# Patient Record
Sex: MALE | Race: WHITE | Employment: OTHER | URBAN - METROPOLITAN AREA
[De-identification: names, ages, dates, MRNs, and addresses within clinical notes are randomized per-mention and may not be internally consistent; named-entity substitution may affect disease eponyms.]

---

## 2017-04-01 ENCOUNTER — APPOINTMENT (OUTPATIENT)
Dept: GENERAL RADIOLOGY | Age: 72
DRG: 271 | End: 2017-04-01
Attending: EMERGENCY MEDICINE
Payer: MEDICARE

## 2017-04-01 ENCOUNTER — APPOINTMENT (OUTPATIENT)
Dept: CT IMAGING | Age: 72
DRG: 271 | End: 2017-04-01
Attending: EMERGENCY MEDICINE
Payer: MEDICARE

## 2017-04-01 ENCOUNTER — HOSPITAL ENCOUNTER (INPATIENT)
Age: 72
LOS: 13 days | Discharge: HOME HEALTH | DRG: 271 | End: 2017-04-14
Attending: EMERGENCY MEDICINE | Admitting: SURGERY
Payer: MEDICARE

## 2017-04-01 DIAGNOSIS — R11.2 INTRACTABLE VOMITING WITH NAUSEA, UNSPECIFIED VOMITING TYPE: ICD-10-CM

## 2017-04-01 DIAGNOSIS — N17.9 ACUTE RENAL FAILURE, UNSPECIFIED ACUTE RENAL FAILURE TYPE (HCC): ICD-10-CM

## 2017-04-01 DIAGNOSIS — K43.9 VENTRAL HERNIA WITHOUT OBSTRUCTION OR GANGRENE: ICD-10-CM

## 2017-04-01 DIAGNOSIS — I71.40 ABDOMINAL AORTIC ANEURYSM (AAA) GREATER THAN 5.5 CM IN DIAMETER IN MALE: Primary | ICD-10-CM

## 2017-04-01 LAB
ALBUMIN SERPL BCP-MCNC: 3.4 G/DL (ref 3.5–5)
ALBUMIN/GLOB SERPL: 0.9 {RATIO} (ref 1.1–2.2)
ALP SERPL-CCNC: 92 U/L (ref 45–117)
ALT SERPL-CCNC: 15 U/L (ref 12–78)
ANION GAP BLD CALC-SCNC: 12 MMOL/L (ref 5–15)
AST SERPL W P-5'-P-CCNC: 9 U/L (ref 15–37)
BASOPHILS # BLD AUTO: 0.1 K/UL (ref 0–0.1)
BASOPHILS # BLD: 0 % (ref 0–1)
BILIRUB SERPL-MCNC: 0.6 MG/DL (ref 0.2–1)
BUN SERPL-MCNC: 68 MG/DL (ref 6–20)
BUN/CREAT SERPL: 39 (ref 12–20)
CALCIUM SERPL-MCNC: 9.3 MG/DL (ref 8.5–10.1)
CHLORIDE SERPL-SCNC: 111 MMOL/L (ref 97–108)
CO2 SERPL-SCNC: 22 MMOL/L (ref 21–32)
CREAT SERPL-MCNC: 1.76 MG/DL (ref 0.7–1.3)
EOSINOPHIL # BLD: 0.1 K/UL (ref 0–0.4)
EOSINOPHIL NFR BLD: 1 % (ref 0–7)
ERYTHROCYTE [DISTWIDTH] IN BLOOD BY AUTOMATED COUNT: 14 % (ref 11.5–14.5)
GLOBULIN SER CALC-MCNC: 3.8 G/DL (ref 2–4)
GLUCOSE SERPL-MCNC: 131 MG/DL (ref 65–100)
HCT VFR BLD AUTO: 40.6 % (ref 36.6–50.3)
HGB BLD-MCNC: 13.9 G/DL (ref 12.1–17)
INR PPP: 1.1 (ref 0.9–1.1)
LACTATE SERPL-SCNC: 1.2 MMOL/L (ref 0.4–2)
LIPASE SERPL-CCNC: 146 U/L (ref 73–393)
LYMPHOCYTES # BLD AUTO: 13 % (ref 12–49)
LYMPHOCYTES # BLD: 1.6 K/UL (ref 0.8–3.5)
MCH RBC QN AUTO: 32.3 PG (ref 26–34)
MCHC RBC AUTO-ENTMCNC: 34.2 G/DL (ref 30–36.5)
MCV RBC AUTO: 94.4 FL (ref 80–99)
MONOCYTES # BLD: 1 K/UL (ref 0–1)
MONOCYTES NFR BLD AUTO: 9 % (ref 5–13)
NEUTS SEG # BLD: 9.3 K/UL (ref 1.8–8)
NEUTS SEG NFR BLD AUTO: 77 % (ref 32–75)
PLATELET # BLD AUTO: 191 K/UL (ref 150–400)
POTASSIUM SERPL-SCNC: 4.4 MMOL/L (ref 3.5–5.1)
PROT SERPL-MCNC: 7.2 G/DL (ref 6.4–8.2)
PROTHROMBIN TIME: 11.1 SEC (ref 9–11.1)
RBC # BLD AUTO: 4.3 M/UL (ref 4.1–5.7)
SODIUM SERPL-SCNC: 145 MMOL/L (ref 136–145)
WBC # BLD AUTO: 12.1 K/UL (ref 4.1–11.1)

## 2017-04-01 PROCEDURE — 85025 COMPLETE CBC W/AUTO DIFF WBC: CPT | Performed by: EMERGENCY MEDICINE

## 2017-04-01 PROCEDURE — 83605 ASSAY OF LACTIC ACID: CPT | Performed by: EMERGENCY MEDICINE

## 2017-04-01 PROCEDURE — 80053 COMPREHEN METABOLIC PANEL: CPT | Performed by: EMERGENCY MEDICINE

## 2017-04-01 PROCEDURE — 85610 PROTHROMBIN TIME: CPT | Performed by: EMERGENCY MEDICINE

## 2017-04-01 PROCEDURE — 65610000006 HC RM INTENSIVE CARE

## 2017-04-01 PROCEDURE — 83690 ASSAY OF LIPASE: CPT | Performed by: EMERGENCY MEDICINE

## 2017-04-01 PROCEDURE — 96375 TX/PRO/DX INJ NEW DRUG ADDON: CPT

## 2017-04-01 PROCEDURE — 74011250636 HC RX REV CODE- 250/636: Performed by: EMERGENCY MEDICINE

## 2017-04-01 PROCEDURE — 96376 TX/PRO/DX INJ SAME DRUG ADON: CPT

## 2017-04-01 PROCEDURE — 74011000258 HC RX REV CODE- 258: Performed by: SURGERY

## 2017-04-01 PROCEDURE — 86920 COMPATIBILITY TEST SPIN: CPT | Performed by: EMERGENCY MEDICINE

## 2017-04-01 PROCEDURE — 74176 CT ABD & PELVIS W/O CONTRAST: CPT

## 2017-04-01 PROCEDURE — 74011000250 HC RX REV CODE- 250: Performed by: SURGERY

## 2017-04-01 PROCEDURE — 74022 RADEX COMPL AQT ABD SERIES: CPT

## 2017-04-01 PROCEDURE — 36415 COLL VENOUS BLD VENIPUNCTURE: CPT | Performed by: EMERGENCY MEDICINE

## 2017-04-01 PROCEDURE — 74011250636 HC RX REV CODE- 250/636: Performed by: SURGERY

## 2017-04-01 PROCEDURE — 86900 BLOOD TYPING SEROLOGIC ABO: CPT | Performed by: EMERGENCY MEDICINE

## 2017-04-01 PROCEDURE — 96374 THER/PROPH/DIAG INJ IV PUSH: CPT

## 2017-04-01 PROCEDURE — 93005 ELECTROCARDIOGRAM TRACING: CPT

## 2017-04-01 PROCEDURE — 96361 HYDRATE IV INFUSION ADD-ON: CPT

## 2017-04-01 PROCEDURE — 99284 EMERGENCY DEPT VISIT MOD MDM: CPT

## 2017-04-01 RX ORDER — METOCLOPRAMIDE HYDROCHLORIDE 5 MG/ML
10 INJECTION INTRAMUSCULAR; INTRAVENOUS
Status: COMPLETED | OUTPATIENT
Start: 2017-04-01 | End: 2017-04-01

## 2017-04-01 RX ORDER — ONDANSETRON 2 MG/ML
4 INJECTION INTRAMUSCULAR; INTRAVENOUS
Status: COMPLETED | OUTPATIENT
Start: 2017-04-01 | End: 2017-04-01

## 2017-04-01 RX ORDER — MORPHINE SULFATE 2 MG/ML
6 INJECTION, SOLUTION INTRAMUSCULAR; INTRAVENOUS
Status: COMPLETED | OUTPATIENT
Start: 2017-04-01 | End: 2017-04-01

## 2017-04-01 RX ORDER — ESMOLOL HYDROCHLORIDE 10 MG/ML
50 INJECTION, SOLUTION INTRAVENOUS
Status: DISCONTINUED | OUTPATIENT
Start: 2017-04-01 | End: 2017-04-04

## 2017-04-01 RX ORDER — SODIUM CHLORIDE 9 MG/ML
250 INJECTION, SOLUTION INTRAVENOUS AS NEEDED
Status: DISCONTINUED | OUTPATIENT
Start: 2017-04-01 | End: 2017-04-03 | Stop reason: ALTCHOICE

## 2017-04-01 RX ORDER — ONDANSETRON 2 MG/ML
4 INJECTION INTRAMUSCULAR; INTRAVENOUS
Status: DISCONTINUED | OUTPATIENT
Start: 2017-04-01 | End: 2017-04-02

## 2017-04-01 RX ORDER — FENTANYL CITRATE 50 UG/ML
50 INJECTION, SOLUTION INTRAMUSCULAR; INTRAVENOUS
Status: COMPLETED | OUTPATIENT
Start: 2017-04-01 | End: 2017-04-01

## 2017-04-01 RX ORDER — SODIUM CHLORIDE 450 MG/100ML
100 INJECTION, SOLUTION INTRAVENOUS CONTINUOUS
Status: DISCONTINUED | OUTPATIENT
Start: 2017-04-01 | End: 2017-04-02

## 2017-04-01 RX ORDER — IBUPROFEN 200 MG
1 TABLET ORAL DAILY
Status: DISCONTINUED | OUTPATIENT
Start: 2017-04-02 | End: 2017-04-14 | Stop reason: HOSPADM

## 2017-04-01 RX ADMIN — ONDANSETRON HYDROCHLORIDE 4 MG: 2 INJECTION, SOLUTION INTRAMUSCULAR; INTRAVENOUS at 21:25

## 2017-04-01 RX ADMIN — ONDANSETRON HYDROCHLORIDE 4 MG: 2 INJECTION, SOLUTION INTRAMUSCULAR; INTRAVENOUS at 23:29

## 2017-04-01 RX ADMIN — FENTANYL CITRATE 50 MCG: 50 INJECTION, SOLUTION INTRAMUSCULAR; INTRAVENOUS at 21:25

## 2017-04-01 RX ADMIN — SODIUM CHLORIDE 100 ML/HR: 450 INJECTION, SOLUTION INTRAVENOUS at 22:51

## 2017-04-01 RX ADMIN — ESMOLOL HYDROCHLORIDE 50 MCG/KG/MIN: 10 INJECTION INTRAVENOUS at 23:14

## 2017-04-01 RX ADMIN — Medication 6 MG: at 18:55

## 2017-04-01 RX ADMIN — SODIUM CHLORIDE 1000 ML: 900 INJECTION, SOLUTION INTRAVENOUS at 18:55

## 2017-04-01 RX ADMIN — METOCLOPRAMIDE 10 MG: 5 INJECTION, SOLUTION INTRAMUSCULAR; INTRAVENOUS at 19:38

## 2017-04-01 RX ADMIN — ONDANSETRON HYDROCHLORIDE 4 MG: 2 INJECTION, SOLUTION INTRAMUSCULAR; INTRAVENOUS at 18:54

## 2017-04-01 NOTE — ED PROVIDER NOTES
HPI Comments: Ari Taylor is a 66 y.o. M with PMHx significant for BPH / CAD / HTN who presents in wheelchair to ED [facility] ED c/o diffuse abdominal pain (10/10), nausea and vomiting x this morning. Per family, pt had feculent smelling vomit. Pt has a ventral hernia that he notes has not been worked up. He reports a chronic cough, no change today. Patient reports current 3 ppd tobacco use. He denies any hx of abdominal surgeries. Pt specifically denies any fever, SOB or chest pain. PCP: Stephen Espino NP    There are no other complaints, changes, or physical findings at this time. The history is provided by the patient and a relative. Past Medical History:   Diagnosis Date    BPH (benign prostatic hyperplasia)     CAD (coronary artery disease)     2006 - s/p stent     Essential hypertension 12-12-13    Hematuria     sees urologist    Hyperglycemia        Past Surgical History:   Procedure Laterality Date    CARDIAC SURG PROCEDURE UNLIST      stent 2006         History reviewed. No pertinent family history. Social History     Social History    Marital status:      Spouse name: N/A    Number of children: N/A    Years of education: N/A     Occupational History    Not on file. Social History Main Topics    Smoking status: Current Every Day Smoker     Packs/day: 3.00     Years: 53.00    Smokeless tobacco: Not on file    Alcohol use No    Drug use: Not on file    Sexual activity: Not on file     Other Topics Concern    Not on file     Social History Narrative         ALLERGIES: Lipitor [atorvastatin]    Review of Systems   Constitutional: Negative. Negative for appetite change, chills, fatigue and fever. HENT: Negative. Negative for congestion, rhinorrhea, sinus pressure and sore throat. Eyes: Negative. Respiratory: Negative. Negative for cough, choking, chest tightness, shortness of breath and wheezing. Cardiovascular: Negative.   Negative for chest pain, palpitations and leg swelling. Gastrointestinal: Positive for abdominal pain, nausea and vomiting. Negative for constipation and diarrhea. Endocrine: Negative. Genitourinary: Negative. Negative for difficulty urinating, dysuria, flank pain and urgency. Musculoskeletal:        Positive for ventral hernia   Skin: Negative. Neurological: Negative. Negative for dizziness, speech difficulty, weakness, light-headedness, numbness and headaches. Psychiatric/Behavioral: Negative. All other systems reviewed and are negative. Patient Vitals for the past 12 hrs:   Temp Pulse Resp BP SpO2   04/01/17 2215 - 81 - 126/48 96 %   04/01/17 2125 - 82 20 126/67 94 %   04/01/17 2102 - - - 144/86 95 %   04/01/17 2000 - - - 112/76 96 %   04/01/17 1914 - - - - 98 %   04/01/17 1802 97.4 °F (36.3 °C) 87 18 146/88 98 %            Physical Exam   Constitutional: He is oriented to person, place, and time. He appears well-developed and well-nourished. No distress. HENT:   Head: Normocephalic and atraumatic. Mouth/Throat: Oropharynx is clear and moist. No oropharyngeal exudate. Eyes: Conjunctivae and EOM are normal. Pupils are equal, round, and reactive to light. Neck: Normal range of motion. Neck supple. No JVD present. No tracheal deviation present. Cardiovascular: Normal rate, regular rhythm, normal heart sounds and intact distal pulses. No murmur heard. Pulmonary/Chest: Effort normal and breath sounds normal. No stridor. No respiratory distress. He has no wheezes. He has no rales. He exhibits no tenderness. Abdominal: Soft. He exhibits no distension. There is no tenderness. There is no rebound and no guarding. Obese male, hernia ventral abdomen, mild ttp, no bowel sounds to hernia   Musculoskeletal: Normal range of motion. He exhibits no edema or tenderness. Neurological: He is alert and oriented to person, place, and time. No cranial nerve deficit.    No gross motor or sensory deficits    Skin: Skin is warm and dry. He is not diaphoretic. Psychiatric: He has a normal mood and affect. His behavior is normal.   Nursing note and vitals reviewed. MDM  Number of Diagnoses or Management Options  Abdominal aortic aneurysm (AAA) greater than 5.5 cm in diameter in male [facility]):   Acute renal failure, unspecified acute renal failure type [facility]): Intractable vomiting with nausea, unspecified vomiting type:   Ventral hernia without obstruction or gangrene:   Diagnosis management comments: DDx: Bowel obstruction, Incarcerated hernia, Strangulated hernia, Pancreatitis. Amount and/or Complexity of Data Reviewed  Clinical lab tests: ordered and reviewed  Tests in the radiology section of CPT®: ordered and reviewed  Review and summarize past medical records: yes  Discuss the patient with other providers: yes (Dr. Linus Beavers. Yoselyn Fu)    Patient Progress  Patient progress: stable    ED Course       Procedures     ED EKG interpretation:  Rhythm: normal sinus rhythm; and regular . Rate (approx.): 81; Axis: normal; P wave: normal; QRS interval: normal ; ST/T wave: normal; This EKG was interpreted by Titus Gómez DO,ED Provider. CONSULT NOTE:   9:11 PM  Titus Gómez DO spoke with Dr. Linus Beavers. Yoselyn Fu,   Specialty: Vascular Surgery  Discussed pt's hx, disposition, and available diagnostic and imaging results. Reviewed care plans. Consultant agrees with plans as outlined. Consultant will come and evaluate patient. Written by Naomi Horn. Thalia Foote ED Scribe, as dictated by Titus Gómez DO.    CONSULT NOTE:   10:28 PM  Titus Gmóez DO spoke with Dr. Linus Beavers. Yoselyn Fu,   Specialty: Vascular Surgery  Discussed pt's hx, disposition, and available diagnostic and imaging results. Reviewed care plans. Consultant agrees with plans as outlined. Consultant will admit patient. Written by Naomi Horn.  Thalia Foote ED Scribe, as dictated by Titus Gómez DO.    LABORATORY TESTS:  Recent Results (from the past 12 hour(s))   CBC WITH AUTOMATED DIFF Collection Time: 04/01/17  6:56 PM   Result Value Ref Range    WBC 12.1 (H) 4.1 - 11.1 K/uL    RBC 4.30 4. 10 - 5.70 M/uL    HGB 13.9 12.1 - 17.0 g/dL    HCT 40.6 36.6 - 50.3 %    MCV 94.4 80.0 - 99.0 FL    MCH 32.3 26.0 - 34.0 PG    MCHC 34.2 30.0 - 36.5 g/dL    RDW 14.0 11.5 - 14.5 %    PLATELET 641 728 - 094 K/uL    NEUTROPHILS 77 (H) 32 - 75 %    LYMPHOCYTES 13 12 - 49 %    MONOCYTES 9 5 - 13 %    EOSINOPHILS 1 0 - 7 %    BASOPHILS 0 0 - 1 %    ABS. NEUTROPHILS 9.3 (H) 1.8 - 8.0 K/UL    ABS. LYMPHOCYTES 1.6 0.8 - 3.5 K/UL    ABS. MONOCYTES 1.0 0.0 - 1.0 K/UL    ABS. EOSINOPHILS 0.1 0.0 - 0.4 K/UL    ABS. BASOPHILS 0.1 0.0 - 0.1 K/UL   METABOLIC PANEL, COMPREHENSIVE    Collection Time: 04/01/17  6:56 PM   Result Value Ref Range    Sodium 145 136 - 145 mmol/L    Potassium 4.4 3.5 - 5.1 mmol/L    Chloride 111 (H) 97 - 108 mmol/L    CO2 22 21 - 32 mmol/L    Anion gap 12 5 - 15 mmol/L    Glucose 131 (H) 65 - 100 mg/dL    BUN 68 (H) 6 - 20 MG/DL    Creatinine 1.76 (H) 0.70 - 1.30 MG/DL    BUN/Creatinine ratio 39 (H) 12 - 20      GFR est AA 46 (L) >60 ml/min/1.73m2    GFR est non-AA 38 (L) >60 ml/min/1.73m2    Calcium 9.3 8.5 - 10.1 MG/DL    Bilirubin, total 0.6 0.2 - 1.0 MG/DL    ALT (SGPT) 15 12 - 78 U/L    AST (SGOT) 9 (L) 15 - 37 U/L    Alk.  phosphatase 92 45 - 117 U/L    Protein, total 7.2 6.4 - 8.2 g/dL    Albumin 3.4 (L) 3.5 - 5.0 g/dL    Globulin 3.8 2.0 - 4.0 g/dL    A-G Ratio 0.9 (L) 1.1 - 2.2     LIPASE    Collection Time: 04/01/17  6:56 PM   Result Value Ref Range    Lipase 146 73 - 393 U/L   PROTHROMBIN TIME + INR    Collection Time: 04/01/17  6:56 PM   Result Value Ref Range    INR 1.1 0.9 - 1.1      Prothrombin time 11.1 9.0 - 11.1 sec   LACTIC ACID, PLASMA    Collection Time: 04/01/17  6:56 PM   Result Value Ref Range    Lactic acid 1.2 0.4 - 2.0 MMOL/L       IMAGING RESULTS:  CT ABD PELV WO CONT   Final Result   INDICATION: severe abdominal pain, n/v, ventral hernia     COMPARISON: Earlier radiographs     TECHNIQUE:   Unenhanced axial images were obtained through the abdomen and pelvis. Coronal  and sagittal reconstructions were generated. Oral contrast was not administered. CT dose reduction was achieved through use of a standardized protocol tailored  for this examination and automatic exposure control for dose modulation.      The absence of intravenous and oral contrast material reduces the capacity of CT  to evaluate the solid parenchymal organs of the abdomen and pelvis as well as  the bowel.      FINDINGS:   LUNG BASES: Clear. INCIDENTALLY IMAGED HEART AND MEDIASTINUM: Vascular calcifications at the aortic  root and within the right coronary and left anterior descending coronary  arteries. LIVER: No mass or biliary dilatation. GALLBLADDER: Unremarkable. SPLEEN: No mass. PANCREAS: No mass or ductal dilatation. ADRENALS: Unremarkable. KIDNEYS/URETERS: Several tiny nonobstructing bilateral renal calculi. No renal  pelvocaliectasis. Several bilateral nonspecific lesions of the kidneys measuring  up to 1.9 cm in size, one of which is hyperdense in the posterior right mid pole  measuring 1.1 cm in size. STOMACH: Nondistended. SMALL BOWEL: Nondistended. COLON: Nondistended. APPENDIX: Unremarkable. PERITONEUM: No free peroneal air or fluid. RETROPERITONEUM: Renal and infrarenal fusiform abdominal aortic aneurysm  measuring up to 8.4 cm AP, extending to level of aortic bifurcation. Right  common iliac artery aneurysm measuring 2.9 cm. URINARY BLADDER: No mass or calculus. BONES: Moderate lumbosacral junction, mild lumbar and mild lower thoracic spine  degenerative changes. Mild bilateral hip and SI joint osteoarthrosis. Moderate  degenerative changes at symphysis pubis. ADDITIONAL COMMENTS: Superior periumbilical hernia containing fat, with orifice  measuring 2.2 cm.     IMPRESSION  IMPRESSION:     1. Renal and infrarenal abdominal aortic aneurysm measuring up to 8.4 cm AP.   2. Right common iliac artery aneurysm measuring 2.9 cm.  3. Peripheral vertical hernia containing fat. 4. Subtle nonspecific renal lesions present bilaterally measuring up to 1.9 cm,  one of which is hyperattenuating. XR ABD ACUTE W 1 V CHEST   Final Result   EXAM: XR ABD ACUTE W 1 V CHEST     INDICATION: Abdominal Pain     COMPARISON: None.     FINDINGS: The upright chest radiograph demonstrates clear lungs and normal  cardiac and mediastinal contours. There is no pleural effusion or free air under  the diaphragm.     Supine and upright views of the abdomen demonstrate a nonobstructive bowel gas  pattern. There is no free intraperitoneal air. No soft tissue masses or  pathologic calcifications are identified. Mild lumbar and thoracic spine  degenerative changes are shown.     IMPRESSION  IMPRESSION: No evidence for bowel obstruction or free intraperineal air. MEDICATIONS GIVEN:  Medications   sodium chloride 0.9 % bolus infusion 1,000 mL (0 mL IntraVENous IV Completed 4/1/17 2120)   morphine injection 6 mg (6 mg IntraVENous Given 4/1/17 1855)   ondansetron (ZOFRAN) injection 4 mg (4 mg IntraVENous Given 4/1/17 1854)   metoclopramide HCl (REGLAN) injection 10 mg (10 mg IntraVENous Given 4/1/17 1938)   ondansetron (ZOFRAN) injection 4 mg (4 mg IntraVENous Given 4/1/17 2125)   fentaNYL citrate (PF) injection 50 mcg (50 mcg IntraVENous Given 4/1/17 2125)       IMPRESSION:  1. Abdominal aortic aneurysm (AAA) greater than 5.5 cm in diameter in [facility])    2. Ventral hernia without obstruction or gangrene    3. Intractable vomiting with nausea, unspecified vomiting type    4. Acute renal failure, unspecified acute renal failure type ([address]        PLAN:  1. Admit to hospital.    10:28 PM  Patient is being admitted to the hospital by Dr. Noemi Calles. Matilde Yost. The results of their tests and reasons for their admission have been discussed with them and/or available family.   They convey agreement and understanding for the need to be admitted and for their admission diagnosis. Consultation has been made with the inpatient physician specialist for hospitalization. Written by Rubi Paz. Bill Casper ED Scribe, as dictated by New So DO. Attestations: This note is prepared by Rubi Paz. Lianna, acting as Scribe for New So, [address] [place] [address]: The scribe's documentation has been prepared under my direction and personally reviewed by me in its entirety. I confirm that the note above accurately reflects all work, treatment, procedures, and medical decision making performed by me.

## 2017-04-01 NOTE — IP AVS SNAPSHOT
[address] [facility] [phone] Patient: Claudell Leyland MRN: [id number] KCZ:[id number] You are allergic to the following Allergen Reactions Lipitor (Atorvastatin) Other (comments) \"muscle pain\" Recent Documentation Height Weight BMI Smoking Status 1.753 m 111.7 kg 36.37 kg/m2 Current Every Day Smoker Emergency Contacts Name Discharge Info Relation Home Work Mobile [id number] [facility]  Spouse [3] [phone] Nellie Iniguez  Child [2] [phone] About your hospitalization You were admitted on:  April 1, 2017 You last received care in the:  [facility] 2 GENERAL SURGERY You were discharged on:  April 14, 2017 Unit phone number:  [phone] Why you were hospitalized Your primary diagnosis was:  Not on File Your diagnoses also included:  Aaa (Abdominal Aortic Aneurysm) ([facility]), Tobacco Use Disorder, Dyslipidemia, Cad (Coronary Artery Disease), Pad (Peripheral Artery Disease) ([facility]), Carotid Stenosis, Right, S/P Ptca (Percutaneous Transluminal Coronary Angioplasty) Providers Seen During Your Hospitalizations Provider Role Specialty Primary office phone Kiana Hernandez DO Attending Provider Emergency Medicine [phone] Rosamaria Avina MD Attending Provider General and Vascular Surgery [phone] Your Primary Care Physician (PCP) Primary Care Physician Office Phone Office Fax Tr Holley [phone] Follow-up Information Follow up With Details Comments Contact Info Brody Burgess MD Schedule an appointment as soon as possible for a visit in 3 weeks  [address]. [phone] Nabil Divers, NP   [address] [facility] [address] [place] [address] 
[phone] Current Discharge Medication List  
  
 START taking these medications Dose & Instructions Dispensing Information Comments Morning Noon Evening Bedtime HYDROcodone-acetaminophen 5-325 mg per tablet Commonly known as:  Juanitahonorio Zimmermanle Your last dose was: Your next dose is:    
   
   
 Dose:  1 Tab Take 1 Tab by mouth every four (4) hours as needed for Pain. Max Daily Amount: 6 Tabs. Quantity:  20 Tab Refills:  0  
     
   
   
   
  
 zolpidem 5 mg tablet Commonly known as:  AMBIEN Your last dose was: Your next dose is:    
   
   
 Dose:  5 mg Take 1 Tab by mouth nightly as needed for Sleep. Max Daily Amount: 5 mg. Quantity:  30 Tab Refills:  0 CONTINUE these medications which have NOT CHANGED Dose & Instructions Dispensing Information Comments Morning Noon Evening Bedtime  
 aspirin delayed-release 81 mg tablet Your last dose was: Your next dose is:    
   
   
 Dose:  162 mg Take 2 Tabs by mouth daily. Quantity:  100 Tab Refills:  2  
     
   
   
   
  
 atorvastatin 10 mg tablet Commonly known as:  LIPITOR Your last dose was: Your next dose is: Take  by mouth daily. Refills:  0  
     
   
   
   
  
 hydroCHLOROthiazide 25 mg tablet Commonly known as:  HYDRODIURIL Your last dose was: Your next dose is:    
   
   
 Dose:  25 mg Take 25 mg by mouth daily. Refills:  0 PLAVIX 75 mg Tab Generic drug:  clopidogrel Your last dose was: Your next dose is: Take  by mouth daily. Refills:  0 PROTONIX 40 mg tablet Generic drug:  pantoprazole Your last dose was: Your next dose is:    
   
   
 Dose:  40 mg Take 40 mg by mouth daily. Refills:  0 Where to Get Your Medications Information on where to get these meds will be given to you by the nurse or doctor. ! Ask your nurse or doctor about these medications HYDROcodone-acetaminophen 5-325 mg per tablet  
 zolpidem 5 mg tablet Discharge Instructions Patient Discharge Instructions Kamala Avilez / [id number] : 1945 Admitted 2017 Discharged: 2017 What to do at [facility] May shower No driving, lifting, or straining Need  for PT and nursing visits Information obtained by : 
I understand that if any problems occur once I am at home I am to contact my physician. I understand and acknowledge receipt of the instructions indicated above. R.N.'s Signature                                                                  Date/Time Patient or Representative Signature                                                          Date/Time Alyx Chávez MD 
 
 
Discharge Orders None Introducing [facility]! [facility] introduces [employer] patient portal. Now you can access parts of your medical record, email your doctor's office, and request medication refills online. 1. In your internet browser, go to [email or web address] 2. Click on the First Time User? Click Here link in the Sign In box. You will see the New Member Sign Up page. 3. Enter your [employer] Access Code exactly as it appears below. You will not need to use this code after youve completed the sign-up process. If you do not sign up before the expiration date, you must request a new code. · [employer] Access Code: [email or web address] Expires: 2017  6:26 PM 
 
4.  Enter the last four digits of your Social Security Number (xxxx) and Date of Birth (mm/dd/yyyy) as indicated and click Submit. You will be taken to the next sign-up page. 5. Create a [employer]ck ID. This will be your [employer] login ID and cannot be changed, so think of one that is secure and easy to remember. 6. Create a [employer]ck password. You can change your password at any time. 7. Enter your Password Reset Question and Answer. This can be used at a later time if you forget your password. 8. Enter your e-mail address. You will receive e-mail notification when new information is available in [address] 9. Click Sign Up. You can now view and download portions of your medical record. 10. Click the Download Summary menu link to download a portable copy of your medical information. If you have questions, please visit the Frequently Asked Questions section of the [employer] website. Remember, [employer] is NOT to be used for urgent needs. For medical emergencies, dial 911. Now available from your iPhone and Android! General Information Please provide this summary of care documentation to your next provider. Patient Signature:  ____________________________________________________________ Date:  ____________________________________________________________  
  
Kayleigh Cons Provider Signature:  ____________________________________________________________ Date:  ____________________________________________________________

## 2017-04-01 NOTE — IP AVS SNAPSHOT
Current Discharge Medication List  
  
START taking these medications Dose & Instructions Dispensing Information Comments Morning Noon Evening Bedtime HYDROcodone-acetaminophen 5-325 mg per tablet Commonly known as:  Franck Porter Your last dose was: Your next dose is:    
   
   
 Dose:  1 Tab Take 1 Tab by mouth every four (4) hours as needed for Pain. Max Daily Amount: 6 Tabs. Quantity:  20 Tab Refills:  0  
     
   
   
   
  
 zolpidem 5 mg tablet Commonly known as:  AMBIEN Your last dose was: Your next dose is:    
   
   
 Dose:  5 mg Take 1 Tab by mouth nightly as needed for Sleep. Max Daily Amount: 5 mg. Quantity:  30 Tab Refills:  0 CONTINUE these medications which have NOT CHANGED Dose & Instructions Dispensing Information Comments Morning Noon Evening Bedtime  
 aspirin delayed-release 81 mg tablet Your last dose was: Your next dose is:    
   
   
 Dose:  162 mg Take 2 Tabs by mouth daily. Quantity:  100 Tab Refills:  2  
     
   
   
   
  
 atorvastatin 10 mg tablet Commonly known as:  LIPITOR Your last dose was: Your next dose is: Take  by mouth daily. Refills:  0  
     
   
   
   
  
 hydroCHLOROthiazide 25 mg tablet Commonly known as:  HYDRODIURIL Your last dose was: Your next dose is:    
   
   
 Dose:  25 mg Take 25 mg by mouth daily. Refills:  0 PLAVIX 75 mg Tab Generic drug:  clopidogrel Your last dose was: Your next dose is: Take  by mouth daily. Refills:  0 PROTONIX 40 mg tablet Generic drug:  pantoprazole Your last dose was: Your next dose is:    
   
   
 Dose:  40 mg Take 40 mg by mouth daily. Refills:  0 Where to Get Your Medications Information on where to get these meds will be given to you by the nurse or doctor. ! Ask your nurse or doctor about these medications HYDROcodone-acetaminophen 5-325 mg per tablet  
 zolpidem 5 mg tablet

## 2017-04-02 ENCOUNTER — APPOINTMENT (OUTPATIENT)
Dept: GENERAL RADIOLOGY | Age: 72
DRG: 271 | End: 2017-04-02
Attending: FAMILY MEDICINE
Payer: MEDICARE

## 2017-04-02 ENCOUNTER — APPOINTMENT (OUTPATIENT)
Dept: ULTRASOUND IMAGING | Age: 72
DRG: 271 | End: 2017-04-02
Attending: INTERNAL MEDICINE
Payer: MEDICARE

## 2017-04-02 PROBLEM — I65.21 CAROTID STENOSIS, RIGHT: Status: ACTIVE | Noted: 2017-04-02

## 2017-04-02 PROBLEM — Z98.61 S/P PTCA (PERCUTANEOUS TRANSLUMINAL CORONARY ANGIOPLASTY): Status: ACTIVE | Noted: 2017-04-02

## 2017-04-02 PROBLEM — I73.9 PAD (PERIPHERAL ARTERY DISEASE) (HCC): Status: ACTIVE | Noted: 2017-04-02

## 2017-04-02 LAB
ALBUMIN SERPL BCP-MCNC: 2.9 G/DL (ref 3.5–5)
ALBUMIN/GLOB SERPL: 0.9 {RATIO} (ref 1.1–2.2)
ALP SERPL-CCNC: 77 U/L (ref 45–117)
ALT SERPL-CCNC: 13 U/L (ref 12–78)
ANION GAP BLD CALC-SCNC: 10 MMOL/L (ref 5–15)
ANION GAP BLD CALC-SCNC: 11 MMOL/L (ref 5–15)
APPEARANCE UR: CLEAR
AST SERPL W P-5'-P-CCNC: 13 U/L (ref 15–37)
ATRIAL RATE: 81 BPM
BACTERIA URNS QL MICRO: NEGATIVE /HPF
BASOPHILS # BLD AUTO: 0 K/UL (ref 0–0.1)
BASOPHILS # BLD: 0 % (ref 0–1)
BILIRUB SERPL-MCNC: 0.6 MG/DL (ref 0.2–1)
BILIRUB UR QL: NEGATIVE
BUN SERPL-MCNC: 69 MG/DL (ref 6–20)
BUN SERPL-MCNC: 78 MG/DL (ref 6–20)
BUN/CREAT SERPL: 41 (ref 12–20)
BUN/CREAT SERPL: 45 (ref 12–20)
CALCIUM SERPL-MCNC: 8 MG/DL (ref 8.5–10.1)
CALCIUM SERPL-MCNC: 8.5 MG/DL (ref 8.5–10.1)
CALCULATED P AXIS, ECG09: 58 DEGREES
CALCULATED R AXIS, ECG10: 19 DEGREES
CALCULATED T AXIS, ECG11: 61 DEGREES
CHLORIDE SERPL-SCNC: 116 MMOL/L (ref 97–108)
CHLORIDE SERPL-SCNC: 116 MMOL/L (ref 97–108)
CHLORIDE UR-SCNC: 24 MMOL/L
CK SERPL-CCNC: 49 U/L (ref 39–308)
CO2 SERPL-SCNC: 18 MMOL/L (ref 21–32)
CO2 SERPL-SCNC: 20 MMOL/L (ref 21–32)
COLOR UR: ABNORMAL
CREAT SERPL-MCNC: 1.67 MG/DL (ref 0.7–1.3)
CREAT SERPL-MCNC: 1.75 MG/DL (ref 0.7–1.3)
CREAT UR-MCNC: 129 MG/DL
DIAGNOSIS, 93000: NORMAL
EOSINOPHIL # BLD: 0.1 K/UL (ref 0–0.4)
EOSINOPHIL #/AREA URNS HPF: NEGATIVE /[HPF]
EOSINOPHIL NFR BLD: 1 % (ref 0–7)
EPITH CASTS URNS QL MICRO: ABNORMAL /LPF
ERYTHROCYTE [DISTWIDTH] IN BLOOD BY AUTOMATED COUNT: 14.2 % (ref 11.5–14.5)
GLOBULIN SER CALC-MCNC: 3.4 G/DL (ref 2–4)
GLUCOSE SERPL-MCNC: 111 MG/DL (ref 65–100)
GLUCOSE SERPL-MCNC: 123 MG/DL (ref 65–100)
GLUCOSE UR STRIP.AUTO-MCNC: NEGATIVE MG/DL
HCT VFR BLD AUTO: 30.7 % (ref 36.6–50.3)
HGB BLD-MCNC: 10.4 G/DL (ref 12.1–17)
HGB UR QL STRIP: NEGATIVE
HYALINE CASTS URNS QL MICRO: ABNORMAL /LPF (ref 0–5)
KETONES UR QL STRIP.AUTO: NEGATIVE MG/DL
LEUKOCYTE ESTERASE UR QL STRIP.AUTO: NEGATIVE
LYMPHOCYTES # BLD AUTO: 14 % (ref 12–49)
LYMPHOCYTES # BLD: 1.6 K/UL (ref 0.8–3.5)
MCH RBC QN AUTO: 32 PG (ref 26–34)
MCHC RBC AUTO-ENTMCNC: 33.9 G/DL (ref 30–36.5)
MCV RBC AUTO: 94.5 FL (ref 80–99)
MONOCYTES # BLD: 0.8 K/UL (ref 0–1)
MONOCYTES NFR BLD AUTO: 7 % (ref 5–13)
NEUTS SEG # BLD: 8.4 K/UL (ref 1.8–8)
NEUTS SEG NFR BLD AUTO: 78 % (ref 32–75)
NITRITE UR QL STRIP.AUTO: NEGATIVE
P-R INTERVAL, ECG05: 174 MS
PH UR STRIP: 5.5 [PH] (ref 5–8)
PHOSPHATE SERPL-MCNC: 1.9 MG/DL (ref 2.6–4.7)
PLATELET # BLD AUTO: 153 K/UL (ref 150–400)
POTASSIUM SERPL-SCNC: 3.9 MMOL/L (ref 3.5–5.1)
POTASSIUM SERPL-SCNC: 4.4 MMOL/L (ref 3.5–5.1)
PROT SERPL-MCNC: 6.3 G/DL (ref 6.4–8.2)
PROT UR STRIP-MCNC: 30 MG/DL
PROT UR-MCNC: 40 MG/DL (ref 0–11.9)
PROT/CREAT UR-RTO: 0.3
Q-T INTERVAL, ECG07: 370 MS
QRS DURATION, ECG06: 76 MS
QTC CALCULATION (BEZET), ECG08: 429 MS
RBC # BLD AUTO: 3.25 M/UL (ref 4.1–5.7)
RBC #/AREA URNS HPF: ABNORMAL /HPF (ref 0–5)
SODIUM SERPL-SCNC: 144 MMOL/L (ref 136–145)
SODIUM SERPL-SCNC: 147 MMOL/L (ref 136–145)
SODIUM UR-SCNC: 33 MMOL/L
SP GR UR REFRACTOMETRY: 1.02 (ref 1–1.03)
UA: UC IF INDICATED,UAUC: ABNORMAL
UROBILINOGEN UR QL STRIP.AUTO: 0.2 EU/DL (ref 0.2–1)
VENTRICULAR RATE, ECG03: 81 BPM
WBC # BLD AUTO: 10.8 K/UL (ref 4.1–11.1)
WBC URNS QL MICRO: ABNORMAL /HPF (ref 0–4)

## 2017-04-02 PROCEDURE — 82550 ASSAY OF CK (CPK): CPT | Performed by: INTERNAL MEDICINE

## 2017-04-02 PROCEDURE — 82436 ASSAY OF URINE CHLORIDE: CPT | Performed by: INTERNAL MEDICINE

## 2017-04-02 PROCEDURE — 84100 ASSAY OF PHOSPHORUS: CPT | Performed by: INTERNAL MEDICINE

## 2017-04-02 PROCEDURE — 74011250636 HC RX REV CODE- 250/636: Performed by: INTERNAL MEDICINE

## 2017-04-02 PROCEDURE — 74020 XR ABD (AP AND ERECT OR DECUB): CPT

## 2017-04-02 PROCEDURE — 85025 COMPLETE CBC W/AUTO DIFF WBC: CPT | Performed by: SURGERY

## 2017-04-02 PROCEDURE — 74011250636 HC RX REV CODE- 250/636: Performed by: SURGERY

## 2017-04-02 PROCEDURE — 77030032490 HC SLV COMPR SCD KNE COVD -B

## 2017-04-02 PROCEDURE — 93880 EXTRACRANIAL BILAT STUDY: CPT

## 2017-04-02 PROCEDURE — 80074 ACUTE HEPATITIS PANEL: CPT | Performed by: INTERNAL MEDICINE

## 2017-04-02 PROCEDURE — 80048 BASIC METABOLIC PNL TOTAL CA: CPT | Performed by: INTERNAL MEDICINE

## 2017-04-02 PROCEDURE — 74011250637 HC RX REV CODE- 250/637: Performed by: SURGERY

## 2017-04-02 PROCEDURE — 84156 ASSAY OF PROTEIN URINE: CPT | Performed by: INTERNAL MEDICINE

## 2017-04-02 PROCEDURE — 65610000006 HC RM INTENSIVE CARE

## 2017-04-02 PROCEDURE — 83883 ASSAY NEPHELOMETRY NOT SPEC: CPT | Performed by: INTERNAL MEDICINE

## 2017-04-02 PROCEDURE — 36415 COLL VENOUS BLD VENIPUNCTURE: CPT | Performed by: SURGERY

## 2017-04-02 PROCEDURE — 74011000250 HC RX REV CODE- 250: Performed by: SURGERY

## 2017-04-02 PROCEDURE — 77030034848

## 2017-04-02 PROCEDURE — 82784 ASSAY IGA/IGD/IGG/IGM EACH: CPT | Performed by: INTERNAL MEDICINE

## 2017-04-02 PROCEDURE — 74011250636 HC RX REV CODE- 250/636

## 2017-04-02 PROCEDURE — 76770 US EXAM ABDO BACK WALL COMP: CPT

## 2017-04-02 PROCEDURE — 74011000250 HC RX REV CODE- 250: Performed by: INTERNAL MEDICINE

## 2017-04-02 PROCEDURE — 87205 SMEAR GRAM STAIN: CPT | Performed by: INTERNAL MEDICINE

## 2017-04-02 PROCEDURE — 80053 COMPREHEN METABOLIC PANEL: CPT | Performed by: SURGERY

## 2017-04-02 PROCEDURE — 86225 DNA ANTIBODY NATIVE: CPT | Performed by: INTERNAL MEDICINE

## 2017-04-02 PROCEDURE — 84300 ASSAY OF URINE SODIUM: CPT | Performed by: INTERNAL MEDICINE

## 2017-04-02 PROCEDURE — 86038 ANTINUCLEAR ANTIBODIES: CPT | Performed by: INTERNAL MEDICINE

## 2017-04-02 PROCEDURE — 81001 URINALYSIS AUTO W/SCOPE: CPT | Performed by: SURGERY

## 2017-04-02 RX ORDER — PROCHLORPERAZINE EDISYLATE 5 MG/ML
INJECTION INTRAMUSCULAR; INTRAVENOUS
Status: DISPENSED
Start: 2017-04-02 | End: 2017-04-02

## 2017-04-02 RX ORDER — ONDANSETRON 2 MG/ML
4 INJECTION INTRAMUSCULAR; INTRAVENOUS
Status: DISPENSED | OUTPATIENT
Start: 2017-04-02 | End: 2017-04-07

## 2017-04-02 RX ORDER — MORPHINE SULFATE 2 MG/ML
INJECTION, SOLUTION INTRAMUSCULAR; INTRAVENOUS
Status: DISPENSED
Start: 2017-04-02 | End: 2017-04-02

## 2017-04-02 RX ORDER — MORPHINE SULFATE 2 MG/ML
2 INJECTION, SOLUTION INTRAMUSCULAR; INTRAVENOUS
Status: DISCONTINUED | OUTPATIENT
Start: 2017-04-02 | End: 2017-04-06

## 2017-04-02 RX ADMIN — ESMOLOL HYDROCHLORIDE 50 MCG/KG/MIN: 10 INJECTION INTRAVENOUS at 07:24

## 2017-04-02 RX ADMIN — Medication 2 MG: at 09:49

## 2017-04-02 RX ADMIN — PROCHLORPERAZINE EDISYLATE 5 MG: 5 INJECTION INTRAMUSCULAR; INTRAVENOUS at 01:22

## 2017-04-02 RX ADMIN — SODIUM BICARBONATE: 84 INJECTION, SOLUTION INTRAVENOUS at 09:57

## 2017-04-02 RX ADMIN — Medication 2 MG: at 01:22

## 2017-04-02 RX ADMIN — ONDANSETRON HYDROCHLORIDE 4 MG: 2 INJECTION, SOLUTION INTRAMUSCULAR; INTRAVENOUS at 04:09

## 2017-04-02 NOTE — ED NOTES
Paged Dr. Renetta Thapa regarding orders to titrate esmolol within parameters specified by his orders. Order reads to maintain systolic  or less. Pt is currently 99 systolic. Paged Dr. Renetta Thapa for clarification as to what he would like done if pt's BP reaches less than 90 systolic. Dr. Renetta Thapa requests the medication is titrated down or up to keep the pt btwn  systolic. Will continue to monitor pt.

## 2017-04-02 NOTE — PROGRESS NOTES
VSS overnight  Only complaints are nausea and periumbilical pain  He is very tender over incarcerated umbilical hernia - wonder if there is bowel rather than just fat and/or if fat infarcted?   BONNY occluded-L open  BUN/creat worse    Can't get CTA yet  Nephrology/Cards/Pulm consults  Will ask Gen Surgeon to see as I suspect current SSX from hernia rather than AAA  Her for ARF  Seq compression devices

## 2017-04-02 NOTE — PROGRESS NOTES
Paged Dr. Cade Khan regarding blood tinged urine in finley and bleeding from penis around finley. No new orders at this time, hematuria expected from finley insertion, patient has BPH and history of hematuria.

## 2017-04-02 NOTE — ED NOTES
Pt complaining of abdominal pain 9/10 and n/v.  Dr. Monica Coreas paged. Verbal orders received. Pt wants to have a bowel movement and refuses to use the bed pan. Pt advised that d/t his condition, pt needs to use bedpan. However pt insists on not using bedpan. Pt up to have a bowel movement without success. Pt placed back in bed. Ultrasound tech at bedside to perform doppler. Pharmacy called to verify morphine and send phenergan.   Per pharmacy, order will be changed compazine which is formulary for [facility].

## 2017-04-02 NOTE — CONSULTS
Nephrology Consult Note     Yony Rivera     www. [facility]                    Phone - [phone]   Patient: Alison Barillas  YOB: 1945     Date- 4/2/2017   MRN: [id number]  Ghanshyam AlvaradoANGELICA   Age: 66 y.o. Sex: male      ADMIT DATE:4/1/2017    CONSULTATION DATE:4/2/2017                REASON FOR CONSULTATION: I have been asked to see this patient by  for  Acute renal failure  ASSESSMENT:   Acute renal failure secondary to multiple possibilities. 1.Pre renal factors - vomiting and HCTZ causing dehydration   2. Intra reanl factors - poor renal blood flow due to aneurysm, AIN due to aleve use  3. Post renal factors - urinary retention can't be ruled out  ·   · Abdominal aortic aneurysm  · Metabolic acidosis  · Nausea and vomiting  · Right renal lesion - hyperdense on ct  · nephrolithiasis  · Umbilical hernia  · Active Problems:  ·   CAD (coronary artery disease) ()  ·     Overview: 2006 - s/p stent   ·   ·   Dyslipidemia (1/25/2014)  ·   ·   Tobacco use disorder (1/25/2014)  ·   ·   AAA (abdominal aortic aneurysm) ([address] (4/1/2017)  ·   ·   PAD (peripheral artery disease) ([address] (4/2/2017)  ·   ·   Carotid stenosis, right (4/2/2017)  ·   ·   S/P PTCA (percutaneous transluminal coronary angioplasty) (4/2/2017)  ·     Overview: 2006 stent ? Vessel and type of stent  ·   ·   PLAN:   · Add bicarb to ivf  · No dialysis indicated at present  · Place finley cath  · Check renal usg  · Check urine na, cr  · Check spep, hepatitis panel , birgit  · Check  Urine pr/cr  · Daily labs  · Check ipth to eval for renal stone  · Need for dialysis in setting of worsening cr. D/w pt. And wife. [x] High complexity decision making was performed  [x] Patient is at high-risk of decompensation with multiple organ involvement    Subjective:   HPI: Alison Barillas is a 66 y.o.  male. He presented to er with vomiting. He is found to have arf with cr.  1.76  He has abdominal aortic aneurysm   - CT Showed. -- Renal and infrarenal fusiform abdominal aortic aneurysm  measuring up to 8.4 cm AP, extending to level of aortic bifurcation. Right  common iliac artery aneurysm measuring 2.9 cm  He has been taking aleve every day  He has vomiting and poor po intake for few days  He c/o abdominal pain  He has umbilical hernia. Surgery is consulted for further eval for incarcerated hernia. No urine out put documented   He denies dysuria or hematuria  His urine positive for proteinuria  He has no h/o DM  He denies recent abx use  No documented hypotension    Past Medical Hx:   Past Medical History:   Diagnosis Date    BPH (benign prostatic hyperplasia)     CAD (coronary artery disease)     2006 - s/p stent     Essential hypertension 12-12-13    Hematuria     sees urologist    Hyperglycemia         Past Surgical Hx:     Past Surgical History:   Procedure Laterality Date    CARDIAC SURG PROCEDURE UNLIST      stent 2006       Medications:  Prior to Admission medications    Medication Sig Start Date End Date Taking? Authorizing Provider   aspirin delayed-release 81 mg tablet Take 2 Tabs by mouth daily. 1/30/14   Deepa Epperson MD   pantoprazole (PROTONIX) 40 mg tablet Take 40 mg by mouth daily. Historical Provider   atorvastatin (LIPITOR) 10 mg tablet Take  by mouth daily. Historical Provider   clopidogrel (PLAVIX) 75 mg tablet Take  by mouth daily. Historical Provider   hydrochlorothiazide (HYDRODIURIL) 25 mg tablet Take 25 mg by mouth daily. Historical Provider       Allergies   Allergen Reactions    Lipitor [Atorvastatin] Other (comments)     \"muscle pain\"       Social Hx:  reports that he has been smoking. He has a 159.00 pack-year smoking history. He does not have any smokeless tobacco history on file. He reports that he does not drink alcohol. History reviewed. No pertinent family history. Review of Systems:  A 11 point review of system was performed today. Pertinent positives and negatives are mentioned in the HPI. The reminder of the ROS is negative and noncontributory. Allergies   Allergen Reactions    Lipitor [Atorvastatin] Other (comments)     \"muscle pain\"      Objective:    Vitals:    Vitals:    04/02/17 0600 04/02/17 0700 04/02/17 0712 04/02/17 0813   BP: 109/63 (!) 95/22 92/53 97/46   Pulse: 62 62 63 60   Resp: 17 18 17 17   Temp:   97 °F (36.1 °C)    SpO2: 96% 96% 97% 98%   Weight:       Height:         I&O's:  04/01 0701 - 04/02 0700  In: 809.9 [I.V.:809.9]  Out: 100 [Urine:100]    Physical Exam:  General:Alert, No distress,   Eyes:No scleral icterus, No conjunctival pallor  Neck:Supple,no mass palpable,no thyromegaly  Lungs:Clears to auscultation Bilaterally, normal respiratory effort  CVS:RRR, S1 S2 normal,  No rub,  Abdomen:Soft, Non tender, No hepatosplenomegaly  Extremities: No LE edema  Skin:No rash or lesions, Warm and DRY   Psych: appropriate affect    :  No finley  Musculoskeletal : no redness, no joint tenderness  NEURO: Normal Speech, Non focal    CODE STATUS:    Care Plan discussed with:  Patient and wife and nurse     Chart reviewed. ECG[de-identified] Rev:yes  NSR  No significant ST-T CHANGES  Xray/CT/US/MRI REV:yes  CT ABDO. FINDINGS:   LUNG BASES: Clear. INCIDENTALLY IMAGED HEART AND MEDIASTINUM: Vascular calcifications at the aortic  root and within the right coronary and left anterior descending coronary  arteries. LIVER: No mass or biliary dilatation. GALLBLADDER: Unremarkable. SPLEEN: No mass. PANCREAS: No mass or ductal dilatation. ADRENALS: Unremarkable. KIDNEYS/URETERS: Several tiny nonobstructing bilateral renal calculi. No renal  pelvocaliectasis. Several bilateral nonspecific lesions of the kidneys measuring  up to 1.9 cm in size, one of which is hyperdense in the posterior right mid pole  measuring 1.1 cm in size. STOMACH: Nondistended. SMALL BOWEL: Nondistended. COLON: Nondistended. APPENDIX: Unremarkable.   PERITONEUM: No free peroneal air or fluid. RETROPERITONEUM: Renal and infrarenal fusiform abdominal aortic aneurysm  measuring up to 8.4 cm AP, extending to level of aortic bifurcation. Right  common iliac artery aneurysm measuring 2.9 cm. URINARY BLADDER: No mass or calculus. BONES: Moderate lumbosacral junction, mild lumbar and mild lower thoracic spine  degenerative changes. Mild bilateral hip and SI joint osteoarthrosis. Moderate  degenerative changes at symphysis pubis. ADDITIONAL COMMENTS: Superior periumbilical hernia containing fat, with orifice  measuring 2.2 cm.     IMPRESSION  IMPRESSION:     1. Renal and infrarenal abdominal aortic aneurysm measuring up to 8.4 cm AP. 2. Right common iliac artery aneurysm measuring 2.9 cm.  3. Peripheral vertical hernia containing fat. 4. Subtle nonspecific renal lesions present bilaterally measuring up to 1.9 cm,  one of which is hyperattenuating.   Lab Data Personally Reviewed: (see below)  Recent Labs      04/02/17   0337  04/01/17   1856   NA  144  145   K  4.4  4.4   CL  116*  111*   CO2  18*  22   GLU  123*  131*   BUN  78*  68*   CREA  1.75*  1.76*   CA  8.5  9.3     Recent Labs      04/02/17   0513  04/01/17   1856   WBC  10.8  12.1*   HGB  10.4*  13.9   HCT  30.7*  40.6   PLT  153  191     Recent Labs      04/02/17   0337  04/01/17   1856   NA  144  145   K  4.4  4.4   CL  116*  111*   CO2  18*  22   BUN  78*  68*   CREA  1.75*  1.76*   GLU  123*  131*   CA  8.5  9.3     Lab Results   Component Value Date/Time    Color YELLOW/STRAW 04/02/2017 12:43 AM    Appearance CLEAR 04/02/2017 12:43 AM    Specific gravity 1.024 04/02/2017 12:43 AM    pH (UA) 5.5 04/02/2017 12:43 AM    Protein 30 04/02/2017 12:43 AM    Glucose NEGATIVE  04/02/2017 12:43 AM    Ketone NEGATIVE  04/02/2017 12:43 AM    Bilirubin NEGATIVE  04/02/2017 12:43 AM    Urobilinogen 0.2 04/02/2017 12:43 AM    Nitrites NEGATIVE  04/02/2017 12:43 AM    Leukocyte Esterase NEGATIVE  04/02/2017 12:43 AM    Epithelial cells FEW 04/02/2017 12:43 AM    Bacteria NEGATIVE  04/02/2017 12:43 AM    WBC 0-4 04/02/2017 12:43 AM    RBC 0-5 04/02/2017 12:43 AM     No results found for: CULT  Prior to Admission Medications   Prescriptions Last Dose Informant Patient Reported? Taking?   aspirin delayed-release 81 mg tablet   No No   Sig: Take 2 Tabs by mouth daily. atorvastatin (LIPITOR) 10 mg tablet   Yes No   Sig: Take  by mouth daily. clopidogrel (PLAVIX) 75 mg tablet   Yes No   Sig: Take  by mouth daily. hydrochlorothiazide (HYDRODIURIL) 25 mg tablet   Yes No   Sig: Take 25 mg by mouth daily. pantoprazole (PROTONIX) 40 mg tablet   Yes No   Sig: Take 40 mg by mouth daily. Facility-Administered Medications: None       Medications list Personally Reviewed   [x]      Yes     []               No    Thank you for allowing us to participate in the care this patient. We will follow patient with you. Signed By: Catie Alexis MD  [facility]  [facility], [address], [address]  Phone - [phone]         Fax - [phone] [facility] Office  [address] [place] [address]  Phone - [phone]        Fax - [phone]     www. [facility]

## 2017-04-02 NOTE — ROUTINE PROCESS
TRANSFER - OUT REPORT:    Verbal report given to Ayden Hinojosa RN(name) on [address]  being transferred to CCU(unit) for routine progression of care       Report consisted of patients Situation, Background, Assessment and   Recommendations(SBAR). Information from the following report(s) SBAR, ED Summary, Intake/Output, MAR and Recent Results was reviewed with the receiving nurse. Lines:   Peripheral IV 04/01/17 Right Antecubital (Active)   Site Assessment Clean, dry, & intact 4/1/2017  7:02 PM   Phlebitis Assessment 0 4/1/2017  7:02 PM   Infiltration Assessment 0 4/1/2017  7:02 PM   Dressing Status Clean, dry, & intact 4/1/2017  7:02 PM   Dressing Type Transparent 4/1/2017  7:02 PM   Hub Color/Line Status Pink 4/1/2017  7:02 PM       Peripheral IV 04/01/17 Left Antecubital (Active)   Site Assessment Clean, dry, & intact 4/1/2017 10:40 PM   Phlebitis Assessment 0 4/1/2017 10:40 PM   Infiltration Assessment 0 4/1/2017 10:40 PM   Dressing Status Clean, dry, & intact 4/1/2017 10:40 PM   Dressing Type Transparent 4/1/2017 10:40 PM   Hub Color/Line Status Pink 4/1/2017 10:40 PM        Opportunity for questions and clarification was provided.       Patient transported with:   Monitor  Registered Nurse        ]

## 2017-04-02 NOTE — H&P
Matti Rogers, [address]   HISTORY AND PHYSICAL       Name:  Yessi Ferrell   MR#:  [id number]   :  1945   Account #:  [de-identified]        Date of Adm:  2017       ADMITTING DIAGNOSES    1. Large abdominal aortic aneurysm. 2. Abdominal pain with nausea and vomiting. 3. Acute renal failure. 4. Carotid artery disease. 5. Coronary artery disease. HISTORY OF PRESENT ILLNESS: The patient is a 64-year-old   gentleman with a host of chronic medical problems and tobacco abuse,   who has had little to no medical care or evaluation in the last several   years. He presented to the emergency room today with complaints of   24 hours of intractable nausea and vomiting, which had turned bilious. As part of his evaluation, he had a CT scan without contrast, which   revealed an 8 cm juxtarenal abdominal aortic aneurysm. He had no   prior knowledge of aneurysmal disease. He reports that all of his pain   is in the anterior aspect of his abdomen at the apex of his panniculus   and that he feels better after he vomits. He denies any back pain. His   past medical history is fairly extensive but hard to tease out, as   documentation is modest and the patient and family are poor historians. PAST MEDICAL HISTORY: He does have coronary artery disease. He   reports that he had a stent placed in his heart years ago, but he cannot   remember where or who his cardiologist was and reports he has never   followed up with his cardiologist since stent placement. He has a   history of a stroke in the past and reports that he was told that he had   an occluded right carotid, but again did not follow up with planned CT   angio and carotid duplex. He has had no recent neurologic events. He   has hyperlipidemia, hyperglycemia, severe tobacco abuse, smoking up   to 3 packs a day.  He has a history of claudication, benign prostatic   hypertrophy, hematuria, and hypertension. PAST SURGICAL HISTORY: Only the coronary angioplasty and   stenting, which may have been in 2006. SOCIAL HISTORY: He lives in [place]. He has an over 160-pack-year   history of smoking. Does not use alcohol. Supportive wife and   daughter in attendance. A retired . FAMILY HISTORY: Noncontributory. REVIEW OF SYSTEMS: Negative for weight change, fever, or   headaches. No chest pain, palpitations or shortness of breath. No   cough or wheezing. No neurologic, psychiatric symptoms, or easy   bruising. PHYSICAL EXAMINATION   GENERAL: He is an elderly gentleman who is in no acute distress. HEAD AND NECK: Exam is unremarkable. I do not appreciate carotid   bruits. LUNGS: Clear, without wheezing or rhonchi at this time. HEART: Regular. ABDOMEN: Obese, protuberant. He has a small, chronically   incarcerated ventral hernia, but it is nontender. No groin hernias are   noted. I cannot palpate his aortic pulsation due to his body habitus. His   abdomen is not particularly tender nor was it particularly distended. EXTREMITIES: He has easily palpable femoral and popliteal pulses,   which did not feel enlarged. BUN and creatinine are elevated at 68 and 1.76. White count is 12. Hemoglobin is 13.9. Coagulation profile is normal.      CT scan of the abdomen and pelvis reveals a large juxtarenal   abdominal aortic aneurysm and a small right common iliac artery. The   CT scan was without IV contrast, but it appears as if, given the   angulation and diameter of the neck and the renal involvement, he is   probably not a candidate for aortic stent grafting. IMPRESSION    1. Incidentally discovered large abdominal aortic aneurysm. I get the   impression from the exam and his history that his current abdominal   discomfort is probably not related to the aneurysm and is more related   to his gastrointestinal process. 2. Nausea and vomiting with dehydration and acute renal failure. 3. Coronary artery disease, status unknown. 4. Carotid artery disease, status unknown. 5. Hypertension. 6. Obesity. PLAN: The patient needs to be admitted for gentle hydration, blood   pressure control, and close observation. If he rehydrates to a normal   renal function, we will attempt to get a CT angio to further evaluate his   aneurysm. We will obtain pulmonary and cardiology consultations as   well to try to determine the status of his coronary arteries and carotids   in short order. He will almost certainly need aneurysm repair during   this admission. He and his family understand and agree to the plan as   outlined. A separate conversation was had with his daughter outside the room in   which the gravity of the situation was shared with her.         MD Kimberly Reyes / Pilar Adames   D:  04/01/2017   22:41   T:  04/02/2017   02:10   Job #:  [id number]

## 2017-04-02 NOTE — PROGRESS NOTES
[facility]  Pulmonary, Critical Care, and Sleep Medicine    Name: Dania English MRN: [id number]   : 1945 Hospital: [facility]   Date: 2017        Critical Care Initial Patient Consult    IMPRESSION:   · Presented with abdominal pain, Umbilical Hernia evaluation per Dr. Karlene Case  · Acute renal failure, ongoing evaluation  · Mild Hypotension  · Metabolic acidosis, normal lactate  · AAA 8.4cm followed by Dr. Dee Natarajan. · Anemia hgb of 10.4  · Smoker 2-3 ppd  · Obese  · CAD had prior stent      RECOMMENDATIONS:   · Will need pre op pulmonary evalution when stable. · Ongoing evaluation per Renal   · General surgery following  · Will continue oxygen as needed  · Add nebs  · Monitor BP  · electrolytes     Subjective/History: This patient has been seen and evaluated at the request of Dr. Dee Natarajan for above. Patient is a 66 y.o. male reviewed Chart. Pt not a great historian. Denies any chest pain, back or abdominal pain during my evaluation. NO acute leg pain or swelling. ROS is negative. Past Medical History:   Diagnosis Date    BPH (benign prostatic hyperplasia)     CAD (coronary artery disease)      - s/p stent     Essential hypertension 13    Hematuria     sees urologist    Hyperglycemia       Past Surgical History:   Procedure Laterality Date    CARDIAC SURG PROCEDURE UNLIST      stent       Prior to Admission medications    Medication Sig Start Date End Date Taking? Authorizing Provider   aspirin delayed-release 81 mg tablet Take 2 Tabs by mouth daily. 14   Lorena Beckman MD   pantoprazole (PROTONIX) 40 mg tablet Take 40 mg by mouth daily. Historical Provider   atorvastatin (LIPITOR) 10 mg tablet Take  by mouth daily. Historical Provider   clopidogrel (PLAVIX) 75 mg tablet Take  by mouth daily. Historical Provider   hydrochlorothiazide (HYDRODIURIL) 25 mg tablet Take 25 mg by mouth daily.     Historical Provider     Current Facility-Administered Medications   Medication Dose Route Frequency    morphine 2 mg/mL injection        prochlorperazine (COMPAZINE) 5 mg/mL injection        dextrose 5% 1,000 mL with sodium bicarbonate (8.4%) 100 mEq infusion   IntraVENous CONTINUOUS    nicotine (NICODERM CQ) 21 mg/24 hr patch 1 Patch  1 Patch TransDERmal DAILY    esmolol 10mg/mL (BREVIBLOC) infusion  50 mcg/kg/min IntraVENous TITRATE     Allergies   Allergen Reactions    Lipitor [Atorvastatin] Other (comments)     \"muscle pain\"      Social History   Substance Use Topics    Smoking status: Current Every Day Smoker     Packs/day: 3.00     Years: 53.00    Smokeless tobacco: Not on file    Alcohol use No      History reviewed. No pertinent family history. Review of Systems:  A comprehensive review of systems was negative. Objective:   Vital Signs:    Visit Vitals    BP 97/46    Pulse 60    Temp 97 °F (36.1 °C)    Resp 17    Ht 5' 9\" (1.753 m)    Wt 111.7 kg (246 lb 4.1 oz)    SpO2 98%    BMI 36.37 kg/m2       O2 Device: Room air       Temp (24hrs), Av.8 °F (36.6 °C), Min:97 °F (36.1 °C), Max:98.4 °F (36.9 °C)       Intake/Output:   Last shift:         Last 3 shifts:  1901 -  0700  In: 809.9 [I.V.:809.9]  Out: 100 [Urine:100]    Intake/Output Summary (Last 24 hours) at 17 0906  Last data filed at 17 0500   Gross per 24 hour   Intake           809.91 ml   Output              100 ml   Net           709.91 ml       Physical Exam:    General:  Alert, cooperative, no distress, appears stated age. Head:  Normocephalic, without obvious abnormality, atraumatic. Eyes:  Conjunctivae/corneas clear. PERRL, EOMs intact. Nose: Nares normal. Septum midline. Mucosa normal. No drainage or sinus tenderness. Throat: Lips, mucosa, and tongue normal. Teeth and gums normal.   Neck: Supple, symmetrical, trachea midline, no adenopathy, thyroid: no enlargment/tenderness/nodules, no carotid bruit and no JVD.    Back: Symmetric, no curvature. ROM normal.   Lungs:   Clear to auscultation bilaterally. Has some scattered wheezing. Chest wall:  No tenderness or deformity. Heart:  Regular rate and rhythm, S1, S2 normal, no murmur, click, rub or gallop. Abdomen:   Soft, non-tender. Bowel sounds normal. No masses,  No organomegaly. Extremities: Extremities normal, atraumatic, no cyanosis or edema. Pulses: 2+ and symmetric all extremities. Skin: Skin color, texture, turgor normal. No rashes or lesions   Lymph nodes: Cervical, supraclavicular, and axillary nodes normal.   Neurologic: Grossly nonfocal       Data:     Recent Results (from the past 24 hour(s))   CBC WITH AUTOMATED DIFF    Collection Time: 04/01/17  6:56 PM   Result Value Ref Range    WBC 12.1 (H) 4.1 - 11.1 K/uL    RBC 4.30 4. 10 - 5.70 M/uL    HGB 13.9 12.1 - 17.0 g/dL    HCT 40.6 36.6 - 50.3 %    MCV 94.4 80.0 - 99.0 FL    MCH 32.3 26.0 - 34.0 PG    MCHC 34.2 30.0 - 36.5 g/dL    RDW 14.0 11.5 - 14.5 %    PLATELET 445 491 - 189 K/uL    NEUTROPHILS 77 (H) 32 - 75 %    LYMPHOCYTES 13 12 - 49 %    MONOCYTES 9 5 - 13 %    EOSINOPHILS 1 0 - 7 %    BASOPHILS 0 0 - 1 %    ABS. NEUTROPHILS 9.3 (H) 1.8 - 8.0 K/UL    ABS. LYMPHOCYTES 1.6 0.8 - 3.5 K/UL    ABS. MONOCYTES 1.0 0.0 - 1.0 K/UL    ABS. EOSINOPHILS 0.1 0.0 - 0.4 K/UL    ABS. BASOPHILS 0.1 0.0 - 0.1 K/UL   METABOLIC PANEL, COMPREHENSIVE    Collection Time: 04/01/17  6:56 PM   Result Value Ref Range    Sodium 145 136 - 145 mmol/L    Potassium 4.4 3.5 - 5.1 mmol/L    Chloride 111 (H) 97 - 108 mmol/L    CO2 22 21 - 32 mmol/L    Anion gap 12 5 - 15 mmol/L    Glucose 131 (H) 65 - 100 mg/dL    BUN 68 (H) 6 - 20 MG/DL    Creatinine 1.76 (H) 0.70 - 1.30 MG/DL    BUN/Creatinine ratio 39 (H) 12 - 20      GFR est AA 46 (L) >60 ml/min/1.73m2    GFR est non-AA 38 (L) >60 ml/min/1.73m2    Calcium 9.3 8.5 - 10.1 MG/DL    Bilirubin, total 0.6 0.2 - 1.0 MG/DL    ALT (SGPT) 15 12 - 78 U/L    AST (SGOT) 9 (L) 15 - 37 U/L    Alk. phosphatase 92 45 - 117 U/L    Protein, total 7.2 6.4 - 8.2 g/dL    Albumin 3.4 (L) 3.5 - 5.0 g/dL    Globulin 3.8 2.0 - 4.0 g/dL    A-G Ratio 0.9 (L) 1.1 - 2.2     LIPASE    Collection Time: 04/01/17  6:56 PM   Result Value Ref Range    Lipase 146 73 - 393 U/L   PROTHROMBIN TIME + INR    Collection Time: 04/01/17  6:56 PM   Result Value Ref Range    INR 1.1 0.9 - 1.1      Prothrombin time 11.1 9.0 - 11.1 sec   LACTIC ACID, PLASMA    Collection Time: 04/01/17  6:56 PM   Result Value Ref Range    Lactic acid 1.2 0.4 - 2.0 MMOL/L   TYPE & SCREEN    Collection Time: 04/01/17  9:31 PM   Result Value Ref Range    Crossmatch Expiration 04/04/2017     ABO/Rh(D) O NEGATIVE     Antibody screen NEG     Unit number [id number]     Blood component type RC LR AS1     Unit division 00     Status of unit ALLOCATED     Crossmatch result Compatible     Unit number [id number]     Blood component type RC LR AS3,1     Unit division 00     Status of unit ALLOCATED     Crossmatch result Compatible    EKG, 12 LEAD, INITIAL    Collection Time: 04/01/17 11:09 PM   Result Value Ref Range    Ventricular Rate 81 BPM    Atrial Rate 81 BPM    P-R Interval 174 ms    QRS Duration 76 ms    Q-T Interval 370 ms    QTC Calculation (Bezet) 429 ms    Calculated P Axis 58 degrees    Calculated R Axis 19 degrees    Calculated T Axis 61 degrees    Diagnosis       Normal sinus rhythm  Septal infarct , age undetermined  No previous ECGs available     URINALYSIS W/ REFLEX CULTURE    Collection Time: 04/02/17 12:43 AM   Result Value Ref Range    Color YELLOW/STRAW      Appearance CLEAR CLEAR      Specific gravity 1.024 1.003 - 1.030      pH (UA) 5.5 5.0 - 8.0      Protein 30 (A) NEG mg/dL    Glucose NEGATIVE  NEG mg/dL    Ketone NEGATIVE  NEG mg/dL    Bilirubin NEGATIVE  NEG      Blood NEGATIVE  NEG      Urobilinogen 0.2 0.2 - 1.0 EU/dL    Nitrites NEGATIVE  NEG      Leukocyte Esterase NEGATIVE  NEG      WBC 0-4 0 - 4 /hpf    RBC 0-5 0 - 5 /hpf Epithelial cells FEW FEW /lpf    Bacteria NEGATIVE  NEG /hpf    UA:UC IF INDICATED CULTURE NOT INDICATED BY UA RESULT CNI      Hyaline cast 0-2 0 - 5 /lpf   METABOLIC PANEL, COMPREHENSIVE    Collection Time: 04/02/17  3:37 AM   Result Value Ref Range    Sodium 144 136 - 145 mmol/L    Potassium 4.4 3.5 - 5.1 mmol/L    Chloride 116 (H) 97 - 108 mmol/L    CO2 18 (L) 21 - 32 mmol/L    Anion gap 10 5 - 15 mmol/L    Glucose 123 (H) 65 - 100 mg/dL    BUN 78 (H) 6 - 20 MG/DL    Creatinine 1.75 (H) 0.70 - 1.30 MG/DL    BUN/Creatinine ratio 45 (H) 12 - 20      GFR est AA 47 (L) >60 ml/min/1.73m2    GFR est non-AA 38 (L) >60 ml/min/1.73m2    Calcium 8.5 8.5 - 10.1 MG/DL    Bilirubin, total 0.6 0.2 - 1.0 MG/DL    ALT (SGPT) 13 12 - 78 U/L    AST (SGOT) 13 (L) 15 - 37 U/L    Alk. phosphatase 77 45 - 117 U/L    Protein, total 6.3 (L) 6.4 - 8.2 g/dL    Albumin 2.9 (L) 3.5 - 5.0 g/dL    Globulin 3.4 2.0 - 4.0 g/dL    A-G Ratio 0.9 (L) 1.1 - 2.2     CBC WITH AUTOMATED DIFF    Collection Time: 04/02/17  5:13 AM   Result Value Ref Range    WBC 10.8 4.1 - 11.1 K/uL    RBC 3.25 (L) 4.10 - 5.70 M/uL    HGB 10.4 (L) 12.1 - 17.0 g/dL    HCT 30.7 (L) 36.6 - 50.3 %    MCV 94.5 80.0 - 99.0 FL    MCH 32.0 26.0 - 34.0 PG    MCHC 33.9 30.0 - 36.5 g/dL    RDW 14.2 11.5 - 14.5 %    PLATELET 853 627 - 265 K/uL    NEUTROPHILS 78 (H) 32 - 75 %    LYMPHOCYTES 14 12 - 49 %    MONOCYTES 7 5 - 13 %    EOSINOPHILS 1 0 - 7 %    BASOPHILS 0 0 - 1 %    ABS. NEUTROPHILS 8.4 (H) 1.8 - 8.0 K/UL    ABS. LYMPHOCYTES 1.6 0.8 - 3.5 K/UL    ABS. MONOCYTES 0.8 0.0 - 1.0 K/UL    ABS. EOSINOPHILS 0.1 0.0 - 0.4 K/UL    ABS. BASOPHILS 0.0 0.0 - 0.1 K/UL             Telemetry:normal sinus rhythm    Imaging:  I have personally reviewed the patients radiographs and have reviewed the reports:    4-1-17: IMPRESSION:     1. Renal and infrarenal abdominal aortic aneurysm measuring up to 8.4 cm AP.   2. Right common iliac artery aneurysm measuring 2.9 cm.  3. Peripheral vertical hernia containing fat. 4. Subtle nonspecific renal lesions present bilaterally measuring up to 1.9 cm,  one of which is hyperattenuating. KUB:   4-2-17: COMPARISON: 4/1/2017 plain films and CT which showed a fat-containing umbilical  hernia without alteration in density of the fat and an 8.4 cm AAA and a 2.9 cm  right common iliac artery aneurysm     FINDINGS: Supine and upright views of the abdomen demonstrate a normal gas  pattern. There is no free intraperitoneal air. The previously described 8.4 cm  AAA is not well assessed by this study. The bones and soft tissues are within  normal limits. A rectal tube is in place.     IMPRESSION: Nonspecific gas pattern. No evidence for obstruction.        Onur William MD

## 2017-04-02 NOTE — PROGRESS NOTES
Surgery      Recheck of patient following partial reduction of ventral hernia earlier. .    Abd pain and nausea resolved. Discussed briefly with Dr Hi Vale. Likely open procedure so hernia essentially will be resolved with midline incision.     Dr Rober Zamora to assume surgical evaluation in Marina Meredith MD, [facility]

## 2017-04-02 NOTE — CONSULTS
CARDIOLOGY CONSULT       Date of  Admission: 4/1/2017  6:03 PM     Admission type:Emergency    Consult for: Preoperative evaluation for AAA  Consulted by: Dr. Saloni Gayle     Subjective: Violet Em is a 66 y.o. male admitted for AAA (abdominal aortic aneurysm) ([facility] Patient complains of abdominal discomfort. It is not yet clear whether the pain relates to his umbilical hernia, AAA, or both. He was evaluated in 2014 by Dr. Padmini Marte which included a stress test and echocardiogram.  Stress test showed concerns for inferior ischemia. It was recommended he have catheterization but he chose not to have it done and was lost to follow-up. He currently The patient denies chest pain/ shortness of breath, orthopnea, PND, LE edema, palpitations, syncope, or presyncope. He states he was working up until January of this year and had to move 50 pound hoses at his job with mild shortness of breath.     Patient Active Problem List    Diagnosis Date Noted    PAD (peripheral artery disease) ([facility] 04/02/2017    Carotid stenosis, right 04/02/2017    S/P PTCA (percutaneous transluminal coronary angioplasty) 04/02/2017    AAA (abdominal aortic aneurysm) ([facility] 04/01/2017    Dyslipidemia 01/25/2014    Tobacco use disorder 01/25/2014    Atherosclerosis of native arteries of the extremities with intermittent claudication 01/25/2014    Cerebrovascular disease 01/25/2014    CAD (coronary artery disease)     Hyperglycemia       Robin Arndt NP  Past Medical History:   Diagnosis Date    BPH (benign prostatic hyperplasia)     CAD (coronary artery disease)     2006 - s/p stent     Essential hypertension 12-12-13    Hematuria     sees urologist    Hyperglycemia       Social History     Social History    Marital status:      Spouse name: N/A    Number of children: N/A    Years of education: N/A     Social History Main Topics    Smoking status: Current Every Day Smoker     Packs/day: 3.00     Years: 53.00  Smokeless tobacco: None    Alcohol use No    Drug use: None    Sexual activity: Not Asked     Other Topics Concern    None     Social History Narrative     Allergies   Allergen Reactions    Lipitor [Atorvastatin] Other (comments)     \"muscle pain\"      History reviewed. No pertinent family history. Current Facility-Administered Medications   Medication Dose Route Frequency    morphine injection 2 mg  2 mg IntraVENous Q4H PRN    morphine 2 mg/mL injection        prochlorperazine (COMPAZINE) 5 mg/mL injection        ondansetron (ZOFRAN) injection 4 mg  4 mg IntraVENous Q4H PRN    prochlorperazine (COMPAZINE) with saline injection 10 mg  10 mg IntraVENous Q6H PRN    dextrose 5% 1,000 mL with sodium bicarbonate (8.4%) 100 mEq infusion   IntraVENous CONTINUOUS    0.45% sodium chloride infusion  100 mL/hr IntraVENous CONTINUOUS    nicotine (NICODERM CQ) 21 mg/24 hr patch 1 Patch  1 Patch TransDERmal DAILY    esmolol 10mg/mL (BREVIBLOC) infusion  50 mcg/kg/min IntraVENous TITRATE    0.9% sodium chloride infusion 250 mL  250 mL IntraVENous PRN         Review of Symptoms:  11 systems reviewed, negative other than as stated in HPI     Subjective:      Visit Vitals    BP 97/46    Pulse 60    Temp 97 °F (36.1 °C)    Resp 17    Ht 5' 9\" (1.753 m)    Wt 246 lb 4.1 oz (111.7 kg)    SpO2 98%    BMI 36.37 kg/m2       Physical:  Gen:  NAD  Heart: regular rhythm, no murmur, gallop or rub  Lungs: clear to auscultation bilaterally  Neck: supple, symmetrical, trachea midline, no adenopathy, thyroid: not enlarged, symmetric, no tenderness/mass/nodules, no carotid bruit and no JVD  Abdomen: soft, non-tender.  Bowel sounds normal.  Deep palpation deferred with AAA  Extremities: no cyanosis or edema, positive dp pulses  Neurologic: CN, motor and speech grossly normal    Data Review:   Recent Labs      04/02/17   0513  04/01/17   1856   WBC  10.8  12.1*   HGB  10.4*  13.9   HCT  30.7*  40.6   PLT  153  191 Recent Labs      04/02/17   0337  04/01/17   1856   NA  144  145   K  4.4  4.4   CL  116*  111*   CO2  18*  22   GLU  123*  131*   BUN  78*  68*   CREA  1.75*  1.76*   CA  8.5  9.3   ALB  2.9*  3.4*   TBILI  0.6  0.6   SGOT  13*  9*   ALT  13  15   INR   --   1.1       No results for input(s): TROIQ, CPK, CKMB in the last 72 hours. @BRIEFLAB(CHOL,CHOLPOCT,978951,[id number],[id number],CHOLX,CHOLP,CHLST,CHOLV,302001,HDL,HDLPOCT,HDLPOC,436298,NHDLCT,[id number],HDLC,HDLP,LDL,LDLPOCT,LDLCPOC,999659,NLDLCT,DLDL,LDLC,DLDLP,151982,VLDLC,VLDL,TGL,TGLX,TRIGL,[id number],TRIGP,TGLPOCT,113586,824822,CHHD,CHHDX)@      Intake/Output Summary (Last 24 hours) at 04/02/17 0901  Last data filed at 04/02/17 0500   Gross per 24 hour   Intake           809.91 ml   Output              100 ml   Net           709.91 ml        Cardiographics    Telemetry: normal sinus rhythm    ECG: normal EKG, normal sinus rhythm    Echocardiogram:   SUMMARY:  Left ventricle: Systolic function was normal. Ejection fraction was  estimated in the range of 55 % to 60 %. There was mild concentric  hypertrophy. Doppler parameters were consistent with abnormal left  ventricular relaxation (grade 1 diastolic dysfunction). Right ventricle: Wall thickness was mildly increased. Assessment:         Active Problems:    CAD (coronary artery disease) ()      Overview: 2006 - s/p stent       Dyslipidemia (1/25/2014)      Tobacco use disorder (1/25/2014)      AAA (abdominal aortic aneurysm) ([facility] (4/1/2017)      PAD (peripheral artery disease) ([facility] (4/2/2017)      Carotid stenosis, right (4/2/2017)      S/P PTCA (percutaneous transluminal coronary angioplasty) (4/2/2017)      Overview: 2006 stent ?  Vessel and type of stent         Plan:     Very large AAA, umbilical hernia, pre-op evaluation with h/o CAD and abnormal stress test 2014:  Discussed with Dr. Sivakumar Booth today for 4/2/27  He was advised to have cardiac catheterization in 2014, but was immediately lost to follow-up and never had catheterization  Based on abnormal stress test 2014, and exercise history of complete carotid occlusion, and high risk AAA surgery, revised cardiac risk index is 3 and would predict a 9% risk of cardiovascular events  As surgery for AAA is urgent and cannot wait 1 month, must proceed at increased cardiac risk while continuing beta-blockers in order to save his life from aortic rupture  It is not safe to wait 1 month to consider catheterization and bare-metal stent with 1 month of uninterrupted Plavix per Dr. Rhonda Garcia  I will order preop echo for tomorrow morning  Check lipids and start high intensity statin when taking p.o., continue esmolol (BP and pulse well controlled)    Thanks for the consult. I appreciate the opportunity to participate in this patient's care and will follow with you.

## 2017-04-02 NOTE — PROGRESS NOTES
Dr. Caleb Herrera was called and given update on patient status. New orders received to change frequency of Zofran. Will continue to monitor.

## 2017-04-02 NOTE — CONSULTS
Surgery      Called by Dr Cj Rowland requesting evaluation of patient who has multiple medical and surgical issues including an AAA 8.4 cm in diameter, who presented with abdominal pain and vomiting. No vomiting since patient has been in hospital.  CT scan shows a ventral hernia with no evidence of bowel within the hernia and no comment of compromised contents. Chart reviewed. Pt examined and interviewed. Abd soft, not distended, reports tederness greatest in RLQ and at site of hernia when atempting to reduce. Reports pressure is making him feel like he has to \"pee\", has finley  In place. Midline supraumbilical hernia, tender during initial attempt to reduce. Partially reduced but per paient result is less tenderness at site, and no pain when I am not pushing. WBC and lactic acid were WNL  Afeb    CT reviewed and I saw no bowel in Hernia and no evidence of compromised tissue. Abd films performed    Will check again later to see how patient is doing,      Madelaine Smith.  Homar Moreno MD, [facility]

## 2017-04-02 NOTE — PROGRESS NOTES
Bedside and Verbal shift change report given to Robert Denise RN (oncoming nurse) by Hanna Matthew RN (offgoing nurse).  Report included the following information SBAR, Kardex, Intake/Output, Recent Results and Cardiac Rhythm SR.

## 2017-04-02 NOTE — PROGRESS NOTES
Carotid duplex completed. There is evidence of Right ICA occlusion. Absent color fill with homogenous echoes within the ICA. The right CCA and ECA are patent. The left CCA and ICA are patent with no significant velocity shifts appreciated. Vertebral arteries were not identified. Blood pressures were essentially equal. Subclavian flow antegrade and within normal limits bilaterally. This report was called to Dr. Karma Rivero.   Thank you, Dominique [facility]

## 2017-04-02 NOTE — H&P
[place], [address]   HISTORY AND PHYSICAL       Name:  Sajan Hughes   MR#:  [id number]   :  1945   Account #:  [de-identified]        Date of Adm:  2017       ADMITTING DIAGNOSES     1. Abdominal pain. 2. Incidental aneurysm. HISTORY OF PRESENT ILLNESS: The patient is a 75-year-old   gentleman who presents to the emergency room with a 24-hour history   of nausea, vomiting, and anterior abdominal pain. He is a poor   historian, who has not seen a physician in many years, but clearly has   significant medical comorbidities. His current symptoms started 24-36 hours prior to his presentation   when he noticed some diffuse anterior lower abdominal and   suprapubic pain followed by intractable nausea and vomiting. He had   no prior knowledge of aneurysmal disease. Currently, he denies back   pain and reports that most of his complaints are related to nausea and   discomfort in the area between his umbilicus and pubis. PAST MEDICAL HISTORY: Extensive and significant for the followin. Coronary artery disease. The patient reports that he had a coronary   stent placed years ago. He cannot remember who his cardiologist is,   which facility the stent was placed in, and he reports that he has not   seen his cardiologist since implantation of stent many years ago. He   denies chest pain on a routine basis. 2. Carotid artery disease. He reports has had a stroke in the past and   was told that he had significant carotid disease, but was not a   candidate for intervention. That was years ago and he has had no   followup. 3. Obesity and hyperglycemia. 4. Benign prostatic hypertrophy with a history of hematuria. 5. Long-standing tobacco abuse. The patient has been smoking 3   packs a day for over 50 years for greater than 150-pack-year history. He has never required oxygen at home. 6. Hypertension.     PAST SURGICAL HISTORY: Only the coronary intervention as   mentioned. SOCIAL HISTORY: He lives in [place]. He has a supportive wife and   daughter in attendance, who confirmed that he has been difficult and   noncompliant with medical care in the past. He does not use alcohol. He is retired. He has the tobacco history as mentioned above. FAMILY HISTORY: Noncontributory. REVIEW OF SYSTEMS: Negative for weight change, fever, headache. No chest pain, palpitations, shortness of breath. No cough or   wheezing. He has the GI symptoms as mentioned above, 24 hours of   nausea and vomiting. No neurologic, psychiatric symptoms or easy   bruising. PHYSICAL EXAMINATION   GENERAL: He is an elderly gentleman who is sedentary and obese in   appearance. HEAD AND NECK: Unremarkable. I do not appreciate a carotid bruit   on either side. He has no jugular venous distention, no head and neck   adenopathy. LUNGS: Clear. HEART: Regular. ABDOMEN: Very obese, protuberant and is generally soft and   nondistended. He does have an incarcerated umbilical hernia, which is   not reducible and not particularly tender at the time of my exam. He   has no groin hernias that I can appreciate. He has bounding femoral   and popliteal pulses bilaterally. He has no lower extremity wounds or   edema. His body habitus precludes palpation of his aorta despite the   aneurysm size. DATA: CBC is unremarkable. Electrolytes reveal hypernatremia and   significant elevation in BUN and a modest elevation in creatinine,   probably related to volume contraction from vomiting. A CT scan which   is without oral or IV contrast reveals what appears to be a chronically   incarcerated umbilical hernia with omentum, but no bowel. He has a   previously unknown 8 cm pararenal abdominal aortic aneurysm, which   appears to have no infrarenal neck. He has a small right common iliac   aneurysm as well. IMPRESSION   1.  Incidentally discovered large abdominal aortic aneurysm. I do not   think his abdominal pain at this time is aneurysm related, although this   warrants admission for further evaluation. If his renal function   improves, we will obtain a CT angio to further delineate his aortic   anatomy. 2. Suprapubic abdominal pain, nausea, vomiting. The patient either   has an infectious GI process or possibly an incarcerated umbilical   hernia as the cause of his symptoms. We will admit him, treat him   symptomatically and hydrate him and his umbilical hernia may need   further evaluation. 3. Acute renal failure, presumed to be prerenal and due to volume   contraction. Will hydrate him gently over the next 24 hours. 4. Long-standing tobacco abuse. Will place a Nicoderm patch while he   is here. Ask pulmonologist to see him in anticipation of the need for   possible aneurysm repair. 5. Obesity and hyperglycemia. 6. Hypertension.         MD Chele Vincent / Julio Mina   D:  04/02/2017   08:22   T:  04/02/2017   08:53   Job #:  [id number]

## 2017-04-02 NOTE — PROGRESS NOTES
2nd visit    He feels much better after partial reduction of hernia and finley placement  Nausea and abd pain resolved  Still some incarcerated umbilical contents but not as tender to palpation  Consultants input noted and appreciated    Renal treatment and cardiac eval ongoing  No indication for emergent AAA repair at this time, but will prob need to occur this admission after medical optimization (which is relative at this point)  Another discussion with patient and family about gravity of situation and realistic expectations

## 2017-04-03 ENCOUNTER — APPOINTMENT (OUTPATIENT)
Dept: CT IMAGING | Age: 72
DRG: 271 | End: 2017-04-03
Attending: SURGERY
Payer: MEDICARE

## 2017-04-03 LAB
ALBUMIN SERPL BCP-MCNC: 3 G/DL (ref 3.5–5)
ALBUMIN/GLOB SERPL: 0.9 {RATIO} (ref 1.1–2.2)
ALP SERPL-CCNC: 77 U/L (ref 45–117)
ALT SERPL-CCNC: 12 U/L (ref 12–78)
ANION GAP BLD CALC-SCNC: 10 MMOL/L (ref 5–15)
AST SERPL W P-5'-P-CCNC: 9 U/L (ref 15–37)
BILIRUB SERPL-MCNC: 0.9 MG/DL (ref 0.2–1)
BUN SERPL-MCNC: 60 MG/DL (ref 6–20)
BUN/CREAT SERPL: 36 (ref 12–20)
CALCIUM SERPL-MCNC: 8.3 MG/DL (ref 8.5–10.1)
CALCIUM SERPL-MCNC: 8.5 MG/DL (ref 8.5–10.1)
CHLORIDE SERPL-SCNC: 109 MMOL/L (ref 97–108)
CHOLEST SERPL-MCNC: 135 MG/DL
CO2 SERPL-SCNC: 23 MMOL/L (ref 21–32)
CREAT SERPL-MCNC: 1.66 MG/DL (ref 0.7–1.3)
ERYTHROCYTE [DISTWIDTH] IN BLOOD BY AUTOMATED COUNT: 14.2 % (ref 11.5–14.5)
GLOBULIN SER CALC-MCNC: 3.4 G/DL (ref 2–4)
GLUCOSE SERPL-MCNC: 136 MG/DL (ref 65–100)
HAV IGM SERPL QL IA: NONREACTIVE
HBV CORE IGM SER QL: NONREACTIVE
HBV SURFACE AG SER QL: <0.1 INDEX
HBV SURFACE AG SER QL: NEGATIVE
HCT VFR BLD AUTO: 31.2 % (ref 36.6–50.3)
HCV AB SERPL QL IA: NONREACTIVE
HCV COMMENT,HCGAC: NORMAL
HDLC SERPL-MCNC: 27 MG/DL
HDLC SERPL: 5 {RATIO} (ref 0–5)
HEMOCCULT STL QL: POSITIVE
HGB BLD-MCNC: 10.8 G/DL (ref 12.1–17)
LDLC SERPL CALC-MCNC: 58 MG/DL (ref 0–100)
LIPID PROFILE,FLP: ABNORMAL
MCH RBC QN AUTO: 32.9 PG (ref 26–34)
MCHC RBC AUTO-ENTMCNC: 34.6 G/DL (ref 30–36.5)
MCV RBC AUTO: 95.1 FL (ref 80–99)
PHOSPHATE SERPL-MCNC: 1.5 MG/DL (ref 2.6–4.7)
PLATELET # BLD AUTO: 145 K/UL (ref 150–400)
POTASSIUM SERPL-SCNC: 3.6 MMOL/L (ref 3.5–5.1)
PROT SERPL-MCNC: 6.4 G/DL (ref 6.4–8.2)
PTH-INTACT SERPL-MCNC: 147.1 PG/ML (ref 14–72)
RBC # BLD AUTO: 3.28 M/UL (ref 4.1–5.7)
SODIUM SERPL-SCNC: 142 MMOL/L (ref 136–145)
SP1: NORMAL
SP2: NORMAL
SP3: NORMAL
TRIGL SERPL-MCNC: 250 MG/DL (ref ?–150)
VLDLC SERPL CALC-MCNC: 50 MG/DL
WBC # BLD AUTO: 9.7 K/UL (ref 4.1–11.1)

## 2017-04-03 PROCEDURE — 77030029684 HC NEB SM VOL KT MONA -A

## 2017-04-03 PROCEDURE — 74011000250 HC RX REV CODE- 250: Performed by: INTERNAL MEDICINE

## 2017-04-03 PROCEDURE — 93306 TTE W/DOPPLER COMPLETE: CPT

## 2017-04-03 PROCEDURE — 83970 ASSAY OF PARATHORMONE: CPT | Performed by: INTERNAL MEDICINE

## 2017-04-03 PROCEDURE — 80053 COMPREHEN METABOLIC PANEL: CPT | Performed by: SURGERY

## 2017-04-03 PROCEDURE — 74011250637 HC RX REV CODE- 250/637: Performed by: SURGERY

## 2017-04-03 PROCEDURE — 94640 AIRWAY INHALATION TREATMENT: CPT

## 2017-04-03 PROCEDURE — 74011000258 HC RX REV CODE- 258: Performed by: INTERNAL MEDICINE

## 2017-04-03 PROCEDURE — 82272 OCCULT BLD FECES 1-3 TESTS: CPT | Performed by: SURGERY

## 2017-04-03 PROCEDURE — 85027 COMPLETE CBC AUTOMATED: CPT | Performed by: SURGERY

## 2017-04-03 PROCEDURE — 74174 CTA ABD&PLVS W/CONTRAST: CPT

## 2017-04-03 PROCEDURE — 84100 ASSAY OF PHOSPHORUS: CPT | Performed by: SURGERY

## 2017-04-03 PROCEDURE — 65610000006 HC RM INTENSIVE CARE

## 2017-04-03 PROCEDURE — 74011636320 HC RX REV CODE- 636/320: Performed by: SURGERY

## 2017-04-03 PROCEDURE — 80061 LIPID PANEL: CPT | Performed by: INTERNAL MEDICINE

## 2017-04-03 PROCEDURE — 74011250636 HC RX REV CODE- 250/636: Performed by: INTERNAL MEDICINE

## 2017-04-03 PROCEDURE — 36415 COLL VENOUS BLD VENIPUNCTURE: CPT | Performed by: INTERNAL MEDICINE

## 2017-04-03 PROCEDURE — 94664 DEMO&/EVAL PT USE INHALER: CPT

## 2017-04-03 PROCEDURE — 74011000250 HC RX REV CODE- 250: Performed by: SURGERY

## 2017-04-03 PROCEDURE — 74011250636 HC RX REV CODE- 250/636: Performed by: SURGERY

## 2017-04-03 PROCEDURE — 74011250637 HC RX REV CODE- 250/637: Performed by: INTERNAL MEDICINE

## 2017-04-03 RX ORDER — SODIUM CHLORIDE 9 MG/ML
50 INJECTION, SOLUTION INTRAVENOUS
Status: COMPLETED | OUTPATIENT
Start: 2017-04-03 | End: 2017-04-03

## 2017-04-03 RX ORDER — SODIUM CHLORIDE 0.9 % (FLUSH) 0.9 %
10 SYRINGE (ML) INJECTION
Status: COMPLETED | OUTPATIENT
Start: 2017-04-03 | End: 2017-04-03

## 2017-04-03 RX ORDER — DEXTROSE MONOHYDRATE AND SODIUM CHLORIDE 5; .45 G/100ML; G/100ML
50 INJECTION, SOLUTION INTRAVENOUS CONTINUOUS
Status: DISCONTINUED | OUTPATIENT
Start: 2017-04-03 | End: 2017-04-06

## 2017-04-03 RX ORDER — METOPROLOL TARTRATE 5 MG/5ML
5 INJECTION INTRAVENOUS
Status: DISCONTINUED | OUTPATIENT
Start: 2017-04-03 | End: 2017-04-09

## 2017-04-03 RX ORDER — ACETAMINOPHEN 325 MG/1
650 TABLET ORAL
Status: DISCONTINUED | OUTPATIENT
Start: 2017-04-03 | End: 2017-04-07

## 2017-04-03 RX ORDER — IPRATROPIUM BROMIDE AND ALBUTEROL SULFATE 2.5; .5 MG/3ML; MG/3ML
3 SOLUTION RESPIRATORY (INHALATION)
Status: DISCONTINUED | OUTPATIENT
Start: 2017-04-03 | End: 2017-04-06

## 2017-04-03 RX ORDER — MUPIROCIN 20 MG/G
OINTMENT TOPICAL EVERY 12 HOURS
Status: DISPENSED | OUTPATIENT
Start: 2017-04-03 | End: 2017-04-08

## 2017-04-03 RX ADMIN — ACETAMINOPHEN 650 MG: 325 TABLET, FILM COATED ORAL at 14:41

## 2017-04-03 RX ADMIN — DEXTROSE MONOHYDRATE AND SODIUM CHLORIDE 100 ML/HR: 5; .45 INJECTION, SOLUTION INTRAVENOUS at 09:26

## 2017-04-03 RX ADMIN — DEXTROSE MONOHYDRATE AND SODIUM CHLORIDE 100 ML/HR: 5; .45 INJECTION, SOLUTION INTRAVENOUS at 19:45

## 2017-04-03 RX ADMIN — POTASSIUM PHOSPHATE, MONOBASIC AND POTASSIUM PHOSPHATE, DIBASIC: 224; 236 INJECTION, SOLUTION INTRAVENOUS at 09:59

## 2017-04-03 RX ADMIN — Medication 10 ML: at 14:42

## 2017-04-03 RX ADMIN — METOPROLOL TARTRATE 5 MG: 5 INJECTION INTRAVENOUS at 20:21

## 2017-04-03 RX ADMIN — SODIUM BICARBONATE: 84 INJECTION, SOLUTION INTRAVENOUS at 00:36

## 2017-04-03 RX ADMIN — IPRATROPIUM BROMIDE AND ALBUTEROL SULFATE 3 ML: .5; 3 SOLUTION RESPIRATORY (INHALATION) at 12:03

## 2017-04-03 RX ADMIN — SODIUM CHLORIDE 50 ML/HR: 900 INJECTION, SOLUTION INTRAVENOUS at 15:13

## 2017-04-03 RX ADMIN — IPRATROPIUM BROMIDE AND ALBUTEROL SULFATE 3 ML: .5; 3 SOLUTION RESPIRATORY (INHALATION) at 15:20

## 2017-04-03 RX ADMIN — MUPIROCIN: 20 OINTMENT TOPICAL at 09:57

## 2017-04-03 RX ADMIN — Medication 2 MG: at 23:45

## 2017-04-03 RX ADMIN — IOPAMIDOL 100 ML: 755 INJECTION, SOLUTION INTRAVENOUS at 15:13

## 2017-04-03 RX ADMIN — MUPIROCIN: 20 OINTMENT TOPICAL at 19:46

## 2017-04-03 NOTE — PROGRESS NOTES
Pressure Ulcer Prevention In basket Alert Received for Aj < 14 (moderate risk).      Suggested Care Plan/Interventions for Nursing  1. Complete Aj Pressure Ulcer Risk Scale and use sub scores to identify appropriate interventions. 2. Perform Assessment: skin, changes in LOC, visual cues for pain, monitor skin under medical devices  3. Respond to Reduced Sensory Perception: changes in LOC, check visual cues for pain, float heels, suspension boots, pressure redistribution bed/mattress/chair cushion, turning and reposition approximately every 2 hours (pillows & wedges), pad between skin to skin, turn & reposition  4. Manage Moisture: absorbent under pads, internal / external urinary device, internal /  external fecal device, minimize layers, contain wound drainage, access need for specialty bed, limit adult briefs, maintain skin hydration (lotion/cream), moisture barrier, offer toileting every hour  5. Promote Activity: increase time out of bed, chair cushion, PT/OT evaluation, trapeze to reposition, pressure redistribution bed/mattress/chair  6. Address Reduced Mobility: float heels / suspension boot, HOB 30 degrees or less, pressure redistribution bed/mattress/cushion, PT / OT evaluation, turn and reposition approximately every 2 hours (pillows & wedges)  7. Promote Nutrition: document food / fluid / supplement intake, encourage/assist with meals as needed  8. Reduce Friction and Shear: transferring/repositioning devices (lift/draw sheet), lift team/ patient mobility team, feet elevated on foot rest, minimize layers, foam dressing / transparent film / skin sealants, protective barrier creams and emollients, transfer aides (board, Sherrill lift, ceiling lift, stand assist), HOB 30 degrees or less, trapeze to reposition.   Wound Care Team

## 2017-04-03 NOTE — PROGRESS NOTES
0715: Bedside and verbal report received from night shift nurse. 0890: Assessment completed. Patient alert and following commands. No complaints of pain. Her draining blood tinged urine. 1020: Patient to toilet. Minimum assistance needed. 1200: Reassessment completed. 1530: Patient back from [facility] 18: Patient sitting in chair with family at bedside. 1600: Reassessment completed. 1917: Bedside and verbal report given to Shawn Mclean RN.

## 2017-04-03 NOTE — PROGRESS NOTES
[facility]  Pulmonary, Critical Care, and Sleep Medicine    Name: Kenneth Gutierrezedgardoignacia  MRN: [id number]   : 1945 Hospital: [facility]   Date: 4/3/2017        Critical Care    IMPRESSION:   · Presented with abdominal pain, Umbilical Hernia evaluation per Dr. Karlene Case  · Acute renal failure   · Mild Hypotension  · Metabolic acidosis, normal lactate  · AAA 8.4cm followed by Dr. Dee Natarajan. · Anemia hgb of 10.4  · Smoker 2-3 ppd  · Obese  · CAD had prior stent      RECOMMENDATIONS:   · Can check PFTs as part of preop risk-stratification, though there will be few modifiable risk factors from a pulmonary standpoint (especially for an urgent/necessary surgery)  · Ongoing evaluation per renal  · General surgery following  · Vascular surgery following  · Will continue oxygen as needed  · Bronchodilators   · Monitor BP     Subjective/History:     4-3-17: no overnight events    This patient has been seen and evaluated at the request of Dr. Dee Natarajan for above. Patient is a 66 y.o. male reviewed Chart. Pt not a great historian. Denies any chest pain, back or abdominal pain during my evaluation. NO acute leg pain or swelling. ROS is negative. Past Medical History:   Diagnosis Date    BPH (benign prostatic hyperplasia)     CAD (coronary artery disease)      - s/p stent     Essential hypertension 13    Hematuria     sees urologist    Hyperglycemia       Past Surgical History:   Procedure Laterality Date    CARDIAC SURG PROCEDURE UNLIST      stent       Prior to Admission medications    Medication Sig Start Date End Date Taking? Authorizing Provider   aspirin delayed-release 81 mg tablet Take 2 Tabs by mouth daily. 14   Lorena Beckman MD   pantoprazole (PROTONIX) 40 mg tablet Take 40 mg by mouth daily. Historical Provider   atorvastatin (LIPITOR) 10 mg tablet Take  by mouth daily. Historical Provider   clopidogrel (PLAVIX) 75 mg tablet Take  by mouth daily. Historical Provider   hydrochlorothiazide (HYDRODIURIL) 25 mg tablet Take 25 mg by mouth daily. Historical Provider     Current Facility-Administered Medications   Medication Dose Route Frequency    nicotine (NICODERM CQ) 21 mg/24 hr patch 1 Patch  1 Patch TransDERmal DAILY    esmolol 10mg/mL (BREVIBLOC) infusion  50 mcg/kg/min IntraVENous TITRATE     Allergies   Allergen Reactions    Lipitor [Atorvastatin] Other (comments)     \"muscle pain\"      Social History   Substance Use Topics    Smoking status: Current Every Day Smoker     Packs/day: 3.00     Years: 53.00    Smokeless tobacco: Not on file    Alcohol use No      History reviewed. No pertinent family history. Review of Systems:  A comprehensive review of systems was negative. Objective:   Vital Signs:    Visit Vitals    /59 (BP 1 Location: Left arm, BP Patient Position: At rest)    Pulse 71    Temp 99.2 °F (37.3 °C)    Resp 20    Ht 5' 9\" (1.753 m)    Wt 111.7 kg (246 lb 4.1 oz)    SpO2 97%    BMI 36.37 kg/m2       O2 Device: Room air       Temp (24hrs), Av.3 °F (36.8 °C), Min:97.2 °F (36.2 °C), Max:99.2 °F (37.3 °C)       Intake/Output:   Last shift:      701 - 1900  In: -   Out: 325 [Urine:325]  Last 3 shifts: 1901 -  0700  In: 1483.6 [I.V.:1483.6]  Out: 1500 [Urine:1500]    Intake/Output Summary (Last 24 hours) at 17 0841  Last data filed at 17 0725   Gross per 24 hour   Intake           673.72 ml   Output             1725 ml   Net         -1051.28 ml       Physical Exam:    General:  Alert, cooperative, no distress, appears stated age. Head:  Normocephalic, without obvious abnormality, atraumatic. Eyes:  Conjunctivae/corneas clear. PERRL, EOMs intact. Nose: Nares normal. Septum midline. Mucosa normal.     Throat: Lips, mucosa, and tongue normal.     Neck: Supple, symmetrical, trachea midline    Back:   Symmetric, no curvature.  ROM normal.   Lungs:   Clear to auscultation bilaterally. Has some scattered wheezing. Chest wall:  No tenderness or deformity. Heart:  Regular rate and rhythm    Abdomen:   Soft, mild tenderness   Extremities: Extremities normal, atraumatic, no cyanosis or edema. Skin: Skin color, texture, turgor normal. No rashes or lesions   Neurologic: Grossly nonfocal     Data:   Labs:  Recent Labs      04/03/17   0357  04/02/17   0513  04/01/17   1856   WBC  9.7  10.8  12.1*   HGB  10.8*  10.4*  13.9   HCT  31.2*  30.7*  40.6   PLT  145*  153  191     Recent Labs      04/03/17   0357  04/03/17   0356  04/02/17   1348  04/02/17   0337  04/01/17   1856   NA  142   --   147*  144  145   K  3.6   --   3.9  4.4  4.4   CL  109*   --   116*  116*  111*   CO2  23   --   20*  18*  22   GLU  136*   --   111*  123*  131*   BUN  60*   --   69*  78*  68*   CREA  1.66*   --   1.67*  1.75*  1.76*   CA  8.5  8.3*  8.0*  8.5  9.3   PHOS  1.5*   --    --   1.9*   --    ALB  3.0*   --    --   2.9*  3.4*   TBILI  0.9   --    --   0.6  0.6   SGOT  9*   --    --   13*  9*   ALT  12   --    --   13  15   INR   --    --    --    --   1.1     No results for input(s): PH, PCO2, PO2, HCO3, FIO2 in the last 72 hours.     Imaging:  I have personally reviewed the patients radiographs:  None today        Lane Ziegler MD

## 2017-04-03 NOTE — PROGRESS NOTES
4/3/2017 3:17 PM    Admit Date: 4/1/2017    Admit Diagnosis:   AAA (abdominal aortic aneurysm) ([facility])    Subjective: [address] Bonds denies chest pain or shortness of breath. Current Facility-Administered Medications   Medication Dose Route Frequency    mupirocin (BACTROBAN) 2 % ointment   Topical Q12H    albuterol-ipratropium (DUO-NEB) 2.5 MG-0.5 MG/3 ML  3 mL Nebulization QID RT    dextrose 5 % - 0.45% NaCl infusion  100 mL/hr IntraVENous CONTINUOUS    acetaminophen (TYLENOL) tablet 650 mg  650 mg Oral Q4H PRN    morphine injection 2 mg  2 mg IntraVENous Q4H PRN    ondansetron (ZOFRAN) injection 4 mg  4 mg IntraVENous Q4H PRN    prochlorperazine (COMPAZINE) with saline injection 10 mg  10 mg IntraVENous Q6H PRN    nicotine (NICODERM CQ) 21 mg/24 hr patch 1 Patch  1 Patch TransDERmal DAILY    esmolol 10mg/mL (BREVIBLOC) infusion  50 mcg/kg/min IntraVENous TITRATE         Objective:      Physical Exam:    Visit Vitals    /65    Pulse 78    Temp 97.9 °F (36.6 °C)    Resp 13    Ht 5' 9\" (1.753 m)    Wt 246 lb 4.1 oz (111.7 kg)    SpO2 98%    BMI 36.37 kg/m2     Gen:  NAD  Mental Status - Alert. General Appearance - Not in acute distress. Chest and Lung Exam   Inspection: Accessory muscles - No use of accessory muscles in breathing. Auscultation:   Breath sounds: - Normal.   Cardiovascular   Inspection: Jugular vein - Bilateral - Inspection Normal.   Palpation/Percussion:   Apical Impulse: - Normal.   Auscultation: Rhythm - Regular. Heart Sounds - S1 WNL and S2 WNL. No S3 or S4. Murmurs & Other Heart Sounds: Auscultation of the heart reveals - No Murmurs. Peripheral Vascular   Upper Extremity: Inspection - Bilateral - No Cyanotic nailbeds or Digital clubbing. Lower Extremity:   Palpation: Edema - Bilateral - No edema. Abdomen:   Soft, non-tender, bowel sounds are active.   Neuro: A&O times 3, CN and motor grossly WNL    Data Review:   Recent Labs      04/03/17   0354 04/02/17   0513  04/01/17   1856   WBC  9.7  10.8  12.1*   HGB  10.8*  10.4*  13.9   HCT  31.2*  30.7*  40.6   PLT  145*  153  191     Recent Labs      04/03/17   0357  04/03/17   0356  04/02/17   1348  04/02/17   0337  04/01/17   1856   NA  142   --   147*  144  145   K  3.6   --   3.9  4.4  4.4   CL  109*   --   116*  116*  111*   CO2  23   --   20*  18*  22   GLU  136*   --   111*  123*  131*   BUN  60*   --   69*  78*  68*   CREA  1.66*   --   1.67*  1.75*  1.76*   CA  8.5  8.3*  8.0*  8.5  9.3   PHOS  1.5*   --    --   1.9*   --    ALB  3.0*   --    --   2.9*  3.4*   TBILI  0.9   --    --   0.6  0.6   SGOT  9*   --    --   13*  9*   ALT  12   --    --   13  15   INR   --    --    --    --   1.1       Recent Labs      04/02/17   1348   CPK  49         Intake/Output Summary (Last 24 hours) at 04/03/17 1517  Last data filed at 04/03/17 1403   Gross per 24 hour   Intake           461.67 ml   Output             1765 ml   Net         -1303.33 ml        Telemetry: normal sinus rhythm   Echo 4/3/17:  SUMMARY:  Left ventricle: Systolic function was normal. Ejection fraction was  estimated in the range of 55 % to 60 %. No obvious wall motion  abnormalities identified in the views obtained. Assessment:     Active Problems:    CAD (coronary artery disease) ()      Overview: 2006 - s/p stent       Dyslipidemia (1/25/2014)      Tobacco use disorder (1/25/2014)      AAA (abdominal aortic aneurysm) ([facility] (4/1/2017)      PAD (peripheral artery disease) ([facility] (4/2/2017)      Carotid stenosis, right (4/2/2017)      S/P PTCA (percutaneous transluminal coronary angioplasty) (4/2/2017)      Overview: 2006 stent ?  Vessel and type of stent        Plan:     Very large AAA, umbilical hernia, pre-op evaluation with h/o CAD and abnormal stress test 2014:  Discussed with Dr. Amy Castro   He was advised to have cardiac catheterization in 2014, but was immediately lost to follow-up and never had catheterization  Based on abnormal stress test 2014, and exercise history of complete carotid occlusion, and high risk AAA surgery, revised cardiac risk index is 3 and would predict a 9% risk of cardiovascular events  As surgery for AAA is urgent and cannot wait 1 month, must proceed at increased cardiac risk while continuing beta-blockers in order to save his life from aortic rupture  It is not safe to wait 1 month to consider catheterization and bare-metal stent with 1 month of uninterrupted Plavix per Dr. Tsering Winston  Fortunately, preoperative echocardiogram is normal  Check lipids and start high intensity statin when taking p.o., continue esmolol (BP and pulse well controlled)

## 2017-04-03 NOTE — PROGRESS NOTES
Nephrology Progress Note     Yony Rivera       [email or web address]. [email or web address]                   Phone - [phone]   Patient: Janice Platt  YOB: 1945     Date- 4/3/2017     CC: Follow up for acute renal failure          Subjective: Interval History:   -   Cr. Stable  Bun improved  Acidosis better  bp stable  Abdominal pain improved  No sob  C/o discomfort with finley cath     Assessment:   ·  Acute renal failure secondary to multiple possibilities. · 1. Pre renal factors - vomiting and HCTZ causing dehydration   · 2. Intra reanl factors - poor renal blood flow due to aneurysm, AIN due to aleve use  · 3. Post renal factors - no hydronephrosis - 250 ml urine out put after finley placement    · hypophosphatemia  · Abdominal aortic aneurysm - 8 cm  · Metabolic acidosis  · Nausea and vomiting  · Right renal lesion - hyperdense on ct  · Nephrolithiasis  · Umbilical hernia     Plan:   Change ivf to d5 . 45 nacl infusion  Keep finley in place  Iv kphos  Follow spep  Follow ipth  Daily labs  He will need further eval for renal lesion as out pt. Care Plan discussed with: pt ,nurse and family  [] High complexity decision making was performed  [] Patient is at high-risk of decompensation with multiple organ involvement  Review of Systems: Pertinent items are noted in HPI.   Objective:   Vitals:    04/03/17 0500 04/03/17 0600 04/03/17 0800 04/03/17 0900   BP: 103/72 118/59 108/61 115/64   Pulse: 69 71 67 74   Resp: 19 20 17 17   Temp:   97.9 °F (36.6 °C)    SpO2: 98% 97% 94% 96%   Weight:       Height:           Intake/Output Summary (Last 24 hours) at 04/03/17 0919  Last data filed at 04/03/17 0900   Gross per 24 hour   Intake           673.72 ml   Output             2025 ml   Net         -1351.28 ml     Physical Exam:   GEN: NAD  NECK- Supple, no thyromegaly  RESP: Clear b/l, no wheezing  CVS: RRR,S1,S2   NEURO: non focal, normal speech  SKIN: No Rash  ABDO: soft , non tender, No hepatosplenomegaly  EXT: No Edema   - finley +     Chart reviewed. Pertinent Notes reviewed. Medications list  reviewed     Current Facility-Administered Medications   Medication    mupirocin (BACTROBAN) 2 % ointment    albuterol-ipratropium (DUO-NEB) 2.5 MG-0.5 MG/3 ML    potassium phosphate 20 mmol in 0.9% sodium chloride 250 mL infusion    dextrose 5 % - 0.45% NaCl infusion    morphine injection 2 mg    ondansetron (ZOFRAN) injection 4 mg    prochlorperazine (COMPAZINE) with saline injection 10 mg    nicotine (NICODERM CQ) 21 mg/24 hr patch 1 Patch    esmolol 10mg/mL (BREVIBLOC) infusion    0.9% sodium chloride infusion 250 mL              Data Review :  Recent Labs      04/03/17 0357 04/03/17   0356  04/02/17   1348  04/02/17   0337  04/01/17   1856   NA  142   --   147*  144  145   K  3.6   --   3.9  4.4  4.4   CL  109*   --   116*  116*  111*   CO2  23   --   20*  18*  22   GLU  136*   --   111*  123*  131*   BUN  60*   --   69*  78*  68*   CREA  1.66*   --   1.67*  1.75*  1.76*   CA  8.5  8.3*  8.0*  8.5  9.3   PHOS  1.5*   --    --   1.9*   --    ALB  3.0*   --    --   2.9*  3.4*   SGOT  9*   --    --   13*  9*   ALT  12   --    --   13  15   INR   --    --    --    --   1.1     Recent Labs      04/03/17   0357  04/02/17   0513  04/01/17   1856   WBC  9.7  10.8  12.1*   HGB  10.8*  10.4*  13.9   HCT  31.2*  30.7*  40.6   PLT  145*  153  191     No results found for: CULT  No results found for: MD [facility]  [facility] [address], [place], [address]  Phone - [phone]         Fax - [phone]  [facility] Office  [address] [facility] [address]  Phone - [phone]        Fax - [phone]    [email or web address]

## 2017-04-03 NOTE — PROGRESS NOTES
Critical care interdisciplinary rounds held on 04/03/17. Following members present, Pharmacy, Diabetes Treatment, Case Management, Respiratory Therapy, Physical Therapy, Pastoral Care and Nutrition. Led by SHIRA Barkley and Lam Ewing RN. Dr. Donna Hayes. Plan of care discussed. See clinical pathway for plan of care and interventions and desired outcomes.

## 2017-04-03 NOTE — PROGRESS NOTES
Spoke with Dr. Jackie Jarvis regarding pt /45. Pt has been off esmolol all day. Order received for metoprolol PRN for SBP > 130. Not needed at this time.   Recycle /36

## 2017-04-03 NOTE — PROGRESS NOTES
Received patient at the change of shift per report. Assessment completed and documented. During my shift the patient has been up 3 times to the BR for BMs. The first two BMs were formed but the last one was soft, and unformed. The last MB which was loose was + for ocult blood. Physician was paged and spoke with Dr. Francesca Wylie he quested if a.m. Lab with [facility] had been ordered; it had so he said:  \"I'll let Dr. Cj Rowland know. \"

## 2017-04-03 NOTE — PROGRESS NOTES
Admit Date: 2017    POD * No surgery found *    Procedure:  * No surgery found *    Subjective:     Patient has no new complaints. Feels much better today following hernia reduction yesterday. No N/V. Objective:     Blood pressure 115/64, pulse 74, temperature 97.9 °F (36.6 °C), resp. rate 17, height 5' 9\" (1.753 m), weight 246 lb 4.1 oz (111.7 kg), SpO2 96 %. Temp (24hrs), Av.2 °F (36.8 °C), Min:97.2 °F (36.2 °C), Max:99.2 °F (37.3 °C)      Physical Exam:  GENERAL: alert, cooperative, no distress, appears stated age, LUNG: clear to auscultation bilaterally, HEART: regular rate and rhythm, S1, S2 normal, no murmur, click, rub or gallop, ABDOMEN: soft, non-tender. Bowel sounds normal. No masses,  no organomegaly, palpable supraumbilical midline hernia sac, soft and less tender, EXTREMITIES:  extremities normal, atraumatic, no cyanosis or edema    Labs:   Recent Results (from the past 24 hour(s))   CHLORIDE, UR, RANDOM    Collection Time: 17  9:38 AM   Result Value Ref Range    Chloride,urine random 24 MMOL/L   SODIUM, UR, RANDOM    Collection Time: 17  9:38 AM   Result Value Ref Range    Sodium urine, random 33 MMOL/L   EOSINOPHILS, URINE    Collection Time: 17  9:38 AM   Result Value Ref Range    Eosinophils,urine NEGATIVE      PROTEIN/CREATININE RATIO, URINE    Collection Time: 17  9:38 AM   Result Value Ref Range    Protein, urine random 40 (H) 0.0 - 11.9 mg/dL    Creatinine, urine 129.00 mg/dL    Protein/Creat.  urine Ratio 0.3     METABOLIC PANEL, BASIC    Collection Time: 17  1:48 PM   Result Value Ref Range    Sodium 147 (H) 136 - 145 mmol/L    Potassium 3.9 3.5 - 5.1 mmol/L    Chloride 116 (H) 97 - 108 mmol/L    CO2 20 (L) 21 - 32 mmol/L    Anion gap 11 5 - 15 mmol/L    Glucose 111 (H) 65 - 100 mg/dL    BUN 69 (H) 6 - 20 MG/DL    Creatinine 1.67 (H) 0.70 - 1.30 MG/DL    BUN/Creatinine ratio 41 (H) 12 - 20      GFR est AA 49 (L) >60 ml/min/1.73m2    GFR est non-AA 41 (L) >60 ml/min/1.73m2    Calcium 8.0 (L) 8.5 - 10.1 MG/DL   CK    Collection Time: 04/02/17  1:48 PM   Result Value Ref Range    CK 49 39 - 308 U/L   OCCULT BLOOD, STOOL    Collection Time: 04/03/17  3:26 AM   Result Value Ref Range    Occult blood, stool POSITIVE (A) NEG     PTH INTACT    Collection Time: 04/03/17  3:56 AM   Result Value Ref Range    Calcium 8.3 (L) 8.5 - 10.1 MG/DL    PTH, Intact 147.1 (H) 14.0 - 72.0 pg/mL   CBC W/O DIFF    Collection Time: 04/03/17  3:57 AM   Result Value Ref Range    WBC 9.7 4.1 - 11.1 K/uL    RBC 3.28 (L) 4.10 - 5.70 M/uL    HGB 10.8 (L) 12.1 - 17.0 g/dL    HCT 31.2 (L) 36.6 - 50.3 %    MCV 95.1 80.0 - 99.0 FL    MCH 32.9 26.0 - 34.0 PG    MCHC 34.6 30.0 - 36.5 g/dL    RDW 14.2 11.5 - 14.5 %    PLATELET 541 (L) 647 - 841 K/uL   METABOLIC PANEL, COMPREHENSIVE    Collection Time: 04/03/17  3:57 AM   Result Value Ref Range    Sodium 142 136 - 145 mmol/L    Potassium 3.6 3.5 - 5.1 mmol/L    Chloride 109 (H) 97 - 108 mmol/L    CO2 23 21 - 32 mmol/L    Anion gap 10 5 - 15 mmol/L    Glucose 136 (H) 65 - 100 mg/dL    BUN 60 (H) 6 - 20 MG/DL    Creatinine 1.66 (H) 0.70 - 1.30 MG/DL    BUN/Creatinine ratio 36 (H) 12 - 20      GFR est AA 49 (L) >60 ml/min/1.73m2    GFR est non-AA 41 (L) >60 ml/min/1.73m2    Calcium 8.5 8.5 - 10.1 MG/DL    Bilirubin, total 0.9 0.2 - 1.0 MG/DL    ALT (SGPT) 12 12 - 78 U/L    AST (SGOT) 9 (L) 15 - 37 U/L    Alk.  phosphatase 77 45 - 117 U/L    Protein, total 6.4 6.4 - 8.2 g/dL    Albumin 3.0 (L) 3.5 - 5.0 g/dL    Globulin 3.4 2.0 - 4.0 g/dL    A-G Ratio 0.9 (L) 1.1 - 2.2     LIPID PANEL    Collection Time: 04/03/17  3:57 AM   Result Value Ref Range    LIPID PROFILE          Cholesterol, total 135 <200 MG/DL    Triglyceride 250 (H) <150 MG/DL    HDL Cholesterol 27 MG/DL    LDL, calculated 58 0 - 100 MG/DL    VLDL, calculated 50 MG/DL    CHOL/HDL Ratio 5.0 0 - 5.0     PHOSPHORUS    Collection Time: 04/03/17  3:57 AM   Result Value Ref Range    Phosphorus 1.5 (L) 2.6 - 4.7 MG/DL       Data Review images and reports reviewed    Assessment:     Active Problems:    CAD (coronary artery disease) ()      Overview: 2006 - s/p stent       Dyslipidemia (1/25/2014)      Tobacco use disorder (1/25/2014)      AAA (abdominal aortic aneurysm) ([facility] (4/1/2017)      PAD (peripheral artery disease) ([facility] (4/2/2017)      Carotid stenosis, right (4/2/2017)      S/P PTCA (percutaneous transluminal coronary angioplasty) (4/2/2017)      Overview: 2006 stent ? Vessel and type of stent        Plan/Recommendations/Medical Decision Making:     Continue present treatment   Understand pt will undergo open AAA repair. I can be available to assist with management of hernia at time of that operation if needed, suspect will be a simple matter of opening the midline and closing primarily at the end of the AAA repair. No pressing urgency for surgery from hernia standpoint presently. Zackery Caban.  Flora Navarro MD, [facility]

## 2017-04-03 NOTE — PROCEDURES
[facility]  *** FINAL REPORT ***    Name: Augusto Nino  MRN: [id number]    Inpatient  : 13 Aug 1945  HIS Order #: [id number]  [address] [facility] Visit #: [id number]  Date: 2017    TYPE OF TEST: Cerebrovascular Duplex    REASON FOR TEST  AAA    Right Carotid:-             Proximal               Mid                 Distal  cm/s  Systolic  Diastolic  Systolic  Diastolic  Systolic  Diastolic  CCA:     83.0       8.0                            25.0       0.0  Bulb:  ECA:    324.0  ICA:      0.0                  0.0                  0.0  ICA/CCA:  0.0    ICA Stenosis: Occluded    Right Vertebral:-  Finding: Not visualized  Sys:  [place]:    Right Subclavian: Normal    Left Carotid:-            Proximal                Mid                 Distal  cm/s  Systolic  Diastolic  Systolic  Diastolic  Systolic  Diastolic  CCA:     56.7                                      85.0  Bulb:  ECA:    128.0  ICA:     58.0                 54.0                 82.0  ICA/CCA:  0.7    ICA Stenosis: <50%    Left Vertebral:-  Finding: Not visualized  Sys:  [place]:    Left Subclavian: Normal    INTERPRETATION/FINDINGS  PROCEDURE:  Carotid Duplex Examination using B-mode, color and  spectral Doppler of the extracranial cerebrovascular arteries. 1. Probable occlusion of the right internal carotid artery, cannot  exclude extremely low flow. 2. <50% stenosis in the left internal carotid artery. 3. Probable hemodynamically significant stenosis in the right external   carotid artery. 4. No significant stenosis in the left external carotid artery. 5. Unable to visualize either vertebral artery. 6. Normal flow in both subclavian arteries. Plaque Morphology:  1. Homogeneous plaque in the bulb and left ICA. ADDITIONAL COMMENTS    I have personally reviewed the data relevant to the interpretation of  this  study.     TECHNOLOGIST: Dayan Schwarz RVT  Signed: 2017 09:54 AM    PHYSICIAN: Nestor Monroe MD  Signed: 2017 12:50 PM

## 2017-04-03 NOTE — PROGRESS NOTES
The patient complained about not having \"good vision\" at this time; stating that something was wrong. Assessment of his eyes completed with eyes being equal, brisk and reactive. By the time I got in to see the patient he was no longer complaining of having visual difficulties.

## 2017-04-04 ENCOUNTER — ANESTHESIA (OUTPATIENT)
Dept: ENDOSCOPY | Age: 72
DRG: 271 | End: 2017-04-04
Payer: MEDICARE

## 2017-04-04 ENCOUNTER — ANESTHESIA EVENT (OUTPATIENT)
Dept: ENDOSCOPY | Age: 72
DRG: 271 | End: 2017-04-04
Payer: MEDICARE

## 2017-04-04 LAB
ALBUMIN SERPL BCP-MCNC: 2.7 G/DL (ref 3.5–5)
ANA SER QL: POSITIVE
ANION GAP BLD CALC-SCNC: 6 MMOL/L (ref 5–15)
BUN SERPL-MCNC: 33 MG/DL (ref 6–20)
BUN/CREAT SERPL: 24 (ref 12–20)
CALCIUM SERPL-MCNC: 8 MG/DL (ref 8.5–10.1)
CHLORIDE SERPL-SCNC: 111 MMOL/L (ref 97–108)
CO2 SERPL-SCNC: 27 MMOL/L (ref 21–32)
CREAT SERPL-MCNC: 1.39 MG/DL (ref 0.7–1.3)
DSDNA AB SER-ACNC: 14 IU/ML (ref 0–9)
GLUCOSE SERPL-MCNC: 128 MG/DL (ref 65–100)
IGA SERPL-MCNC: 144 MG/DL (ref 61–437)
IGG SERPL-MCNC: 826 MG/DL (ref 700–1600)
IGM SERPL-MCNC: 34 MG/DL (ref 15–143)
KAPPA LC FREE SER-MCNC: 51.78 MG/L (ref 3.3–19.4)
KAPPA LC FREE/LAMBDA FREE SER: 1.93 {RATIO} (ref 0.26–1.65)
LAMBDA LC FREE SERPL-MCNC: 26.82 MG/L (ref 5.71–26.3)
PHOSPHATE SERPL-MCNC: 2.3 MG/DL (ref 2.6–4.7)
POTASSIUM SERPL-SCNC: 3.6 MMOL/L (ref 3.5–5.1)
PROT PATTERN SERPL IFE-IMP: NORMAL
SEE BELOW:, 164879: ABNORMAL
SODIUM SERPL-SCNC: 144 MMOL/L (ref 136–145)

## 2017-04-04 PROCEDURE — 94060 EVALUATION OF WHEEZING: CPT

## 2017-04-04 PROCEDURE — 80069 RENAL FUNCTION PANEL: CPT | Performed by: INTERNAL MEDICINE

## 2017-04-04 PROCEDURE — 36415 COLL VENOUS BLD VENIPUNCTURE: CPT | Performed by: INTERNAL MEDICINE

## 2017-04-04 PROCEDURE — 77030033269 HC SLV COMPR SCD KNE2 CARD -B

## 2017-04-04 PROCEDURE — 65610000006 HC RM INTENSIVE CARE

## 2017-04-04 PROCEDURE — 94640 AIRWAY INHALATION TREATMENT: CPT

## 2017-04-04 PROCEDURE — 77030027138 HC INCENT SPIROMETER -A

## 2017-04-04 PROCEDURE — 74011250637 HC RX REV CODE- 250/637: Performed by: INTERNAL MEDICINE

## 2017-04-04 PROCEDURE — 74011250637 HC RX REV CODE- 250/637: Performed by: SURGERY

## 2017-04-04 PROCEDURE — 77030034848

## 2017-04-04 PROCEDURE — 74011000250 HC RX REV CODE- 250: Performed by: INTERNAL MEDICINE

## 2017-04-04 PROCEDURE — 51798 US URINE CAPACITY MEASURE: CPT

## 2017-04-04 PROCEDURE — 74011000250 HC RX REV CODE- 250: Performed by: SURGERY

## 2017-04-04 PROCEDURE — 94726 PLETHYSMOGRAPHY LUNG VOLUMES: CPT

## 2017-04-04 PROCEDURE — 74011000258 HC RX REV CODE- 258: Performed by: INTERNAL MEDICINE

## 2017-04-04 PROCEDURE — 77030032490 HC SLV COMPR SCD KNE COVD -B

## 2017-04-04 PROCEDURE — 94729 DIFFUSING CAPACITY: CPT

## 2017-04-04 RX ORDER — SODIUM,POTASSIUM PHOSPHATES 280-250MG
2 POWDER IN PACKET (EA) ORAL 4 TIMES DAILY
Status: DISPENSED | OUTPATIENT
Start: 2017-04-04 | End: 2017-04-05

## 2017-04-04 RX ADMIN — IPRATROPIUM BROMIDE AND ALBUTEROL SULFATE 3 ML: .5; 3 SOLUTION RESPIRATORY (INHALATION) at 21:05

## 2017-04-04 RX ADMIN — POTASSIUM & SODIUM PHOSPHATES POWDER PACK 280-160-250 MG 2 PACKET: 280-160-250 PACK at 18:03

## 2017-04-04 RX ADMIN — POTASSIUM & SODIUM PHOSPHATES POWDER PACK 280-160-250 MG 2 PACKET: 280-160-250 PACK at 21:19

## 2017-04-04 RX ADMIN — ACETAMINOPHEN 650 MG: 325 TABLET, FILM COATED ORAL at 13:45

## 2017-04-04 RX ADMIN — MUPIROCIN: 20 OINTMENT TOPICAL at 21:20

## 2017-04-04 RX ADMIN — METOPROLOL TARTRATE 5 MG: 5 INJECTION INTRAVENOUS at 03:21

## 2017-04-04 RX ADMIN — IPRATROPIUM BROMIDE AND ALBUTEROL SULFATE 3 ML: .5; 3 SOLUTION RESPIRATORY (INHALATION) at 08:00

## 2017-04-04 RX ADMIN — IPRATROPIUM BROMIDE AND ALBUTEROL SULFATE 3 ML: .5; 3 SOLUTION RESPIRATORY (INHALATION) at 11:25

## 2017-04-04 RX ADMIN — IPRATROPIUM BROMIDE AND ALBUTEROL SULFATE 3 ML: .5; 3 SOLUTION RESPIRATORY (INHALATION) at 16:00

## 2017-04-04 RX ADMIN — DEXTROSE MONOHYDRATE AND SODIUM CHLORIDE 100 ML/HR: 5; .45 INJECTION, SOLUTION INTRAVENOUS at 03:32

## 2017-04-04 RX ADMIN — MUPIROCIN: 20 OINTMENT TOPICAL at 08:40

## 2017-04-04 RX ADMIN — DEXTROSE MONOHYDRATE AND SODIUM CHLORIDE 75 ML/HR: 5; .45 INJECTION, SOLUTION INTRAVENOUS at 18:46

## 2017-04-04 RX ADMIN — METOPROLOL TARTRATE 5 MG: 5 INJECTION INTRAVENOUS at 10:33

## 2017-04-04 NOTE — PROGRESS NOTES
1900: Received report from Sangeeta GUERRERO. 2000: Assessment complete. Patient is alert and oriented. Complaining of some pain when he urinates, however a finley is in place. Received adequate urine output, tugged on catheter, placement secure. Patient states, \"I have a large prostate, its always been like this, but is still hurts every once in a while. \" Will continue to monitor. 2020: PRN metoprolol given. / HR  78  0000: Reassessment complete. Patient had an incontinent episode on the way to the toilet of black watery stool. Still complaining of urethra pain. PRN morphine given. 0200: Patient complaining of pain around prostate region. States \"I'm sorry but I cant stand it anymore this is the worst pain. \" Deflated finley ballon, 10ml returned, immediate blood oozing from urethra, advanced finley to bifurcation and inflated. Patient felt immediate relief, stated \"that feels so much better\"   0330: PRN metoprolol given. 0400: Reassessment complete. No changes.

## 2017-04-04 NOTE — CARDIO/PULMONARY
C/P rehab note- chart reviewed for in basket referral.    Adm with AAA 8.4 cm    HX includes BPH,CAD,HTN,Hematuria,Hyperglycemia. PAD. Current everyday smoker. LVEF 55-60%. Possible surgery this Friday per notes.     No teaching from Cardiac Rehab  Indicated at this time

## 2017-04-04 NOTE — PROGRESS NOTES
1915: Received report from [employer]. 2000: Assessment complete. Patient alert and oriented. Denies pain. Bowel sounds active. 2045: Patient called out complaining of pain around prostate area. Deflated finley balloon, advanced catheter and re inflated. Instant relief of patients pain. Will continue to monitor. 2300: Patient restless, up to chair for comfort. 2330: Reassessment complete. Back to bed.   0430: Reassessment complete. No changes. Labs drawn and sent. 0720: Bedside and Verbal shift change report given to Alisha Parrish (oncoming nurse) by [address] (offgoing nurse). Report included the following information SBAR, Kardex, ED Summary and Recent Results.

## 2017-04-04 NOTE — PROGRESS NOTES
Interdisciplinary team rounds were held  4/4/2017  with the following team members:Care Management, Diabetes Treatment Specialist, Nursing, Nutrition, Pastoral Care, Pharmacy, Physical Therapy, Physician and Respiratory Therapy. Plan of care discussed. Goal: See MD orders and progress notes for further  interventions and desired outcomes.

## 2017-04-04 NOTE — CONSULTS
Gastroenterology Consult    Patient Name: Thea Gonzalez    : 1945    MRN: [id number]  [facility] Date: 2017  Consult Date: 2017     Attending Physician: Dr. Cher Mccoy    Subjective:     Chief Complaint: GI Bleed    History of Present Illness: Thea Gonzalez is a 66 y.o. male who is seen in consultation at the request of Dr. Keira Sen for the above complaint. Patient was admitted on 17 with c/o abdominal pain, nausea and vomiting. He had a CT scan in the ER that revealed a fat-containing periumbilical hernia and a 8.4 cm AAA, which was thought to be an incidental but urgent finding. The periumbilical hernia was partially reduced on 17 by Dr. Kamran Arceo. Patient reports continued pain in the periumbilical region, though improved since admission. He reports that at the onset of the pain, he had nausea and vomiting, with the vomitus described as \"black as tar. \"  He has not had any recent vomiting. He noticed yesterday that he had black stool, which was tested and found to be heme-positive. Patient denies any h/o previous GI Bleed. He was taking ASA and Plavix at home, but they have been stopped. Past Medical History:   Diagnosis Date    BPH (benign prostatic hyperplasia)     CAD (coronary artery disease)      - s/p stent     Essential hypertension 13    Hematuria     sees urologist    Hyperglycemia      Past Surgical History:   Procedure Laterality Date    CARDIAC SURG PROCEDURE UNLIST      stent       History reviewed. No pertinent family history. Social History   Substance Use Topics    Smoking status: Current Every Day Smoker     Packs/day: 3.00     Years: 53.00    Smokeless tobacco: Not on file    Alcohol use No        Allergies   Allergen Reactions    Lipitor [Atorvastatin] Other (comments)     \"muscle pain\"     Prior to Admission medications    Medication Sig Start Date End Date Taking?  Authorizing Provider   aspirin delayed-release 81 mg tablet Take 2 Tabs by mouth daily. 1/30/14   Mitul Rogers MD   pantoprazole (PROTONIX) 40 mg tablet Take 40 mg by mouth daily. Historical Provider   atorvastatin (LIPITOR) 10 mg tablet Take  by mouth daily. Historical Provider   clopidogrel (PLAVIX) 75 mg tablet Take  by mouth daily. Historical Provider   hydrochlorothiazide (HYDRODIURIL) 25 mg tablet Take 25 mg by mouth daily. Historical Provider     Current Facility-Administered Medications   Medication Dose Route Frequency    mupirocin (BACTROBAN) 2 % ointment   Topical Q12H    albuterol-ipratropium (DUO-NEB) 2.5 MG-0.5 MG/3 ML  3 mL Nebulization QID RT    dextrose 5 % - 0.45% NaCl infusion  100 mL/hr IntraVENous CONTINUOUS    acetaminophen (TYLENOL) tablet 650 mg  650 mg Oral Q4H PRN    metoprolol (LOPRESSOR) injection 5 mg  5 mg IntraVENous Q6H PRN    morphine injection 2 mg  2 mg IntraVENous Q4H PRN    ondansetron (ZOFRAN) injection 4 mg  4 mg IntraVENous Q4H PRN    prochlorperazine (COMPAZINE) with saline injection 10 mg  10 mg IntraVENous Q6H PRN    nicotine (NICODERM CQ) 21 mg/24 hr patch 1 Patch  1 Patch TransDERmal DAILY        Review of Systems:    Constitutional:  No fever, chills, night sweats, weight loss, increased fatigue or weakness  Respiratory: No coughing, wheezing or sob  Cardiac:  No chest pain, palpitations  Gastrointestinal:  See history of the present illness  Musculoskeletal:  No arthritis. Integumentary:  No skin rash or jaundice  Neurologic:  No confusion; no numbness or tingling of the extremities. Ext: No pain or edema    Objective:     Visit Vitals    /56    Pulse (!) 58    Temp 98.4 °F (36.9 °C)    Resp 14    Ht 5' 9\" (1.753 m)    Wt 111.7 kg (246 lb 4.1 oz)    SpO2 94%    BMI 36.37 kg/m2        Physical Exam:  General appearance: cooperative, no distress, appears stated age  Skin: Extremities and face reveal no rashes, jaundice or pallor  HEENT: Sclerae anicteric. Extra-occular muscles are intact.  No abnormal pigmentation of the lips. Cardiovascular: Regular rate and rhythm. No murmurs  Respiratory: Clear breath sounds with no wheezes, rales, or rhonchi. GI: Abdomen obese, nondistended, soft. TTP periumbilical at site of hernia, otherwise nontender. Normal active bowel sounds. No enlargement of the liver or spleen. No masses palpable. Rectal: Deferred   Musculoskeletal: No edema of the lower legs. Neurological: Gross memory appears intact. Patient is alert and oriented. Psychiatric: Mood appears appropriate with good judgement. No anxiety or agitation. Laboratory:    Recent Results (from the past 24 hour(s))   RENAL FUNCTION PANEL    Collection Time: 04/04/17  3:29 AM   Result Value Ref Range    Sodium 144 136 - 145 mmol/L    Potassium 3.6 3.5 - 5.1 mmol/L    Chloride 111 (H) 97 - 108 mmol/L    CO2 27 21 - 32 mmol/L    Anion gap 6 5 - 15 mmol/L    Glucose 128 (H) 65 - 100 mg/dL    BUN 33 (H) 6 - 20 MG/DL    Creatinine 1.39 (H) 0.70 - 1.30 MG/DL    BUN/Creatinine ratio 24 (H) 12 - 20      GFR est AA >60 >60 ml/min/1.73m2    GFR est non-AA 50 (L) >60 ml/min/1.73m2    Calcium 8.0 (L) 8.5 - 10.1 MG/DL    Phosphorus 2.3 (L) 2.6 - 4.7 MG/DL    Albumin 2.7 (L) 3.5 - 5.0 g/dL       Imaging Review:    Results    CT ABD PELV WO CONT (Accession [id number]) (Order [id number])         Allergies        Unspecified: Lipitor [Atorvastatin]       Result Information      Status Provider Status        Final result (Exam End: 4/1/2017  8:19 PM) Open        Study Result      INDICATION: severe abdominal pain, n/v, ventral hernia     COMPARISON: Earlier radiographs     TECHNIQUE:   Unenhanced axial images were obtained through the abdomen and pelvis. Coronal  and sagittal reconstructions were generated. Oral contrast was not administered.   CT dose reduction was achieved through use of a standardized protocol tailored  for this examination and automatic exposure control for dose modulation.      The absence of intravenous and oral contrast material reduces the capacity of CT  to evaluate the solid parenchymal organs of the abdomen and pelvis as well as  the bowel.      FINDINGS:   LUNG BASES: Clear. INCIDENTALLY IMAGED HEART AND MEDIASTINUM: Vascular calcifications at the aortic  root and within the right coronary and left anterior descending coronary  arteries. LIVER: No mass or biliary dilatation. GALLBLADDER: Unremarkable. SPLEEN: No mass. PANCREAS: No mass or ductal dilatation. ADRENALS: Unremarkable. KIDNEYS/URETERS: Several tiny nonobstructing bilateral renal calculi. No renal  pelvocaliectasis. Several bilateral nonspecific lesions of the kidneys measuring  up to 1.9 cm in size, one of which is hyperdense in the posterior right mid pole  measuring 1.1 cm in size. STOMACH: Nondistended. SMALL BOWEL: Nondistended. COLON: Nondistended. APPENDIX: Unremarkable. PERITONEUM: No free peroneal air or fluid. RETROPERITONEUM: Renal and infrarenal fusiform abdominal aortic aneurysm  measuring up to 8.4 cm AP, extending to level of aortic bifurcation. Right  common iliac artery aneurysm measuring 2.9 cm. URINARY BLADDER: No mass or calculus. BONES: Moderate lumbosacral junction, mild lumbar and mild lower thoracic spine  degenerative changes. Mild bilateral hip and SI joint osteoarthrosis. Moderate  degenerative changes at symphysis pubis. ADDITIONAL COMMENTS: Superior periumbilical hernia containing fat, with orifice  measuring 2.2 cm.     IMPRESSION:    1. Renal and infrarenal abdominal aortic aneurysm measuring up to 8.4 cm AP. 2. Right common iliac artery aneurysm measuring 2.9 cm.  3. Peripheral vertical hernia containing fat. 4. Subtle nonspecific renal lesions present bilaterally measuring up to 1.9 cm,  one of which is hyperattenuating.            Assessment / Plan:     Anemia with heme-positive stool and report of black vomitus several days ago   - Hgb 13.9 on admission 4/1, down to 10.8 today.   Looking at history, appears his normal range is around 17   - Plan for EGD today at 1430 with Dr. Karo Shay. - Last had sips of clears around 0900 this morning. GI Attending---Pt seen and examined. Needs EGD but was given clear liquids this am. So scheduled for tomorrow. Patient Active Hospital Problem List:   Active Problems:    CAD (coronary artery disease) ()      Overview: 2006 - s/p stent       Dyslipidemia (1/25/2014)      Tobacco use disorder (1/25/2014)      AAA (abdominal aortic aneurysm) ([facility] (4/1/2017)      PAD (peripheral artery disease) ([facility] (4/2/2017)      Carotid stenosis, right (4/2/2017)      S/P PTCA (percutaneous transluminal coronary angioplasty) (4/2/2017)      Overview: 2006 stent ? Vessel and type of stent        The above was discussed with Dr. Karo Shay. Please await further input from him. Thank you for allowing us to participate in the care and management of this patient.      Cortes Cee PA-C

## 2017-04-04 NOTE — PROGRESS NOTES
[facility]  Pulmonary, Critical Care, and Sleep Medicine    Name: Janice Platt MRN: [id number]   : 1945 Hospital: [facility]   Date: 2017        Critical Care    IMPRESSION:   · Presented with abdominal pain, Umbilical Hernia evaluation per Dr. Gm Lopez  · Acute renal failure   · Mild Hypotension  · Metabolic acidosis, normal lactate  · AAA 8.4cm followed by Dr. Isabella Soliman. · Anemia hgb of 10.4  · Smoker 2-3 ppd  · Obese  · CAD had prior stent      RECOMMENDATIONS:   · Will check PFTs as part of preop risk-stratification, though there will be few modifiable risk factors from a pulmonary standpoint (especially for an urgent/necessary surgery)  · Bronchodilators   · Will start preop incentive spirometry for training to maximize postop compliance  · Ongoing evaluation per renal  · General surgery following  · Vascular surgery following  · Will continue oxygen as needed  · Monitor BP     Subjective/History:     17:  Didn't notice much difference with nebulizers. Having some melena. This patient has been seen and evaluated at the request of Dr. Isabella Soliman for above. Patient is a 66 y.o. male reviewed Chart. Pt not a great historian. Denies any chest pain, back or abdominal pain during my evaluation. NO acute leg pain or swelling. ROS is negative. Past Medical History:   Diagnosis Date    BPH (benign prostatic hyperplasia)     CAD (coronary artery disease)      - s/p stent     Essential hypertension 13    Hematuria     sees urologist    Hyperglycemia       Past Surgical History:   Procedure Laterality Date    CARDIAC SURG PROCEDURE UNLIST      stent       Prior to Admission medications    Medication Sig Start Date End Date Taking? Authorizing Provider   aspirin delayed-release 81 mg tablet Take 2 Tabs by mouth daily. 14   Candace Colón MD   pantoprazole (PROTONIX) 40 mg tablet Take 40 mg by mouth daily.     Historical Provider atorvastatin (LIPITOR) 10 mg tablet Take  by mouth daily. Historical Provider   clopidogrel (PLAVIX) 75 mg tablet Take  by mouth daily. Historical Provider   hydrochlorothiazide (HYDRODIURIL) 25 mg tablet Take 25 mg by mouth daily. Historical Provider     Current Facility-Administered Medications   Medication Dose Route Frequency    mupirocin (BACTROBAN) 2 % ointment   Topical Q12H    albuterol-ipratropium (DUO-NEB) 2.5 MG-0.5 MG/3 ML  3 mL Nebulization QID RT    dextrose 5 % - 0.45% NaCl infusion  100 mL/hr IntraVENous CONTINUOUS    nicotine (NICODERM CQ) 21 mg/24 hr patch 1 Patch  1 Patch TransDERmal DAILY     Allergies   Allergen Reactions    Lipitor [Atorvastatin] Other (comments)     \"muscle pain\"      Social History   Substance Use Topics    Smoking status: Current Every Day Smoker     Packs/day: 3.00     Years: 53.00    Smokeless tobacco: Not on file    Alcohol use No      History reviewed. No pertinent family history. Review of Systems:  A comprehensive review of systems was negative. Objective:   Vital Signs:    Visit Vitals    /67    Pulse 66    Temp 98.6 °F (37 °C)    Resp 17    Ht 5' 9\" (1.753 m)    Wt 111.7 kg (246 lb 4.1 oz)    SpO2 97%    BMI 36.37 kg/m2       O2 Device: Room air       Temp (24hrs), Av.4 °F (36.9 °C), Min:97.9 °F (36.6 °C), Max:98.7 °F (37.1 °C)       Intake/Output:   Last shift:         Last 3 shifts:  1901 -  0700  In: 2056.7 [I.V.:2056.7]  Out: 9038 [Urine:3165]    Intake/Output Summary (Last 24 hours) at 17 0842  Last data filed at 17 0600   Gross per 24 hour   Intake          2056.67 ml   Output             2265 ml   Net          -208.33 ml       Physical Exam:    General:  Alert, cooperative, no distress, appears stated age. Head:  Normocephalic, without obvious abnormality, atraumatic. Eyes:  Conjunctivae/corneas clear. PERRL, EOMs intact. Nose: Nares normal. Septum midline.  Mucosa normal.     Throat: Lips, mucosa, and tongue normal.     Neck: Supple, symmetrical, trachea midline    Back:   Symmetric, no curvature. ROM normal.   Lungs:   Bilateral scattered wheeze   Chest wall:  No tenderness or deformity. Heart:  Regular rate and rhythm    Abdomen:   Soft, mild tenderness   Extremities: Extremities normal, atraumatic, no cyanosis or edema. Skin: Skin color, texture, turgor normal. No rashes or lesions   Neurologic: Grossly nonfocal     Data:   Labs:  Recent Labs      04/03/17   0357  04/02/17   0513  04/01/17   1856   WBC  9.7  10.8  12.1*   HGB  10.8*  10.4*  13.9   HCT  31.2*  30.7*  40.6   PLT  145*  153  191     Recent Labs      04/04/17   0329  04/03/17   0357  04/03/17   0356  04/02/17   1348  04/02/17   0337  04/01/17   1856   NA  144  142   --   147*  144  145   K  3.6  3.6   --   3.9  4.4  4.4   CL  111*  109*   --   116*  116*  111*   CO2  27  23   --   20*  18*  22   GLU  128*  136*   --   111*  123*  131*   BUN  33*  60*   --   69*  78*  68*   CREA  1.39*  1.66*   --   1.67*  1.75*  1.76*   CA  8.0*  8.5  8.3*  8.0*  8.5  9.3   PHOS  2.3*  1.5*   --    --   1.9*   --    ALB  2.7*  3.0*   --    --   2.9*  3.4*   TBILI   --   0.9   --    --   0.6  0.6   SGOT   --   9*   --    --   13*  9*   ALT   --   12   --    --   13  15   INR   --    --    --    --    --   1.1     No results for input(s): PH, PCO2, PO2, HCO3, FIO2 in the last 72 hours.     Imaging:  I have personally reviewed the patients radiographs:  None today        Violetta Elmore MD

## 2017-04-04 NOTE — ANESTHESIA PREPROCEDURE EVALUATION
Anesthetic History   No history of anesthetic complications            Review of Systems / Medical History  Patient summary reviewed, nursing notes reviewed and pertinent labs reviewed    Pulmonary          Smoker         Neuro/Psych   Within defined limits           Cardiovascular    Hypertension          CAD, PAD, cardiac stents (2006) and hyperlipidemia    Exercise tolerance: >4 METS  Comments: Ejection fraction was estimated in the range of 55 % to 60 %.      R carotisd stenosis   GI/Hepatic/Renal  Within defined limits              Endo/Other        Anemia     Other Findings   Comments: 8.4cm AAA           Physical Exam    Airway  Mallampati: II  TM Distance: 4 - 6 cm  Neck ROM: normal range of motion   Mouth opening: Normal     Cardiovascular  Regular rate and rhythm,  S1 and S2 normal,  no murmur, click, rub, or gallop             Dental  No notable dental hx       Pulmonary  Breath sounds clear to auscultation               Abdominal  GI exam deferred       Other Findings            Anesthetic Plan    ASA: 3  Anesthesia type: total IV anesthesia and MAC          Induction: Intravenous  Anesthetic plan and risks discussed with: Patient

## 2017-04-04 NOTE — PROGRESS NOTES
Nephrology Progress Note     Yoyn Rivera       [email or web address]                   Phone - [phone]   Patient: Brenda Lopes  YOB: 1945     Date- 4/4/2017     CC: Follow up for acute renal failure          Subjective: Interval History:   -   Cr. improving  Phos low  bp stable  Finley removed this am      C/o vomiting  Black stool    No abdo. pain  No sob  No fever     Assessment:   ·  Acute renal failure secondary to multiple possibilities. · 1. Pre renal factors - vomiting and HCTZ causing dehydration   · 2. Intra reanl factors - poor renal blood flow due to aneurysm, AIN due to aleve use  · 3. Post renal factors - no hydronephrosis - 250 ml urine out put after finley placement    · hypophosphatemia  · Abdominal aortic aneurysm - 8 cm  · Metabolic acidosis  · Nausea and vomiting  · Right renal lesion - hyperdense on ct  · Nephrolithiasis  · Umbilical hernia  · GI bleed     Plan:   Continue ivf to d5 . 45 nacl infusion  Keep finley in place  Po neutraphos  Follow spep  Follow ipth  Daily labs  He will need further eval for renal lesion as out pt. D/w  that he will need iv contrast for eval for aaa. Care Plan discussed with: pt ,nurse and family  [] High complexity decision making was performed  [] Patient is at high-risk of decompensation with multiple organ involvement  Review of Systems: Pertinent items are noted in HPI.   Objective:   Vitals:    04/04/17 1100 04/04/17 1125 04/04/17 1200 04/04/17 1300   BP: 119/56  130/79 122/65   Pulse: (!) 58  67 68   Resp: 14  18 13   Temp:   98.4 °F (36.9 °C)    SpO2: 91% 94% 96% 95%   Weight:       Height:           Intake/Output Summary (Last 24 hours) at 04/04/17 1400  Last data filed at 04/04/17 1332   Gross per 24 hour   Intake             1700 ml   Output             1700 ml   Net                0 ml     Physical Exam:   GEN: NAD  NECK- Supple, no thyromegaly  RESP: Clear b/l, no wheezing  CVS: RRR,S1,S2   NEURO: non focal, normal speech  SKIN: No Rash  ABDO: soft , non tender, No hepatosplenomegaly  EXT: No Edema      Chart reviewed. Pertinent Notes reviewed. Medications list  reviewed     Current Facility-Administered Medications   Medication    mupirocin (BACTROBAN) 2 % ointment    albuterol-ipratropium (DUO-NEB) 2.5 MG-0.5 MG/3 ML    dextrose 5 % - 0.45% NaCl infusion    acetaminophen (TYLENOL) tablet 650 mg    metoprolol (LOPRESSOR) injection 5 mg    morphine injection 2 mg    ondansetron (ZOFRAN) injection 4 mg    prochlorperazine (COMPAZINE) with saline injection 10 mg    nicotine (NICODERM CQ) 21 mg/24 hr patch 1 Patch              Data Review :  Recent Labs      04/04/17   0329  04/03/17   0357  04/03/17   0356  04/02/17   1348  04/02/17   0337  04/01/17   1856   NA  144  142   --   147*  144  145   K  3.6  3.6   --   3.9  4.4  4.4   CL  111*  109*   --   116*  116*  111*   CO2  27  23   --   20*  18*  22   GLU  128*  136*   --   111*  123*  131*   BUN  33*  60*   --   69*  78*  68*   CREA  1.39*  1.66*   --   1.67*  1.75*  1.76*   CA  8.0*  8.5  8.3*  8.0*  8.5  9.3   PHOS  2.3*  1.5*   --    --   1.9*   --    ALB  2.7*  3.0*   --    --   2.9*  3.4*   SGOT   --   9*   --    --   13*  9*   ALT   --   12   --    --   13  15   INR   --    --    --    --    --   1.1     Recent Labs      04/03/17   0357  04/02/17   0513  04/01/17   1856   WBC  9.7  10.8  12.1*   HGB  10.8*  10.4*  13.9   HCT  31.2*  30.7*  40.6   PLT  145*  153  191     No results found for: CULT  No results found for: MD [facility]  [facility] [address], [address], [address]  Phone - [phone]         Fax - [phone]  [facility] Office  [address] [place] [address]  Phone - [phone]        Fax - [phone]    [email or web address]

## 2017-04-04 NOTE — PROGRESS NOTES
Attended Critical Care Unit Interdisciplinary Rounds where patient care was discussed.   MICHAELLE Montoya, [facility], [address] [place] [address] [address]     Paging Service  [phone]

## 2017-04-04 NOTE — PROGRESS NOTES
Pt. Robert Carls to [id number]. \"I'm glad to be back\". Family present. Skin and dry to touch. Denies pain \"except when I have to go to the bathroom\". Reports difficulty and painful urination. Note pt. Was straight cath prior to transfer. Scope SR. B/p as recorded. Gait steady, dry non-productive hacking type cough. Requesting dinner tray. Back to bed gait steady. 1800 Voiding scanty urine cherise with slight blood-colored tinged on commode. Some slight dyspnea with exertion. Accepted clear liquid tray without difficulty. 1820 Requesting re-insertion of finley, \"I just can't go\".

## 2017-04-04 NOTE — PROGRESS NOTES
4/4/2017 4:49 PM    Admit Date: 4/1/2017    Admit Diagnosis:   AAA (abdominal aortic aneurysm) (HCC);AAA;GI bleed    Subjective: [address] denies chest pain or shortness of breath. Current Facility-Administered Medications   Medication Dose Route Frequency    potassium, sodium phosphates (NEUTRA-PHOS) packet 2 Packet  2 Packet Oral QID    mupirocin (BACTROBAN) 2 % ointment   Topical Q12H    albuterol-ipratropium (DUO-NEB) 2.5 MG-0.5 MG/3 ML  3 mL Nebulization QID RT    dextrose 5 % - 0.45% NaCl infusion  75 mL/hr IntraVENous CONTINUOUS    acetaminophen (TYLENOL) tablet 650 mg  650 mg Oral Q4H PRN    metoprolol (LOPRESSOR) injection 5 mg  5 mg IntraVENous Q6H PRN    morphine injection 2 mg  2 mg IntraVENous Q4H PRN    ondansetron (ZOFRAN) injection 4 mg  4 mg IntraVENous Q4H PRN    prochlorperazine (COMPAZINE) with saline injection 10 mg  10 mg IntraVENous Q6H PRN    nicotine (NICODERM CQ) 21 mg/24 hr patch 1 Patch  1 Patch TransDERmal DAILY         Objective:      Physical Exam:    Visit Vitals    /79    Pulse 68    Temp 98.7 °F (37.1 °C)    Resp 16    Ht 5' 9\" (1.753 m)    Wt 246 lb 4.1 oz (111.7 kg)    SpO2 95%    BMI 36.37 kg/m2     Gen:  NAD  Mental Status - Alert. General Appearance - Not in acute distress. Chest and Lung Exam   Inspection: Accessory muscles - No use of accessory muscles in breathing. Auscultation:   Breath sounds: - Normal.   Cardiovascular   Inspection: Jugular vein - Bilateral - Inspection Normal.   Palpation/Percussion:   Apical Impulse: - Normal.   Auscultation: Rhythm - Regular. Heart Sounds - S1 WNL and S2 WNL. No S3 or S4. Murmurs & Other Heart Sounds: Auscultation of the heart reveals - No Murmurs. Peripheral Vascular   Upper Extremity: Inspection - Bilateral - No Cyanotic nailbeds or Digital clubbing. Lower Extremity:   Palpation: Edema - Bilateral - No edema. Abdomen:   Soft, non-tender, bowel sounds are active.   Neuro: A&O times 3, CN and motor grossly WNL    Data Review:   Recent Labs      04/03/17   0357  04/02/17   0513  04/01/17   1856   WBC  9.7  10.8  12.1*   HGB  10.8*  10.4*  13.9   HCT  31.2*  30.7*  40.6   PLT  145*  153  191     Recent Labs      04/04/17   0329  04/03/17   0357  04/03/17   0356  04/02/17   1348  04/02/17   0337  04/01/17   1856   NA  144  142   --   147*  144  145   K  3.6  3.6   --   3.9  4.4  4.4   CL  111*  109*   --   116*  116*  111*   CO2  27  23   --   20*  18*  22   GLU  128*  136*   --   111*  123*  131*   BUN  33*  60*   --   69*  78*  68*   CREA  1.39*  1.66*   --   1.67*  1.75*  1.76*   CA  8.0*  8.5  8.3*  8.0*  8.5  9.3   PHOS  2.3*  1.5*   --    --   1.9*   --    ALB  2.7*  3.0*   --    --   2.9*  3.4*   TBILI   --   0.9   --    --   0.6  0.6   SGOT   --   9*   --    --   13*  9*   ALT   --   12   --    --   13  15   INR   --    --    --    --    --   1.1       Recent Labs      04/02/17   1348   CPK  49         Intake/Output Summary (Last 24 hours) at 04/04/17 1649  Last data filed at 04/04/17 1500   Gross per 24 hour   Intake             1400 ml   Output             2125 ml   Net             -725 ml        Telemetry: normal sinus rhythm    Assessment:     Active Problems:    CAD (coronary artery disease) ()      Overview: 2006 - s/p stent       Dyslipidemia (1/25/2014)      Tobacco use disorder (1/25/2014)      AAA (abdominal aortic aneurysm) ([facility]) (4/1/2017)      PAD (peripheral artery disease) ([facility]) (4/2/2017)      Carotid stenosis, right (4/2/2017)      S/P PTCA (percutaneous transluminal coronary angioplasty) (4/2/2017)      Overview: 2006 stent ?  Vessel and type of stent        Plan:     Very large AAA, umbilical hernia, pre-op evaluation with h/o CAD and abnormal stress test 2014:  Discussed with Dr. Keira Sen   He was advised to have cardiac catheterization in 2014, but was immediately lost to follow-up and never had catheterization  Based on abnormal stress test 2014, and exercise history of complete carotid occlusion, and high risk AAA surgery, revised cardiac risk index is 3 and would predict a 9% risk of cardiovascular events  As surgery for AAA is urgent and cannot wait 1 month, must proceed at increased cardiac risk while continuing beta-blockers in order to save his life from aortic rupture  It is not safe to wait 1 month to consider catheterization and bare-metal stent with 1 month of uninterrupted Plavix per Dr. Alaina Crouch  Fortunately, preoperative echocardiogram is normal  Check lipids and start high intensity statin when taking p.o., continue IV BB (BP and pulse well controlled)

## 2017-04-04 NOTE — PROGRESS NOTES
Bedside and Verbal shift change report given to Ema Evans RN by [facility]. Report included the following information SBAR, Kardex, Intake/Output, MAR, Recent Results and Cardiac Rhythm Non telemetry. 1500: Patient transferred from CCU. Arrived to floor. Vitals stable and no complaints of pain   1600: Patient left floor, being transferred back to CCU due to miscommunication in bed placement/ transfer.

## 2017-04-04 NOTE — PROGRESS NOTES
0808:initial assessment complete, Dr. Cristopher Burroughs at bedside; Pt. A/ox4, VSS, no complaints of pain; PIV, finley intact; will continue to monitor. 1000: Finley removed     9146: PRN metotoprolol given for SBP>130. RECHECK 119/56     1200: re-assessment complete, no changes noted; VSS, no complaints of pain; Lines intact; will continue to monitor. 1345: tylenol given for 6/10 pain when urinating. Harles Old Pt. States his prostate hurts. 1420: pt. C/o he has to urinate, but cannot seem to; bladder scanned for >400.    1500: Straight Cath patient for 450 ml, passed on to RN may have to get finley replaced. 1630: re-assessment complete, no changes noted; VSS, no complaints of pain; lines in tact; will continue to monitor. 1830: urinary catheter placed due to retention. 1900: report given to Jenna Mccabe RN.

## 2017-04-04 NOTE — PROGRESS NOTES
Painfree and comfortable  Loose melenotic stools with history of HH +/- PUD  Renal function better  abd benign    30min talk with pt and daughter  He would like to proceed with open repair of juxtarenal AAA - plan Friday    Need GI eval in interim  To floor  D/C finley for now  Clear liquids  PFTs

## 2017-04-05 ENCOUNTER — ANESTHESIA (OUTPATIENT)
Dept: ENDOSCOPY | Age: 72
DRG: 271 | End: 2017-04-05
Payer: MEDICARE

## 2017-04-05 ENCOUNTER — ANESTHESIA EVENT (OUTPATIENT)
Dept: ENDOSCOPY | Age: 72
DRG: 271 | End: 2017-04-05
Payer: MEDICARE

## 2017-04-05 LAB
ABO + RH BLD: NORMAL
ALBUMIN SERPL BCP-MCNC: 2.7 G/DL (ref 3.5–5)
ANION GAP BLD CALC-SCNC: 8 MMOL/L (ref 5–15)
BASOPHILS # BLD AUTO: 0 K/UL (ref 0–0.1)
BASOPHILS # BLD: 0 % (ref 0–1)
BLD PROD TYP BPU: NORMAL
BLD PROD TYP BPU: NORMAL
BLOOD GROUP ANTIBODIES SERPL: NORMAL
BPU ID: NORMAL
BPU ID: NORMAL
BUN SERPL-MCNC: 19 MG/DL (ref 6–20)
BUN/CREAT SERPL: 14 (ref 12–20)
CALCIUM SERPL-MCNC: 8.4 MG/DL (ref 8.5–10.1)
CHLORIDE SERPL-SCNC: 111 MMOL/L (ref 97–108)
CO2 SERPL-SCNC: 24 MMOL/L (ref 21–32)
CREAT SERPL-MCNC: 1.32 MG/DL (ref 0.7–1.3)
CROSSMATCH RESULT,%XM: NORMAL
CROSSMATCH RESULT,%XM: NORMAL
EOSINOPHIL # BLD: 0.5 K/UL (ref 0–0.4)
EOSINOPHIL NFR BLD: 8 % (ref 0–7)
ERYTHROCYTE [DISTWIDTH] IN BLOOD BY AUTOMATED COUNT: 14.1 % (ref 11.5–14.5)
GLUCOSE SERPL-MCNC: 109 MG/DL (ref 65–100)
H PYLORI FROM TISSUE: NEGATIVE
HCT VFR BLD AUTO: 27.2 % (ref 36.6–50.3)
HGB BLD-MCNC: 9.1 G/DL (ref 12.1–17)
KIT LOT NO., HCLOLOT: NORMAL
LYMPHOCYTES # BLD AUTO: 23 % (ref 12–49)
LYMPHOCYTES # BLD: 1.3 K/UL (ref 0.8–3.5)
MCH RBC QN AUTO: 31.6 PG (ref 26–34)
MCHC RBC AUTO-ENTMCNC: 33.5 G/DL (ref 30–36.5)
MCV RBC AUTO: 94.4 FL (ref 80–99)
MONOCYTES # BLD: 0.5 K/UL (ref 0–1)
MONOCYTES NFR BLD AUTO: 9 % (ref 5–13)
NEGATIVE CONTROL: NEGATIVE
NEUTS SEG # BLD: 3.4 K/UL (ref 1.8–8)
NEUTS SEG NFR BLD AUTO: 60 % (ref 32–75)
PHOSPHATE SERPL-MCNC: 2.8 MG/DL (ref 2.6–4.7)
PLATELET # BLD AUTO: 148 K/UL (ref 150–400)
POSITIVE CONTROL: POSITIVE
POTASSIUM SERPL-SCNC: 3.7 MMOL/L (ref 3.5–5.1)
RBC # BLD AUTO: 2.88 M/UL (ref 4.1–5.7)
SODIUM SERPL-SCNC: 143 MMOL/L (ref 136–145)
SPECIMEN EXP DATE BLD: NORMAL
STATUS OF UNIT,%ST: NORMAL
STATUS OF UNIT,%ST: NORMAL
UNIT DIVISION, %UDIV: 0
UNIT DIVISION, %UDIV: 0
WBC # BLD AUTO: 5.7 K/UL (ref 4.1–11.1)

## 2017-04-05 PROCEDURE — 0DB58ZX EXCISION OF ESOPHAGUS, VIA NATURAL OR ARTIFICIAL OPENING ENDOSCOPIC, DIAGNOSTIC: ICD-10-PCS | Performed by: INTERNAL MEDICINE

## 2017-04-05 PROCEDURE — 77030029684 HC NEB SM VOL KT MONA -A

## 2017-04-05 PROCEDURE — 85025 COMPLETE CBC W/AUTO DIFF WBC: CPT | Performed by: INTERNAL MEDICINE

## 2017-04-05 PROCEDURE — 76040000019: Performed by: INTERNAL MEDICINE

## 2017-04-05 PROCEDURE — 74011250636 HC RX REV CODE- 250/636: Performed by: SURGERY

## 2017-04-05 PROCEDURE — 74011000250 HC RX REV CODE- 250

## 2017-04-05 PROCEDURE — 74011250637 HC RX REV CODE- 250/637: Performed by: SURGERY

## 2017-04-05 PROCEDURE — 0DB68ZX EXCISION OF STOMACH, VIA NATURAL OR ARTIFICIAL OPENING ENDOSCOPIC, DIAGNOSTIC: ICD-10-PCS | Performed by: INTERNAL MEDICINE

## 2017-04-05 PROCEDURE — 88305 TISSUE EXAM BY PATHOLOGIST: CPT | Performed by: INTERNAL MEDICINE

## 2017-04-05 PROCEDURE — 74011250636 HC RX REV CODE- 250/636: Performed by: INTERNAL MEDICINE

## 2017-04-05 PROCEDURE — 36415 COLL VENOUS BLD VENIPUNCTURE: CPT | Performed by: INTERNAL MEDICINE

## 2017-04-05 PROCEDURE — 80069 RENAL FUNCTION PANEL: CPT | Performed by: INTERNAL MEDICINE

## 2017-04-05 PROCEDURE — 65270000029 HC RM PRIVATE

## 2017-04-05 PROCEDURE — 74011000250 HC RX REV CODE- 250: Performed by: INTERNAL MEDICINE

## 2017-04-05 PROCEDURE — 74011000258 HC RX REV CODE- 258: Performed by: INTERNAL MEDICINE

## 2017-04-05 PROCEDURE — 77030019988 HC FCPS ENDOSC DISP BSC -B: Performed by: INTERNAL MEDICINE

## 2017-04-05 PROCEDURE — 87077 CULTURE AEROBIC IDENTIFY: CPT | Performed by: INTERNAL MEDICINE

## 2017-04-05 PROCEDURE — 94640 AIRWAY INHALATION TREATMENT: CPT

## 2017-04-05 PROCEDURE — 76060000031 HC ANESTHESIA FIRST 0.5 HR: Performed by: INTERNAL MEDICINE

## 2017-04-05 PROCEDURE — 74011250637 HC RX REV CODE- 250/637: Performed by: INTERNAL MEDICINE

## 2017-04-05 RX ORDER — FLUMAZENIL 0.1 MG/ML
0.2 INJECTION INTRAVENOUS
Status: DISCONTINUED | OUTPATIENT
Start: 2017-04-05 | End: 2017-04-05 | Stop reason: HOSPADM

## 2017-04-05 RX ORDER — SODIUM CHLORIDE 0.9 % (FLUSH) 0.9 %
5-10 SYRINGE (ML) INJECTION AS NEEDED
Status: ACTIVE | OUTPATIENT
Start: 2017-04-05 | End: 2017-04-05

## 2017-04-05 RX ORDER — DEXTROMETHORPHAN/PSEUDOEPHED 2.5-7.5/.8
1.2 DROPS ORAL
Status: DISCONTINUED | OUTPATIENT
Start: 2017-04-05 | End: 2017-04-05 | Stop reason: HOSPADM

## 2017-04-05 RX ORDER — ATROPINE SULFATE 0.1 MG/ML
0.5 INJECTION INTRAVENOUS
Status: DISCONTINUED | OUTPATIENT
Start: 2017-04-05 | End: 2017-04-05 | Stop reason: HOSPADM

## 2017-04-05 RX ORDER — SODIUM CHLORIDE 0.9 % (FLUSH) 0.9 %
5-10 SYRINGE (ML) INJECTION EVERY 8 HOURS
Status: COMPLETED | OUTPATIENT
Start: 2017-04-05 | End: 2017-04-05

## 2017-04-05 RX ORDER — METOPROLOL TARTRATE 25 MG/1
25 TABLET, FILM COATED ORAL 2 TIMES DAILY
Status: DISCONTINUED | OUTPATIENT
Start: 2017-04-06 | End: 2017-04-07

## 2017-04-05 RX ORDER — SODIUM CHLORIDE 0.9 % (FLUSH) 0.9 %
5-10 SYRINGE (ML) INJECTION EVERY 8 HOURS
Status: DISPENSED | OUTPATIENT
Start: 2017-04-05 | End: 2017-04-05

## 2017-04-05 RX ORDER — LIDOCAINE HYDROCHLORIDE 20 MG/ML
INJECTION, SOLUTION EPIDURAL; INFILTRATION; INTRACAUDAL; PERINEURAL AS NEEDED
Status: DISCONTINUED | OUTPATIENT
Start: 2017-04-05 | End: 2017-04-05 | Stop reason: HOSPADM

## 2017-04-05 RX ORDER — SODIUM CHLORIDE 9 MG/ML
75 INJECTION, SOLUTION INTRAVENOUS CONTINUOUS
Status: DISPENSED | OUTPATIENT
Start: 2017-04-05 | End: 2017-04-05

## 2017-04-05 RX ORDER — EPINEPHRINE 0.1 MG/ML
1 INJECTION INTRACARDIAC; INTRAVENOUS
Status: DISCONTINUED | OUTPATIENT
Start: 2017-04-05 | End: 2017-04-05 | Stop reason: HOSPADM

## 2017-04-05 RX ORDER — NALOXONE HYDROCHLORIDE 0.4 MG/ML
0.4 INJECTION, SOLUTION INTRAMUSCULAR; INTRAVENOUS; SUBCUTANEOUS
Status: DISCONTINUED | OUTPATIENT
Start: 2017-04-05 | End: 2017-04-05 | Stop reason: HOSPADM

## 2017-04-05 RX ORDER — GLYCOPYRROLATE 0.2 MG/ML
INJECTION INTRAMUSCULAR; INTRAVENOUS AS NEEDED
Status: DISCONTINUED | OUTPATIENT
Start: 2017-04-05 | End: 2017-04-05 | Stop reason: HOSPADM

## 2017-04-05 RX ORDER — MIDAZOLAM HYDROCHLORIDE 1 MG/ML
.25-5 INJECTION, SOLUTION INTRAMUSCULAR; INTRAVENOUS
Status: DISCONTINUED | OUTPATIENT
Start: 2017-04-05 | End: 2017-04-05 | Stop reason: HOSPADM

## 2017-04-05 RX ORDER — FENTANYL CITRATE 50 UG/ML
25 INJECTION, SOLUTION INTRAMUSCULAR; INTRAVENOUS
Status: DISCONTINUED | OUTPATIENT
Start: 2017-04-05 | End: 2017-04-05 | Stop reason: HOSPADM

## 2017-04-05 RX ORDER — METOPROLOL TARTRATE 25 MG/1
12.5 TABLET, FILM COATED ORAL 2 TIMES DAILY
Status: DISCONTINUED | OUTPATIENT
Start: 2017-04-05 | End: 2017-04-05

## 2017-04-05 RX ORDER — PROPOFOL 10 MG/ML
.5-2 INJECTION, EMULSION INTRAVENOUS ONCE
Status: DISCONTINUED | OUTPATIENT
Start: 2017-04-05 | End: 2017-04-05 | Stop reason: HOSPADM

## 2017-04-05 RX ADMIN — MUPIROCIN: 20 OINTMENT TOPICAL at 08:56

## 2017-04-05 RX ADMIN — IPRATROPIUM BROMIDE AND ALBUTEROL SULFATE 3 ML: .5; 3 SOLUTION RESPIRATORY (INHALATION) at 08:12

## 2017-04-05 RX ADMIN — METOPROLOL TARTRATE 12.5 MG: 25 TABLET ORAL at 17:14

## 2017-04-05 RX ADMIN — Medication 10 ML: at 15:20

## 2017-04-05 RX ADMIN — SODIUM CHLORIDE 75 ML/HR: 900 INJECTION, SOLUTION INTRAVENOUS at 13:52

## 2017-04-05 RX ADMIN — ACETAMINOPHEN 650 MG: 325 TABLET, FILM COATED ORAL at 21:37

## 2017-04-05 RX ADMIN — IPRATROPIUM BROMIDE AND ALBUTEROL SULFATE 3 ML: .5; 3 SOLUTION RESPIRATORY (INHALATION) at 11:43

## 2017-04-05 RX ADMIN — LIDOCAINE HYDROCHLORIDE 100 MG: 20 INJECTION, SOLUTION EPIDURAL; INFILTRATION; INTRACAUDAL; PERINEURAL at 14:11

## 2017-04-05 RX ADMIN — IPRATROPIUM BROMIDE AND ALBUTEROL SULFATE 3 ML: .5; 3 SOLUTION RESPIRATORY (INHALATION) at 20:18

## 2017-04-05 RX ADMIN — DEXTROSE MONOHYDRATE AND SODIUM CHLORIDE 50 ML/HR: 5; .45 INJECTION, SOLUTION INTRAVENOUS at 15:20

## 2017-04-05 RX ADMIN — IPRATROPIUM BROMIDE AND ALBUTEROL SULFATE 3 ML: .5; 3 SOLUTION RESPIRATORY (INHALATION) at 16:00

## 2017-04-05 RX ADMIN — Medication 2 MG: at 09:18

## 2017-04-05 RX ADMIN — DEXTROSE MONOHYDRATE AND SODIUM CHLORIDE 75 ML/HR: 5; .45 INJECTION, SOLUTION INTRAVENOUS at 08:00

## 2017-04-05 RX ADMIN — GLYCOPYRROLATE 0.2 MG: 0.2 INJECTION INTRAMUSCULAR; INTRAVENOUS at 14:11

## 2017-04-05 NOTE — ANESTHESIA POSTPROCEDURE EVALUATION
Post-Anesthesia Evaluation and Assessment    Patient: Violet Em MRN: [id number]  SSN: [id number]    YOB: 1945  Age: 66 y.o. Sex: male       Cardiovascular Function/Vital Signs  Visit Vitals    BP (!) 118/91    Pulse 83    Temp 36.7 °C (98.1 °F)    Resp 23    Ht 5' 9\" (1.753 m)    Wt 111.7 kg (246 lb 4.1 oz)    SpO2 94%    BMI 36.37 kg/m2       Patient is status post total IV anesthesia anesthesia for Procedure(s):  ESOPHAGOGASTRODUODENOSCOPY (EGD)  ESOPHAGOGASTRODUODENAL (EGD) BIOPSY. Nausea/Vomiting: None    Postoperative hydration reviewed and adequate. Pain:  Pain Scale 1: Numeric (0 - 10) (04/05/17 1456)  Pain Intensity 1: 0 (04/05/17 1456)   Managed    Neurological Status: At baseline    Mental Status and Level of Consciousness: Arousable    Pulmonary Status:   O2 Device: Room air (04/05/17 1500)   Adequate oxygenation and airway patent    Complications related to anesthesia: None    Post-anesthesia assessment completed.  No concerns    Signed By: Neri Briseno DO     April 5, 2017

## 2017-04-05 NOTE — PROGRESS NOTES
Painfree  Diarrhea resolved  Her reinserted for retention  EGD today  AAA repair Friday  Have started po beta blocker

## 2017-04-05 NOTE — H&P
See prior Consultation Note. The patient was reexamined. No interval change in the last 30 days. Proceed with procedure(s) with deep sedation or conscious sedation as clinically indicated.

## 2017-04-05 NOTE — PERIOP NOTES
TRANSFER - IN REPORT:    Verbal report received from April(name) on Marlon Vasquezer  being received from 2135(unit) for ordered procedure      Report consisted of patients Situation, Background, Assessment and   Recommendations(SBAR). Information from the following report(s) SBAR was reviewed with the receiving nurse. Opportunity for questions and clarification was provided. Assessment completed upon patients arrival to unit and care assumed.

## 2017-04-05 NOTE — PROGRESS NOTES
4/5/2017 5:22 PM    Admit Date: 4/1/2017    Admit Diagnosis:   AAA (abdominal aortic aneurysm) (HCC);AAA;GI bleed    Subjective: [address] denies chest pain or shortness of breath. Current Facility-Administered Medications   Medication Dose Route Frequency    0.9% sodium chloride infusion  75 mL/hr IntraVENous CONTINUOUS    sodium chloride (NS) flush 5-10 mL  5-10 mL IntraVENous Q8H    sodium chloride (NS) flush 5-10 mL  5-10 mL IntraVENous PRN    sodium chloride (NS) flush 5-10 mL  5-10 mL IntraVENous PRN    metoprolol tartrate (LOPRESSOR) tablet 12.5 mg  12.5 mg Oral BID    mupirocin (BACTROBAN) 2 % ointment   Topical Q12H    albuterol-ipratropium (DUO-NEB) 2.5 MG-0.5 MG/3 ML  3 mL Nebulization QID RT    dextrose 5 % - 0.45% NaCl infusion  50 mL/hr IntraVENous CONTINUOUS    acetaminophen (TYLENOL) tablet 650 mg  650 mg Oral Q4H PRN    metoprolol (LOPRESSOR) injection 5 mg  5 mg IntraVENous Q6H PRN    morphine injection 2 mg  2 mg IntraVENous Q4H PRN    ondansetron (ZOFRAN) injection 4 mg  4 mg IntraVENous Q4H PRN    prochlorperazine (COMPAZINE) with saline injection 10 mg  10 mg IntraVENous Q6H PRN    nicotine (NICODERM CQ) 21 mg/24 hr patch 1 Patch  1 Patch TransDERmal DAILY         Objective:      Physical Exam:    Visit Vitals    /75    Pulse 73    Temp 98.5 °F (36.9 °C)    Resp 22    Ht 5' 9\" (1.753 m)    Wt 246 lb 4.1 oz (111.7 kg)    SpO2 97%    BMI 36.37 kg/m2     Gen:  NAD  Mental Status - Alert. General Appearance - Not in acute distress. Chest and Lung Exam   Inspection: Accessory muscles - No use of accessory muscles in breathing. Auscultation:   Breath sounds: - Normal.   Cardiovascular   Inspection: Jugular vein - Bilateral - Inspection Normal.   Palpation/Percussion:   Apical Impulse: - Normal.   Auscultation: Rhythm - Regular. Heart Sounds - S1 WNL and S2 WNL. No S3 or S4. Murmurs & Other Heart Sounds:  Auscultation of the heart reveals - No Murmurs. Peripheral Vascular   Upper Extremity: Inspection - Bilateral - No Cyanotic nailbeds or Digital clubbing. Lower Extremity:   Palpation: Edema - Bilateral - No edema. Abdomen:   Soft, non-tender, bowel sounds are active. Neuro: A&O times 3, CN and motor grossly WNL    Data Review:   Recent Labs      04/05/17 0433 04/03/17   0357   WBC  5.7  9.7   HGB  9.1*  10.8*   HCT  27.2*  31.2*   PLT  148*  145*     Recent Labs      04/05/17 0433 04/04/17   0329  04/03/17   0357   NA  143  144  142   K  3.7  3.6  3.6   CL  111*  111*  109*   CO2  24  27  23   GLU  109*  128*  136*   BUN  19  33*  60*   CREA  1.32*  1.39*  1.66*   CA  8.4*  8.0*  8.5   PHOS  2.8  2.3*  1.5*   ALB  2.7*  2.7*  3.0*   TBILI   --    --   0.9   SGOT   --    --   9*   ALT   --    --   12       No results for input(s): TROIQ, CPK, CKMB in the last 72 hours. Intake/Output Summary (Last 24 hours) at 04/05/17 1722  Last data filed at 04/05/17 1425   Gross per 24 hour   Intake          3142.09 ml   Output             2605 ml   Net           537.09 ml        Telemetry: normal sinus rhythm    Assessment:     Active Problems:    CAD (coronary artery disease) ()      Overview: 2006 - s/p stent       Dyslipidemia (1/25/2014)      Tobacco use disorder (1/25/2014)      AAA (abdominal aortic aneurysm) ([facility] (4/1/2017)      PAD (peripheral artery disease) ([facility] (4/2/2017)      Carotid stenosis, right (4/2/2017)      S/P PTCA (percutaneous transluminal coronary angioplasty) (4/2/2017)      Overview: 2006 stent ?  Vessel and type of stent        Plan:     Very large AAA, umbilical hernia, pre-op evaluation with h/o CAD and abnormal stress test 2014:  Discussed with Dr. Dee Natarajan   He was advised to have cardiac catheterization in 2014, but was immediately lost to follow-up and never had catheterization  Based on abnormal stress test 2014, and exercise history of complete carotid occlusion, and high risk AAA surgery, revised cardiac risk index is 3 and would predict a 9% risk of cardiovascular events  As surgery for AAA is urgent and cannot wait 1 month, must proceed at increased cardiac risk while continuing beta-blockers in order to save his life from aortic rupture  Converted by Dr. Monica Coreas to p.o. metoprolol. I will increase to 25 twice daily. LDL is low, but he will benefit from lifelong statin. Start Lipitor 20 mg daily.   It is not safe to wait 1 month to consider catheterization and bare-metal stent with 1 month of uninterrupted Plavix per Dr. Monica Coreas  Fortunately, preoperative echocardiogram is normal

## 2017-04-05 NOTE — PROGRESS NOTES
0700 - Verbal and bedside shift report received from Susan Bajwa RN. Care of patient assumed. 0800 - Patient assessment completed as documented. The patient is alert, oriented times four, and follows commands. Lungs are diminished bilaterally with scattered wheezes, bowel sounds are active, and pulses are palpable in all four extremities. The patient denies any pain or shortness of breath. Vital signs are stable. Patient remains NPO for EGD scheduled for this afternoon. 6114 - Administered Morphine 2 mg IV for headache pain 6/10.    1239 - Endoscopy notified of patient being transferred to room 2135, transport will be on unit to get patient around 1.     1241 - TRANSFER - OUT REPORT:    Verbal report given to Mary Jane Harmon RN on [facility]  being transferred to General Surgery (unit) for routine progression of care       Report consisted of patients Situation, Background, Assessment and   Recommendations(SBAR). Information from the following report(s) SBAR, Kardex, Intake/Output, MAR, Recent Results and Cardiac Rhythm normal sinus was reviewed with the receiving nurse. Lines:   Peripheral IV 04/04/17 Right;Upper Arm (Active)   Site Assessment Clean, dry, & intact 4/5/2017 12:00 PM   Phlebitis Assessment 0 4/5/2017 12:00 PM   Infiltration Assessment 0 4/5/2017 12:00 PM   Dressing Status Clean, dry, & intact; Occlusive 4/5/2017 12:00 PM   Dressing Type Tape;Transparent 4/5/2017 12:00 PM   Hub Color/Line Status Pink; Infusing 4/5/2017 12:00 PM        Opportunity for questions and clarification was provided.       Patient transported with:   Patient-specific medications from Pharmacy

## 2017-04-05 NOTE — ANESTHESIA PREPROCEDURE EVALUATION
Anesthetic History   No history of anesthetic complications            Review of Systems / Medical History  Patient summary reviewed, nursing notes reviewed and pertinent labs reviewed    Pulmonary        Sleep apnea: No treatment  Smoker         Neuro/Psych       CVA       Cardiovascular    Hypertension: well controlled        Dysrhythmias : SVT  CAD, PAD, cardiac stents and hyperlipidemia    Exercise tolerance: <4 METS     GI/Hepatic/Renal  Within defined limits              Endo/Other    Diabetes: well controlled, type 2    Obesity and anemia     Other Findings            Physical Exam    Airway  Mallampati: II  TM Distance: > 6 cm  Neck ROM: normal range of motion   Mouth opening: Normal     Cardiovascular    Rhythm: regular  Rate: normal         Dental    Dentition: Edentulous     Pulmonary      Decreased breath sounds: bibasilar           Abdominal  GI exam deferred       Other Findings            Anesthetic Plan    ASA: 4  Anesthesia type: total IV anesthesia          Induction: Intravenous  Anesthetic plan and risks discussed with: Patient

## 2017-04-05 NOTE — PROGRESS NOTES
Anesthesia reports 200 mg Propofol, 100 mg Lidocaine and 250 mL NS given during procedure. Received report from anesthesia staff on vital signs and status of patient.

## 2017-04-05 NOTE — PROGRESS NOTES
TRANSFER - IN REPORT:    Verbal report received from Cinda Eid RN(name) on [facility]   being received from Carlos (unit) for progression of care     Report consisted of patients Situation, Background, Assessment and   Recommendations(SBAR). Information from the following report(s) Procedure Summary, Intake/Output and MAR was reviewed with the receiving nurse. Opportunity for questions and clarification was provided. Assessment completed upon patients arrival to unit and care assumed.

## 2017-04-05 NOTE — PROGRESS NOTES
[facility]  Pulmonary, Critical Care, and Sleep Medicine    Name: Ora Alvarez MRN: [id number]   : 1945 Hospital: [facility]   Date: 2017        Critical Care    IMPRESSION:   · Presented with abdominal pain, Umbilical Hernia evaluation per Dr. Menendez Factor  · Acute renal failure   · COPD  · Metabolic acidosis, normal lactate  · AAA 8.4cm followed by Dr. Jalen Gaffney. · Anemia hgb of 10.4  · Smoker 2-3 ppd  · Obese  · CAD had prior stent      RECOMMENDATIONS:   · On room air  · Bronchodilators   · Preop incentive spirometry for training to maximize postop compliance  · Ongoing evaluation per renal  · General surgery following  · Vascular surgery following  · Will continue oxygen as needed  · Monitor BP     Subjective/History:     17:  Didn't notice much difference with nebulizers. Having some melena. This patient has been seen and evaluated at the request of Dr. Jalen Gaffney for above. Patient is a 66 y.o. male reviewed Chart. Pt not a great historian. Denies any chest pain, back or abdominal pain during my evaluation. NO acute leg pain or swelling. ROS is negative. Past Medical History:   Diagnosis Date    BPH (benign prostatic hyperplasia)     CAD (coronary artery disease)      - s/p stent     Essential hypertension 13    Hematuria     sees urologist    Hyperglycemia       Past Surgical History:   Procedure Laterality Date    CARDIAC SURG PROCEDURE UNLIST      stent       Prior to Admission medications    Medication Sig Start Date End Date Taking? Authorizing Provider   aspirin delayed-release 81 mg tablet Take 2 Tabs by mouth daily. 14   Rere Chery MD   pantoprazole (PROTONIX) 40 mg tablet Take 40 mg by mouth daily. Historical Provider   atorvastatin (LIPITOR) 10 mg tablet Take  by mouth daily. Historical Provider   clopidogrel (PLAVIX) 75 mg tablet Take  by mouth daily.     Historical Provider   hydrochlorothiazide (HYDRODIURIL) 25 mg tablet Take 25 mg by mouth daily. Historical Provider     Current Facility-Administered Medications   Medication Dose Route Frequency    potassium, sodium phosphates (NEUTRA-PHOS) packet 2 Packet  2 Packet Oral QID    mupirocin (BACTROBAN) 2 % ointment   Topical Q12H    albuterol-ipratropium (DUO-NEB) 2.5 MG-0.5 MG/3 ML  3 mL Nebulization QID RT    dextrose 5 % - 0.45% NaCl infusion  75 mL/hr IntraVENous CONTINUOUS    nicotine (NICODERM CQ) 21 mg/24 hr patch 1 Patch  1 Patch TransDERmal DAILY     Allergies   Allergen Reactions    Lipitor [Atorvastatin] Other (comments)     \"muscle pain\"      Social History   Substance Use Topics    Smoking status: Current Every Day Smoker     Packs/day: 3.00     Years: 53.00    Smokeless tobacco: Not on file    Alcohol use No      History reviewed. No pertinent family history. Review of Systems:  A comprehensive review of systems was negative. Objective:   Vital Signs:    Visit Vitals    /78 (BP 1 Location: Left arm, BP Patient Position: At rest)    Pulse 68    Temp 98.3 °F (36.8 °C)    Resp 22    Ht 5' 9\" (1.753 m)    Wt 111.7 kg (246 lb 4.1 oz)    SpO2 96%    BMI 36.37 kg/m2       O2 Device: Room air       Temp (24hrs), Av.5 °F (36.9 °C), Min:98.2 °F (36.8 °C), Max:99 °F (37.2 °C)       Intake/Output:   Last shift:         Last 3 shifts:  1901 -  0700  In: 3889.2 [P.O.:370; I.V.:3519.2]  Out: 3805 [Urine:3805]    Intake/Output Summary (Last 24 hours) at 17 0748  Last data filed at 17 0600   Gross per 24 hour   Intake          2489.17 ml   Output             2605 ml   Net          -115.83 ml       Physical Exam:    General:  Alert, cooperative, no distress, appears stated age. Head:  Normocephalic, without obvious abnormality, atraumatic. Eyes:  Conjunctivae/corneas clear. PERRL, EOMs intact. Nose: Nares normal. Septum midline.  Mucosa normal.     Throat: Lips, mucosa, and tongue normal.     Neck: Supple, symmetrical, trachea midline    Back:   Symmetric, no curvature. ROM normal.   Lungs: No wheeze today   Chest wall:  No tenderness or deformity. Heart:  Regular rate and rhythm    Abdomen:   Soft, mild tenderness   Extremities: Extremities normal, atraumatic, no cyanosis or edema. Skin: Skin color, texture, turgor normal. No rashes or lesions   Neurologic: Grossly nonfocal     Data:   Labs:  Recent Labs      04/05/17 0433 04/03/17   0357   WBC  5.7  9.7   HGB  9.1*  10.8*   HCT  27.2*  31.2*   PLT  148*  145*     Recent Labs      04/05/17 0433 04/04/17   0329  04/03/17   0357   NA  143  144  142   K  3.7  3.6  3.6   CL  111*  111*  109*   CO2  24  27  23   GLU  109*  128*  136*   BUN  19  33*  60*   CREA  1.32*  1.39*  1.66*   CA  8.4*  8.0*  8.5   PHOS  2.8  2.3*  1.5*   ALB  2.7*  2.7*  3.0*   TBILI   --    --   0.9   SGOT   --    --   9*   ALT   --    --   12     No results for input(s): PH, PCO2, PO2, HCO3, FIO2 in the last 72 hours.     Imaging:  I have personally reviewed the patients radiographs:  None today        Ann Marie Pool MD

## 2017-04-05 NOTE — PROGRESS NOTES
TRANSFER - OUT REPORT:    Verbal report given to Manuela Cross (name) on Ora Alvarez  being transferred to General Surgery room 2135 (unit) for routine progression of care       Report consisted of patients Situation, Background, Assessment and   Recommendations(SBAR). Information from the following report(s) SBAR, Procedure Summary, Intake/Output, MAR and Recent Results was reviewed with the receiving nurse. Lines:   Peripheral IV 04/04/17 Right;Upper Arm (Active)   Site Assessment Clean, dry, & intact 4/5/2017  2:39 PM   Phlebitis Assessment 0 4/5/2017  2:39 PM   Infiltration Assessment 0 4/5/2017  2:39 PM   Dressing Status Clean, dry, & intact 4/5/2017  2:39 PM   Dressing Type Transparent 4/5/2017  2:39 PM   Hub Color/Line Status Infusing 4/5/2017  2:39 PM        Opportunity for questions and clarification was provided.

## 2017-04-05 NOTE — PROGRESS NOTES
End of Shift Nursing Note    Bedside shift change report given to YOLANDA Mackey (oncoming nurse) by Homer Bland RN (offgoing nurse). Report included the following information SBAR, Kardex, Intake/Output and MAR. Zone Phone:   [phone]    Significant changes during shift:    Patient had a EGD done today   Non-emergent issues for physician to address:   none     Number times ambulated in hallway past shift: 0      Number of times OOB to chair past shift: 0    POD #: admitted 4-1-17     Vital Signs:    Temp: 98.5 °F (36.9 °C)     Pulse (Heart Rate): 73     BP: 136/75     Resp Rate: 22     O2 Sat (%): 97 %    Lines & Drains:     Urinary Catheter? Yes   Placement Date: 4-4-17   Medical Necessity: retention/obstruction      Skin Integrity:      Wounds: no   Dressings Present: no    Wound Concerns: no      GI:    Current diet:  DIET CLEAR LIQUID    Nausea: NO  Vomiting: NO  Bowel Sounds: YES  Flatus: YES  Last Bowel Movement: several days ago   Respiratory:  Supplemental O2: No     Incentive Spirometer: YES  Volume: 1000  Coughing and Deep Breathing: YES  Oral Care: YES  Understanding (patient/family education): YES   Getting out of bed: YES  Head of bed elevation: YES    Patient Safety:    Falls Score: 2  Mobility Score: 1  Bed Alarm On? No  Sitter? NO      Opportunity for questions and clarification was given to oncoming nurse. Patient bed is in supine position, side rails are up x 2,  call bell within reach and patient not in distress.     Diana Garrison

## 2017-04-05 NOTE — PROCEDURES
NAME:  Dick Carvalho   :   1945   MRN:   [id number]     Date/Time:  2017 3:38 PM    Esophagogastroduodenoscopy (EGD) Procedure Note    Procedure: Esophagogastroduodenoscopy with biopsy, ERNA test    Indication:  Melena/hematochezia  Pre-operative Diagnosis: see indication above  Post-operative Diagnosis: see findings below    Primary GI:  Tari Myrick MD  :  Merrill Woodson MD  Referring Provider:   -Jermain Romero MD; -Melissa Pimentel NP    Exam:  Airway: clear, no airway problems anticipated  Heart: RRR, without gallops or rubs  Lungs: clear bilaterally without wheezes, crackles, or rhonchi  Abdomen: soft, nontender, nondistended, bowel sounds present  Mental Status: awake, alert and oriented to person, place and time     Anethesia/Sedation:  MAC anesthesia Propofol 200mg IV  Procedure Details   After informed consent was obtained for the procedure, with all risks and benefits of procedure explained the patient was taken to the endoscopy suite and placed in the left lateral decubitus position. Following sequential administration of sedation as per above, the [id number] gastroscope was inserted into the mouth and advanced under direct vision to second portion of the duodenum. A careful inspection was made as the gastroscope was withdrawn, including a retroflexed view of the proximal stomach; findings and interventions are described below. Findings:    -Minimal distal esophagitis  -Normal gastric mucosa; ERNA test obtained  -Small duodenal diverticulum immediate past D1-D2 junction  -Large 7cm lateral duodenal diverticulum in second portion duodenum, immediately distal to and opposite ampulla  -No active bleeding or hematin noted    Therapies:  biopsy of esophagus; ERNA test  Specimens: ERNA test; #1 g-e jxn  EBL:  None. Complications:   None; patient tolerated the procedure well.            Impression:    -Minimal distal esophagitis  -Normal gastric mucosa; ERNA test obtained and noted to be negative  -Small duodenal diverticulum immediate past D1-D2 junction  -Large 7cm lateral duodenal diverticulum in second portion duodenum, immediately distal to and opposite ampulla  -No active bleeding or hematin noted    Recommendations:  -Check pending esophageal biopsy  -Consider for bleeding from more distal distal  -Resume diet  -Note large duodenal diverticulum seen endoscopically is also seen on all prior CT scans, but not commented on    Discharge disposition:  To room following recovery in Endoscopy  Leon Goodson MD

## 2017-04-05 NOTE — PROGRESS NOTES
Nephrology Progress Note     Yony Rivera       [email or web address]                   Phone - [phone]   Patient: Kaylan Melena  YOB: 1945     Date- 4/5/2017     CC: Follow up for acute renal failure          Subjective: Interval History:   -   Cr. improving  PHOS IMPROVED  bp stable  CT ABDO results noted  Finley was reinserted due to urinary retention. Positive birgit  Normal spep  No abdo. pain  No sob  No fever     Assessment:   ·  Acute renal failure secondary to multiple possibilities. · 1. Pre renal factors - vomiting and HCTZ causing dehydration   · 2. Intra reanl factors - poor renal blood flow due to aneurysm, AIN due to aleve use  · 3. Post renal factors - no hydronephrosis - 250 ml urine out put after finley placement    · hypophosphatemia  · Abdominal aortic aneurysm -s/p CT ANGIO 4-3-17  · Left renal artery stenosis on ct angio  · Metabolic acidosis  · Nausea and vomiting  · Right renal lesion - hyperdense on ct  · Nephrolithiasis  · Umbilical hernia  · GI bleed     Plan:   Continue ivf to d5 . 45 nacl infusion  Keep finley in place  Check c3,c 4 - he has positive birgit, dsdna, i doubt he has lupus affecting his kidneys. He will need urology eval if urinary retention issue persist  Plan for surgery noted. Care Plan discussed with: pt ,nurse and family  [] High complexity decision making was performed  [] Patient is at high-risk of decompensation with multiple organ involvement  Review of Systems: Pertinent items are noted in HPI.   Objective:   Vitals:    04/04/17 2139 04/04/17 2330 04/05/17 0430 04/05/17 0800   BP:  123/66 124/78 134/69   Pulse:  70 68 67   Resp:  14 22 21   Temp:  98.4 °F (36.9 °C) 98.3 °F (36.8 °C) 97.7 °F (36.5 °C)   SpO2: 95% 98% 96% 96%   Weight:       Height:           Intake/Output Summary (Last 24 hours) at 04/05/17 1004  Last data filed at 04/05/17 0900   Gross per 24 hour   Intake          2639.17 ml   Output             2905 ml   Net          -265.83 ml     Physical Exam:   GEN: NAD  NECK- Supple, no thyromegaly  RESP: Clear b/l, no wheezing  CVS: RRR,S1,S2   NEURO: non focal, normal speech  SKIN: No Rash  ABDO: soft , non tender, No hepatosplenomegaly  EXT: No Edema   - finley +      Chart reviewed. Pertinent Notes reviewed. Medications list  reviewed     Current Facility-Administered Medications   Medication    mupirocin (BACTROBAN) 2 % ointment    albuterol-ipratropium (DUO-NEB) 2.5 MG-0.5 MG/3 ML    dextrose 5 % - 0.45% NaCl infusion    acetaminophen (TYLENOL) tablet 650 mg    metoprolol (LOPRESSOR) injection 5 mg    morphine injection 2 mg    ondansetron (ZOFRAN) injection 4 mg    prochlorperazine (COMPAZINE) with saline injection 10 mg    nicotine (NICODERM CQ) 21 mg/24 hr patch 1 Patch              Data Review [facility].) [[id number]] Collected: 04/02/17 1348      Order Status: Completed Specimen: Serum from Serum Updated: 04/04/17 1537      Immunofixation, serum Comment          (NOTE)   An apparent normal immunofixation pattern. Performed At: [address] [id number]   Annette Vora MD [id number]            Immunoglobulin G, Qt. 826 mg/dL       Immunoglobulin A, Qt. 144 mg/dL       Immunoglobulin M, Qt.  34 mg/dL        (NOTE)   Performed At: [address] [id number]   Annette Vora MD [id number]           DSDNA AB REFLEX [[id number]] (Abnormal) Collected: 04/02/17 1348     Order Status: Completed Specimen: Serum Updated: 04/04/17 1439      dsDNA Ab, Qt. 14 (H) IU/mL        (NOTE)                                     Negative      <5                                     Equivocal  5 - 9                                     Positive      >9   Performed At: [address] [id number]   Annette Vora MD [id number]           NEETU [place]ca CASCADE [[id number]] (Abnormal) Collected: 04/02/17 1348     Order Status: Completed Specimen: Serum from Serum Updated: 04/04/17 1439      NEETU Direct Positive (A)          (NOTE)   Performed At: [address] [id number]   Amy Gutierrez MD [id number]            See below Comment          (NOTE)             Recent Labs      04/05/17   0433  04/04/17   0329  04/03/17   0357  04/03/17   0356  04/02/17   1348   NA  143  144  142   --   147*   K  3.7  3.6  3.6   --   3.9   CL  111*  111*  109*   --   116*   CO2  24  27  23   --   20*   GLU  109*  128*  136*   --   111*   BUN  19  33*  60*   --   69*   CREA  1.32*  1.39*  1.66*   --   1.67*   CA  8.4*  8.0*  8.5  8.3*  8.0*   PHOS  2.8  2.3*  1.5*   --    --    ALB  2.7*  2.7*  3.0*   --    --    SGOT   --    --   9*   --    --    ALT   --    --   12   --    --      Recent Labs      04/05/17 0433 04/03/17   0357   WBC  5.7  9.7   HGB  9.1*  10.8*   HCT  27.2*  31.2*   PLT  148*  145*     CT ABDO  There is a large juxtarenal fusiform abdominal aortic aneurysm. The aneurysm  begins immediately inferior to both renal arteries without a discernible neck. The aneurysm extends to the aortic bifurcation but does not involve the iliac  arteries. Maximal aneurysm diameter measures 7.8 cm.     The celiac artery is widely patent. The superior mesenteric artery is calcified  at its origin with possible moderate stenosis.     The extra iliac arteries are calcified but no definite focal stenosis. Right  external iliac is ectatic but not grossly aneurysmal. There is a focal aneurysm  at the right iliac bifurcation measuring 2.6 cm. The external iliac arteries are  calcified but otherwise patent.     There is probable focal stenosis of the origin of the left renal artery. The  right renal artery is calcified but grossly patent.     Lung bases are grossly clear with minimal atelectasis.  The liver pancreas spleen  and kidneys are unremarkable given limitations of arterial phase contrast. There  are small bilateral renal cysts. The adrenals are unremarkable.     No pelvic mass or adenopathy. No free fluid or focal fluid collection.     IMPRESSION  IMPRESSION: Juxtarenal fusiform abdominal aortic aneurysm extending to the  aortic bifurcation as detailed above. No results found for: CULT  No results found for: [facility], [address] [place] [address] [facility]  [facility] SYSTM FRANCISPris[facility] HospitalVERONA [address] dwight Balderasu, [address] [place]  Phone - [phone]         Fax - [phone]  [facility]  [address] [place] [address]  Phone - [phone]        Fax - [phone]    www. [email or web address]

## 2017-04-05 NOTE — ROUTINE PROCESS
[facility]  1945  [id number]    Situation:  Verbal report received from: Candido Litten RN  Procedure: Procedure(s):  ESOPHAGOGASTRODUODENOSCOPY (EGD)  ESOPHAGOGASTRODUODENAL (EGD) BIOPSY    Background:    Preoperative diagnosis: GI bleed  Postoperative diagnosis: esophagitis    :  Dr. Anton Price  Assistant(s): Endoscopy RN-1: Jared Parisi RN  Endoscopy RN-2: Remi Sanon RN    Specimens:   ID Type Source Tests Collected by Time Destination   1 : Distal Esophagus bx Preservative Esophagus, Distal  Teddy Segundo MD 4/5/2017 1421 Pathology     H. Pylori  yes    Assessment:  Intra-procedure medications       Anesthesia gave intra-procedure sedation and medications, see anesthesia flow sheet yes    Intravenous fluids: NS@ KVO     Vital signs stable       Abdominal assessment: round and soft       Recommendation:  Discharge patient per MD order  . Return to floor yes  Family or Friend   Wife and two daughters are here, probably upstairs in pt room, per pt.   Permission to share finding with family or friend yes

## 2017-04-06 ENCOUNTER — ANESTHESIA EVENT (OUTPATIENT)
Dept: SURGERY | Age: 72
DRG: 271 | End: 2017-04-06
Payer: MEDICARE

## 2017-04-06 LAB
ALBUMIN SERPL BCP-MCNC: 2.8 G/DL (ref 3.5–5)
ANION GAP BLD CALC-SCNC: 12 MMOL/L (ref 5–15)
BUN SERPL-MCNC: 14 MG/DL (ref 6–20)
BUN/CREAT SERPL: 10 (ref 12–20)
CALCIUM SERPL-MCNC: 8.7 MG/DL (ref 8.5–10.1)
CHLORIDE SERPL-SCNC: 110 MMOL/L (ref 97–108)
CO2 SERPL-SCNC: 21 MMOL/L (ref 21–32)
CREAT SERPL-MCNC: 1.46 MG/DL (ref 0.7–1.3)
GLUCOSE SERPL-MCNC: 111 MG/DL (ref 65–100)
PHOSPHATE SERPL-MCNC: 2.4 MG/DL (ref 2.6–4.7)
POTASSIUM SERPL-SCNC: 3.6 MMOL/L (ref 3.5–5.1)
SODIUM SERPL-SCNC: 143 MMOL/L (ref 136–145)

## 2017-04-06 PROCEDURE — 74011000250 HC RX REV CODE- 250: Performed by: INTERNAL MEDICINE

## 2017-04-06 PROCEDURE — 86235 NUCLEAR ANTIGEN ANTIBODY: CPT | Performed by: INTERNAL MEDICINE

## 2017-04-06 PROCEDURE — 94640 AIRWAY INHALATION TREATMENT: CPT

## 2017-04-06 PROCEDURE — 86160 COMPLEMENT ANTIGEN: CPT | Performed by: INTERNAL MEDICINE

## 2017-04-06 PROCEDURE — 74011250637 HC RX REV CODE- 250/637: Performed by: INTERNAL MEDICINE

## 2017-04-06 PROCEDURE — 86920 COMPATIBILITY TEST SPIN: CPT | Performed by: SURGERY

## 2017-04-06 PROCEDURE — 36415 COLL VENOUS BLD VENIPUNCTURE: CPT | Performed by: INTERNAL MEDICINE

## 2017-04-06 PROCEDURE — 74011250637 HC RX REV CODE- 250/637: Performed by: SURGERY

## 2017-04-06 PROCEDURE — 65270000029 HC RM PRIVATE

## 2017-04-06 PROCEDURE — 86900 BLOOD TYPING SEROLOGIC ABO: CPT | Performed by: SURGERY

## 2017-04-06 PROCEDURE — P9016 RBC LEUKOCYTES REDUCED: HCPCS | Performed by: SURGERY

## 2017-04-06 PROCEDURE — 80069 RENAL FUNCTION PANEL: CPT | Performed by: INTERNAL MEDICINE

## 2017-04-06 PROCEDURE — 36430 TRANSFUSION BLD/BLD COMPNT: CPT

## 2017-04-06 RX ORDER — ZOLPIDEM TARTRATE 5 MG/1
5 TABLET ORAL
Status: DISCONTINUED | OUTPATIENT
Start: 2017-04-06 | End: 2017-04-07

## 2017-04-06 RX ORDER — CEFAZOLIN SODIUM IN 0.9 % NACL 2 G/100 ML
2 PLASTIC BAG, INJECTION (ML) INTRAVENOUS
Status: COMPLETED | OUTPATIENT
Start: 2017-04-06 | End: 2017-04-07

## 2017-04-06 RX ORDER — SODIUM CHLORIDE 9 MG/ML
250 INJECTION, SOLUTION INTRAVENOUS AS NEEDED
Status: DISCONTINUED | OUTPATIENT
Start: 2017-04-06 | End: 2017-04-07

## 2017-04-06 RX ORDER — IPRATROPIUM BROMIDE AND ALBUTEROL SULFATE 2.5; .5 MG/3ML; MG/3ML
3 SOLUTION RESPIRATORY (INHALATION)
Status: DISCONTINUED | OUTPATIENT
Start: 2017-04-06 | End: 2017-04-13

## 2017-04-06 RX ORDER — PANTOPRAZOLE SODIUM 40 MG/1
40 TABLET, DELAYED RELEASE ORAL DAILY
Status: DISCONTINUED | OUTPATIENT
Start: 2017-04-06 | End: 2017-04-07

## 2017-04-06 RX ADMIN — IPRATROPIUM BROMIDE AND ALBUTEROL SULFATE 3 ML: .5; 3 SOLUTION RESPIRATORY (INHALATION) at 14:54

## 2017-04-06 RX ADMIN — IPRATROPIUM BROMIDE AND ALBUTEROL SULFATE 3 ML: .5; 3 SOLUTION RESPIRATORY (INHALATION) at 08:23

## 2017-04-06 RX ADMIN — IPRATROPIUM BROMIDE AND ALBUTEROL SULFATE 3 ML: .5; 3 SOLUTION RESPIRATORY (INHALATION) at 20:50

## 2017-04-06 RX ADMIN — MUPIROCIN: 20 OINTMENT TOPICAL at 22:05

## 2017-04-06 RX ADMIN — METOPROLOL TARTRATE 25 MG: 25 TABLET ORAL at 17:40

## 2017-04-06 RX ADMIN — ZOLPIDEM TARTRATE 5 MG: 5 TABLET ORAL at 21:29

## 2017-04-06 RX ADMIN — METOPROLOL TARTRATE 25 MG: 25 TABLET ORAL at 09:01

## 2017-04-06 RX ADMIN — PANTOPRAZOLE SODIUM 40 MG: 40 TABLET, DELAYED RELEASE ORAL at 09:01

## 2017-04-06 NOTE — PROGRESS NOTES
Nutrition Services      Nutrition Screen:  Wt Readings from Last 10 Encounters:   04/02/17 111.7 kg (246 lb 4.1 oz)   01/30/14 108.4 kg (239 lb)   01/24/14 108.1 kg (238 lb 6.4 oz)   12/26/13 107.5 kg (237 lb)   12/09/10 112.5 kg (248 lb)   11/18/10 112.5 kg (248 lb)     Body mass index is 36.37 kg/(m^2). Cardiac diet ed done w/ pt per pt request; pt asked lots of questions & expressed understanding. Provided written & contact info. \" I know I want to do better. \"    Supplements:                        _____ ordered ______  declined. __ __  Pt is nutritionally stable at this time, will rescreen in 7 days. _x __    Pt is at nutritional risk and will be rescreened in 3-5 days. __ __  Pt is at moderate or high nutritional risk, will refer to RD for assessment.        Elaine Bran  Dietetic Technician, Registered

## 2017-04-06 NOTE — PROGRESS NOTES
Nephrology Progress Note     Yony Rivera       [email or web address]. [email or web address]                   Phone - [phone]   Patient: Jody Blanco  YOB: 1945     Date- 4/6/2017     CC: Follow up for acute renal failure          Subjective: Interval History:   -   Cr.slightly increased  Good urine out put  No abdo. pain  No sob  No fever     Assessment:   ·  Acute renal failure -vomiting and HCTZ ,aleve use, urinary retention  · hypophosphatemia  · Abdominal aortic aneurysm -s/p CT ANGIO 4-3-17  · Left renal artery stenosis on ct angio  · Metabolic acidosis  · Nausea and vomiting  · Right renal lesion - hyperdense on ct  · Nephrolithiasis  · Umbilical hernia  · GI bleed     Plan:   Stop ivf  Check iron panel  prbc tx ordered by DR. Tracy finley in place  Follow c3,c 4 - he has positive birgit, dsdna, i doubt he has lupus affecting his kidneys. Plan for surgery noted. Care Plan discussed with: pt  [] High complexity decision making was performed  [] Patient is at high-risk of decompensation with multiple organ involvement  Review of Systems: Pertinent items are noted in HPI. Objective:   Vitals:    04/06/17 0300 04/06/17 0806 04/06/17 0823 04/06/17 1142   BP: 127/66 127/70  120/63   Pulse: 70 69  82   Resp: 20 19  19   Temp: 98.2 °F (36.8 °C) 98.3 °F (36.8 °C)  98.7 °F (37.1 °C)   SpO2: 96% 96% 98% 94%   Weight:       Height:           Intake/Output Summary (Last 24 hours) at 04/06/17 1151  Last data filed at 04/06/17 1118   Gross per 24 hour   Intake          2227.93 ml   Output             2475 ml   Net          -247.07 ml     Physical Exam:   GEN: NAD  NECK- Supple, no thyromegaly  RESP: Clear b/l, no wheezing  CVS: RRR,S1,S2   NEURO: non focal, normal speech  SKIN: No Rash  ABDO: soft , non tender,  EXT: No Edema   - finley +      Chart reviewed. Pertinent Notes reviewed.    Medications list  reviewed     Current Facility-Administered Medications   Medication    pantoprazole (PROTONIX) tablet 40 mg    ceFAZolin (ANCEF) 2g in 100 ml 0.9% NS IVPB    0.9% sodium chloride infusion 250 mL    albuterol-ipratropium (DUO-NEB) 2.5 MG-0.5 MG/3 ML    zolpidem (AMBIEN) tablet 5 mg    metoprolol tartrate (LOPRESSOR) tablet 25 mg    mupirocin (BACTROBAN) 2 % ointment    acetaminophen (TYLENOL) tablet 650 mg    metoprolol (LOPRESSOR) injection 5 mg    ondansetron (ZOFRAN) injection 4 mg    prochlorperazine (COMPAZINE) with saline injection 10 mg    nicotine (NICODERM CQ) 21 mg/24 hr patch 1 Patch              Data Review [facility] [[id number]] Collected: 04/02/17 1348      Order Status: Completed Specimen: Serum from Serum Updated: 04/04/17 1537      Immunofixation, serum Comment          (NOTE)   An apparent normal immunofixation pattern. Performed At: [address] [id number]   Tate Vazquez MD [id number]            Immunoglobulin G, Qt. 826 mg/dL       Immunoglobulin A, Qt. 144 mg/dL       Immunoglobulin M, Qt.  34 mg/dL        (NOTE)   Performed At: [address] [id number]   Tate Vazquez MD QS:[id number]           DSDNA AB REFLEX [[id number]] (Abnormal) Collected: 04/02/17 1348     Order Status: Completed Specimen: Serum Updated: 04/04/17 1439      dsDNA Ab, Qt. 14 (H) IU/mL        (NOTE)                                     Negative      <5                                     Equivocal  5 - 9                                     Positive      >9   Performed At: [address] [id number]   Tate Vazquez MD [id number]         KAITLYNN Maldonado/REFLEX CASCADE [[id number]] (Abnormal) Collected: 04/02/17 1348     Order Status: Completed Specimen: Serum from Serum Updated: 04/04/17 1439      NEETU Direct Positive (A)          (NOTE)   Performed At: [place] [address] [place], [place] [id number]   Annette Vora MD [id number]            See below Comment          (NOTE)             Recent Labs      04/06/17   0427  04/05/17   0433  04/04/17   0329   NA  143  143  144   K  3.6  3.7  3.6   CL  110*  111*  111*   CO2  21  24  27   GLU  111*  109*  128*   BUN  14  19  33*   CREA  1.46*  1.32*  1.39*   CA  8.7  8.4*  8.0*   PHOS  2.4*  2.8  2.3*   ALB  2.8*  2.7*  2.7*     Recent Labs      04/05/17   0433   WBC  5.7   HGB  9.1*   HCT  27.2*   PLT  148*     CT ABDO  There is a large juxtarenal fusiform abdominal aortic aneurysm. The aneurysm  begins immediately inferior to both renal arteries without a discernible neck. The aneurysm extends to the aortic bifurcation but does not involve the iliac  arteries. Maximal aneurysm diameter measures 7.8 cm.     The celiac artery is widely patent. The superior mesenteric artery is calcified  at its origin with possible moderate stenosis.     The extra iliac arteries are calcified but no definite focal stenosis. Right  external iliac is ectatic but not grossly aneurysmal. There is a focal aneurysm  at the right iliac bifurcation measuring 2.6 cm. The external iliac arteries are  calcified but otherwise patent.     There is probable focal stenosis of the origin of the left renal artery. The  right renal artery is calcified but grossly patent.     Lung bases are grossly clear with minimal atelectasis. The liver pancreas spleen  and kidneys are unremarkable given limitations of arterial phase contrast. There  are small bilateral renal cysts. The adrenals are unremarkable.     No pelvic mass or adenopathy. No free fluid or focal fluid collection.     IMPRESSION  IMPRESSION: Juxtarenal fusiform abdominal aortic aneurysm extending to the  aortic bifurcation as detailed above.   No results found for: CULT  No results found for: [place], MD  [facility]  [address]  [address] [facility]  Phone - [phone]         Fax - [phone]  [facility] Office  [address] [address]  Phone - [phone]        Fax - [phone]    [email or web address]

## 2017-04-06 NOTE — PROGRESS NOTES
Gastrointestinal Progress Note    Patient Name: Olga Mcconnell      : 1945      MRN: [id number]  Admit Date: 2017  Today's Date: 2017    Subjective:     Patient without GI complaints today. He had a small BM yesterday and another small BM this morning. Both were dark in color, but described as lighter than the black he had been seeing. He denies nausea and vomiting. He is scheduled for AAA repair tomorrow and will be getting blood transfusion today. Objective:     Blood pressure 120/63, pulse 82, temperature 98.7 °F (37.1 °C), resp. rate 19, height 5' 9\" (1.753 m), weight 111.7 kg (246 lb 4.1 oz), SpO2 94 %. Physical Exam:  General appearance: cooperative, no distress  Skin: Extremities and face reveal no rashes, pallor or jaundice  HEENT: Sclerae anicteric. Extra-occular muscles are intact. No abnormal pigmentation of the lips. Cardiovascular: Regular rate and rhythm. Respiratory: Clear breath sounds  GI: Abdomen nondistended, soft, and nontender. Normal active bowel sounds. Musculoskeletal: No edema of the lower legs. Neurological: Gross memory appears intact. Patient is alert and oriented. Psychiatric: Mood appears appropriate with good judgement. No anxiety or agitation.     Data Review:    Recent Results (from the past 24 hour(s))   POC H. PYLORI, TISSUE    Collection Time: 17  2:16 PM   Result Value Ref Range    H. pylori from tissue Negative Negative    Positive control positive     Negative control negative     Lot no. [id number]    RENAL FUNCTION PANEL    Collection Time: 17  4:27 AM   Result Value Ref Range    Sodium 143 136 - 145 mmol/L    Potassium 3.6 3.5 - 5.1 mmol/L    Chloride 110 (H) 97 - 108 mmol/L    CO2 21 21 - 32 mmol/L    Anion gap 12 5 - 15 mmol/L    Glucose 111 (H) 65 - 100 mg/dL    BUN 14 6 - 20 MG/DL    Creatinine 1.46 (H) 0.70 - 1.30 MG/DL    BUN/Creatinine ratio 10 (L) 12 - 20      GFR est AA 57 (L) >60 ml/min/1.73m2    GFR est non-AA 47 (L) >60 ml/min/1.73m2    Calcium 8.7 8.5 - 10.1 MG/DL    Phosphorus 2.4 (L) 2.6 - 4.7 MG/DL    Albumin 2.8 (L) 3.5 - 5.0 g/dL   TYPE & CROSSMATCH    Collection Time: 04/06/17  9:42 AM   Result Value Ref Range    Crossmatch Expiration 04/09/2017     ABO/Rh(D) O NEGATIVE     Antibody screen NEG     Unit number [id number]     Blood component type RC LR AS3,1     Unit division 00     Status of unit ALLOCATED     Crossmatch result Compatible     Unit number [id number]     Blood component type RC LR AS3,1     Unit division 00     Status of unit ALLOCATED     Crossmatch result Compatible        Assessment / Plan :        Anemia with heme-positive stool   - Hgb 13.9 on admission 4/1/17, down to 9.1 yesterday, but no evidence of bleeding on EGD yesterday    - To be transfused with 2 units PRBCs today, in preparation for AAA repair tomorrow    - He will need colonoscopy and possibly M2A at some point, but the AAA is the most pressing concern right now. - We will continue to follow. Patient Active Hospital Problem List:   Active Problems:    CAD (coronary artery disease) ()      Overview: 2006 - s/p stent       Dyslipidemia (1/25/2014)      Tobacco use disorder (1/25/2014)      AAA (abdominal aortic aneurysm) ([facility] (4/1/2017)      PAD (peripheral artery disease) ([facility] (4/2/2017)      Carotid stenosis, right (4/2/2017)      S/P PTCA (percutaneous transluminal coronary angioplasty) (4/2/2017)      Overview: 2006 stent ? Vessel and type of stent        The above was discussed with Dr. Bina Agrawal. Please await further input from him.

## 2017-04-06 NOTE — PROGRESS NOTES
Feels 'real good'  Wants solid food today  Plan AAA repair in am  preop transfusion today as he is <10 and expect significant blood loss    Have discussed procedure at length with pt and his daughter. Given his co-morbidities and challenging anatomy, they understand that this is a high risk repair. I have quoted a mortality of 12-15% and a morbidity of 25-40%. They understand the likelihood of dialysis either temp or permanent in addition to periop cardiopulm risks.

## 2017-04-06 NOTE — PROGRESS NOTES
End of Shift Nursing Note    Bedside shift change report given to Isadora Gentile (oncoming nurse) by Joesph Sanches (offgoing nurse). Report included the following information SBAR, Kardex, Intake/Output and MAR. Zone Phone:   [phone]    Significant changes during shift:    none   Non-emergent issues for physician to address:   none     Number times ambulated in hallway past shift: 1      Number of times OOB to chair past shift: 1    POD #:      Vital Signs:    Temp: 98.2 °F (36.8 °C)     Pulse (Heart Rate): 70     BP: 127/66     Resp Rate: 20     O2 Sat (%): 96 %    Lines & Drains:     Urinary Catheter? Yes     NG tube [] in [] removed [] not applicable   Drains [] in [] removed [] not applicable     Skin Integrity:      Wounds: no   Dressings Present: no    Wound Concerns: no      GI:    Current diet:  DIET CLEAR LIQUID    Nausea: NO  Vomiting: NO  Bowel Sounds: YES  Flatus: YES  Last Bowel Movement: today   Appearance:     Respiratory:  Supplemental O2: No      Device:    via  Liters/min     Incentive Spirometer: YES  Volume: 1200  Coughing and Deep Breathing: YES  Oral Care: YES  Understanding (patient/family education): YES   Getting out of bed: YES  Head of bed elevation: YES    Patient Safety:    Falls Score: 2  Mobility Score: 1  Bed Alarm On? No  Sitter? No      Opportunity for questions and clarification was given to oncoming nurse. Patient bed is in low position, side rails are up x 2, door & observation blinds open as needed, call bell within reach and patient not in distress.     Leander Triplett RN

## 2017-04-06 NOTE — PROGRESS NOTES
The patient is scheduled for AAA surgery tomorrow. He lives with his wife and his daughter Cathleen Guerrero has been assisting him with medical appointments in the past.  Care Management Interventions  PCP Verified by CM: Yes (1 month ago)  Transition of Care Consult (CM Consult): Discharge Planning (scheduled for surgery 4/7/17)  Discharge Durable Medical Equipment: No (straight cane at home)  Physical Therapy Consult: No  Occupational Therapy Consult: No  Speech Therapy Consult: No  Current Support Network: Lives with Spouse, Own Home, Family Lives Nearby (he was independent and drivng PTA, just started using cane  for pain before admission)   The patient uses [facility] and has been able to pay for his prescriptions. He is challenged with paying his co pays for doctor appointments. Per his daughter he has high co pays with his insurance. CM explained and gave the daughter a financial application for a Care Card. Care management will follow for discharge planning.  Patrice Castro RN #[id number]

## 2017-04-06 NOTE — PROGRESS NOTES
4/6/2017 3:18 PM    Admit Date: 4/1/2017    Admit Diagnosis:   AAA (abdominal aortic aneurysm) (HCC);AAA;GI bleed    Subjective: [address] denies chest pain or shortness of breath. Current Facility-Administered Medications   Medication Dose Route Frequency    pantoprazole (PROTONIX) tablet 40 mg  40 mg Oral DAILY    ceFAZolin (ANCEF) 2g in 100 ml 0.9% NS IVPB  2 g IntraVENous ON CALL    0.9% sodium chloride infusion 250 mL  250 mL IntraVENous PRN    albuterol-ipratropium (DUO-NEB) 2.5 MG-0.5 MG/3 ML  3 mL Nebulization TID RT    zolpidem (AMBIEN) tablet 5 mg  5 mg Oral QHS PRN    metoprolol tartrate (LOPRESSOR) tablet 25 mg  25 mg Oral BID    mupirocin (BACTROBAN) 2 % ointment   Topical Q12H    acetaminophen (TYLENOL) tablet 650 mg  650 mg Oral Q4H PRN    metoprolol (LOPRESSOR) injection 5 mg  5 mg IntraVENous Q6H PRN    ondansetron (ZOFRAN) injection 4 mg  4 mg IntraVENous Q4H PRN    prochlorperazine (COMPAZINE) with saline injection 10 mg  10 mg IntraVENous Q6H PRN    nicotine (NICODERM CQ) 21 mg/24 hr patch 1 Patch  1 Patch TransDERmal DAILY         Objective:      Physical Exam:    Visit Vitals    /65 (BP 1 Location: Left arm, BP Patient Position: At rest)    Pulse 83    Temp 96.9 °F (36.1 °C)    Resp 18    Ht 5' 9\" (1.753 m)    Wt 246 lb 4.1 oz (111.7 kg)    SpO2 96%    BMI 36.37 kg/m2     Gen:  NAD  Mental Status - Alert. General Appearance - Not in acute distress. Chest and Lung Exam   Inspection: Accessory muscles - No use of accessory muscles in breathing. Auscultation:   Breath sounds: - Normal.   Cardiovascular   Inspection: Jugular vein - Bilateral - Inspection Normal.   Palpation/Percussion:   Apical Impulse: - Normal.   Auscultation: Rhythm - Regular. Heart Sounds - S1 WNL and S2 WNL. No S3 or S4. Murmurs & Other Heart Sounds: Auscultation of the heart reveals - No Murmurs.    Peripheral Vascular   Upper Extremity: Inspection - Bilateral - No Cyanotic nailbeds or Digital clubbing. Lower Extremity:   Palpation: Edema - Bilateral - No edema. Abdomen:   Soft, non-tender, bowel sounds are active. Neuro: A&O times 3, CN and motor grossly WNL    Data Review:   Recent Labs      04/05/17   0433   WBC  5.7   HGB  9.1*   HCT  27.2*   PLT  148*     Recent Labs      04/06/17   0427  04/05/17   0433  04/04/17   0329   NA  143  143  144   K  3.6  3.7  3.6   CL  110*  111*  111*   CO2  21  24  27   GLU  111*  109*  128*   BUN  14  19  33*   CREA  1.46*  1.32*  1.39*   CA  8.7  8.4*  8.0*   PHOS  2.4*  2.8  2.3*   ALB  2.8*  2.7*  2.7*       No results for input(s): TROIQ, CPK, CKMB in the last 72 hours. Intake/Output Summary (Last 24 hours) at 04/06/17 1518  Last data filed at 04/06/17 1427   Gross per 24 hour   Intake          2085.01 ml   Output             2500 ml   Net          -414.99 ml        Telemetry: normal sinus rhythm    Assessment:     Active Problems:    CAD (coronary artery disease) ()      Overview: 2006 - s/p stent       Dyslipidemia (1/25/2014)      Tobacco use disorder (1/25/2014)      AAA (abdominal aortic aneurysm) ([facility] (4/1/2017)      [facility] (4/2/2017)      Carotid stenosis, right (4/2/2017)      S/P PTCA (percutaneous transluminal coronary angioplasty) (4/2/2017)      Overview: 2006 stent ?  Vessel and type of stent        Plan:     Very large AAA, umbilical hernia, pre-op evaluation with h/o CAD and abnormal stress test 2014:  Discussed with Dr. Hi Vale   He was advised to have cardiac catheterization in 2014, but was immediately lost to follow-up and never had catheterization  Based on abnormal stress test 2014, and exercise history of complete carotid occlusion, and high risk AAA surgery, revised cardiac risk index is 3 and would predict a 9% risk of cardiovascular events  As surgery for AAA is urgent and cannot wait 1 month, must proceed at increased cardiac risk while continuing beta-blockers in order to save his life from aortic rupture  Blood pressure controlled on metoprolol 25 twice daily. LDL is low, but he will benefit from lifelong statin. Start Lipitor 20 mg daily.   It is not safe to wait 1 month to consider catheterization and bare-metal stent with 1 month of uninterrupted Plavix per Dr. Guy Loyola  Fortunately, preoperative echocardiogram is normal

## 2017-04-06 NOTE — PROGRESS NOTES
Pt's PIV is leaking and pt's c/o pain at the site. Pt's IV taken out. Catheter tip intact, no redness or swelling noted at insertion site. Band-aid applied. Attempted to obtain line, unsuccessful. PICC team called to see if pt would qualify for a midline catheter. PICC team to come evaluate pt.

## 2017-04-06 NOTE — PROGRESS NOTES
Bedside and Verbal shift change report given to Jossie Lr RN (oncoming nurse) by Niecy Juarez RN (offgoing nurse). Report included the following information SBAR, Kardex, OR Summary, Intake/Output and MAR.

## 2017-04-06 NOTE — PROGRESS NOTES
Spiritual Care Assessment/Progress Notes    Yobany Dickerson [id number]  [id number]    1945  66 y.o.  male    Patient Telephone Number: [phone] (home)   Jainism Affiliation: Kevin Collado   Language: English   Extended Emergency Contact Information  Primary Emergency Contact: Rita Leonardo  Address: [address] [place] [address],[address], [facility] [address] Phone: [phone]  Relation: Spouse  Secondary Emergency Contact: Millicent Phone: [phone]  Relation: Child   Patient Active Problem List    Diagnosis Date Noted    [facility] 04/02/2017    Carotid stenosis, right 04/02/2017    S/P PTCA (percutaneous transluminal coronary angioplasty) 04/02/2017    AAA (ab[facility]) ([facility] 04/01/2017    Dyslipidemia 01/25/2014    Tobacco use disorder 01/25/2014    Atherosclerosis of native arteries of the extremities with intermittent claudication 01/25/2014    Cerebrovascular disease 01/25/2014    CAD (coronary artery disease)     Hyperglycemia         Date: 4/6/2017       Level of Jainism/Spiritual Activity:  []         Involved in uzma tradition/spiritual practice    []         Not involved in uzma tradition/spiritual practice  [x]         Spiritually oriented    []         Claims no spiritual orientation    []         seeking spiritual identity  []         Feels alienated from Yarsani practice/tradition  []         Feels angry about Yarsani practice/tradition  [x]         Spirituality/Yarsani tradition is a resource for coping at this time.   []         Not able to assess due to medical condition    Services Provided Today:  []         crisis intervention    []         reading Scriptures  [x]         spiritual assessment    [x]         prayer  [x]         empathic listening/emotional support  []         rites and rituals (cite in comments)  []         life review     []         Yarsani support  []         theological development    []         advocacy  []         ethical dialog     []         blessing  []         bereavement support    []         support to family  []         anticipatory grief support   []         help with AMD  []         spiritual guidance    []         meditation      Spiritual Care Needs  []         Emotional Support  []         Spiritual/Lutheran Care  []         Loss/Adjustment  []         Advocacy/Referral                /Ethics  [x]         No needs expressed at               this time  []         Other: (note in               comments)  [address]  []         Follow up visits with               pt/family  []         Provide materials  []         Schedule sacraments  []         Contact Community               Clergy  [x]         Follow up as needed  []         Other: (note in               comments)     Comments:  Brief supportive visit on Gen Surg to assess spiritual needs. Patient's daughter was present. Provided listening presence and affirmation of experience as patient shared he came to the hospital for one issue but an underlying issue was discovered that needed attention. He is expecting his wife to arrive shortly. Patient's nurse came and patient wanted to speak with her, so we ended our visit. He was receptive to assurance of prayer and he was advised of  availability.   MICHAELLE Dias, [facility], [address]     Paging Service  [phone])

## 2017-04-06 NOTE — PROGRESS NOTES
End of Shift Nursing Note    Bedside shift change report given to Karrie Rodas RN (oncoming nurse) by Marvin Bourgeois RN (offgoing nurse). Report included the following information SBAR, Kardex, Intake/Output, MAR and Recent Results. Zone Phone:   [phone]    Significant changes during shift:    Patient received 2 units of blood and tolerated well   Non-emergent issues for physician to address:   Consent for surgery not yet signed, patient is agreeable and aware of surgery but needs more information about surgery, please talk with patient and family regarding risks of surgery vs benefits     Number times ambulated in hallway past shift: 0      Number of times OOB to chair past shift: 0 (patient sitting on edge of bed for dinner, did ambulate to restroom once)     Vital Signs:    Temp: 98.3 °F (36.8 °C)     Pulse (Heart Rate): 80     BP: 98/60     Resp Rate: 16     O2 Sat (%): 95 %    Lines & Drains:     Urinary Catheter? Yes   Placement Date: 4/4/2017   Medical Necessity: retention  PIV R arm    NG tube [] in [] removed [x] not applicable   Drains [] in [] removed [x] not applicable     Skin Integrity:      Wounds: no   Dressings Present: no    Wound Concerns: no      GI:    Current diet:  DIET NPO  DIET CARDIAC Regular    Nausea: NO  Vomiting: NO  Bowel Sounds: YES  Flatus: YES  Last Bowel Movement: today   Appearance: dark brown per patient    Respiratory:       Coughing and Deep Breathing: YES  Oral Care: YES  Understanding (patient/family education): YES   Getting out of bed: YES  Head of bed elevation: YES    Patient Safety:    Falls Score: 2  Mobility Score: 1  Bed Alarm On? No  Sitter? No      Opportunity for questions and clarification was given to oncoming nurse. Patient bed is in locked position, side rails are up x 2, door & observation blinds open as needed, call bell within reach and patient not in distress.     Lyn Richard RN

## 2017-04-07 ENCOUNTER — ANESTHESIA (OUTPATIENT)
Dept: SURGERY | Age: 72
DRG: 271 | End: 2017-04-07
Payer: MEDICARE

## 2017-04-07 ENCOUNTER — APPOINTMENT (OUTPATIENT)
Dept: GENERAL RADIOLOGY | Age: 72
DRG: 271 | End: 2017-04-07
Attending: SURGERY
Payer: MEDICARE

## 2017-04-07 LAB
ALBUMIN SERPL BCP-MCNC: 2.6 G/DL (ref 3.5–5)
ANION GAP BLD CALC-SCNC: 10 MMOL/L (ref 5–15)
ANION GAP BLD CALC-SCNC: 12 MMOL/L (ref 5–15)
ANION GAP BLD CALC-SCNC: 18 MMOL/L (ref 5–15)
APTT PPP: 24.5 SEC (ref 22.1–32.5)
ARTERIAL PATENCY WRIST A: ABNORMAL
BASE DEFICIT BLDA-SCNC: 5.7 MMOL/L
BASOPHILS # BLD AUTO: 0 K/UL (ref 0–0.1)
BASOPHILS # BLD: 0 % (ref 0–1)
BDY SITE: ABNORMAL
BREATHS.SPONTANEOUS ON VENT: 15
BUN BLD-MCNC: 14 MG/DL (ref 9–20)
BUN SERPL-MCNC: 14 MG/DL (ref 6–20)
BUN SERPL-MCNC: 14 MG/DL (ref 6–20)
BUN/CREAT SERPL: 9 (ref 12–20)
BUN/CREAT SERPL: 9 (ref 12–20)
C3 SERPL-MCNC: 141 MG/DL (ref 82–167)
C4 SERPL-MCNC: 31 MG/DL (ref 14–44)
CA-I BLD-MCNC: 1.25 MMOL/L (ref 1.12–1.32)
CALCIUM SERPL-MCNC: 7.8 MG/DL (ref 8.5–10.1)
CALCIUM SERPL-MCNC: 8.3 MG/DL (ref 8.5–10.1)
CHLORIDE BLD-SCNC: 108 MMOL/L (ref 98–107)
CHLORIDE SERPL-SCNC: 112 MMOL/L (ref 97–108)
CHLORIDE SERPL-SCNC: 112 MMOL/L (ref 97–108)
CO2 BLD-SCNC: 20 MMOL/L (ref 21–32)
CO2 SERPL-SCNC: 20 MMOL/L (ref 21–32)
CO2 SERPL-SCNC: 22 MMOL/L (ref 21–32)
CREAT BLD-MCNC: 1.4 MG/DL (ref 0.6–1.3)
CREAT SERPL-MCNC: 1.49 MG/DL (ref 0.7–1.3)
CREAT SERPL-MCNC: 1.59 MG/DL (ref 0.7–1.3)
DIFFERENTIAL METHOD BLD: ABNORMAL
EOSINOPHIL # BLD: 0.1 K/UL (ref 0–0.4)
EOSINOPHIL NFR BLD: 1 % (ref 0–7)
ERYTHROCYTE [DISTWIDTH] IN BLOOD BY AUTOMATED COUNT: 14.8 % (ref 11.5–14.5)
FIO2 ON VENT: 100 %
GAS FLOW.O2 O2 DELIVERY SYS: 4 L/MIN
GLUCOSE BLD STRIP.AUTO-MCNC: 182 MG/DL (ref 65–100)
GLUCOSE BLD-MCNC: 129 MG/DL (ref 65–100)
GLUCOSE SERPL-MCNC: 111 MG/DL (ref 65–100)
GLUCOSE SERPL-MCNC: 162 MG/DL (ref 65–100)
HCO3 BLDA-SCNC: 21 MMOL/L (ref 22–26)
HCT VFR BLD AUTO: 30.3 % (ref 36.6–50.3)
HCT VFR BLD CALC: 31 % (ref 36.6–50.3)
HGB BLD-MCNC: 10.2 G/DL (ref 12.1–17)
HGB BLD-MCNC: 10.5 GM/DL (ref 12.1–17)
INR PPP: 1.1 (ref 0.9–1.1)
LYMPHOCYTES # BLD AUTO: 6 % (ref 12–49)
LYMPHOCYTES # BLD: 0.6 K/UL (ref 0.8–3.5)
MCH RBC QN AUTO: 31.8 PG (ref 26–34)
MCHC RBC AUTO-ENTMCNC: 33.7 G/DL (ref 30–36.5)
MCV RBC AUTO: 94.4 FL (ref 80–99)
MONOCYTES # BLD: 0.8 K/UL (ref 0–1)
MONOCYTES NFR BLD AUTO: 7 % (ref 5–13)
NEUTS SEG # BLD: 9.3 K/UL (ref 1.8–8)
NEUTS SEG NFR BLD AUTO: 86 % (ref 32–75)
PCO2 BLDA: 45 MMHG (ref 35–45)
PH BLDA: 7.29 [PH] (ref 7.35–7.45)
PHOSPHATE SERPL-MCNC: 3.2 MG/DL (ref 2.6–4.7)
PLATELET # BLD AUTO: 166 K/UL (ref 150–400)
PO2 BLDA: 76 MMHG (ref 80–100)
POTASSIUM BLD-SCNC: 3.9 MMOL/L (ref 3.5–5.1)
POTASSIUM SERPL-SCNC: 3.4 MMOL/L (ref 3.5–5.1)
POTASSIUM SERPL-SCNC: 4.2 MMOL/L (ref 3.5–5.1)
PROTHROMBIN TIME: 11.6 SEC (ref 9–11.1)
RBC # BLD AUTO: 3.21 M/UL (ref 4.1–5.7)
RBC MORPH BLD: ABNORMAL
SAO2 % BLD: 94 % (ref 92–97)
SAO2% DEVICE SAO2% SENSOR NAME: ABNORMAL
SERVICE CMNT-IMP: ABNORMAL
SERVICE CMNT-IMP: ABNORMAL
SODIUM BLD-SCNC: 142 MMOL/L (ref 136–145)
SODIUM SERPL-SCNC: 144 MMOL/L (ref 136–145)
SODIUM SERPL-SCNC: 144 MMOL/L (ref 136–145)
SPECIMEN SITE: ABNORMAL
THERAPEUTIC RANGE,PTTT: NORMAL SECS (ref 58–77)
WBC # BLD AUTO: 10.8 K/UL (ref 4.1–11.1)

## 2017-04-07 PROCEDURE — 74011250636 HC RX REV CODE- 250/636

## 2017-04-07 PROCEDURE — 30233N1 TRANSFUSION OF NONAUTOLOGOUS RED BLOOD CELLS INTO PERIPHERAL VEIN, PERCUTANEOUS APPROACH: ICD-10-PCS | Performed by: SURGERY

## 2017-04-07 PROCEDURE — 74011250636 HC RX REV CODE- 250/636: Performed by: ANESTHESIOLOGY

## 2017-04-07 PROCEDURE — 03HB33Z INSERTION OF INFUSION DEVICE INTO RIGHT RADIAL ARTERY, PERCUTANEOUS APPROACH: ICD-10-PCS | Performed by: ANESTHESIOLOGY

## 2017-04-07 PROCEDURE — 77030002986 HC SUT PROL J&J -A: Performed by: SURGERY

## 2017-04-07 PROCEDURE — 80069 RENAL FUNCTION PANEL: CPT | Performed by: INTERNAL MEDICINE

## 2017-04-07 PROCEDURE — 85730 THROMBOPLASTIN TIME PARTIAL: CPT | Performed by: SURGERY

## 2017-04-07 PROCEDURE — 82962 GLUCOSE BLOOD TEST: CPT

## 2017-04-07 PROCEDURE — 77030008771 HC TU NG SALEM SUMP -A: Performed by: SURGERY

## 2017-04-07 PROCEDURE — 77030008684 HC TU ET CUF COVD -B: Performed by: ANESTHESIOLOGY

## 2017-04-07 PROCEDURE — 82803 BLOOD GASES ANY COMBINATION: CPT | Performed by: SURGERY

## 2017-04-07 PROCEDURE — 74011000250 HC RX REV CODE- 250: Performed by: SURGERY

## 2017-04-07 PROCEDURE — 77030021668 HC NEB PREFIL KT VYRM -A

## 2017-04-07 PROCEDURE — 74011000272 HC RX REV CODE- 272: Performed by: SURGERY

## 2017-04-07 PROCEDURE — 65620000000 HC RM CCU GENERAL

## 2017-04-07 PROCEDURE — 77030020788: Performed by: SURGERY

## 2017-04-07 PROCEDURE — C1768 GRAFT, VASCULAR: HCPCS | Performed by: SURGERY

## 2017-04-07 PROCEDURE — 77030013797 HC KT TRNSDUC PRSSR EDWD -A: Performed by: ANESTHESIOLOGY

## 2017-04-07 PROCEDURE — 77030011640 HC PAD GRND REM COVD -A: Performed by: SURGERY

## 2017-04-07 PROCEDURE — 77030005401 HC CATH RAD ARRO -A: Performed by: ANESTHESIOLOGY

## 2017-04-07 PROCEDURE — 77030032490 HC SLV COMPR SCD KNE COVD -B

## 2017-04-07 PROCEDURE — 76210000017 HC OR PH I REC 1.5 TO 2 HR: Performed by: SURGERY

## 2017-04-07 PROCEDURE — 77030010516 HC APPL HEMA CLP TELE -B: Performed by: SURGERY

## 2017-04-07 PROCEDURE — 74011000250 HC RX REV CODE- 250: Performed by: ANESTHESIOLOGY

## 2017-04-07 PROCEDURE — 77030020153 HC PRB DOPLR DISP MIZU -C: Performed by: SURGERY

## 2017-04-07 PROCEDURE — 94640 AIRWAY INHALATION TREATMENT: CPT

## 2017-04-07 PROCEDURE — 77030018846 HC SOL IRR STRL H20 ICUM -A: Performed by: SURGERY

## 2017-04-07 PROCEDURE — 77030020256 HC SOL INJ NACL 0.9%  500ML: Performed by: SURGERY

## 2017-04-07 PROCEDURE — 85610 PROTHROMBIN TIME: CPT | Performed by: SURGERY

## 2017-04-07 PROCEDURE — 02HV33Z INSERTION OF INFUSION DEVICE INTO SUPERIOR VENA CAVA, PERCUTANEOUS APPROACH: ICD-10-PCS | Performed by: ANESTHESIOLOGY

## 2017-04-07 PROCEDURE — 77030034848: Performed by: SURGERY

## 2017-04-07 PROCEDURE — 04V00DZ RESTRICTION OF ABDOMINAL AORTA WITH INTRALUMINAL DEVICE, OPEN APPROACH: ICD-10-PCS | Performed by: SURGERY

## 2017-04-07 PROCEDURE — 77030008771 HC TU NG SALEM SUMP -A: Performed by: ANESTHESIOLOGY

## 2017-04-07 PROCEDURE — 76060000039 HC ANESTHESIA 4 TO 4.5 HR: Performed by: SURGERY

## 2017-04-07 PROCEDURE — 85025 COMPLETE CBC W/AUTO DIFF WBC: CPT | Performed by: SURGERY

## 2017-04-07 PROCEDURE — 77030031139 HC SUT VCRL2 J&J -A: Performed by: SURGERY

## 2017-04-07 PROCEDURE — 65660000000 HC RM CCU STEPDOWN

## 2017-04-07 PROCEDURE — 77030026438 HC STYL ET INTUB CARD -A: Performed by: ANESTHESIOLOGY

## 2017-04-07 PROCEDURE — 77030002996 HC SUT SLK J&J -A: Performed by: SURGERY

## 2017-04-07 PROCEDURE — C1751 CATH, INF, PER/CENT/MIDLINE: HCPCS | Performed by: ANESTHESIOLOGY

## 2017-04-07 PROCEDURE — 77030014125 HC TY EPDRL BBMI -B: Performed by: ANESTHESIOLOGY

## 2017-04-07 PROCEDURE — 77030011264 HC ELECTRD BLD EXT COVD -A: Performed by: SURGERY

## 2017-04-07 PROCEDURE — 77030018836 HC SOL IRR NACL ICUM -A: Performed by: SURGERY

## 2017-04-07 PROCEDURE — 74011000250 HC RX REV CODE- 250: Performed by: INTERNAL MEDICINE

## 2017-04-07 PROCEDURE — 74011250636 HC RX REV CODE- 250/636: Performed by: SURGERY

## 2017-04-07 PROCEDURE — 77030020061 HC IV BLD WRMR ADMIN SET 3M -B: Performed by: ANESTHESIOLOGY

## 2017-04-07 PROCEDURE — 77030010938 HC CLP LIG TELE -A: Performed by: SURGERY

## 2017-04-07 PROCEDURE — 71010 XR CHEST PORT: CPT

## 2017-04-07 PROCEDURE — 77030008463 HC STPLR SKN PROX J&J -B: Performed by: SURGERY

## 2017-04-07 PROCEDURE — 80048 BASIC METABOLIC PNL TOTAL CA: CPT | Performed by: SURGERY

## 2017-04-07 PROCEDURE — 77030020782 HC GWN BAIR PAWS FLX 3M -B

## 2017-04-07 PROCEDURE — 80047 BASIC METABLC PNL IONIZED CA: CPT

## 2017-04-07 PROCEDURE — 74011250637 HC RX REV CODE- 250/637: Performed by: INTERNAL MEDICINE

## 2017-04-07 PROCEDURE — 0WQF0ZZ REPAIR ABDOMINAL WALL, OPEN APPROACH: ICD-10-PCS | Performed by: SURGERY

## 2017-04-07 PROCEDURE — 77030033263 HC DRSG MEPILEX 16-48IN BORD MOLN -B

## 2017-04-07 PROCEDURE — P9016 RBC LEUKOCYTES REDUCED: HCPCS | Performed by: SURGERY

## 2017-04-07 PROCEDURE — 77030010939 HC CLP LIG TELE -B: Performed by: SURGERY

## 2017-04-07 PROCEDURE — 74011000250 HC RX REV CODE- 250

## 2017-04-07 PROCEDURE — 76010000175 HC OR TIME 4 TO 4.5 HR INTENSV-TIER 1: Performed by: SURGERY

## 2017-04-07 PROCEDURE — 36415 COLL VENOUS BLD VENIPUNCTURE: CPT | Performed by: INTERNAL MEDICINE

## 2017-04-07 PROCEDURE — P9045 ALBUMIN (HUMAN), 5%, 250 ML: HCPCS

## 2017-04-07 PROCEDURE — 88304 TISSUE EXAM BY PATHOLOGIST: CPT | Performed by: SURGERY

## 2017-04-07 PROCEDURE — 88302 TISSUE EXAM BY PATHOLOGIST: CPT | Performed by: SURGERY

## 2017-04-07 DEVICE — IMPLANTABLE DEVICE: Type: IMPLANTABLE DEVICE | Site: ABDOMEN | Status: FUNCTIONAL

## 2017-04-07 RX ORDER — SODIUM BICARBONATE 1 MEQ/ML
SYRINGE (ML) INTRAVENOUS AS NEEDED
Status: DISCONTINUED | OUTPATIENT
Start: 2017-04-07 | End: 2017-04-07 | Stop reason: HOSPADM

## 2017-04-07 RX ORDER — SODIUM CHLORIDE 0.9 % (FLUSH) 0.9 %
5-10 SYRINGE (ML) INJECTION AS NEEDED
Status: DISCONTINUED | OUTPATIENT
Start: 2017-04-07 | End: 2017-04-14 | Stop reason: HOSPADM

## 2017-04-07 RX ORDER — POTASSIUM CHLORIDE AND SODIUM CHLORIDE 450; 150 MG/100ML; MG/100ML
INJECTION, SOLUTION INTRAVENOUS CONTINUOUS
Status: DISCONTINUED | OUTPATIENT
Start: 2017-04-07 | End: 2017-04-12

## 2017-04-07 RX ORDER — ACETAMINOPHEN 500 MG
1000 TABLET ORAL EVERY 6 HOURS
Status: DISCONTINUED | OUTPATIENT
Start: 2017-04-07 | End: 2017-04-07

## 2017-04-07 RX ORDER — ACETAMINOPHEN 10 MG/ML
1000 INJECTION, SOLUTION INTRAVENOUS EVERY 6 HOURS
Status: DISCONTINUED | OUTPATIENT
Start: 2017-04-07 | End: 2017-04-07

## 2017-04-07 RX ORDER — MIDAZOLAM HYDROCHLORIDE 1 MG/ML
INJECTION, SOLUTION INTRAMUSCULAR; INTRAVENOUS AS NEEDED
Status: DISCONTINUED | OUTPATIENT
Start: 2017-04-07 | End: 2017-04-07 | Stop reason: HOSPADM

## 2017-04-07 RX ORDER — SODIUM CHLORIDE 9 MG/ML
250 INJECTION, SOLUTION INTRAVENOUS AS NEEDED
Status: DISCONTINUED | OUTPATIENT
Start: 2017-04-07 | End: 2017-04-07

## 2017-04-07 RX ORDER — ROCURONIUM BROMIDE 10 MG/ML
INJECTION, SOLUTION INTRAVENOUS AS NEEDED
Status: DISCONTINUED | OUTPATIENT
Start: 2017-04-07 | End: 2017-04-07 | Stop reason: HOSPADM

## 2017-04-07 RX ORDER — ACETAMINOPHEN 10 MG/ML
INJECTION, SOLUTION INTRAVENOUS AS NEEDED
Status: DISCONTINUED | OUTPATIENT
Start: 2017-04-07 | End: 2017-04-07 | Stop reason: HOSPADM

## 2017-04-07 RX ORDER — LIDOCAINE HYDROCHLORIDE AND EPINEPHRINE 15; 5 MG/ML; UG/ML
INJECTION, SOLUTION EPIDURAL AS NEEDED
Status: DISCONTINUED | OUTPATIENT
Start: 2017-04-07 | End: 2017-04-07 | Stop reason: HOSPADM

## 2017-04-07 RX ORDER — MANNITOL 250 MG/ML
INJECTION, SOLUTION INTRAVENOUS AS NEEDED
Status: DISCONTINUED | OUTPATIENT
Start: 2017-04-07 | End: 2017-04-07 | Stop reason: HOSPADM

## 2017-04-07 RX ORDER — ONDANSETRON 2 MG/ML
4 INJECTION INTRAMUSCULAR; INTRAVENOUS
Status: DISCONTINUED | OUTPATIENT
Start: 2017-04-07 | End: 2017-04-14 | Stop reason: HOSPADM

## 2017-04-07 RX ORDER — HYDROMORPHONE HCL IN 0.9% NACL 15 MG/30ML
PATIENT CONTROLLED ANALGESIA VIAL INTRAVENOUS CONTINUOUS
Status: DISCONTINUED | OUTPATIENT
Start: 2017-04-07 | End: 2017-04-07 | Stop reason: SINTOL

## 2017-04-07 RX ORDER — ACETAMINOPHEN 10 MG/ML
1000 INJECTION, SOLUTION INTRAVENOUS EVERY 6 HOURS
Status: COMPLETED | OUTPATIENT
Start: 2017-04-07 | End: 2017-04-08

## 2017-04-07 RX ORDER — FENTANYL CITRATE 50 UG/ML
25 INJECTION, SOLUTION INTRAMUSCULAR; INTRAVENOUS
Status: DISCONTINUED | OUTPATIENT
Start: 2017-04-07 | End: 2017-04-07 | Stop reason: HOSPADM

## 2017-04-07 RX ORDER — FENTANYL CITRATE 50 UG/ML
INJECTION, SOLUTION INTRAMUSCULAR; INTRAVENOUS
Status: COMPLETED
Start: 2017-04-07 | End: 2017-04-07

## 2017-04-07 RX ORDER — MIDAZOLAM HYDROCHLORIDE 1 MG/ML
0.5 INJECTION, SOLUTION INTRAMUSCULAR; INTRAVENOUS
Status: DISCONTINUED | OUTPATIENT
Start: 2017-04-07 | End: 2017-04-07 | Stop reason: HOSPADM

## 2017-04-07 RX ORDER — BUPIVACAINE HYDROCHLORIDE AND EPINEPHRINE 2.5; 5 MG/ML; UG/ML
INJECTION, SOLUTION EPIDURAL; INFILTRATION; INTRACAUDAL; PERINEURAL AS NEEDED
Status: DISCONTINUED | OUTPATIENT
Start: 2017-04-07 | End: 2017-04-07 | Stop reason: HOSPADM

## 2017-04-07 RX ORDER — HEPARIN SODIUM 1000 [USP'U]/ML
INJECTION, SOLUTION INTRAVENOUS; SUBCUTANEOUS AS NEEDED
Status: DISCONTINUED | OUTPATIENT
Start: 2017-04-07 | End: 2017-04-07 | Stop reason: HOSPADM

## 2017-04-07 RX ORDER — POTASSIUM CHLORIDE AND SODIUM CHLORIDE 450; 150 MG/100ML; MG/100ML
INJECTION, SOLUTION INTRAVENOUS
Status: COMPLETED
Start: 2017-04-07 | End: 2017-04-07

## 2017-04-07 RX ORDER — KETOROLAC TROMETHAMINE 30 MG/ML
30 INJECTION, SOLUTION INTRAMUSCULAR; INTRAVENOUS
Status: COMPLETED | OUTPATIENT
Start: 2017-04-08 | End: 2017-04-08

## 2017-04-07 RX ORDER — NEOSTIGMINE METHYLSULFATE 1 MG/ML
INJECTION INTRAVENOUS AS NEEDED
Status: DISCONTINUED | OUTPATIENT
Start: 2017-04-07 | End: 2017-04-07 | Stop reason: HOSPADM

## 2017-04-07 RX ORDER — MORPHINE SULFATE 10 MG/ML
2 INJECTION, SOLUTION INTRAMUSCULAR; INTRAVENOUS
Status: DISCONTINUED | OUTPATIENT
Start: 2017-04-07 | End: 2017-04-07 | Stop reason: HOSPADM

## 2017-04-07 RX ORDER — HYDROMORPHONE HYDROCHLORIDE 1 MG/ML
.2-.5 INJECTION, SOLUTION INTRAMUSCULAR; INTRAVENOUS; SUBCUTANEOUS
Status: DISCONTINUED | OUTPATIENT
Start: 2017-04-07 | End: 2017-04-07 | Stop reason: HOSPADM

## 2017-04-07 RX ORDER — GLYCOPYRROLATE 0.2 MG/ML
INJECTION INTRAMUSCULAR; INTRAVENOUS AS NEEDED
Status: DISCONTINUED | OUTPATIENT
Start: 2017-04-07 | End: 2017-04-07 | Stop reason: HOSPADM

## 2017-04-07 RX ORDER — SODIUM CHLORIDE, SODIUM LACTATE, POTASSIUM CHLORIDE, CALCIUM CHLORIDE 600; 310; 30; 20 MG/100ML; MG/100ML; MG/100ML; MG/100ML
25 INJECTION, SOLUTION INTRAVENOUS CONTINUOUS
Status: DISCONTINUED | OUTPATIENT
Start: 2017-04-07 | End: 2017-04-07 | Stop reason: HOSPADM

## 2017-04-07 RX ORDER — HYDROMORPHONE HYDROCHLORIDE 2 MG/ML
INJECTION, SOLUTION INTRAMUSCULAR; INTRAVENOUS; SUBCUTANEOUS AS NEEDED
Status: DISCONTINUED | OUTPATIENT
Start: 2017-04-07 | End: 2017-04-07 | Stop reason: HOSPADM

## 2017-04-07 RX ORDER — MORPHINE SULFATE 2 MG/ML
2 INJECTION, SOLUTION INTRAMUSCULAR; INTRAVENOUS
Status: DISCONTINUED | OUTPATIENT
Start: 2017-04-07 | End: 2017-04-08

## 2017-04-07 RX ORDER — LIDOCAINE HYDROCHLORIDE 20 MG/ML
INJECTION, SOLUTION EPIDURAL; INFILTRATION; INTRACAUDAL; PERINEURAL AS NEEDED
Status: DISCONTINUED | OUTPATIENT
Start: 2017-04-07 | End: 2017-04-07 | Stop reason: HOSPADM

## 2017-04-07 RX ORDER — BUPIVACAINE HCL/0.9 % NACL/PF 0.125 %
1-15 PREFILLED PUMP RESERVOIR EPIDURAL
Status: DISCONTINUED | OUTPATIENT
Start: 2017-04-07 | End: 2017-04-09

## 2017-04-07 RX ORDER — ONDANSETRON 2 MG/ML
INJECTION INTRAMUSCULAR; INTRAVENOUS AS NEEDED
Status: DISCONTINUED | OUTPATIENT
Start: 2017-04-07 | End: 2017-04-07 | Stop reason: HOSPADM

## 2017-04-07 RX ORDER — LIDOCAINE HYDROCHLORIDE 10 MG/ML
0.1 INJECTION, SOLUTION EPIDURAL; INFILTRATION; INTRACAUDAL; PERINEURAL AS NEEDED
Status: DISCONTINUED | OUTPATIENT
Start: 2017-04-07 | End: 2017-04-07 | Stop reason: HOSPADM

## 2017-04-07 RX ORDER — ONDANSETRON 2 MG/ML
4 INJECTION INTRAMUSCULAR; INTRAVENOUS AS NEEDED
Status: DISCONTINUED | OUTPATIENT
Start: 2017-04-07 | End: 2017-04-07 | Stop reason: HOSPADM

## 2017-04-07 RX ORDER — SODIUM CHLORIDE 0.9 % (FLUSH) 0.9 %
5-10 SYRINGE (ML) INJECTION EVERY 8 HOURS
Status: DISCONTINUED | OUTPATIENT
Start: 2017-04-07 | End: 2017-04-14 | Stop reason: HOSPADM

## 2017-04-07 RX ORDER — FENTANYL CITRATE 50 UG/ML
INJECTION, SOLUTION INTRAMUSCULAR; INTRAVENOUS AS NEEDED
Status: DISCONTINUED | OUTPATIENT
Start: 2017-04-07 | End: 2017-04-07 | Stop reason: HOSPADM

## 2017-04-07 RX ORDER — SODIUM CHLORIDE 9 MG/ML
INJECTION, SOLUTION INTRAVENOUS
Status: DISCONTINUED | OUTPATIENT
Start: 2017-04-07 | End: 2017-04-07 | Stop reason: HOSPADM

## 2017-04-07 RX ORDER — PROPOFOL 10 MG/ML
INJECTION, EMULSION INTRAVENOUS
Status: DISCONTINUED
Start: 2017-04-07 | End: 2017-04-07

## 2017-04-07 RX ORDER — ALBUMIN HUMAN 50 G/1000ML
SOLUTION INTRAVENOUS AS NEEDED
Status: DISCONTINUED | OUTPATIENT
Start: 2017-04-07 | End: 2017-04-07 | Stop reason: HOSPADM

## 2017-04-07 RX ORDER — SODIUM CHLORIDE 9 MG/ML
100 INJECTION, SOLUTION INTRAVENOUS CONTINUOUS
Status: DISCONTINUED | OUTPATIENT
Start: 2017-04-07 | End: 2017-04-07

## 2017-04-07 RX ORDER — ACETAMINOPHEN 500 MG
1000 TABLET ORAL EVERY 6 HOURS
Status: COMPLETED | OUTPATIENT
Start: 2017-04-07 | End: 2017-04-08

## 2017-04-07 RX ORDER — DIPHENHYDRAMINE HYDROCHLORIDE 50 MG/ML
12.5 INJECTION, SOLUTION INTRAMUSCULAR; INTRAVENOUS AS NEEDED
Status: DISCONTINUED | OUTPATIENT
Start: 2017-04-07 | End: 2017-04-07 | Stop reason: HOSPADM

## 2017-04-07 RX ORDER — SODIUM CHLORIDE, SODIUM LACTATE, POTASSIUM CHLORIDE, CALCIUM CHLORIDE 600; 310; 30; 20 MG/100ML; MG/100ML; MG/100ML; MG/100ML
INJECTION, SOLUTION INTRAVENOUS
Status: DISCONTINUED | OUTPATIENT
Start: 2017-04-07 | End: 2017-04-07

## 2017-04-07 RX ORDER — PROPOFOL 10 MG/ML
INJECTION, EMULSION INTRAVENOUS AS NEEDED
Status: DISCONTINUED | OUTPATIENT
Start: 2017-04-07 | End: 2017-04-07 | Stop reason: HOSPADM

## 2017-04-07 RX ORDER — SODIUM CHLORIDE, SODIUM LACTATE, POTASSIUM CHLORIDE, CALCIUM CHLORIDE 600; 310; 30; 20 MG/100ML; MG/100ML; MG/100ML; MG/100ML
INJECTION, SOLUTION INTRAVENOUS
Status: DISCONTINUED | OUTPATIENT
Start: 2017-04-07 | End: 2017-04-07 | Stop reason: HOSPADM

## 2017-04-07 RX ORDER — LORAZEPAM 2 MG/ML
1 INJECTION INTRAMUSCULAR
Status: DISCONTINUED | OUTPATIENT
Start: 2017-04-07 | End: 2017-04-14 | Stop reason: HOSPADM

## 2017-04-07 RX ORDER — CEFAZOLIN SODIUM IN 0.9 % NACL 2 G/100 ML
2 PLASTIC BAG, INJECTION (ML) INTRAVENOUS EVERY 8 HOURS
Status: COMPLETED | OUTPATIENT
Start: 2017-04-07 | End: 2017-04-08

## 2017-04-07 RX ORDER — PROTAMINE SULFATE 10 MG/ML
INJECTION, SOLUTION INTRAVENOUS AS NEEDED
Status: DISCONTINUED | OUTPATIENT
Start: 2017-04-07 | End: 2017-04-07 | Stop reason: HOSPADM

## 2017-04-07 RX ORDER — SODIUM CHLORIDE 0.9 % (FLUSH) 0.9 %
5-10 SYRINGE (ML) INJECTION AS NEEDED
Status: DISCONTINUED | OUTPATIENT
Start: 2017-04-07 | End: 2017-04-07

## 2017-04-07 RX ORDER — HYDROMORPHONE HYDROCHLORIDE 1 MG/ML
1-2 INJECTION, SOLUTION INTRAMUSCULAR; INTRAVENOUS; SUBCUTANEOUS
Status: DISCONTINUED | OUTPATIENT
Start: 2017-04-07 | End: 2017-04-08

## 2017-04-07 RX ORDER — FENTANYL CITRATE 50 UG/ML
50 INJECTION, SOLUTION INTRAMUSCULAR; INTRAVENOUS AS NEEDED
Status: DISCONTINUED | OUTPATIENT
Start: 2017-04-07 | End: 2017-04-07 | Stop reason: HOSPADM

## 2017-04-07 RX ORDER — SUCCINYLCHOLINE CHLORIDE 20 MG/ML
INJECTION INTRAMUSCULAR; INTRAVENOUS AS NEEDED
Status: DISCONTINUED | OUTPATIENT
Start: 2017-04-07 | End: 2017-04-07 | Stop reason: HOSPADM

## 2017-04-07 RX ADMIN — BUPIVACAINE HYDROCHLORIDE AND EPINEPHRINE 4 ML: 2.5; 5 INJECTION, SOLUTION EPIDURAL; INFILTRATION; INTRACAUDAL; PERINEURAL at 13:15

## 2017-04-07 RX ADMIN — HYDROMORPHONE HYDROCHLORIDE 1 MG: 2 INJECTION, SOLUTION INTRAMUSCULAR; INTRAVENOUS; SUBCUTANEOUS at 12:50

## 2017-04-07 RX ADMIN — ALBUMIN HUMAN 250 ML: 50 SOLUTION INTRAVENOUS at 11:24

## 2017-04-07 RX ADMIN — PROTAMINE SULFATE 25 MG: 10 INJECTION, SOLUTION INTRAVENOUS at 11:26

## 2017-04-07 RX ADMIN — ACETAMINOPHEN 1000 MG: 10 INJECTION, SOLUTION INTRAVENOUS at 13:40

## 2017-04-07 RX ADMIN — FENTANYL CITRATE 50 MCG: 50 INJECTION, SOLUTION INTRAMUSCULAR; INTRAVENOUS at 09:49

## 2017-04-07 RX ADMIN — FENTANYL CITRATE 25 MCG: 50 INJECTION, SOLUTION INTRAMUSCULAR; INTRAVENOUS at 13:49

## 2017-04-07 RX ADMIN — IPRATROPIUM BROMIDE AND ALBUTEROL SULFATE 3 ML: .5; 3 SOLUTION RESPIRATORY (INHALATION) at 19:20

## 2017-04-07 RX ADMIN — FENTANYL CITRATE 50 MCG: 50 INJECTION, SOLUTION INTRAMUSCULAR; INTRAVENOUS at 09:53

## 2017-04-07 RX ADMIN — HEPARIN SODIUM 5000 UNITS: 1000 INJECTION, SOLUTION INTRAVENOUS; SUBCUTANEOUS at 10:19

## 2017-04-07 RX ADMIN — NEOSTIGMINE METHYLSULFATE 5 MG: 1 INJECTION INTRAVENOUS at 12:08

## 2017-04-07 RX ADMIN — FENTANYL CITRATE 50 MCG: 50 INJECTION, SOLUTION INTRAMUSCULAR; INTRAVENOUS at 12:58

## 2017-04-07 RX ADMIN — FENTANYL CITRATE 100 MCG: 50 INJECTION, SOLUTION INTRAMUSCULAR; INTRAVENOUS at 09:18

## 2017-04-07 RX ADMIN — LORAZEPAM 1 MG: 2 INJECTION INTRAMUSCULAR at 14:29

## 2017-04-07 RX ADMIN — LIDOCAINE HYDROCHLORIDE 80 MG: 20 INJECTION, SOLUTION EPIDURAL; INFILTRATION; INTRACAUDAL; PERINEURAL at 09:18

## 2017-04-07 RX ADMIN — ONDANSETRON HYDROCHLORIDE 4 MG: 2 INJECTION, SOLUTION INTRAMUSCULAR; INTRAVENOUS at 21:22

## 2017-04-07 RX ADMIN — CEFAZOLIN 2 G: 10 INJECTION, POWDER, FOR SOLUTION INTRAVENOUS; PARENTERAL at 09:20

## 2017-04-07 RX ADMIN — MIDAZOLAM HYDROCHLORIDE 2 MG: 1 INJECTION, SOLUTION INTRAMUSCULAR; INTRAVENOUS at 08:15

## 2017-04-07 RX ADMIN — FENTANYL CITRATE 50 MCG: 50 INJECTION, SOLUTION INTRAMUSCULAR; INTRAVENOUS at 09:58

## 2017-04-07 RX ADMIN — SODIUM CHLORIDE, SODIUM LACTATE, POTASSIUM CHLORIDE, AND CALCIUM CHLORIDE: 600; 310; 30; 20 INJECTION, SOLUTION INTRAVENOUS at 10:38

## 2017-04-07 RX ADMIN — ROCURONIUM BROMIDE 30 MG: 10 INJECTION, SOLUTION INTRAVENOUS at 09:30

## 2017-04-07 RX ADMIN — Medication: at 18:35

## 2017-04-07 RX ADMIN — SODIUM CHLORIDE 100 ML/HR: 900 INJECTION, SOLUTION INTRAVENOUS at 13:15

## 2017-04-07 RX ADMIN — ACETAMINOPHEN 1000 MG: 10 INJECTION, SOLUTION INTRAVENOUS at 11:45

## 2017-04-07 RX ADMIN — Medication 10 ML/HR: at 20:55

## 2017-04-07 RX ADMIN — ALBUMIN HUMAN 250 ML: 50 SOLUTION INTRAVENOUS at 10:56

## 2017-04-07 RX ADMIN — LIDOCAINE HYDROCHLORIDE AND EPINEPHRINE 3 ML: 15; 5 INJECTION, SOLUTION EPIDURAL at 11:30

## 2017-04-07 RX ADMIN — FENTANYL CITRATE 50 MCG: 50 INJECTION, SOLUTION INTRAMUSCULAR; INTRAVENOUS at 08:15

## 2017-04-07 RX ADMIN — FENTANYL CITRATE 50 MCG: 50 INJECTION, SOLUTION INTRAMUSCULAR; INTRAVENOUS at 12:54

## 2017-04-07 RX ADMIN — Medication 10 ML: at 22:04

## 2017-04-07 RX ADMIN — FENTANYL CITRATE 50 MCG: 50 INJECTION, SOLUTION INTRAMUSCULAR; INTRAVENOUS at 09:35

## 2017-04-07 RX ADMIN — MANNITOL 12.5 G: 250 INJECTION, SOLUTION INTRAVENOUS at 09:42

## 2017-04-07 RX ADMIN — Medication 25 MEQ: at 11:57

## 2017-04-07 RX ADMIN — FENTANYL CITRATE 100 MCG: 50 INJECTION, SOLUTION INTRAMUSCULAR; INTRAVENOUS at 13:15

## 2017-04-07 RX ADMIN — FENTANYL CITRATE 50 MCG: 50 INJECTION, SOLUTION INTRAMUSCULAR; INTRAVENOUS at 13:40

## 2017-04-07 RX ADMIN — SUCCINYLCHOLINE CHLORIDE 140 MG: 20 INJECTION INTRAMUSCULAR; INTRAVENOUS at 09:18

## 2017-04-07 RX ADMIN — POTASSIUM CHLORIDE AND SODIUM CHLORIDE 1000 ML: 450; 150 INJECTION, SOLUTION INTRAVENOUS at 15:08

## 2017-04-07 RX ADMIN — SODIUM CHLORIDE, SODIUM LACTATE, POTASSIUM CHLORIDE, AND CALCIUM CHLORIDE 25 ML/HR: 600; 310; 30; 20 INJECTION, SOLUTION INTRAVENOUS at 08:03

## 2017-04-07 RX ADMIN — Medication 2 MG: at 15:28

## 2017-04-07 RX ADMIN — MIDAZOLAM HYDROCHLORIDE 1 MG: 1 INJECTION, SOLUTION INTRAMUSCULAR; INTRAVENOUS at 08:19

## 2017-04-07 RX ADMIN — SODIUM CHLORIDE, SODIUM LACTATE, POTASSIUM CHLORIDE, CALCIUM CHLORIDE: 600; 310; 30; 20 INJECTION, SOLUTION INTRAVENOUS at 09:39

## 2017-04-07 RX ADMIN — ONDANSETRON 4 MG: 2 INJECTION INTRAMUSCULAR; INTRAVENOUS at 11:43

## 2017-04-07 RX ADMIN — MANNITOL 12.5 G: 250 INJECTION, SOLUTION INTRAVENOUS at 10:21

## 2017-04-07 RX ADMIN — PROPOFOL 150 MG: 10 INJECTION, EMULSION INTRAVENOUS at 09:18

## 2017-04-07 RX ADMIN — GLYCOPYRROLATE 0.6 MG: 0.2 INJECTION INTRAMUSCULAR; INTRAVENOUS at 12:08

## 2017-04-07 RX ADMIN — ROCURONIUM BROMIDE 40 MG: 10 INJECTION, SOLUTION INTRAVENOUS at 10:58

## 2017-04-07 RX ADMIN — ACETAMINOPHEN 1000 MG: 10 INJECTION, SOLUTION INTRAVENOUS at 19:56

## 2017-04-07 RX ADMIN — ROCURONIUM BROMIDE 5 MG: 10 INJECTION, SOLUTION INTRAVENOUS at 09:18

## 2017-04-07 RX ADMIN — SODIUM CHLORIDE AND POTASSIUM CHLORIDE 1000 ML: 4.5; 1.49 INJECTION, SOLUTION INTRAVENOUS at 15:08

## 2017-04-07 RX ADMIN — HYDROMORPHONE HYDROCHLORIDE 1 MG: 2 INJECTION, SOLUTION INTRAMUSCULAR; INTRAVENOUS; SUBCUTANEOUS at 10:50

## 2017-04-07 RX ADMIN — Medication 50 MEQ: at 11:25

## 2017-04-07 RX ADMIN — MUPIROCIN: 20 OINTMENT TOPICAL at 20:56

## 2017-04-07 RX ADMIN — SODIUM CHLORIDE: 9 INJECTION, SOLUTION INTRAVENOUS at 09:08

## 2017-04-07 RX ADMIN — Medication 8 ML/HR: at 13:15

## 2017-04-07 RX ADMIN — Medication 5 ML/HR: at 13:30

## 2017-04-07 RX ADMIN — METOPROLOL TARTRATE 5 MG: 5 INJECTION INTRAVENOUS at 03:53

## 2017-04-07 RX ADMIN — Medication 10 ML/HR: at 13:16

## 2017-04-07 RX ADMIN — CEFAZOLIN 2 G: 10 INJECTION, POWDER, FOR SOLUTION INTRAVENOUS; PARENTERAL at 16:53

## 2017-04-07 RX ADMIN — BUPIVACAINE HYDROCHLORIDE AND EPINEPHRINE 4 ML: 2.5; 5 INJECTION, SOLUTION EPIDURAL; INFILTRATION; INTRACAUDAL; PERINEURAL at 13:12

## 2017-04-07 RX ADMIN — Medication 10 ML: at 16:53

## 2017-04-07 NOTE — PERIOP NOTES
Family into visit. [address] in from [address], given mepilex sacral pad, she states will place on pt.

## 2017-04-07 NOTE — PERIOP NOTES
Handoff Report from Operating Room to PACU    Report received from ALEXIA HERCULES CRNA and Karime Crocker RN regarding Claudell Leyland. Surgeon(s):  Rosamaria Avina MD  And Procedure(s) (LRB):  OPEN ABDOMINAL AORTIC  ANEURYSM REPAIR (GEN W/ EPIDURAL) (N/A)  confirmed   with allergies, drains and dressings discussed. Anesthesia type, drugs, patient history, complications, estimated blood loss, vital signs, intake and output, and last pain medication, lines, reversal medications and temperature were reviewed.

## 2017-04-07 NOTE — PROGRESS NOTES
Patient transported to OR via bed with transport tech; family escorted to [facility] [address] waiting room    Arvind Light RN  7:33 AM  04/07/17

## 2017-04-07 NOTE — ANESTHESIA POSTPROCEDURE EVALUATION
Post-Anesthesia Evaluation and Assessment    Patient: Taiwo Jack MRN: [id number]  SSN: [id number]    YOB: 1945  Age: 66 y.o. Sex: male       Cardiovascular Function/Vital Signs  Visit Vitals    /54    Pulse 63    Temp 36.4 °C (97.6 °F)    Resp 17    Ht 5' 9\" (1.753 m)    Wt 111.7 kg (246 lb 4.1 oz)    SpO2 92%    BMI 36.37 kg/m2       Patient is status post general anesthesia for Procedure(s):  OPEN ABDOMINAL AORTIC  ANEURYSM REPAIR (GEN W/ EPIDURAL). Nausea/Vomiting: None    Postoperative hydration reviewed and adequate. Pain:  Pain Scale 1: Numeric (0 - 10) (04/07/17 0745)  Pain Intensity 1: 0 (04/07/17 0745)   Managed    Neurological Status:   Neuro (WDL): Within Defined Limits (04/07/17 0755)   At baseline    Mental Status and Level of Consciousness: Arousable    Pulmonary Status:   O2 Device: Nasal cannula;02 face tent (04/07/17 1415)   Adequate oxygenation and airway patent    Complications related to anesthesia: None    Post-anesthesia assessment completed.  No concerns    Signed By: Jovani Villeda MD     April 7, 2017

## 2017-04-07 NOTE — ROUTINE PROCESS
15:50 PM  Pt arrived to 2548, A&O x 4, 10/10 pain, abdominal dressing clean/dry/ intact, pulses are palpable, Pt follows commands, states he has all feeling in his feet/toes. Pt bathed, skin looks good. NG to suction. 4:00 PM  Pt still complains of 10/10. Epidural titrated to 12 mg/hr. Pt still has all feeling in toes/feet. VSS. Surgical site clean/dry/intact. 4:17 PM  Pt moaning, still complains of pain. Epidural titrated 15 mg/hr. VSS. Pt still has all feeling in toes/feet. 5:33 PM  Paged Dr. Kena Anderson, on call for Dr. Nazanin Calderón, regarding pain control for pt.     5:48 PM  Orders received from Dr. Kena Anderson: Dilaudid PCA  0.3 mg bolus, 6 min lockout, no basal rate, 4 mg per 4 hour max, 2 mg loading dose. 6:04 PM  Waiting for PCA pump to be delivered. Pt still complains of 10/10 pain, VSS, Feeling still intact in feet/legs. Pulses still palpable, will continue to monitor. 6:42 PM  Dilauded PCA started with Willis Davidson RN. Pt stated his pain is already easing up. VSS, Pt still has feeling in his toes/legs. Surgical site clean/dry/intact, pulses are palpable. Will continue to monitor.

## 2017-04-07 NOTE — PROGRESS NOTES
[facility]  Pulmonary, Critical Care, and Sleep Medicine    Name: Milan Kendrick MRN: [id number]   : 1945 Hospital: [facility]   Date: 2017        Critical Care    IMPRESSION:   · S/P open AAA repair  · Acute renal failure (preop)  · COPD  · Anemia hgb of 10.4  · Smoker 2-3 ppd  · Obese  · CAD had prior stent      RECOMMENDATIONS:   · O2 - wean  · Bronchodilators   · Pulmonary toilet  · Ongoing evaluation per renal  · Postop care  · Pain control  · BP control - currently on nicardipine drip  · DVT prophylaxis     Subjective/History:     17:  Seen in PACU post open AAA repair. Having some abdominal pain but no dyspnea. 17:  Didn't notice much difference with nebulizers. Having some melena. This patient has been seen and evaluated at the request of Dr. Desean Pierce for above. Patient is a 66 y.o. male reviewed Chart. Pt not a great historian. Denies any chest pain, back or abdominal pain during my evaluation. NO acute leg pain or swelling. ROS is negative. Past Medical History:   Diagnosis Date    BPH (benign prostatic hyperplasia)     CAD (coronary artery disease)      - s/p stent     Essential hypertension 13    Hematuria     sees urologist    Hyperglycemia       Past Surgical History:   Procedure Laterality Date    CARDIAC SURG PROCEDURE UNLIST      stent       Prior to Admission medications    Medication Sig Start Date End Date Taking? Authorizing Provider   aspirin delayed-release 81 mg tablet Take 2 Tabs by mouth daily. 14  Yes Mckenna Burks MD   atorvastatin (LIPITOR) 10 mg tablet Take  by mouth daily. Yes Historical Provider   clopidogrel (PLAVIX) 75 mg tablet Take  by mouth daily. Yes Historical Provider   hydrochlorothiazide (HYDRODIURIL) 25 mg tablet Take 25 mg by mouth daily. Yes Historical Provider   pantoprazole (PROTONIX) 40 mg tablet Take 40 mg by mouth daily.     Historical Provider     Current Facility-Administered Medications   Medication Dose Route Frequency    bupivacaine(PF) in NS (MARCAINE) 0.125 % epidural infusion  1-15 mL/hr Epidural TITRATE    0.9% sodium chloride infusion  100 mL/hr IntraVENous CONTINUOUS    [START ON 2017] pantoprazole (PROTONIX) 40 mg in sodium chloride 0.9 % 10 mL injection  40 mg IntraVENous DAILY    acetaminophen (TYLENOL) tablet 1,000 mg  1,000 mg Oral Q6H    Or    acetaminophen (OFIRMEV) infusion 1,000 mg  1,000 mg IntraVENous Q6H    niCARdipine (CARDENE) 25 mg in 0.9% sodium chloride 250 mL infusion  0-15 mg/hr IntraVENous TITRATE    albuterol-ipratropium (DUO-NEB) 2.5 MG-0.5 MG/3 ML  3 mL Nebulization TID RT    mupirocin (BACTROBAN) 2 % ointment   Topical Q12H    nicotine (NICODERM CQ) 21 mg/24 hr patch 1 Patch  1 Patch TransDERmal DAILY     Allergies   Allergen Reactions    Lipitor [Atorvastatin] Other (comments)     \"muscle pain\"      Social History   Substance Use Topics    Smoking status: Current Every Day Smoker     Packs/day: 3.00     Years: 53.00    Smokeless tobacco: Not on file    Alcohol use No      History reviewed. No pertinent family history. Review of Systems:  A comprehensive review of systems was negative. Objective:   Vital Signs:    Visit Vitals    /54    Pulse 64    Temp 97.6 °F (36.4 °C)    Resp 13    Ht 5' 9\" (1.753 m)    Wt 111.7 kg (246 lb 4.1 oz)    SpO2 98%    BMI 36.37 kg/m2       O2 Device: Nasal cannula   O2 Flow Rate (L/min): 2 l/min   Temp (24hrs), Av.7 °F (36.5 °C), Min:96.4 °F (35.8 °C), Max:98.6 °F (37 °C)       Intake/Output:   Last shift:       07 -  1900  In: 6624 [I.V.:2400]  Out: 1120 [Urine:720]  Last 3 shifts: 1901 -  0700  In: 2635 [P.O.:1200;  I.V.:825]  Out: 3550 [Urine:3550]    Intake/Output Summary (Last 24 hours) at 17 1357  Last data filed at 17 1333   Gross per 24 hour   Intake             3800 ml   Output             2170 ml   Net             1630 ml       Physical Exam:    General:  Alert, cooperative, no distress   Head:  Normocephalic, without obvious abnormality, atraumatic. Eyes:  Conjunctivae/corneas clear. Nose: Nares normal. Septum midline. Mucosa normal.     Throat: Lips, mucosa, and tongue normal.     Neck: Supple, symmetrical, trachea midline    Back:   Symmetric, no curvature. ROM normal.   Lungs: No wheeze today   Chest wall:  No tenderness or deformity. Heart:  Regular rate and rhythm    Abdomen:   Hypoactive bowel sounds immediately postop   Extremities: Extremities normal, atraumatic, no cyanosis or edema.    Skin: Skin color, texture, turgor normal. No rashes or lesions   Neurologic: Grossly nonfocal     Data:   Labs:  Recent Labs      04/05/17   0433   WBC  5.7   HGB  9.1*   HCT  27.2*   PLT  148*     Recent Labs      04/07/17   0400  04/06/17   0427  04/05/17   0433   NA  144  143  143   K  3.4*  3.6  3.7   CL  112*  110*  111*   CO2  20*  21  24   GLU  111*  111*  109*   BUN  14  14  19   CREA  1.49*  1.46*  1.32*   CA  8.3*  8.7  8.4*   PHOS  3.2  2.4*  2.8   ALB  2.6*  2.8*  2.7*     Recent Labs      04/07/17   1344   PH  7.29*   PCO2  45   PO2  76*   HCO3  21*   FIO2  100       Imaging:  I have personally reviewed the patients radiographs:  None today        Andrew Robison MD

## 2017-04-07 NOTE — PROCEDURES
Epidural not working properly. Epidural removed and replaced @ T8 - T9. Patient tolerated procedure well.

## 2017-04-07 NOTE — ANESTHESIA PREPROCEDURE EVALUATION
Anesthetic History   No history of anesthetic complications            Review of Systems / Medical History  Patient summary reviewed, nursing notes reviewed and pertinent labs reviewed    Pulmonary          Smoker      Comments: Current Every Day Smoker - 159 pack years   Neuro/Psych   Within defined limits           Cardiovascular    Hypertension          CAD, PAD and hyperlipidemia    Exercise tolerance: >4 METS  Comments: S/P PTCA  AAA  Carotid stenosis, right     GI/Hepatic/Renal         Renal disease: CRI       Endo/Other        Obesity    Comments: Hx Hyperglycemia    Other Findings   Comments: BPH   Hx Hematuria   Cerebrovascular disease                 Physical Exam    Airway  Mallampati: II    Neck ROM: normal range of motion   Mouth opening: Normal     Cardiovascular  Regular rate and rhythm,  S1 and S2 normal,  no murmur, click, rub, or gallop             Dental    Dentition: Edentulous     Pulmonary  Breath sounds clear to auscultation               Abdominal  GI exam deferred       Other Findings            Anesthetic Plan    ASA: 3  Anesthesia type: general    Monitoring Plan: Arterial line and CVP  Post-op pain plan if not by surgeon: indwelling epidural catheter    Induction: Intravenous  Anesthetic plan and risks discussed with: Patient

## 2017-04-07 NOTE — PERIOP NOTES
TRANSFER - IN REPORT:    Verbal report received from Lupe Martinez RN (name) on [facility] .  being received from General Surgery (unit) for ordered procedure      Report consisted of patients Situation, Background, Assessment and   Recommendations(SBAR). Information from the following report(s) SBAR, Kardex, [facility] and Recent Results was reviewed with the receiving nurse. Opportunity for questions and clarification was provided. Assessment completed upon patients arrival to unit and care assumed.

## 2017-04-07 NOTE — PROCEDURES
[address], [address]   PULMONARY FUNCTION       Name:  Harshil Kim   MR#:  [id number]   :  1945   Account #:  [de-identified]    Date of Procedure:  2017   Date of Adm:  2017        CLINICAL DIAGNOSIS/INDICATIONS:  Dyspnea. INTERPRETATION:  Spirometry and lung volumes were performed and   they reveal    1. No airflow obstruction. 2. No restrictive lung disease. 3. Moderate reduction in DLCO. 4. Significant improvement on FEV1 after bronchodilators. 5. Flow volume loop is within normal limits.          MD MAKENNA Taylor / Guy Magallanes   D:  2017   10:58   T:  2017   14:09   Job #:  [id number]

## 2017-04-07 NOTE — PERIOP NOTES
1237: Patient arrived to pacu restless, thrashing around bed, complaining of pain in his belly. Anesthesia remained at bedside and dosed with pain medication per their documentation. Dr Calvin Leigh at bedside as well attempting to calm patient. Cardene infusing at 15mg/hour which was decreased by anesthesia to 10mg/hour. 1300: placed humidified face tent on patient to assist with oxygenation; patient is mouth breather. 1305: Chest xray obtained as ordered. Dr Terrence García (renal) at bedside to see patient. 1315: Patient continues to complain of pain 10/10, thrashing about in bed. Pharmacy has been notified of need for epidural infusion as ordered. In interim, Dr Pearl Padron to bedside to dose epidural. Patient also received IV pain medication as documented. 1330: Patient continues to rate pain 10/10. bolused 5ml per epidural order instructions. Dr Pearl Padron at bedside. Made decision to change out epidural catheter as he thinks it is not working properly. 1340: Patient medicated with IV pain medication as documented  1345: Dr Pearl Padrno at bedside again with epidural supplies. Current epidural catheter removed with no problems, tip intact. New eipidural placed at bedside. Labs and ABG also obtained at this time. Epidural infusion restarted at completion of epidural insertion at 10ml/hour. Dr Manisha Shah at bedside to see patient. 1349: Patient received another dose of IV pain medication. 1355: Cardene infusion stopped. 80: Spoke with patient's daughter who was understanding of elapsed time since previous update. Awaiting bed assignment and transfer to CCU. Will update with room number at time of transfer. 1415: Per Dr Calvin Leigh, hold 2nd unit of PRBC as HGB 10.2 on recent labs. Unit of blood returned to blood bank in cooler that it was provided in from OR.   1429: patient continues to be anxious, complaining of pain when awake. Patient will drift off to sleep and appears comfortable.  As soon as he awakens, begins thrashing about in bed, moaning, etc. Medicated with 1mg ativan PRN as ordered. [phone]: attempt to call report for room 2548, nurse not available at this time. She will return call when available. Patient relaxed, no signs of distress, vital signs as documented. 1445: Dr Caleb Herrera has been in and out of PACU seeing patient throughout his entire stay. Reports that he his happy with patient's apparent status at this time. He reports that he will be placing order for different IV fluids. Will be on look out for new order. 1530: TRANSFER - OUT REPORT:    Verbal report given to Inga Maloney RN (name) on [address] [facility] [address]  being transferred to CCU (unit) for routine post - op       Report consisted of patients Situation, Background, Assessment and   Recommendations(SBAR). Information from the following report(s) SBAR, Kardex, OR Summary, Procedure Summary, Intake/Output, MAR and Alarm Parameters  was reviewed with the receiving nurse. Lines:   Triple Lumen 04/07/17 Right Internal jugular (Active)   Central Line Being Utilized Yes 4/7/2017  3:13 PM   Criteria for Appropriate Use Hemodynamically unstable, requiring monitoring lines, vasopressors, or volume resuscitation 4/7/2017  3:13 PM   Site Assessment Clean, dry, & intact 4/7/2017  3:13 PM   Infiltration Assessment 0 4/7/2017  3:13 PM   Affected Extremity/Extremities Color distal to insertion site pink (or appropriate for race); Pulses palpable 4/7/2017  3:13 PM   Date of Last Dressing Change 04/07/17 4/7/2017  3:13 PM   Dressing Status Clean, dry, & intact 4/7/2017  3:13 PM   Dressing Type Disk with Chlorhexadine gluconate (CHG); Transparent 4/7/2017  3:13 PM   Proximal Hub Color/Line Status White;Capped 4/7/2017  3:13 PM   Positive Blood Return (Medial Site) Yes 4/7/2017 12:40 PM   Medial Hub Color/Line Status Blue; Infusing 4/7/2017  3:13 PM   Positive Blood Return (Lateral Site) Yes 4/7/2017 12:40 PM   Distal Hub Color/Line Status Brown;Capped 4/7/2017  3:13 PM   Alcohol Cap Used Yes 4/7/2017 12:40 PM       Peripheral IV 04/06/17 Right Forearm (Active)   Site Assessment Clean, dry, & intact 4/7/2017  3:13 PM   Phlebitis Assessment 0 4/7/2017  3:13 PM   Infiltration Assessment 0 4/7/2017  3:13 PM   Dressing Status Clean, dry, & intact 4/7/2017  3:13 PM   Dressing Type Tape;Transparent 4/7/2017  3:13 PM   Hub Color/Line Status Pink;Capped 4/7/2017  3:13 PM   Action Taken Other (comment) 4/6/2017  1:24 PM   Alcohol Cap Used No 4/6/2017  1:24 PM       Arterial Line 04/07/17 Radial artery (Active)   Site Assessment Clean, dry, & intact 4/7/2017  3:13 PM   Dressing Status Clean, dry, & intact 4/7/2017  3:13 PM   Dressing Type Tape;Transparent 4/7/2017  3:13 PM   Line Status Intact and in place 4/7/2017  3:13 PM   Treatment Arm board on 4/7/2017  3:13 PM   Affected Extremity/Extremities Pulses palpable;Color distal to insertion site pink (or appropriate for race) 4/7/2017 12:40 PM        Opportunity for questions and clarification was provided. Patient transported with:   Monitor  O2 @ 5 liters  Registered Nurse    [phone]: Family updated with room number and directed to CCU waiting room.

## 2017-04-07 NOTE — ANESTHESIA PROCEDURE NOTES
Arterial Line Placement    Performed by: Hallie Vargas  Authorized by: Hallie Vargas     Pre-Procedure  Indications:  Arterial pressure monitoring  Preanesthetic Checklist: timeout performed      Procedure:   Location:  Radial artery  Number of attempts:  1    Assessment:   Post-procedure:  Line secured and sterile dressing applied  Patient Tolerance:  Patient tolerated the procedure well with no immediate complications  Comment:   Risks, benefits, alternatives explained and patient agrees to proceed. Sterile prep with Chlorhexidine. 0.5 mL 1% Lidocaine placed at insertion site. Right radial a-line placed.

## 2017-04-07 NOTE — PROGRESS NOTES
Order received to obtain consent for AAA repair. Nurse asked pt if he understands the risks of the surgery. Pt states, \"I guess so. I don't really care right now, I just want some real food. \" Consent not obtained.

## 2017-04-07 NOTE — BRIEF OP NOTE
BRIEF OPERATIVE NOTE    Date of Procedure: 4/7/2017   Preoperative Diagnosis: large juxtarenal AAA  Postoperative Diagnosis: same    Procedure(s): Open AAA repair w/24mm Dacron tube graft  Surgeon: Dayday Loya MD   Anesthesia: General   Estimated Blood Loss: 400cc  Specimens:   ID Type Source Tests Collected by Time Destination   1 : Hernia Sac and Content Preservative Hernia Sac  Juan Pan MD 4/7/2017 9012 Pathology   2 : neru thrombosis Preservative Abdomen  Juan Pan MD 4/7/2017 1202 Pathology      Findings: 2+ DP pulses; good UO  Complications: none  Implants:   Implant Name Type Inv.  Item Serial No.  Lot No. LRB No. Used Action   GRAFT KNT HEMSHLD GL [id number] --  - [id number]   GRAFT KNT HEMSHLD GL [id number] --  [id number] Liane STOVALL MADARVIN CARDIOVASCLR [id number] N/A 1 Implanted

## 2017-04-07 NOTE — PROGRESS NOTES
End of Shift Nursing Note    Bedside shift change report given to YOLANDA Redding (oncoming nurse) by Florencio Sommer RN (offgoing nurse). Report included the following information SBAR, Kardex, Intake/Output, MAR and Recent Results. Zone Phone:   [phone]    Significant changes during shift:   CHG Wipe bath done at 0440, new gown and linen at this time as well; extra wipes in room as patient gets sweaty and may need an additional wipe down; nicotine patch not removed at this time     at 0342, given 5mg IV Metoprolol per order, SBP down to 112    Patient to leave unit at 0730 for surgery scheduled at 0900     Non-emergent issues for physician to address:   Consent for surgery not yet signed, patient is agreeable and aware of surgery but needs more information about surgery, please talk with patient and family regarding risks of surgery vs benefits     Number times ambulated in hallway past shift: 0      Number of times OOB to chair past shift: 0 (patient sitting on edge of bed for dinner, did ambulate to restroom once)     Vital Signs:    Temp: 98.2 °F (36.8 °C)     Pulse (Heart Rate): (!) 57     BP: 112/60     Resp Rate: 18     O2 Sat (%): 93 %    Lines & Drains:     Urinary Catheter? Yes   Placement Date: 4/4/2017   Medical Necessity: retention  PIV R arm    NG tube [] in [] removed [x] not applicable   Drains [] in [] removed [x] not applicable     Skin Integrity:      Wounds: no   Dressings Present: no    Wound Concerns: no      GI:    Current diet:  DIET NPO    Nausea: NO  Vomiting: NO  Bowel Sounds: YES  Flatus: YES  Last Bowel Movement: 4/6   Appearance: dark brown per patient    Respiratory:       Coughing and Deep Breathing: YES  Oral Care: YES  Understanding (patient/family education): YES   Getting out of bed: YES  Head of bed elevation: YES    Patient Safety:    Falls Score: 2  Mobility Score: 1  Bed Alarm On? No  Sitter?  No      Opportunity for questions and clarification was given to juventino nurse. Patient bed is in locked position, side rails are up x 2, door & observation blinds open as needed, call bell within reach and patient not in distress.     Carolina Reardon RN

## 2017-04-07 NOTE — PROGRESS NOTES
Nephrology Progress Note     Yony Rivera       [email or web address]                   Phone - [phone]   Patient: Jose Alejandro Cummings  YOB: 1945     Date- 4/7/2017     CC: Follow up for acute renal failure          Subjective: Interval History:   -   Seen in PACU  S/p AAA repair   Getting prbc tx  Ros  Can't access due to patient's current condition       Assessment:   ·  Acute renal failure -vomiting and HCTZ ,aleve use, urinary retention  · hypophosphatemia  · Abdominal aortic aneurysm -s/p CT ANGIO 4-3-17  · Left renal artery stenosis on ct angio  · Metabolic acidosis  · Nausea and vomiting  · Right renal lesion - hyperdense on ct  · Nephrolithiasis  · Umbilical hernia  · GI bleed  · +ve NEETU, normal complements level     Plan:   Follow post op labs  Continue nacl infusion  follow iron panel  prbc tx ordered by DR. Alina finley in [facility] discussed with:nurse  [] High complexity decision making was performed  [] Patient is at high-risk of decompensation with multiple organ involvement  Review of Systems: Pertinent items are noted in HPI. Objective:   Vitals:    04/07/17 0832 04/07/17 0837 04/07/17 0850 04/07/17 1254   BP: 122/53 119/53 127/59 127/54   Pulse: 65 66 67 64   Resp: 16 18 16 13   Temp:    97.6 °F (36.4 °C)   SpO2: 99% 93% 94% 98%   Weight:       Height:           Intake/Output Summary (Last 24 hours) at 04/07/17 1307  Last data filed at 04/07/17 1255   Gross per 24 hour   Intake             3920 ml   Output             2070 ml   Net             1850 ml     Physical Exam:   GEN: confused  NECK- Supple,   RESP: Clear b/l, no wheezing  CVS: RRR,S1,S2   NEURO: Can't access due to patient's current condition    ABDO: soft , dressing +  EXT: No Edema   - finley +      Chart reviewed. Pertinent Notes reviewed.    Medications list  reviewed     Current Facility-Administered Medications   Medication    sodium chloride (NS) flush 5-10 mL    fentaNYL citrate (PF) injection 25 mcg    morphine injection 2 mg    HYDROmorphone (PF) (DILAUDID) injection 0.2-0.5 mg    ondansetron (ZOFRAN) injection 4 mg    midazolam (VERSED) injection 0.5 mg    diphenhydrAMINE (BENADRYL) injection 12.5 mg    meperidine (DEMEROL) injection 12.5 mg    ePHEDrine (MISTOLE) 50 mg/mL injection 5 mg    0.9% sodium chloride infusion 250 mL    bupivacaine(PF) in NS (MARCAINE) 0.125 % epidural infusion    propofol (DIPRIVAN) 10 mg/mL injection    0.9% sodium chloride infusion    LORazepam (ATIVAN) injection 1 mg    [START ON 4/8/2017] pantoprazole (PROTONIX) 40 mg in sodium chloride 0.9 % 10 mL injection    ondansetron (ZOFRAN) injection 4 mg    morphine injection 2 mg    acetaminophen (TYLENOL) tablet 1,000 mg    Or    acetaminophen (OFIRMEV) infusion 1,000 mg    pantoprazole (PROTONIX) tablet 40 mg    0.9% sodium chloride infusion 250 mL    albuterol-ipratropium (DUO-NEB) 2.5 MG-0.5 MG/3 ML    zolpidem (AMBIEN) tablet 5 mg    metoprolol tartrate (LOPRESSOR) tablet 25 mg    mupirocin (BACTROBAN) 2 % ointment    acetaminophen (TYLENOL) tablet 650 mg    metoprolol (LOPRESSOR) injection 5 mg    prochlorperazine (COMPAZINE) with saline injection 10 mg    nicotine (NICODERM CQ) 21 mg/24 hr patch 1 Patch              Data Review [facility].) [[id number]] Collected: 04/02/17 1348      Order Status: Completed Specimen: Serum from Serum Updated: 04/04/17 4547      Immunofixation, serum Comment          (NOTE)   An apparent normal immunofixation pattern. Performed At: [address] [id number]   Brian Gerard MD [id number]            Immunoglobulin G, Qt. 826 mg/dL       Immunoglobulin A, Qt. 144 mg/dL       Immunoglobulin M, Qt.  34 mg/dL        (NOTE)   Performed At: [facility]   [employer] [address] [id number]   Brian Gerard MD [id number]:[id number]   DSDNA AB REFLEX [[id number]] (Abnormal) Collected: 04/02/17 1348     Order Status: Completed Specimen: Serum Updated: 04/04/17 1439      dsDNA Ab, Qt. 14 (H) IU/mL        (NOTE)                                     Negative      <5                                     Equivocal  5 - 9                                     Positive      >9   Performed At: [address] [id number]   Amy Gutierrez MD IO:[id number]         Dinorah Chapa, W/REFLEX CASCADE [[id number]] (Abnormal) Collected: 04/02/17 1348     Order Status: Completed Specimen: Serum from Serum Updated: 04/04/17 1439      NEETU Direct Positive (A)          (NOTE)   Performed At: [address] [id number]   Amy Gutierrez MD GA:[id number]            See below Comment          (NOTE)             Recent Labs      04/07/17   0400  04/06/17   0427  04/05/17   0433   NA  144  143  143   K  3.4*  3.6  3.7   CL  112*  110*  111*   CO2  20*  21  24   GLU  111*  111*  109*   BUN  14  14  19   CREA  1.49*  1.46*  1.32*   CA  8.3*  8.7  8.4*   PHOS  3.2  2.4*  2.8   ALB  2.6*  2.8*  2.7*     Recent Labs      04/05/17   0433   WBC  5.7   HGB  9.1*   HCT  27.2*   PLT  148*     CT ABDO  There is a large juxtarenal fusiform abdominal aortic aneurysm. The aneurysm  begins immediately inferior to both renal arteries without a discernible neck. The aneurysm extends to the aortic bifurcation but does not involve the iliac  arteries. Maximal aneurysm diameter measures 7.8 cm.     The celiac artery is widely patent. The superior mesenteric artery is calcified  at its origin with possible moderate stenosis.     The extra iliac arteries are calcified but no definite focal stenosis. Right  external iliac is ectatic but not grossly aneurysmal. There is a focal aneurysm  at the right iliac bifurcation measuring 2.6 cm.  The external iliac arteries are  calcified but otherwise patent.     There is probable focal stenosis of the origin of the left renal artery. The  right renal artery is calcified but grossly patent.     Lung bases are grossly clear with minimal atelectasis. The liver pancreas spleen  and kidneys are unremarkable given limitations of arterial phase contrast. There  are small bilateral renal cysts. The adrenals are unremarkable.     No pelvic mass or adenopathy. No free fluid or focal fluid collection.     IMPRESSION  IMPRESSION: Juxtarenal fusiform abdominal aortic aneurysm extending to the  aortic bifurcation as detailed above. No results found for: CULT  No results found for: [address] [facility]  [facility] SYST FRANCISFormerly [facility] DillonVERONA [address], Katheryne [facility], [address] [facility]  Phone - [phone]         Fax - [phone]  [facility] Office  [address] [place] [address]  Phone - [phone]        Fax - [phone]    [email or web address]

## 2017-04-07 NOTE — OP NOTES
[address], [address]   OP NOTE       Name:  Sajan Hughes   MR#:  [id number]   :  1945   Account #:  [de-identified]    Surgery Date:  2017   Date of Adm:  2017       PREOPERATIVE DIAGNOSES:   1. Large juxtarenal abdominal aortic aneurysm. 2. Incarcerated umbilical hernia. POSTOPERATIVE DIAGNOSES:   1. Large juxtarenal abdominal aortic aneurysm. 2. Incarcerated umbilical hernia. PROCEDURES PERFORMED:   1. Repair with 24 mm Dacron tube graft. 2. Reduce and repair umbilical hernia. SURGEON: Sage Vale MD    ANESTHESIA: General and epidural.    SPECIMENS REMOVED: Mural thrombus. ESTIMATED BLOOD LOSS: 400 mL. INDICATIONS: The patient is a 33-year-old gentleman admitted almost   a week ago with a symptomatic incarcerated umbilical hernia, nausea,   vomiting, renal failure, and low-grade GI bleeding. Over the ensuing week,   these issues have been addressed and corrected. He has was   incidentally identified as having an 8.5 cm previously unknown   abdominal aortic aneurysm, which extended to the renal arteries,   with no options for traditional endografting. He is brought to the   operating room at this time for open repair. I have had an extensive   discussion with the patient and his wife and daughter regarding risks   and benefits. They understand the risks, including myocardial   infarction, ventilator dependence, pulmonary complications, renal   insufficiency, including the possible need for temporary or permanent   dialysis, bleeding and infection. DESCRIPTION OF PROCEDURE: After obtaining informed consent,   the patient was placed supine on the operating table. After adequate   induction of general and epidural anesthesia, patient and site   confirmation and administration of prophylactic antibiotics, the patient   was prepped from nipples to knees and table to table.      A long midline incision was made from xiphoid to pubis, omentum,   which was chronically incarcerated, and umbilical hernia was identified,   reduced and resected. No bowel was noted within the umbilical hernia. General exploration of abdominal cavity was unremarkable. The   omentum was eviscerated superiorly, the small bowel was eviscerated   laterally to the patient's right and both were protected with warm, moist   laparotomy pads. Attention was turned to the retroperitoneum where   the large aneurysm was identified. There was a more inflammatory   component to the aneurysm than is normally seen. The duodenum and   small bowel required a somewhat tedious dissection off the aneurysm. The gonadal vein required ligation and division. Dissection was   continued cephalad until the left renal vein was identified. This was   then mobilized fully and allowed identification of both right and left   renal artery orifices. The patient's costal margin was flared enough and the left   renal vein was mobile enough that the left renal vein did not require ligation. Attention was turned to the pelvis where dissection was continued until   the aortic bifurcation and the proximal common iliac arteries were   dissected free and controlled. Care was taken to identify and avoid   injuring any of the ureters. The circumferential control was obtained at   the level of the renal arteries with the help of a digital dissection. A large Satinsky clamp was placed across the aorta just at the renal   artery orifices and applied. This allowed access to a centimeter or 2 of   aneurysmal aorta just below the renal arteries. The patient was   systemically heparinized. The proximal and distal clips were applied. The aorta was opened and a large amount of mural thrombus was   evacuated. There were no backbleeding lumbars noted and the DUKE   did not backbleed.  The proximal neck was fashioned such that a   sewing ring was achieved, a centimeter or 2 below the clamp in the   renal arteries. This was clearly not normal healthy aorta by any means,   but was deemed suitable for a pledgeted suture line in this particular   instance. A 24 mm Dacron Hemashield tube graft was chosen. The   proximal anastomosis was constructed in an end-to-end fashion using   2 continuous Prolene sutures. The posterior wall of the suture line was   better quality aorta and also allowed for taking some tissue deep to the   aorta in bites. The anterior suture line was constructed between the   Dacron prosthesis and a doubled thickness layer of the aorta   reinforced with interrupted pledgets. At the completion of the proximal   suture line, the graft was clamped. The Satinsky clamp was   slowly released and the proximal suture was found to be hemostatic. Attention was turned to the distal aorta where a sewing ring around the   aortic orifices was identified. The graft was taken to length, transected   and a distal anastomosis fashioned in a similar end-to-end fashion   using a single continuous Prolene suture in a parachute technique. At   the completion of the distal anastomosis, suture line was found to be   hemostatic. Flow was returned to both legs at the same time with   the transient decrease in blood pressure, but otherwise tolerated   without incident. At this point, both suture lines were deemed   hemostatic, as was the retroperitoneum. Operating surgeon removed   gown and gloves and checked the feet where there were palpable   dorsal pedal pulses bilaterally. The patient was given 25 mg of   protamine for heparin reversal.      The retroperitoneum was irrigated with antibiotic solution, evacuated   and deemed hemostatic. The generous aneurysm sac was closed   securely over the Dacron prosthesis and the intraabdominal contents   returned to their appropriate position.  After ensuring a correct sponge   and needle count x2, the midline fascia was closed with 2 interrupted   heavy gauge running Prolene sutures which included repair of the   umbilical hernia. Skin was closed with standard skin staples. The   patient tolerated the procedure well with no complications. Blood loss   was 400 mL, was not replaced as there was inadequate volume from   the Cell Saver to return any of the shed blood.         MD ARELI Andres / RAJI   D:  04/07/2017   12:36   T:  04/07/2017   13:56   Job #:  [id number]

## 2017-04-07 NOTE — PROGRESS NOTES
Patient is off the floor  He is scheduled for AAA surgery today  Labs reviewed  Cr. Stable  Patient not seen today. We will follow.     Ruperto Peña MD

## 2017-04-07 NOTE — ANESTHESIA PROCEDURE NOTES
Central Line Placement    Performed by: Adelina Mcdonald  Authorized by: Adelina Mcdonald     Indications: central pressure monitoring and vascular access    Pre-procedure: All elements of maximal sterile barrier technique followed? Yes    Maximal barrier precautions followed, 2% Chlorhexidine for cutaneous antisepsis and Hand hygiene performed prior to catheter insertion            Assessment:           Central Line Procedure Note    Indication: Inadequate venous access    Risks, benefits, alternatives explained and patient agrees to proceed. Patient positioned in Trendelenburg. 7-Step Sterility Protocol followed. (cap, mask sterile gown, sterile gloves, large sterile sheet, hand hygiene, 2% chlorhexidine for cutaneous antisepsis)  5 mL 1% Lidocaine placed at insertion site. Right internal jugular cannulated x 1 attempt(s) utilizing the Seldinger technique. Catheter secured & Biopatch applied. Sterile Tegaderm placed. CXR pending.     Care turned over to covering Attending MD.

## 2017-04-07 NOTE — ANESTHESIA PROCEDURE NOTES
Epidural Block    Performed by: Vanita Burdick  Authorized by: Vanita Burdick     Pre-Procedure  Indication: labor epidural    Preanesthetic Checklist: risks and benefits discussed, site marked and timeout performed      Epidural:   Patient position:  Seated  Prep region:  Thoracic  Prep: Chlorhexidine    Location:  T9-10    Needle and Epidural Catheter:   Needle Type:  Tuohy  Needle Gauge:  17 G  Injection Technique:  Loss of resistance using air  Attempts:  1  Catheter Size:  19 G  Events: no blood with aspiration, no cerebrospinal fluid with aspiration, no paresthesia and negative aspiration test      Assessment:   Catheter Secured:  Tegaderm and tape  Insertion:  Uncomplicated  Patient tolerance:  Patient tolerated the procedure well with no immediate complications

## 2017-04-07 NOTE — PROGRESS NOTES
This RN spoke with Olamide Nation RN on ambulatory surgery unit regarding patient's upcoming surgery; reviewed recent lab results, recent medications, past medical history, SBAR, Kardex, vital signs; opportunity for questions and clarification was provided; patient will be transported for surgery around 0730; patient and family updated; continuing to monitor and intervene as needed.     Teddy Wesley RN  6:30 AM  04/07/17

## 2017-04-08 LAB
ANION GAP BLD CALC-SCNC: 10 MMOL/L (ref 5–15)
APTT PPP: 27.9 SEC (ref 22.1–32.5)
BASOPHILS # BLD AUTO: 0 K/UL (ref 0–0.1)
BASOPHILS # BLD: 0 % (ref 0–1)
BUN SERPL-MCNC: 15 MG/DL (ref 6–20)
BUN/CREAT SERPL: 10 (ref 12–20)
CALCIUM SERPL-MCNC: 7.6 MG/DL (ref 8.5–10.1)
CHLORIDE SERPL-SCNC: 112 MMOL/L (ref 97–108)
CO2 SERPL-SCNC: 22 MMOL/L (ref 21–32)
CREAT SERPL-MCNC: 1.57 MG/DL (ref 0.7–1.3)
ENA RNP AB SER-ACNC: <0.2 AI (ref 0–0.9)
EOSINOPHIL # BLD: 0.1 K/UL (ref 0–0.4)
EOSINOPHIL NFR BLD: 2 % (ref 0–7)
ERYTHROCYTE [DISTWIDTH] IN BLOOD BY AUTOMATED COUNT: 15 % (ref 11.5–14.5)
GLUCOSE SERPL-MCNC: 100 MG/DL (ref 65–100)
HCT VFR BLD AUTO: 29.6 % (ref 36.6–50.3)
HGB BLD-MCNC: 10 G/DL (ref 12.1–17)
INR PPP: 1.2 (ref 0.9–1.1)
LYMPHOCYTES # BLD AUTO: 10 % (ref 12–49)
LYMPHOCYTES # BLD: 0.6 K/UL (ref 0.8–3.5)
MCH RBC QN AUTO: 32.6 PG (ref 26–34)
MCHC RBC AUTO-ENTMCNC: 33.8 G/DL (ref 30–36.5)
MCV RBC AUTO: 96.4 FL (ref 80–99)
MONOCYTES # BLD: 0.5 K/UL (ref 0–1)
MONOCYTES NFR BLD AUTO: 9 % (ref 5–13)
NEUTS SEG # BLD: 4.7 K/UL (ref 1.8–8)
NEUTS SEG NFR BLD AUTO: 79 % (ref 32–75)
PLATELET # BLD AUTO: 156 K/UL (ref 150–400)
POTASSIUM SERPL-SCNC: 4.7 MMOL/L (ref 3.5–5.1)
PROTHROMBIN TIME: 12 SEC (ref 9–11.1)
RBC # BLD AUTO: 3.07 M/UL (ref 4.1–5.7)
SODIUM SERPL-SCNC: 144 MMOL/L (ref 136–145)
THERAPEUTIC RANGE,PTTT: NORMAL SECS (ref 58–77)
WBC # BLD AUTO: 5.9 K/UL (ref 4.1–11.1)

## 2017-04-08 PROCEDURE — 74011250636 HC RX REV CODE- 250/636: Performed by: INTERNAL MEDICINE

## 2017-04-08 PROCEDURE — 74011000250 HC RX REV CODE- 250: Performed by: INTERNAL MEDICINE

## 2017-04-08 PROCEDURE — 74011250636 HC RX REV CODE- 250/636: Performed by: SURGERY

## 2017-04-08 PROCEDURE — 80048 BASIC METABOLIC PNL TOTAL CA: CPT | Performed by: SURGERY

## 2017-04-08 PROCEDURE — 36415 COLL VENOUS BLD VENIPUNCTURE: CPT | Performed by: SURGERY

## 2017-04-08 PROCEDURE — 85025 COMPLETE CBC W/AUTO DIFF WBC: CPT | Performed by: SURGERY

## 2017-04-08 PROCEDURE — 65660000000 HC RM CCU STEPDOWN

## 2017-04-08 PROCEDURE — 74011250637 HC RX REV CODE- 250/637: Performed by: SURGERY

## 2017-04-08 PROCEDURE — 74011000250 HC RX REV CODE- 250: Performed by: ANESTHESIOLOGY

## 2017-04-08 PROCEDURE — 85730 THROMBOPLASTIN TIME PARTIAL: CPT | Performed by: SURGERY

## 2017-04-08 PROCEDURE — 94640 AIRWAY INHALATION TREATMENT: CPT

## 2017-04-08 PROCEDURE — 85610 PROTHROMBIN TIME: CPT | Performed by: SURGERY

## 2017-04-08 PROCEDURE — C9113 INJ PANTOPRAZOLE SODIUM, VIA: HCPCS | Performed by: SURGERY

## 2017-04-08 PROCEDURE — 74011000250 HC RX REV CODE- 250: Performed by: SURGERY

## 2017-04-08 PROCEDURE — 77030029684 HC NEB SM VOL KT MONA -A

## 2017-04-08 PROCEDURE — 65620000000 HC RM CCU GENERAL

## 2017-04-08 PROCEDURE — 74011000258 HC RX REV CODE- 258: Performed by: INTERNAL MEDICINE

## 2017-04-08 RX ORDER — HYDROCORTISONE SODIUM SUCCINATE 100 MG/2ML
100 INJECTION, POWDER, FOR SOLUTION INTRAMUSCULAR; INTRAVENOUS ONCE
Status: COMPLETED | OUTPATIENT
Start: 2017-04-08 | End: 2017-04-08

## 2017-04-08 RX ORDER — FENTANYL CITRATE 50 UG/ML
25-50 INJECTION, SOLUTION INTRAMUSCULAR; INTRAVENOUS
Status: DISCONTINUED | OUTPATIENT
Start: 2017-04-08 | End: 2017-04-14 | Stop reason: HOSPADM

## 2017-04-08 RX ADMIN — FENTANYL CITRATE 50 MCG: 50 INJECTION, SOLUTION INTRAMUSCULAR; INTRAVENOUS at 08:39

## 2017-04-08 RX ADMIN — ACETAMINOPHEN 1000 MG: 10 INJECTION, SOLUTION INTRAVENOUS at 02:03

## 2017-04-08 RX ADMIN — SODIUM CHLORIDE AND POTASSIUM CHLORIDE: 4.5; 1.49 INJECTION, SOLUTION INTRAVENOUS at 10:52

## 2017-04-08 RX ADMIN — SODIUM CHLORIDE 0.7 MCG/KG/HR: 900 INJECTION, SOLUTION INTRAVENOUS at 20:30

## 2017-04-08 RX ADMIN — FENTANYL CITRATE 50 MCG: 50 INJECTION, SOLUTION INTRAMUSCULAR; INTRAVENOUS at 16:50

## 2017-04-08 RX ADMIN — Medication 10 ML: at 22:00

## 2017-04-08 RX ADMIN — Medication 10 ML: at 05:41

## 2017-04-08 RX ADMIN — FENTANYL CITRATE 50 MCG: 50 INJECTION, SOLUTION INTRAMUSCULAR; INTRAVENOUS at 20:30

## 2017-04-08 RX ADMIN — SODIUM CHLORIDE 0.7 MCG/KG/HR: 900 INJECTION, SOLUTION INTRAVENOUS at 10:43

## 2017-04-08 RX ADMIN — SODIUM CHLORIDE 0.7 MCG/KG/HR: 900 INJECTION, SOLUTION INTRAVENOUS at 14:57

## 2017-04-08 RX ADMIN — IPRATROPIUM BROMIDE AND ALBUTEROL SULFATE 3 ML: .5; 3 SOLUTION RESPIRATORY (INHALATION) at 19:31

## 2017-04-08 RX ADMIN — FENTANYL CITRATE 50 MCG: 50 INJECTION, SOLUTION INTRAMUSCULAR; INTRAVENOUS at 10:54

## 2017-04-08 RX ADMIN — SODIUM CHLORIDE AND POTASSIUM CHLORIDE: 4.5; 1.49 INJECTION, SOLUTION INTRAVENOUS at 01:00

## 2017-04-08 RX ADMIN — SODIUM CHLORIDE 40 MG: 9 INJECTION INTRAMUSCULAR; INTRAVENOUS; SUBCUTANEOUS at 08:39

## 2017-04-08 RX ADMIN — HYDROCORTISONE SODIUM SUCCINATE 100 MG: 100 INJECTION, POWDER, FOR SOLUTION INTRAMUSCULAR; INTRAVENOUS at 10:30

## 2017-04-08 RX ADMIN — LORAZEPAM 1 MG: 2 INJECTION INTRAMUSCULAR at 08:40

## 2017-04-08 RX ADMIN — CEFAZOLIN 2 G: 10 INJECTION, POWDER, FOR SOLUTION INTRAVENOUS; PARENTERAL at 00:27

## 2017-04-08 RX ADMIN — HYDROMORPHONE HYDROCHLORIDE 2 MG: 1 INJECTION, SOLUTION INTRAMUSCULAR; INTRAVENOUS; SUBCUTANEOUS at 05:37

## 2017-04-08 RX ADMIN — FENTANYL CITRATE 50 MCG: 50 INJECTION, SOLUTION INTRAMUSCULAR; INTRAVENOUS at 14:12

## 2017-04-08 RX ADMIN — KETOROLAC TROMETHAMINE 30 MG: 30 INJECTION, SOLUTION INTRAMUSCULAR at 00:26

## 2017-04-08 RX ADMIN — Medication 10 ML: at 14:12

## 2017-04-08 RX ADMIN — Medication 10 ML/HR: at 09:14

## 2017-04-08 NOTE — PROGRESS NOTES
0700  Bedside and verbal report from [facility], RN.  0800  Assessment completed; patient calling out; complains of pain. Screaming at times. Dr. Shabana Jacobo here to see patient. Dr. Odell Eddy on phone for update. Will give ativan and narcotics per prn orders. Patient is oriented to name, place, and situation; not oriented to time. Order received to have anesthesia remove the epidural catheter. Dr. Ash Calloway notified. 0840  Fentanyl 50 mcg IV and ativan 1 mg IV given per prn orders. Nicotine patch applied. 0900  Patient resting for brief moments (1-2 minutes), then calling out, moaning, trying to swing legs over edge of bed. Patient reoriented to situation. 1000  Remains restless; calling out. Rolling side-to-side in the bed. 1030  Arterial line removed by patient due to his agitation. Dr. La Nena Dunbar at bedside; will start precedex and give solu-cortef for control of pain and anxiety. 1043  Precedex drip started  @ 0.7 mcg/kg/hr. 1054  Medicated with fentanyl 50 mcg IV for pain. 1100  Epidural catheter removed by Dr. Johanna Alamo. Patient now resting quietly with eyes closed. Vitals stable. 1200  Napping. Vitals stable. Wife and daughter here to see patient. Status update given to family members. 1300  Napping; vitals stable. 1412  Awake, moaning, but does say that his pain is \"better. \"  Medicated with fentanyl 50 mcg IV for pain per prn order. 1500  Now resting quietly with eyes closed; vitals stable. 1600  Reassessment completed; bath completed; bed linens and gown changed. Patient cooperative, complains of \"soreness\" but is able to participate in care. 1650  Medicated with fentanyl 50 mcg IV for pain. 1800  Resting quietly with eyes closed; responds to name called and follows commands, then returns to sleep. Urine output adequate. 1900  Napping at intervals. Bedside and Verbal shift change report given to [facility] RN (oncoming nurse) by Gisela Kendrick RN (offgoing nurse).  Report included the following information SBAR, Kardex, Procedure Summary, Intake/Output, MAR, Accordion, Recent Results, Med Rec Status, Cardiac Rhythm NSR and Alarm Parameters .

## 2017-04-08 NOTE — PROGRESS NOTES
[facility]  Pulmonary, Critical Care, and Sleep Medicine    Name: Ping De Paz MRN: [id number]   : 1945 Hospital: [place]    Date: 2017        Critical Care    IMPRESSION:   · S/P open AAA repair  · Acute renal failure (preop)  · COPD  · Anemia hgb of 10.4  · Smoker 2-3 ppd  · Obese  · CAD had prior stent      RECOMMENDATIONS:   · O2 - wean  · Try fentanyl  · Try IV ativan  · mouthcare  · Nicotine patch  · Bronchodilators   · Pulmonary toilet  · Ongoing evaluation per renal  · Postop care  · Pain control  · BP control - currently off nicardipine drip  · DVT prophylaxis     Subjective/History:     17: restless, less confused but now c/o dry mouth, thirst and lower abdominal pain. Overnight became veri confused from IV dilaudid. On dose IV toradol helped but renal function precludes additional use  17:  Seen in PACU post open AAA repair. Having some abdominal pain but no dyspnea. 17:  Didn't notice much difference with nebulizers. Having some melena. This patient has been seen and evaluated at the request of Dr. Alaina Crouch for above. Patient is a 66 y.o. male reviewed Chart. Pt not a great historian. Denies any chest pain, back or abdominal pain during my evaluation. NO acute leg pain or swelling. ROS is negative. Past Medical History:   Diagnosis Date    BPH (benign prostatic hyperplasia)     CAD (coronary artery disease)      - s/p stent     Essential hypertension 13    Hematuria     sees urologist    Hyperglycemia       Past Surgical History:   Procedure Laterality Date    CARDIAC SURG PROCEDURE UNLIST      stent       Prior to Admission medications    Medication Sig Start Date End Date Taking? Authorizing Provider   aspirin delayed-release 81 mg tablet Take 2 Tabs by mouth daily. 14  Yes Yary Yu MD   atorvastatin (LIPITOR) 10 mg tablet Take  by mouth daily.    Yes Historical Provider   clopidogrel (PLAVIX) 75 mg tablet Take  by mouth daily. Yes Historical Provider   hydrochlorothiazide (HYDRODIURIL) 25 mg tablet Take 25 mg by mouth daily. Yes Historical Provider   pantoprazole (PROTONIX) 40 mg tablet Take 40 mg by mouth daily. Historical Provider     Current Facility-Administered Medications   Medication Dose Route Frequency    bupivacaine(PF) in NS (MARCAINE) 0.125 % epidural infusion  1-15 mL/hr Epidural TITRATE    sodium chloride (NS) flush 5-10 mL  5-10 mL IntraVENous Q8H    pantoprazole (PROTONIX) 40 mg in sodium chloride 0.9 % 10 mL injection  40 mg IntraVENous DAILY    0.45% sodium chloride with KCl 20 mEq/L infusion   IntraVENous CONTINUOUS    albuterol-ipratropium (DUO-NEB) 2.5 MG-0.5 MG/3 ML  3 mL Nebulization TID RT    mupirocin (BACTROBAN) 2 % ointment   Topical Q12H    nicotine (NICODERM CQ) 21 mg/24 hr patch 1 Patch  1 Patch TransDERmal DAILY     Allergies   Allergen Reactions    Lipitor [Atorvastatin] Other (comments)     \"muscle pain\"      Social History   Substance Use Topics    Smoking status: Current Every Day Smoker     Packs/day: 3.00     Years: 53.00    Smokeless tobacco: Not on file    Alcohol use No      History reviewed. No pertinent family history. Review of Systems:  A comprehensive review of systems was negative. Objective:   Vital Signs:    Visit Vitals    /58    Pulse 77    Temp 98.6 °F (37 °C)    Resp 20    Ht 5' 9\" (1.753 m)    Wt 111.7 kg (246 lb 4.1 oz)    SpO2 95%    BMI 36.37 kg/m2       O2 Device: Nasal cannula   O2 Flow Rate (L/min): 2 l/min   Temp (24hrs), Av °F (36.7 °C), Min:97.6 °F (36.4 °C), Max:98.6 °F (37 °C)       Intake/Output:   Last shift:       07 - 1900  In: -   Out: 100 [Urine:100]  Last 3 shifts:  190 -  0700  In: 5136.7 [P.O.:240;  I.V.:4286.7]  Out: 2970 [Urine:2520]    Intake/Output Summary (Last 24 hours) at 17 0830  Last data filed at 17 0800   Gross per 24 hour   Intake 4596.66 ml   Output             2370 ml   Net          2226.66 ml       Physical Exam:    General:  Alert, restless WM   Head:  Normocephalic, without obvious abnormality, atraumatic. Eyes:  Conjunctivae/corneas clear. Nose: Nares normal. Septum midline. Mucosa normal.     Throat: Lips, mucosa, and tongue normal.     Neck: Supple, symmetrical, trachea midline    Back:   Symmetric, no curvature. ROM normal.   Lungs: No wheeze today   Chest wall:  No tenderness or deformity. Heart:  Regular rate and rhythm    Abdomen:   Hypoactive bowel sounds, guarding lower abdomen   Extremities: Extremities normal, atraumatic, no cyanosis or edema. SCDS   Skin: Skin color, texture, turgor normal. No rashes or lesions   Neurologic: Grossly nonfocal     Data:   Labs:  Recent Labs      04/08/17   0333  04/07/17   1345   WBC  5.9  10.8   HGB  10.0*  10.2*   HCT  29.6*  30.3*   PLT  156  166     Recent Labs      04/08/17   0333  04/07/17   1345  04/07/17   0400  04/06/17   0427   NA  144  144  144  143   K  4.7  4.2  3.4*  3.6   CL  112*  112*  112*  110*   CO2  22  22  20*  21   GLU  100  162*  111*  111*   BUN  15  14  14  14   CREA  1.57*  1.59*  1.49*  1.46*   CA  7.6*  7.8*  8.3*  8.7   PHOS   --    --   3.2  2.4*   ALB   --    --   2.6*  2.8*   INR  1.2*  1.1   --    --      Recent Labs      04/07/17   1344   PH  7.29*   PCO2  45   PO2  76*   HCO3  21*   FIO2  100       Imaging:  I have personally reviewed the patients radiographs:  None today        Sara Garcia MD

## 2017-04-08 NOTE — PROGRESS NOTES
1900- bedside report received from [place], [address] [place] [address]- increase in confusion, pt does not believe he had surgery today,   2250- pt states he doesn't know his name, pt c/o of confusion, removed PCA button from pt.  2256- Dr. Walden  on call for Dr. Desean garza, awaiting call back

## 2017-04-08 NOTE — PROGRESS NOTES
Nephrology Progress Note     Yony Rivera       [email or web address]                   Phone - [phone]   Patient: Pascale Colin  YOB: 1945     Date- 4/8/2017     CC: Follow up for acute renal failure          Subjective: Interval History:   Seen and examined. Resting now. Agitated most of the night and AM.  Cr stable, stable UOP. CXR from yesterday noted. O2 sats stable    Ros  Can't assess due to patient's current condition       Assessment:   ·  Acute renal failure -vomiting and HCTZ ,aleve use, urinary retention  · hypophosphatemia  · Abdominal aortic aneurysm -s/p CT ANGIO 4-3-17  · Left renal artery stenosis on ct angio  · Metabolic acidosis  · Nausea and vomiting  · Right renal lesion - hyperdense on ct  · Nephrolithiasis  · Umbilical hernia  · GI bleed  · +ve NEETU, normal complements level     Plan:   Reduce IVF rate  Cbc stable after PRBCs  Serial labs    Care Plan discussed with:nurse  [] High complexity decision making was performed  [] Patient is at high-risk of decompensation with multiple organ involvement  Review of Systems: Pertinent items are noted in HPI. Objective:   Vitals:    04/08/17 1100 04/08/17 1157 04/08/17 1200 04/08/17 1300   BP: 112/55  105/48 111/52   Pulse: 78  63 61   Resp: 16  21 19   Temp:  99.3 °F (37.4 °C)     SpO2: 98%  97% 97%   Weight:       Height:           Intake/Output Summary (Last 24 hours) at 04/08/17 1322  Last data filed at 04/08/17 1300   Gross per 24 hour   Intake          2692.18 ml   Output             1775 ml   Net           917.18 ml     Physical Exam:   GEN: confused  NECK- Supple,   RESP: Clear b/l, no wheezing  CVS: RRR,S1,S2   NEURO: Can't access due to patient's current condition    ABDO: soft , dressing +  EXT: No Edema   - finley +      Chart reviewed. Pertinent Notes reviewed.    Medications list  reviewed     Current Facility-Administered Medications   Medication    fentaNYL citrate (PF) injection 25-50 mcg    artificial saliva (MOUTH KOTE) 1 Spray    dexmedetomidine (PRECEDEX) 400 mcg in 0.9% sodium chloride 100 mL infusion    bupivacaine(PF) in NS (MARCAINE) 0.125 % epidural infusion    sodium chloride (NS) flush 5-10 mL    sodium chloride (NS) flush 5-10 mL    LORazepam (ATIVAN) injection 1 mg    pantoprazole (PROTONIX) 40 mg in sodium chloride 0.9 % 10 mL injection    ondansetron (ZOFRAN) injection 4 mg    0.45% sodium chloride with KCl 20 mEq/L infusion    sodium chloride 0.9 % bolus infusion 1,000 mL    albuterol-ipratropium (DUO-NEB) 2.5 MG-0.5 MG/3 ML    metoprolol (LOPRESSOR) injection 5 mg    nicotine (NICODERM CQ) 21 mg/24 hr patch 1 Patch              Data Review [facility].) [[id number]] Collected: 04/02/17 1348      Order Status: Completed Specimen: Serum from Serum Updated: 04/04/17 1537      Immunofixation, serum Comment          (NOTE)   An apparent normal immunofixation pattern. Performed At: [address] [id number]   Colten Horn MD KW:[id number]            Immunoglobulin G, Qt. 826 mg/dL       Immunoglobulin A, Qt. 144 mg/dL       Immunoglobulin M, Qt.  34 mg/dL        (NOTE)   Performed At: [address] [id number]   Colten Horn MD XD:[id number]           DSDNA AB REFLEX [[id number]] (Abnormal) Collected: 04/02/17 1348     Order Status: Completed Specimen: Serum Updated: 04/04/17 1439      dsDNA Ab, Qt. 14 (H) IU/mL        (NOTE)                                     Negative      <5                                     Equivocal  5 - 9                                     Positive      >9   Performed At: [facility]   [address] [id number]   Colten Horn MD DP:[id number]         Shandra Sloan CASCADE [[id number]] (Abnormal) Collected: 04/02/17 1348     Order Status: Completed Specimen: Serum from Serum Updated: 04/04/17 1439      NEETU Direct Positive (A)          (NOTE)   Performed At: [address] [id number]   Moises Saavedra MD FO:[id number]            See below Comment          (NOTE)             Recent Labs      04/08/17   0333  04/07/17   1345  04/07/17   0400  04/06/17   0427   NA  144  144  144  143   K  4.7  4.2  3.4*  3.6   CL  112*  112*  112*  110*   CO2  22  22  20*  21   GLU  100  162*  111*  111*   BUN  15  14  14  14   CREA  1.57*  1.59*  1.49*  1.46*   CA  7.6*  7.8*  8.3*  8.7   PHOS   --    --   3.2  2.4*   ALB   --    --   2.6*  2.8*   INR  1.2*  1.1   --    --      Recent Labs      04/08/17   0333  04/07/17   1345   WBC  5.9  10.8   HGB  10.0*  10.2*   HCT  29.6*  30.3*   PLT  156  166     CT ABDO  There is a large juxtarenal fusiform abdominal aortic aneurysm. The aneurysm  begins immediately inferior to both renal arteries without a discernible neck. The aneurysm extends to the aortic bifurcation but does not involve the iliac  arteries. Maximal aneurysm diameter measures 7.8 cm.     The celiac artery is widely patent. The superior mesenteric artery is calcified  at its origin with possible moderate stenosis.     The extra iliac arteries are calcified but no definite focal stenosis. Right  external iliac is ectatic but not grossly aneurysmal. There is a focal aneurysm  at the right iliac bifurcation measuring 2.6 cm. The external iliac arteries are  calcified but otherwise patent.     There is probable focal stenosis of the origin of the left renal artery. The  right renal artery is calcified but grossly patent.     Lung bases are grossly clear with minimal atelectasis. The liver pancreas spleen  and kidneys are unremarkable given limitations of arterial phase contrast. There  are small bilateral renal cysts. The adrenals are unremarkable.     No pelvic mass or adenopathy.  No free fluid or focal fluid collection.     IMPRESSION  IMPRESSION: Juxtarenal fusiform abdominal aortic aneurysm extending to the  aortic bifurcation as detailed above. No results found for: CULT  No results found for: [place], MD  [facility]  [facility] SYSTM FRANCISColumb[facility] System[facility] CHRISTINA [address], [address] Lie  [place], [address] [facility]  Phone - [phone]         Fax - [phone]  [facility]  [address] [place] [address]  Phone - [phone]        Fax - [phone]    www. [email or web address] [email or web address]

## 2017-04-08 NOTE — PROGRESS NOTES
Epidural Follow-up Note    1 Day Post-Op sp Procedure(s):  OPEN ABDOMINAL AORTIC  ANEURYSM REPAIR (GEN W/ EPIDURAL). Visit Vitals    BP 90/68 (BP 1 Location: Left arm, BP Patient Position: At rest)    Pulse 83    Temp 36.9 °C (98.5 °F)    Resp 22    Ht 5' 9\" (1.753 m)    Wt 111.7 kg (246 lb 4.1 oz)    SpO2 94%    BMI 36.37 kg/m2   . Pain is poorly controlled with epidural infusion. Epidural site is clean, dry and intact. Epidural catheter removed with blue-tip intact per surgeon's request, (No relief after 2 boluses and rate increase to 15). Now on PCA Dilaudid.

## 2017-04-08 NOTE — PROGRESS NOTES
Cardiology Progress Note            [address]  [phone]    4/8/2017 11:25 AM    Admit Date: 4/1/2017    Admit Diagnosis: AAA (abdominal aortic aneurysm) (HCC);AAA;GI bleed    Subjective: [address] lora chest pain.     Visit Vitals    BP (P) 112/55 (BP 1 Location: Left arm, BP Patient Position: At rest)    Pulse 78    Temp 98.5 °F (36.9 °C)    Resp 16    Ht 5' 9\" (1.753 m)    Wt 246 lb 4.1 oz (111.7 kg)    SpO2 98%    BMI 36.37 kg/m2     Current Facility-Administered Medications   Medication Dose Route Frequency    fentaNYL citrate (PF) injection 25-50 mcg  25-50 mcg IntraVENous Q2.5H PRN    artificial saliva (MOUTH KOTE) 1 Spray  1 Spray Oral PRN    dexmedetomidine (PRECEDEX) 400 mcg in 0.9% sodium chloride 100 mL infusion  0.2-0.7 mcg/kg/hr IntraVENous TITRATE    bupivacaine(PF) in NS (MARCAINE) 0.125 % epidural infusion  1-15 mL/hr Epidural TITRATE    sodium chloride (NS) flush 5-10 mL  5-10 mL IntraVENous Q8H    sodium chloride (NS) flush 5-10 mL  5-10 mL IntraVENous PRN    LORazepam (ATIVAN) injection 1 mg  1 mg IntraVENous Q6H PRN    pantoprazole (PROTONIX) 40 mg in sodium chloride 0.9 % 10 mL injection  40 mg IntraVENous DAILY    ondansetron (ZOFRAN) injection 4 mg  4 mg IntraVENous Q6H PRN    0.45% sodium chloride with KCl 20 mEq/L infusion   IntraVENous CONTINUOUS    sodium chloride 0.9 % bolus infusion 1,000 mL  1,000 mL IntraVENous ONCE PRN    albuterol-ipratropium (DUO-NEB) 2.5 MG-0.5 MG/3 ML  3 mL Nebulization TID RT    metoprolol (LOPRESSOR) injection 5 mg  5 mg IntraVENous Q6H PRN    nicotine (NICODERM CQ) 21 mg/24 hr patch 1 Patch  1 Patch TransDERmal DAILY         Objective:      Visit Vitals    BP (P) 112/55 (BP 1 Location: Left arm, BP Patient Position: At rest)    Pulse 78    Temp 98.5 °F (36.9 °C)    Resp 16    Ht 5' 9\" (1.753 m)    Wt 246 lb 4.1 oz (111.7 kg)    SpO2 98%    BMI 36.37 kg/m2       Physical Exam:  Abdomen: soft, non-tender  Extremities: extremities normal  Heart: regular rate and rhythm  Lungs: clear to auscultation bilaterally  Pulses: 2+ and symmetric    Data Review:   Labs:    Recent Labs      04/08/17   0333  04/07/17   1345   WBC  5.9  10.8   HGB  10.0*  10.2*   HCT  29.6*  30.3*   PLT  156  166     Recent Labs      04/08/17   0333  04/07/17   1345  04/07/17   0400  04/06/17   0427   NA  144  144  144  143   K  4.7  4.2  3.4*  3.6   CL  112*  112*  112*  110*   CO2  22  22  20*  21   GLU  100  162*  111*  111*   BUN  15  14  14  14   CREA  1.57*  1.59*  1.49*  1.46*   CA  7.6*  7.8*  8.3*  8.7   PHOS   --    --   3.2  2.4*   ALB   --    --   2.6*  2.8*   INR  1.2*  1.1   --    --        No results for input(s): TROIQ, CPK, CKMB in the last 72 hours. Intake/Output Summary (Last 24 hours) at 04/08/17 1125  Last data filed at 04/08/17 1000   Gross per 24 hour   Intake          3696.66 ml   Output             1790 ml   Net          1906.66 ml        Telemetry: nsr    Assessment:     Active Problems:    CAD (coronary artery disease) ()      Overview: 2006 - s/p stent       Dyslipidemia (1/25/2014)      Tobacco use disorder (1/25/2014)      AAA (abdominal aortic aneurysm) ([facility] [address] (4/1/2017)      PAD (peripheral artery disease) ([facility] [address] [facility] (4/2/2017)      Carotid stenosis, right (4/2/2017)      S/P PTCA (percutaneous transluminal coronary angioplasty) (4/2/2017)      Overview: 2006 stent ? Vessel and type of stent        Plan:     Alison Barillas is a doing well post op. He is normotensive.  Would start low dose bb once taking po    Santosh Russell MD, [facility]    4/8/2017

## 2017-04-08 NOTE — PROGRESS NOTES
Vascular    Difficult pain control  Epidural not working  Confused & combative w/ narcs  Pulled A-line  Needs narcs to prevent splinting & re-intubation  VSS  Good UOP  Labs stable  2+ pedal pulses  Abd distended (obese), soft  Will try Precedex, steroids x 1 dose, narcs  D/C epidural  High risk for reintubation

## 2017-04-09 PROCEDURE — 94640 AIRWAY INHALATION TREATMENT: CPT

## 2017-04-09 PROCEDURE — 74011000250 HC RX REV CODE- 250: Performed by: SURGERY

## 2017-04-09 PROCEDURE — C9113 INJ PANTOPRAZOLE SODIUM, VIA: HCPCS | Performed by: SURGERY

## 2017-04-09 PROCEDURE — 74011000258 HC RX REV CODE- 258: Performed by: INTERNAL MEDICINE

## 2017-04-09 PROCEDURE — 65620000000 HC RM CCU GENERAL

## 2017-04-09 PROCEDURE — 74011250637 HC RX REV CODE- 250/637: Performed by: INTERNAL MEDICINE

## 2017-04-09 PROCEDURE — 74011250636 HC RX REV CODE- 250/636: Performed by: INTERNAL MEDICINE

## 2017-04-09 PROCEDURE — 74011250636 HC RX REV CODE- 250/636: Performed by: SURGERY

## 2017-04-09 PROCEDURE — 65660000000 HC RM CCU STEPDOWN

## 2017-04-09 PROCEDURE — 74011000250 HC RX REV CODE- 250: Performed by: INTERNAL MEDICINE

## 2017-04-09 PROCEDURE — 74011250637 HC RX REV CODE- 250/637: Performed by: SURGERY

## 2017-04-09 PROCEDURE — 77010033678 HC OXYGEN DAILY

## 2017-04-09 PROCEDURE — 77030027138 HC INCENT SPIROMETER -A

## 2017-04-09 RX ORDER — ACETAMINOPHEN 325 MG/1
650 TABLET ORAL
Status: DISCONTINUED | OUTPATIENT
Start: 2017-04-09 | End: 2017-04-14 | Stop reason: HOSPADM

## 2017-04-09 RX ORDER — METOPROLOL TARTRATE 25 MG/1
12.5 TABLET, FILM COATED ORAL EVERY 12 HOURS
Status: DISCONTINUED | OUTPATIENT
Start: 2017-04-09 | End: 2017-04-10

## 2017-04-09 RX ADMIN — FENTANYL CITRATE 50 MCG: 50 INJECTION, SOLUTION INTRAMUSCULAR; INTRAVENOUS at 20:00

## 2017-04-09 RX ADMIN — IPRATROPIUM BROMIDE AND ALBUTEROL SULFATE 3 ML: .5; 3 SOLUTION RESPIRATORY (INHALATION) at 14:53

## 2017-04-09 RX ADMIN — SODIUM CHLORIDE 0.7 MCG/KG/HR: 900 INJECTION, SOLUTION INTRAVENOUS at 01:14

## 2017-04-09 RX ADMIN — FENTANYL CITRATE 50 MCG: 50 INJECTION, SOLUTION INTRAMUSCULAR; INTRAVENOUS at 12:04

## 2017-04-09 RX ADMIN — METOPROLOL TARTRATE 12.5 MG: 25 TABLET ORAL at 14:29

## 2017-04-09 RX ADMIN — ACETAMINOPHEN 650 MG: 325 TABLET, FILM COATED ORAL at 18:44

## 2017-04-09 RX ADMIN — IPRATROPIUM BROMIDE AND ALBUTEROL SULFATE 3 ML: .5; 3 SOLUTION RESPIRATORY (INHALATION) at 08:55

## 2017-04-09 RX ADMIN — Medication 10 ML: at 14:31

## 2017-04-09 RX ADMIN — Medication 10 ML: at 05:02

## 2017-04-09 RX ADMIN — FENTANYL CITRATE 50 MCG: 50 INJECTION, SOLUTION INTRAMUSCULAR; INTRAVENOUS at 07:57

## 2017-04-09 RX ADMIN — METOPROLOL TARTRATE 12.5 MG: 25 TABLET ORAL at 20:05

## 2017-04-09 RX ADMIN — SODIUM CHLORIDE 0.4 MCG/KG/HR: 900 INJECTION, SOLUTION INTRAVENOUS at 08:00

## 2017-04-09 RX ADMIN — SODIUM CHLORIDE AND POTASSIUM CHLORIDE: 4.5; 1.49 INJECTION, SOLUTION INTRAVENOUS at 05:01

## 2017-04-09 RX ADMIN — FENTANYL CITRATE 50 MCG: 50 INJECTION, SOLUTION INTRAMUSCULAR; INTRAVENOUS at 15:13

## 2017-04-09 RX ADMIN — SODIUM CHLORIDE 0.4 MCG/KG/HR: 900 INJECTION, SOLUTION INTRAVENOUS at 20:00

## 2017-04-09 RX ADMIN — Medication 10 ML: at 22:00

## 2017-04-09 RX ADMIN — SODIUM CHLORIDE 40 MG: 9 INJECTION INTRAMUSCULAR; INTRAVENOUS; SUBCUTANEOUS at 07:50

## 2017-04-09 NOTE — PROGRESS NOTES
Cardiology Progress Note            [address] [facility], [place] [address] [address] [facility]  [phone]    4/9/2017 11:31 AM    Admit Date: 4/1/2017    Admit Diagnosis: AAA (abdominal aortic aneurysm) (HCC);AAA;GI bleed    Subjective: [address] lora chest pain.     Visit Vitals    /53    Pulse 65    Temp 97.7 °F (36.5 °C)    Resp 13    Ht 5' 9\" (1.753 m)    Wt 246 lb 4.1 oz (111.7 kg)    SpO2 91%    BMI 36.37 kg/m2     Current Facility-Administered Medications   Medication Dose Route Frequency    fentaNYL citrate (PF) injection 25-50 mcg  25-50 mcg IntraVENous Q2.5H PRN    artificial saliva (MOUTH KOTE) 1 Spray  1 Spray Oral PRN    dexmedetomidine (PRECEDEX) 400 mcg in 0.9% sodium chloride 100 mL infusion  0.2-0.7 mcg/kg/hr IntraVENous TITRATE    sodium chloride (NS) flush 5-10 mL  5-10 mL IntraVENous Q8H    sodium chloride (NS) flush 5-10 mL  5-10 mL IntraVENous PRN    LORazepam (ATIVAN) injection 1 mg  1 mg IntraVENous Q6H PRN    pantoprazole (PROTONIX) 40 mg in sodium chloride 0.9 % 10 mL injection  40 mg IntraVENous DAILY    ondansetron (ZOFRAN) injection 4 mg  4 mg IntraVENous Q6H PRN    0.45% sodium chloride with KCl 20 mEq/L infusion   IntraVENous CONTINUOUS    sodium chloride 0.9 % bolus infusion 1,000 mL  1,000 mL IntraVENous ONCE PRN    albuterol-ipratropium (DUO-NEB) 2.5 MG-0.5 MG/3 ML  3 mL Nebulization TID RT    metoprolol (LOPRESSOR) injection 5 mg  5 mg IntraVENous Q6H PRN    nicotine (NICODERM CQ) 21 mg/24 hr patch 1 Patch  1 Patch TransDERmal DAILY         Objective:      Visit Vitals    /53    Pulse 65    Temp 97.7 °F (36.5 °C)    Resp 13    Ht 5' 9\" (1.753 m)    Wt 246 lb 4.1 oz (111.7 kg)    SpO2 91%    BMI 36.37 kg/m2       Physical Exam:  Abdomen: soft, non-tender  Extremities: extremities normal  Heart: regular rate and rhythm  Lungs: clear to auscultation bilaterally  Pulses: 2+ and symmetric    Data Review:   Labs:    Recent Labs 04/08/17   0333  04/07/17   1345   WBC  5.9  10.8   HGB  10.0*  10.2*   HCT  29.6*  30.3*   PLT  156  166     Recent Labs      04/08/17   0333  04/07/17   1345  04/07/17   0400   NA  144  144  144   K  4.7  4.2  3.4*   CL  112*  112*  112*   CO2  22  22  20*   GLU  100  162*  111*   BUN  15  14  14   CREA  1.57*  1.59*  1.49*   CA  7.6*  7.8*  8.3*   PHOS   --    --   3.2   ALB   --    --   2.6*   INR  1.2*  1.1   --        No results for input(s): TROIQ, CPK, CKMB in the last 72 hours. Intake/Output Summary (Last 24 hours) at 04/09/17 1131  Last data filed at 04/09/17 5180   Gross per 24 hour   Intake          1661.05 ml   Output              965 ml   Net           696.05 ml        Telemetry: nsr    Assessment:     Active Problems:    CAD (coronary artery disease) ()      Overview: 2006 - s/p stent       Dyslipidemia (1/25/2014)      Tobacco use disorder (1/25/2014)      AAA (abdominal aortic aneurysm) ([facility] [address] (4/1/2017)      PAD (peripheral artery disease) ([facility] [address] [facility] (4/2/2017)      Carotid stenosis, right (4/2/2017)      S/P PTCA (percutaneous transluminal coronary angioplasty) (4/2/2017)      Overview: 2006 stent ? Vessel and type of stent        Plan:     Claudell Leyland is doing well - he is normotensive. Would resume low dose bb lopressor 12.5 mg bid.  Hold for hr<60, sbp<100    Mary Jane Soto MD, [facility]    4/9/2017

## 2017-04-09 NOTE — PROGRESS NOTES
Labs stable  BP stable  Continue with current care  Will see again in AM  Please call with any questions.

## 2017-04-09 NOTE — PROGRESS NOTES
Her catheter found in patients bed with balloon deflated. Cathter to be removed tomorrow. Pt given urinal, RN will monitor closely. 1600 Bladder scanned, <20 ml urine. 1650  Pt able to use urinal, 50 ML cherise urine.

## 2017-04-09 NOTE — PROGRESS NOTES
Vascular    Pain control better on Precedex  Off Cardene  VSS  Looks good  Abd soft  2+ pedal pulses  Cr stable at 1.5  Overall improving  Will D/C NG  Keep finley in due to ARF/CKD and Precedex  Can probably D/C finley tomorrow  PT/OT tomorrow

## 2017-04-09 NOTE — PROGRESS NOTES
[facility]  Pulmonary, Critical Care, and Sleep Medicine    Name: Dania English MRN: [id number]   : 1945 Hospital: [facility]    Date: 2017        Critical Care    IMPRESSION:   · S/P open AAA repair  · Acute renal failure (preop)  · COPD  · Anemia hgb of 10.4  · Smoker 2-3 ppd  · Obese  · CAD had prior stent      RECOMMENDATIONS:   · O2 - wean  · IV precedex  · Reorient  · PT, OT in AM if Ok with   · mouthcare  · Nicotine patch  · Bronchodilators   · Pulmonary toilet  · Ongoing evaluation per renal  · Postop care  · Pain control  · BP control - currently off nicardipine drip  · DVT prophylaxis     Subjective/History:     17: Precedex and one dose IV solucortef helped. Some rest overnight. Cooperative now. Sluggish. Less pain  17: restless, less confused but now c/o dry mouth, thirst and lower abdominal pain. Overnight became veri confused from IV dilaudid. On dose IV toradol helped but renal function precludes additional use. Difficult pain control. Epidural not working and removed. Confused & combative w/ narcs. Pulled out his A-line. VSS. Good UOP  17:  Seen in PACU post open AAA repair. Having some abdominal pain but no dyspnea. 17:  Didn't notice much difference with nebulizers. Having some melena. This patient has been seen and evaluated at the request of Dr. Dee Natarajan for above. Patient is a 66 y.o. male reviewed Chart. Pt not a great historian. Denies any chest pain, back or abdominal pain during my evaluation. NO acute leg pain or swelling. ROS is negative.      Past Medical History:   Diagnosis Date    BPH (benign prostatic hyperplasia)     CAD (coronary artery disease)      - s/p stent     Essential hypertension 13    Hematuria     sees urologist    Hyperglycemia       Past Surgical History:   Procedure Laterality Date    CARDIAC SURG PROCEDURE UNLIST      stent       Prior to Admission medications Medication Sig Start Date End Date Taking? Authorizing Provider   aspirin delayed-release 81 mg tablet Take 2 Tabs by mouth daily. 14  Yes April Hinojosa MD   atorvastatin (LIPITOR) 10 mg tablet Take  by mouth daily. Yes Historical Provider   clopidogrel (PLAVIX) 75 mg tablet Take  by mouth daily. Yes Historical Provider   hydrochlorothiazide (HYDRODIURIL) 25 mg tablet Take 25 mg by mouth daily. Yes Historical Provider   pantoprazole (PROTONIX) 40 mg tablet Take 40 mg by mouth daily. Historical Provider     Current Facility-Administered Medications   Medication Dose Route Frequency    dexmedetomidine (PRECEDEX) 400 mcg in 0.9% sodium chloride 100 mL infusion  0.2-0.7 mcg/kg/hr IntraVENous TITRATE    bupivacaine(PF) in NS (MARCAINE) 0.125 % epidural infusion  1-15 mL/hr Epidural TITRATE    sodium chloride (NS) flush 5-10 mL  5-10 mL IntraVENous Q8H    pantoprazole (PROTONIX) 40 mg in sodium chloride 0.9 % 10 mL injection  40 mg IntraVENous DAILY    0.45% sodium chloride with KCl 20 mEq/L infusion   IntraVENous CONTINUOUS    albuterol-ipratropium (DUO-NEB) 2.5 MG-0.5 MG/3 ML  3 mL Nebulization TID RT    nicotine (NICODERM CQ) 21 mg/24 hr patch 1 Patch  1 Patch TransDERmal DAILY     Allergies   Allergen Reactions    Lipitor [Atorvastatin] Other (comments)     \"muscle pain\"      Social History   Substance Use Topics    Smoking status: Current Every Day Smoker     Packs/day: 3.00     Years: 53.00    Smokeless tobacco: Not on file    Alcohol use No      History reviewed. No pertinent family history. Review of Systems:  A comprehensive review of systems was negative.     Objective:   Vital Signs:    Visit Vitals    /49    Pulse 63    Temp 97.7 °F (36.5 °C)    Resp 26    Ht 5' 9\" (1.753 m)    Wt 111.7 kg (246 lb 4.1 oz)    SpO2 100%    BMI 36.37 kg/m2       O2 Device: Nasal cannula   O2 Flow Rate (L/min): 2 l/min   Temp (24hrs), Av.7 °F (37.1 °C), Min:97.7 °F (36.5 °C), Max:99.3 °F (37.4 °C)       Intake/Output:   Last shift:      04/09 0701 - 04/09 1900  In: -   Out: 40 [Urine:40]  Last 3 shifts: 04/07 1901 - 04/09 0700  In: 3377.4 [I.V.:3377.4]  Out: 5413 [Urine:1565]    Intake/Output Summary (Last 24 hours) at 04/09/17 0832  Last data filed at 04/09/17 0804   Gross per 24 hour   Intake          1777.37 ml   Output             1005 ml   Net           772.37 ml       Physical Exam:    General:  Alert, restless WM   Head:  Normocephalic, without obvious abnormality, atraumatic. Eyes:  Conjunctivae/corneas clear. Nose: Nares normal. Septum midline. Mucosa normal.     Throat: Lips, mucosa, and tongue normal.     Neck: Supple, symmetrical, trachea midline    Back:   Symmetric, no curvature. ROM normal.   Lungs: No wheeze today   Chest wall:  No tenderness or deformity. Heart:  Regular rate and rhythm    Abdomen:   Hypoactive bowel sounds, guarding lower abdomen   Extremities: Extremities normal, atraumatic, no cyanosis or edema. SCDS   Skin: Skin color, texture, turgor normal. No rashes or lesions   Neurologic: Grossly nonfocal     Data:   Labs:  Recent Labs      04/08/17   0333  04/07/17   1345   WBC  5.9  10.8   HGB  10.0*  10.2*   HCT  29.6*  30.3*   PLT  156  166     Recent Labs      04/08/17   0333  04/07/17   1345  04/07/17   0400   NA  144  144  144   K  4.7  4.2  3.4*   CL  112*  112*  112*   CO2  22  22  20*   GLU  100  162*  111*   BUN  15  14  14   CREA  1.57*  1.59*  1.49*   CA  7.6*  7.8*  8.3*   PHOS   --    --   3.2   ALB   --    --   2.6*   INR  1.2*  1.1   --      Recent Labs      04/07/17   1344   PH  7.29*   PCO2  45   PO2  76*   HCO3  21*   FIO2  100       Imaging:  I have personally reviewed the patients radiographs:  None today        Tiny Craven MD

## 2017-04-10 LAB
ABO + RH BLD: NORMAL
ANION GAP BLD CALC-SCNC: 12 MMOL/L (ref 5–15)
BASOPHILS # BLD AUTO: 0 K/UL (ref 0–0.1)
BASOPHILS # BLD: 0 % (ref 0–1)
BLD PROD TYP BPU: NORMAL
BLOOD GROUP ANTIBODIES SERPL: NORMAL
BPU ID: NORMAL
BUN SERPL-MCNC: 23 MG/DL (ref 6–20)
BUN/CREAT SERPL: 16 (ref 12–20)
CALCIUM SERPL-MCNC: 8.6 MG/DL (ref 8.5–10.1)
CHLORIDE SERPL-SCNC: 110 MMOL/L (ref 97–108)
CO2 SERPL-SCNC: 19 MMOL/L (ref 21–32)
CREAT SERPL-MCNC: 1.41 MG/DL (ref 0.7–1.3)
CROSSMATCH RESULT,%XM: NORMAL
EOSINOPHIL # BLD: 0.2 K/UL (ref 0–0.4)
EOSINOPHIL NFR BLD: 3 % (ref 0–7)
ERYTHROCYTE [DISTWIDTH] IN BLOOD BY AUTOMATED COUNT: 14.1 % (ref 11.5–14.5)
GLUCOSE SERPL-MCNC: 84 MG/DL (ref 65–100)
HCT VFR BLD AUTO: 27.4 % (ref 36.6–50.3)
HGB BLD-MCNC: 9.1 G/DL (ref 12.1–17)
LYMPHOCYTES # BLD AUTO: 10 % (ref 12–49)
LYMPHOCYTES # BLD: 0.7 K/UL (ref 0.8–3.5)
MCH RBC QN AUTO: 31.6 PG (ref 26–34)
MCHC RBC AUTO-ENTMCNC: 33.2 G/DL (ref 30–36.5)
MCV RBC AUTO: 95.1 FL (ref 80–99)
MONOCYTES # BLD: 0.7 K/UL (ref 0–1)
MONOCYTES NFR BLD AUTO: 9 % (ref 5–13)
NEUTS SEG # BLD: 5.4 K/UL (ref 1.8–8)
NEUTS SEG NFR BLD AUTO: 78 % (ref 32–75)
PLATELET # BLD AUTO: 194 K/UL (ref 150–400)
POTASSIUM SERPL-SCNC: 4.2 MMOL/L (ref 3.5–5.1)
RBC # BLD AUTO: 2.88 M/UL (ref 4.1–5.7)
SODIUM SERPL-SCNC: 141 MMOL/L (ref 136–145)
SPECIMEN EXP DATE BLD: NORMAL
STATUS OF UNIT,%ST: NORMAL
UNIT DIVISION, %UDIV: 0
WBC # BLD AUTO: 7 K/UL (ref 4.1–11.1)

## 2017-04-10 PROCEDURE — 74011000250 HC RX REV CODE- 250: Performed by: SURGERY

## 2017-04-10 PROCEDURE — 77010033678 HC OXYGEN DAILY

## 2017-04-10 PROCEDURE — 65620000000 HC RM CCU GENERAL

## 2017-04-10 PROCEDURE — 94640 AIRWAY INHALATION TREATMENT: CPT

## 2017-04-10 PROCEDURE — 36415 COLL VENOUS BLD VENIPUNCTURE: CPT | Performed by: INTERNAL MEDICINE

## 2017-04-10 PROCEDURE — 80048 BASIC METABOLIC PNL TOTAL CA: CPT | Performed by: INTERNAL MEDICINE

## 2017-04-10 PROCEDURE — G8978 MOBILITY CURRENT STATUS: HCPCS

## 2017-04-10 PROCEDURE — 97116 GAIT TRAINING THERAPY: CPT

## 2017-04-10 PROCEDURE — 74011250637 HC RX REV CODE- 250/637: Performed by: INTERNAL MEDICINE

## 2017-04-10 PROCEDURE — 65660000000 HC RM CCU STEPDOWN

## 2017-04-10 PROCEDURE — 74011000250 HC RX REV CODE- 250: Performed by: INTERNAL MEDICINE

## 2017-04-10 PROCEDURE — 74011250636 HC RX REV CODE- 250/636: Performed by: SURGERY

## 2017-04-10 PROCEDURE — 74011250636 HC RX REV CODE- 250/636: Performed by: INTERNAL MEDICINE

## 2017-04-10 PROCEDURE — G8988 SELF CARE GOAL STATUS: HCPCS

## 2017-04-10 PROCEDURE — C9113 INJ PANTOPRAZOLE SODIUM, VIA: HCPCS | Performed by: SURGERY

## 2017-04-10 PROCEDURE — 85025 COMPLETE CBC W/AUTO DIFF WBC: CPT | Performed by: SURGERY

## 2017-04-10 PROCEDURE — 97165 OT EVAL LOW COMPLEX 30 MIN: CPT

## 2017-04-10 PROCEDURE — G8987 SELF CARE CURRENT STATUS: HCPCS

## 2017-04-10 PROCEDURE — 74011250637 HC RX REV CODE- 250/637: Performed by: SURGERY

## 2017-04-10 PROCEDURE — 97162 PT EVAL MOD COMPLEX 30 MIN: CPT

## 2017-04-10 PROCEDURE — G8979 MOBILITY GOAL STATUS: HCPCS

## 2017-04-10 RX ORDER — ATORVASTATIN CALCIUM 20 MG/1
20 TABLET, FILM COATED ORAL DAILY
Status: DISCONTINUED | OUTPATIENT
Start: 2017-04-10 | End: 2017-04-14 | Stop reason: HOSPADM

## 2017-04-10 RX ORDER — METOPROLOL TARTRATE 25 MG/1
25 TABLET, FILM COATED ORAL EVERY 12 HOURS
Status: DISCONTINUED | OUTPATIENT
Start: 2017-04-10 | End: 2017-04-11

## 2017-04-10 RX ORDER — PANTOPRAZOLE SODIUM 40 MG/1
40 TABLET, DELAYED RELEASE ORAL
Status: DISCONTINUED | OUTPATIENT
Start: 2017-04-11 | End: 2017-04-14 | Stop reason: HOSPADM

## 2017-04-10 RX ADMIN — SODIUM CHLORIDE AND POTASSIUM CHLORIDE: 4.5; 1.49 INJECTION, SOLUTION INTRAVENOUS at 02:00

## 2017-04-10 RX ADMIN — SODIUM CHLORIDE 40 MG: 9 INJECTION INTRAMUSCULAR; INTRAVENOUS; SUBCUTANEOUS at 09:03

## 2017-04-10 RX ADMIN — FENTANYL CITRATE 50 MCG: 50 INJECTION, SOLUTION INTRAMUSCULAR; INTRAVENOUS at 09:41

## 2017-04-10 RX ADMIN — LORAZEPAM 1 MG: 2 INJECTION INTRAMUSCULAR at 20:12

## 2017-04-10 RX ADMIN — IPRATROPIUM BROMIDE AND ALBUTEROL SULFATE 3 ML: .5; 3 SOLUTION RESPIRATORY (INHALATION) at 14:41

## 2017-04-10 RX ADMIN — Medication 10 ML: at 06:54

## 2017-04-10 RX ADMIN — FENTANYL CITRATE 50 MCG: 50 INJECTION, SOLUTION INTRAMUSCULAR; INTRAVENOUS at 16:24

## 2017-04-10 RX ADMIN — ACETAMINOPHEN 650 MG: 325 TABLET, FILM COATED ORAL at 17:26

## 2017-04-10 RX ADMIN — FENTANYL CITRATE 50 MCG: 50 INJECTION, SOLUTION INTRAMUSCULAR; INTRAVENOUS at 20:11

## 2017-04-10 RX ADMIN — LORAZEPAM 1 MG: 2 INJECTION INTRAMUSCULAR at 14:32

## 2017-04-10 RX ADMIN — METOPROLOL TARTRATE 12.5 MG: 25 TABLET ORAL at 09:03

## 2017-04-10 RX ADMIN — Medication 10 ML: at 14:00

## 2017-04-10 RX ADMIN — FENTANYL CITRATE 50 MCG: 50 INJECTION, SOLUTION INTRAMUSCULAR; INTRAVENOUS at 13:00

## 2017-04-10 RX ADMIN — METOPROLOL TARTRATE 25 MG: 25 TABLET ORAL at 20:12

## 2017-04-10 RX ADMIN — FENTANYL CITRATE 50 MCG: 50 INJECTION, SOLUTION INTRAMUSCULAR; INTRAVENOUS at 03:04

## 2017-04-10 RX ADMIN — ACETAMINOPHEN 650 MG: 325 TABLET, FILM COATED ORAL at 09:03

## 2017-04-10 RX ADMIN — FENTANYL CITRATE 50 MCG: 50 INJECTION, SOLUTION INTRAMUSCULAR; INTRAVENOUS at 18:32

## 2017-04-10 RX ADMIN — IPRATROPIUM BROMIDE AND ALBUTEROL SULFATE 3 ML: .5; 3 SOLUTION RESPIRATORY (INHALATION) at 07:43

## 2017-04-10 RX ADMIN — IPRATROPIUM BROMIDE AND ALBUTEROL SULFATE 3 ML: .5; 3 SOLUTION RESPIRATORY (INHALATION) at 19:25

## 2017-04-10 NOTE — PROGRESS NOTES
POD#3  Looks quite good  Hungry  + flatus  abd soft  2+ DP pulses    Clear liqs  Wean precidex  Ambulate

## 2017-04-10 NOTE — PROGRESS NOTES
Problem: Mobility Impaired (Adult and Pediatric)  Goal: *Acute Goals and Plan of Care (Insert Text)  Physical Therapy Goals  Initiated 4/10/2017  1. Patient will move from supine to sit and sit to supine , scoot up and down and roll side to side in bed with independence within 7 day(s). 2. Patient will transfer from bed to chair and chair to bed with modified independence using the least restrictive device within 7 day(s). 3. Patient will perform sit to stand with modified independence within 7 day(s). 4. Patient will ambulate with modified independence for 150 feet with the least restrictive device within 7 day(s). 5. Patient will ascend/descend 5 stairs with bilateral handrail(s) with modified independence within 7 day(s). PHYSICAL THERAPY EVALUATION  Patient: Chava Red (74 y.o. male)  Date: 4/10/2017  Primary Diagnosis: AAA (abdominal aortic aneurysm) (HCC)  AAA  GI bleed  Procedure(s) (LRB):  OPEN ABDOMINAL AORTIC  ANEURYSM REPAIR (GEN W/ EPIDURAL) (N/A) 3 Days Post-Op   Precautions:  Fall      ASSESSMENT :  Based on the objective data described below, the patient presents with generalized weakness, impaired standing balance, altered gait, impaired functional mobility, increased pain, and decreased activity tolerance from baseline s/p open AAA repair on 4/7/17. Pt received supine in bed on 4L/min O2 and agreeable to PT evaluation. RN cleared pt for mobility and VSS at start of session. Pt was instructed in and performed log roll technique to complete supine to sit, requiring overall Cameron x2 for bed mobility this date. In sitting, pt's SpO2 in mid 80s. He was instructed in and performed PLB ~15 seconds, and SpO2 >92%. Pt performed transfers with min-modA x1-2, exhibiting increased abdominal pain and forward flexed posture. Pt ambulated through room using cardiac cart, requiring Cameron and demonstrating antalgic gait, forward flexed posture, and a narrow HENNY.  VCs required to improve pt's posture as well as safety using cardiac cart; however, pt showed minimal improvement in gait mechanics. Pt requesting to sit in chair and further mobility was limited due to pt's increased fatigue and abdominal discomfort. Pt assisted to recliner, left with all needs met, and VSS (reported in activity tolerance section). RN notified. Discussed use of RW for ambulation to improve stability and pt agreeable. [facility] pending pt's progress in acute PT. Pt will benefit from further skilled acute PT to improve independence and safety with all aspects of functional mobility. Patient will benefit from skilled intervention to address the above impairments. Patients rehabilitation potential is considered to be Good  Factors which may influence rehabilitation potential include:   [X]         None noted  [ ]         Mental ability/status  [ ]         Medical condition  [ ]         Home/family situation and support systems  [ ]         Safety awareness  [ ]         Pain tolerance/management  [ ]         Other:        PLAN :  Recommendations and Planned Interventions:  [X]           Bed Mobility Training             [X]    Neuromuscular Re-Education  [X]           Transfer Training                   [ ]    Orthotic/Prosthetic Training  [X]           Gait Training                         [ ]    Modalities  [X]           Therapeutic Exercises           [ ]    Edema Management/Control  [X]           Therapeutic Activities            [X]    Patient and Family Training/Education  [ ]           Other (comment):     Frequency/Duration: Patient will be followed by physical therapy  5 times a week to address goals. Discharge Recommendations: Rehab vs. Home Health  Further Equipment Recommendations for Discharge: TBD- discussed possible use of RW and pt agreeable       SUBJECTIVE:   Patient stated My stomach hurts.       OBJECTIVE DATA SUMMARY:   HISTORY:    Past Medical History:   Diagnosis Date    BPH (benign prostatic hyperplasia)      CAD (coronary artery disease)       2006 - s/p stent     Essential hypertension 12-12-13    Hematuria       sees urologist    Hyperglycemia         Past Surgical History:   Procedure Laterality Date    CARDIAC SURG PROCEDURE UNLIST         stent 2006      Prior Level of Function/Home Situation: Pt reports independence at baseline without use of DME; lives with wife in one story home with 5 GEOFF; drives; is retired; denies fall hx  Personal factors and/or comorbidities impacting plan of care: CAD, HTN, hyperglycemia     Home Situation  Home Environment: Private residence  # Steps to Enter: 5 (back staircase)  Rails to Enter: Yes  Hand Rails : Bilateral  One/Two Story Residence: One story  Living Alone: (P) Yes (lives with wife and grand daughter)  Support Systems: Family member(s), Spouse/Significant Other/Partner  Patient Expects to be Discharged to[de-identified] Private residence  Current DME Used/Available at Home: None  Tub or Shower Type: Tub/Shower combination     EXAMINATION/PRESENTATION/DECISION MAKING:   Critical Behavior:  Neurologic State: Alert  Orientation Level: Oriented X4  Cognition: Appropriate decision making, Appropriate for age attention/concentration, Appropriate safety awareness, Follows commands     Hearing: Auditory  Auditory Impairment: None  Skin:  intact     Range Of Motion:  AROM: Generally decreased, functional                       Strength:    Strength: Generally decreased, functional                    Tone & Sensation:                  Sensation: Intact               Coordination:  Coordination: Within functional limits        Functional Mobility:  Bed Mobility:     Supine to Sit: Minimum assistance;Assist x2 (for trunk support )     Scooting: Supervision  Transfers:  Sit to Stand: Minimum assistance; Moderate assistance;Assist x2 (for support to bring trunk forward and hip extension)  Stand to Sit: Minimum assistance (for eccentric lowering) Balance:   Sitting: Intact  Standing: Impaired  Standing - Static: Fair  Standing - Dynamic : Fair  Ambulation/Gait Training:  Distance (ft): 20 Feet (ft)  Assistive Device: Gait belt (cardiac cart)  Ambulation - Level of Assistance: Minimal assistance (for balance)     Gait Description (WDL): Exceptions to WDL  Gait Abnormalities: Antalgic;Trunk sway increased  Right Side Weight Bearing: Full  Left Side Weight Bearing: Full  Base of Support: Narrowed     Speed/Sally: Pace decreased (<100 feet/min)  Step Length: Left lengthened;Right shortened        Interventions: Verbal cues (for cardiac cart management and upright posture)           Functional Measure:  Barthel Index:      Bathin  Bladder: 10  Bowels: 10  Groomin  Dressin  Feeding: 10  Mobility: 0  Stairs: 0  Toilet Use: 5  Transfer (Bed to Chair and Back): 10  Total: 55         Barthel and G-code impairment scale:  Percentage of impairment CH  0% CI  1-19% CJ  20-39% CK  40-59% CL  60-79% CM  80-99% CN  100%   Barthel Score 0-100 100 99-80 79-60 59-40 20-39 1-19    0   Barthel Score 0-20 20 17-19 13-16 9-12 5-8 1-4 0      The Barthel ADL Index: Guidelines  1. The index should be used as a record of what a patient does, not as a record of what a patient could do. 2. The main aim is to establish degree of independence from any help, physical or verbal, however minor and for whatever reason. 3. The need for supervision renders the patient not independent. 4. A patient's performance should be established using the best available evidence. Asking the patient, friends/relatives and nurses are the usual sources, but direct observation and common sense are also important. However direct testing is not needed. 5. Usually the patient's performance over the preceding 24-48 hours is important, but occasionally longer periods will be relevant. 6. Middle categories imply that the patient supplies over 50 per cent of the effort.   7. Use of aids to be independent is allowed. John Cannon., Barthel, D.W. ([id number]). Functional evaluation: the Barthel Index. [address] (14)2. LAKSHMI Eason, Glenis Chairez.Melquiades., Meliza, [address] (1999). Measuring the change indisability after inpatient rehabilitation; comparison of the responsiveness of the Barthel Index and Functional [place] Measure. Journal of Neurology, Neurosurgery, and Psychiatry, 66(4), 279-343. ERIC Strickland, CORNELIO Cervantes, & Javier Larios M.A. (2004.) Assessment of post-stroke quality of life in cost-effectiveness studies: The usefulness of the Barthel Index and the EuroQoL-5D. Quality of Life Research, 13, 304-71            G codes: In compliance with CMSs Claims Based Outcome Reporting, the following G-code set was chosen for this patient based on their primary functional limitation being treated: The outcome measure chosen to determine the severity of the functional limitation was the Barthel Index with a score of 55/100 which was correlated with the impairment scale.       · Mobility - Walking and Moving Around:               - CURRENT STATUS:           CK - 40%-59% impaired, limited or restricted               - GOAL STATUS:       CI - 1%-19% impaired, limited or restricted               - D/C STATUS:                       ---------------To be determined---------------      Physical Therapy Evaluation Charge Determination   History Examination Presentation Decision-Making   HIGH Complexity :3+ comorbidities / personal factors will impact the outcome/ POC  HIGH Complexity : 4+ Standardized tests and measures addressing body structure, function, activity limitation and / or participation in recreation  MEDIUM Complexity : Evolving with changing characteristics  Other outcome measures Barthel Index  MEDIUM      Based on the above components, the patient evaluation is determined to be of the following complexity level: MEDIUM     Pain:  Pain Scale 1: Numeric (0 - 10)  Pain Intensity 1: 7  Pain Location 1: Abdomen  Pain Orientation 1: Posterior; Lower  Pain Description 1: Aching  Pain Intervention(s) 1: Medication (see MAR)      Activity Tolerance:   Fair- SpO2 dropped to mid 80s sitting at EOB and resolved with ~15 seconds of PLB on 4L/min O2. Demonstrated increased pain in abdomen with upright activity as well. BP in supine at start of session 141/66; BP in sitting 126/68 (pt denied symptoms); BP in standing 159/57; BP in sitting at end of session 156/64. After treatment:   [X]         Patient left in no apparent distress sitting up in chair  [ ]         Patient left in no apparent distress in bed  [X]         Call bell left within reach  [X]         Nursing notified  [ ]         Caregiver present  [ ]         Bed alarm activated      COMMUNICATION/EDUCATION:   The patients plan of care was discussed with: Physical Therapist and Registered Nurse.  [X]         Fall prevention education was provided and the patient/caregiver indicated understanding. [X]         Patient/family have participated as able in goal setting and plan of care. [X]         Patient/family agree to work toward stated goals and plan of care. [ ]         Patient understands intent and goals of therapy, but is neutral about his/her participation. [ ]         Patient is unable to participate in goal setting and plan of care.      Thank you for this referral.  Ilene Stevenson, SPT   Time Calculation: 26 mins

## 2017-04-10 NOTE — PROGRESS NOTES
Consult received for IP Rehab Screening. PT/OT to see today - will review recommendations and discuss with pt and family.   RONEN Peña

## 2017-04-10 NOTE — PROGRESS NOTES
Problem: Self Care Deficits Care Plan (Adult)  Goal: *Acute Goals and Plan of Care (Insert Text)  Occupational Therapy Goals  Initiated 4/10/2017  1. Patient will perform grooming standing at the sink with supervision/set-up within 7 day(s). 2. Patient will perform lower body dressing with supervision/set-up within 7 day(s). 3. Patient will perform walk in toilet transfers with supervision/set-up within 7 day(s). 4. Patient will perform all aspects of toileting with supervision/set-up within 7 day(s). 5. Patient will gather objects from various heights in room with supervision/setup within 7 days. OCCUPATIONAL THERAPY EVALUATION  Patient: Sergey Jane (37 y.o. male)  Date: 4/10/2017  Primary Diagnosis: AAA (abdominal aortic aneurysm) (HCC)  AAA  GI bleed  Procedure(s) (LRB):  OPEN ABDOMINAL AORTIC  ANEURYSM REPAIR (GEN W/ EPIDURAL) (N/A) 3 Days Post-Op   Precautions:   Fall      ASSESSMENT :  Based on the objective data described below, the patient presents with generalized weakness, abdominal pain (especially with transitional movements) and decreased functional reach to feet (although this is baseline to some extent). Pt is only minimally below his functional baseline and can complete basic ADLs with CGA/MIN A overall and CGA for functional transfers. Anticipate good recovery w/o need for [facility] OT although pt may benefit from a shower chair if cleared to shower when discharged home. Suggest following up with grooming standing at the sink and gathering objects around the room. Recommend with nursing patient to complete as able in order to maintain strength, endurance and independence: OOB to chair 3x/day with CGA, ADLs with supervision/setup and mobilizing to the bathroom for toileting with CGA. Thank you for your assistance. Patient will benefit from skilled intervention to address the above impairments.   Patients rehabilitation potential is considered to be Good  Factors which may influence rehabilitation potential include:   [ ]             None noted  [ ]             Mental ability/status  [X]             Medical condition  [X]             Home/family situation and support systems  [ ]             Safety awareness  [ ]             Pain tolerance/management  [ ]             Other:        PLAN :  Recommendations and Planned Interventions:  [X]               Self Care Training                  [X]        Therapeutic Activities  [X]               Functional Mobility Training    [ ]        Cognitive Retraining  [X]               Therapeutic Exercises           [ ]        Endurance Activities  [ ]               Balance Training                   [ ]        Neuromuscular Re-Education  [ ]               Visual/Perceptual Training     [X]   Home Safety Training  [X]               Patient Education                 [X]        Family Training/Education  [ ]               Other (comment):     Frequency/Duration: Patient will be followed by occupational therapy 2 times a week to address goals. Discharge Recommendations: None  Further Equipment Recommendations for Discharge: shower chair       SUBJECTIVE:   Patient stated I can't bend over and get my socks on at home! Kiet Orellana      OBJECTIVE DATA SUMMARY:   HISTORY:   Past Medical History:   Diagnosis Date    BPH (benign prostatic hyperplasia)      CAD (coronary artery disease)       2006 - s/p stent     Essential hypertension 12-12-13    Hematuria       sees urologist    Hyperglycemia       Past Surgical History:   Procedure Laterality Date    CARDIAC SURG PROCEDURE UNLIST         stent 2006        Prior Level of Function/Home Situation: Pt lives with retired wife and grand daughter and is typically IND with ambulation and ADLs, driving and getting out in the community. Pt admits to difficulty with LB dressing at baseline and typically props his LE up on stool to get socks on.     Expanded or extensive additional review of patient history:      Home Situation  Home Environment: Private residence  # Steps to Enter: 5 (back staircase)  Rails to Enter: Yes  Hand Rails : Bilateral  One/Two Story Residence: One story  Living Alone: Yes (lives with wife and grand daughter)  Support Systems: Family member(s), Spouse/Significant Other/Partner  Patient Expects to be Discharged to[de-identified] Private residence  Current DME Used/Available at Home: None  Tub or Shower Type: Tub/Shower combination  [X]  Right hand dominant             [ ]  Left hand dominant     EXAMINATION OF PERFORMANCE DEFICITS:  Cognitive/Behavioral Status:  Neurologic State: Alert; Appropriate for age  Orientation Level: Appropriate for age  Cognition: Appropriate decision making; Appropriate for age attention/concentration; Appropriate safety awareness; Follows commands  Perception: Appears intact  Perseveration: No perseveration noted  Safety/Judgement: Fall prevention     Skin: incision not observed     Edema: None noted     Hearing: Auditory  Auditory Impairment: None     Vision/Perceptual:            Range of Motion:  AROM: Generally decreased, functional     Strength:  Strength: Generally decreased, functional     Coordination:  Coordination: Within functional limits  Fine Motor Skills-Upper: Left Intact; Right Intact    Gross Motor Skills-Upper: Left Intact; Right Intact     Tone & Sensation:     Sensation: Intact     Balance:  Sitting: Intact  Standing: Impaired  Standing - Static: Fair  Standing - Dynamic : Fair     Functional Mobility and Transfers for ADLs:  Bed Mobility:  Supine to Sit: Minimum assistance;Assist x2 (for trunk support )  Scooting: Supervision     Transfers:  Sit to Stand: Minimum assistance; Moderate assistance;Assist x2 (for support to bring trunk forward and hip extension)  Stand to Sit: Minimum assistance (for eccentric lowering)  Toilet Transfer : Contact guard assistance     ADL Assessment:  Feeding: Independent     Oral Facial Hygiene/Grooming: Independent     Bathing: Contact guard assistance     Upper Body Dressing: Supervision     Lower Body Dressing: Contact guard assistance     Toileting: Contact guard assistance     ADL Intervention and task modifications:     Lower Body Dressing Assistance  Socks: Supervision/set-up (with feet propped up on stool)     Cognitive Retraining  Safety/Judgement: Fall prevention     Functional Measure:  Barthel Index:      Bathin  Bladder: 10  Bowels: 10  Groomin  Dressin  Feeding: 10  Mobility: 0  Stairs: 0  Toilet Use: 5  Transfer (Bed to Chair and Back): 10  Total: 55         Barthel and G-code impairment scale:  Percentage of impairment CH  0% CI  1-19% CJ  20-39% CK  40-59% CL  60-79% CM  80-99% CN  100%   Barthel Score 0-100 100 99-80 79-60 59-40 20-39 1-19    0   Barthel Score 0-20 20 17-19 13-16 9-12 5-8 1-4 0      The Barthel ADL Index: Guidelines  1. The index should be used as a record of what a patient does, not as a record of what a patient could do. 2. The main aim is to establish degree of independence from any help, physical or verbal, however minor and for whatever reason. 3. The need for supervision renders the patient not independent. 4. A patient's performance should be established using the best available evidence. Asking the patient, friends/relatives and nurses are the usual sources, but direct observation and common sense are also important. However direct testing is not needed. 5. Usually the patient's performance over the preceding 24-48 hours is important, but occasionally longer periods will be relevant. 6. Middle categories imply that the patient supplies over 50 per cent of the effort. 7. Use of aids to be independent is allowed. Carrie Duncan., Barthel, D.W. ([id number]). Functional evaluation: the Barthel Index. [address] [facility] (14)2. LAKSHMI Shields, Marilyn Mccormick., Kelton Faulkner., Meliza, [address] [place] [address] ().  Measuring the change indisability after inpatient rehabilitation; comparison of the responsiveness of the Barthel Index and Functional [place] Measure. Journal of Neurology, Neurosurgery, and Psychiatry, 66(4), 342-160. DONA Switf.KRISTYN, CORNELIO Cervantes, & Brandi Canseco M.A. (2004.) Assessment of post-stroke quality of life in cost-effectiveness studies: The usefulness of the Barthel Index and the EuroQoL-5D. Quality of Life Research, 13, 154-20         G codes: In compliance with CMSs Claims Based Outcome Reporting, the following G-code set was chosen for this patient based on their primary functional limitation being treated: The outcome measure chosen to determine the severity of the functional limitation was the Barthel index with a score of 55/100 which was correlated with the impairment scale. · Self Care:               - CURRENT STATUS:    CK - 40%-59% impaired, limited or restricted               - GOAL STATUS:           CI - 1%-19% impaired, limited or restricted               - D/C STATUS:                       ---------------To be determined---------------      Occupational Therapy Evaluation Charge Determination   History Examination Decision-Making   LOW Complexity : Brief history review  LOW Complexity : 1-3 performance deficits relating to physical, cognitive , or psychosocial skils that result in activity limitations and / or participation restrictions  LOW Complexity : No comorbidities that affect functional and no verbal or physical assistance needed to complete eval tasks       Based on the above components, the patient evaluation is determined to be of the following complexity level: LOW   Pain:  Pain Scale 1: Numeric (0 - 10)  Pain Intensity 1: 7  Pain Location 1: Abdomen  Pain Orientation 1: Posterior; Lower  Pain Description 1: Aching  Pain Intervention(s) 1: Medication (see MAR)  Activity Tolerance:   No s/s of distress; pain well managed     Please refer to the flowsheet for vital signs taken during this treatment.   After treatment:   [X] Patient left in no apparent distress sitting up in chair  [ ] Patient left in no apparent distress in bed  [X] Call bell left within reach  [X] Nursing notified  [ ] Caregiver present  [ ] Bed alarm activated      COMMUNICATION/EDUCATION:   The patients plan of care was discussed with: Physical Therapist and Registered Nurse.  [X] Home safety education was provided and the patient/caregiver indicated understanding. [X] Patient/family have participated as able in goal setting and plan of care. [X] Patient/family agree to work toward stated goals and plan of care. [ ] Patient understands intent and goals of therapy, but is neutral about his/her participation. [ ] Patient is unable to participate in goal setting and plan of care. This patients plan of care is appropriate for delegation to [facility].      Thank you for this referral.  Susan Bazzi OT  Time Calculation: 17 mins

## 2017-04-10 NOTE — PROGRESS NOTES
Nephrology Progress Note     Yony Rivera       www. [facility]                   Phone - [phone]   Patient: Lazaro Barlow  YOB: 1945     Date- 4/10/2017     CC: Follow up for acute renal failure          Subjective: Interval History:     Up in a chair. Has a friend visiting and is very alert. Says he feels okay but did have some abdominal discomfort earlier when he tried to eat breakfast.  BP's good. ROS: no fever/chills. No HEENT complaints. No SOB. No chest pain. Some abdominal discomfort with eating this AM. No joint pain. No rashes. He says his UO is low. Assessment:   ·  Acute renal failure -vomiting and HCTZ ,aleve use, urinary retention. Creat relatively stable over the past week. · hypophosphatemia  · Abdominal aortic aneurysm -s/p CT ANGIO 4-3-17  · Left renal artery stenosis on ct angio  · Metabolic acidosis  · Nausea and vomiting  · Right renal lesion - hyperdense on ct  · Nephrolithiasis  · Umbilical hernia  · GI bleed  · +ve NEETU, normal complements level     Plan: Will continue low-rate of IV fluids. Check phos now and in AM.  Check iron studies in AM along with other labs. Care Plan discussed with:patient  [] High complexity decision making was performed  [] Patient is at high-risk of decompensation with multiple organ involvement  Review of Systems: Pertinent items are noted in HPI. Objective:   Vitals:    04/10/17 0743 04/10/17 0800 04/10/17 0900 04/10/17 1000   BP:  126/40 134/62 136/59   Pulse:  72 77 76   Resp:  22 24 21   Temp:  98.8 °F (37.1 °C)     SpO2: 95% 94% 92% 97%   Weight:       Height:           Intake/Output Summary (Last 24 hours) at 04/10/17 1102  Last data filed at 04/10/17 1000   Gross per 24 hour   Intake          1348.89 ml   Output              920 ml   Net           428.89 ml     Physical Exam:     GEN:   Up in a chair chatting with a friend. Alert and in no distress.   NECK- Supple, no JVD  RESP: Clear b/l, no wheezing  CVS: RRR,S1,S2   NEURO: alert and appropriate; grossly unremarkable  Extrem: no edema  ABDO: soft; no tenderness to light palpation; no distention  EXT: No Edema  Musculoskeletal: grossly unremarkable      Chart reviewed. Pertinent Notes reviewed. Medications list  reviewed     Current Facility-Administered Medications   Medication    [START ON 4/11/2017] pantoprazole (PROTONIX) tablet 40 mg    metoprolol tartrate (LOPRESSOR) tablet 12.5 mg    acetaminophen (TYLENOL) tablet 650 mg    fentaNYL citrate (PF) injection 25-50 mcg    artificial saliva (MOUTH KOTE) 1 Spray    sodium chloride (NS) flush 5-10 mL    sodium chloride (NS) flush 5-10 mL    LORazepam (ATIVAN) injection 1 mg    ondansetron (ZOFRAN) injection 4 mg    0.45% sodium chloride with KCl 20 mEq/L infusion    sodium chloride 0.9 % bolus infusion 1,000 mL    albuterol-ipratropium (DUO-NEB) 2.5 MG-0.5 MG/3 ML    nicotine (NICODERM CQ) 21 mg/24 hr patch 1 Patch              Data Review [facility].) [[id number]] Collected: 04/02/17 1348      Order Status: Completed Specimen: Serum from Serum Updated: 04/04/17 1537      Immunofixation, serum Comment          (NOTE)   An apparent normal immunofixation pattern. Performed At: [facility]   [address] [facility] [id number]   Amy Gutierrez MD [id number]            Immunoglobulin G, Qt. 826 mg/dL       Immunoglobulin A, Qt. 144 mg/dL       Immunoglobulin M, Qt.  34 mg/dL        (NOTE)   Performed At: [facility]   [address] [facility] [id number]   Amy Gutierrez MD EZ:[id number]           DSDNA AB REFLEX [[id number]] (Abnormal) Collected: 04/02/17 1348     Order Status: Completed Specimen: Serum Updated: 04/04/17 1439      dsDNA Ab, Qt. 14 (H) IU/mL        (NOTE)                                     Negative      <5                                     Equivocal  5 - 9                                     Positive      >9   Performed At: [facility]   [address] [place], [place] [id number]   Chloe Bonilla MD EJ:[id number]         Marybeth Dorado CASCADE [[id number]] (Abnormal) Collected: 04/02/17 1348     Order Status: Completed Specimen: Serum from Serum Updated: 04/04/17 1439      NEETU Direct Positive (A)          (NOTE)   Performed At: [address] [id number]   Chloe Bonilla MD [id number]            See below Comment          (NOTE)             Recent Labs      04/10/17   0332  04/08/17   0333  04/07/17   1345   NA  141  144  144   K  4.2  4.7  4.2   CL  110*  112*  112*   CO2  19*  22  22   GLU  84  100  162*   BUN  23*  15  14   CREA  1.41*  1.57*  1.59*   CA  8.6  7.6*  7.8*   INR   --   1.2*  1.1     Recent Labs      04/10/17   0332  04/08/17   0333  04/07/17   1345   WBC  7.0  5.9  10.8   HGB  9.1*  10.0*  10.2*   HCT  27.4*  29.6*  30.3*   PLT  194  156  166     CT ABDO  There is a large juxtarenal fusiform abdominal aortic aneurysm. The aneurysm  begins immediately inferior to both renal arteries without a discernible neck. The aneurysm extends to the aortic bifurcation but does not involve the iliac  arteries. Maximal aneurysm diameter measures 7.8 cm.     The celiac artery is widely patent. The superior mesenteric artery is calcified  at its origin with possible moderate stenosis.     The extra iliac arteries are calcified but no definite focal stenosis. Right  external iliac is ectatic but not grossly aneurysmal. There is a focal aneurysm  at the right iliac bifurcation measuring 2.6 cm. The external iliac arteries are  calcified but otherwise patent.     There is probable focal stenosis of the origin of the left renal artery. The  right renal artery is calcified but grossly patent.     Lung bases are grossly clear with minimal atelectasis.  The liver pancreas spleen  and kidneys are unremarkable given limitations of arterial phase contrast. There  are small bilateral renal cysts. The adrenals are unremarkable.     No pelvic mass or adenopathy. No free fluid or focal fluid collection.     IMPRESSION  IMPRESSION: Juxtarenal fusiform abdominal aortic aneurysm extending to the  aortic bifurcation as detailed above. No results found for: CULT  No results found for: [address] [facility] [place], [address] [facility]  [facility] SYSTM FRANCISCAN [facility] CHRI[address], [address] Feliciano  [place], [address] [facility]  Phone - [phone]         Fax - [phone]  [facility] Office  [address] [address]  Phone - [phone]        Fax - [phone]    www. [facility] Group[facility]

## 2017-04-10 NOTE — PROGRESS NOTES
4/10/2017 3:44 PM    Admit Date: 4/1/2017    Admit Diagnosis:   AAA (abdominal aortic aneurysm) (HCC);AAA;GI bleed    Subjective: [address] denies chest pain or shortness of breath. Current Facility-Administered Medications   Medication Dose Route Frequency    [START ON 4/11/2017] pantoprazole (PROTONIX) tablet 40 mg  40 mg Oral ACB    metoprolol tartrate (LOPRESSOR) tablet 25 mg  25 mg Oral Q12H    atorvastatin (LIPITOR) tablet 20 mg  20 mg Oral DAILY    acetaminophen (TYLENOL) tablet 650 mg  650 mg Oral Q4H PRN    fentaNYL citrate (PF) injection 25-50 mcg  25-50 mcg IntraVENous Q2.5H PRN    artificial saliva (MOUTH KOTE) 1 Spray  1 Spray Oral PRN    sodium chloride (NS) flush 5-10 mL  5-10 mL IntraVENous Q8H    sodium chloride (NS) flush 5-10 mL  5-10 mL IntraVENous PRN    LORazepam (ATIVAN) injection 1 mg  1 mg IntraVENous Q6H PRN    ondansetron (ZOFRAN) injection 4 mg  4 mg IntraVENous Q6H PRN    0.45% sodium chloride with KCl 20 mEq/L infusion   IntraVENous CONTINUOUS    sodium chloride 0.9 % bolus infusion 1,000 mL  1,000 mL IntraVENous ONCE PRN    albuterol-ipratropium (DUO-NEB) 2.5 MG-0.5 MG/3 ML  3 mL Nebulization TID RT    nicotine (NICODERM CQ) 21 mg/24 hr patch 1 Patch  1 Patch TransDERmal DAILY         Objective:      Physical Exam:    Visit Vitals    /60    Pulse 73    Temp 98.8 °F (37.1 °C)    Resp 22    Ht 5' 9\" (1.753 m)    Wt 246 lb 4.1 oz (111.7 kg)    SpO2 100%    BMI 36.37 kg/m2     Gen:  NAD  Mental Status - Alert. General Appearance - Not in acute distress. Chest and Lung Exam   Inspection: Accessory muscles - No use of accessory muscles in breathing. Auscultation:   Breath sounds: - Normal.   Cardiovascular   Inspection: Jugular vein - Bilateral - Inspection Normal.   Palpation/Percussion:   Apical Impulse: - Normal.   Auscultation: Rhythm - Regular. Heart Sounds - S1 WNL and S2 WNL. No S3 or S4. Murmurs & Other Heart Sounds:  Auscultation of the heart reveals - No Murmurs. Peripheral Vascular   Upper Extremity: Inspection - Bilateral - No Cyanotic nailbeds or Digital clubbing. Lower Extremity:   Palpation: Edema - Bilateral - No edema. Abdomen:   Soft, non-tender, bowel sounds are active. Neuro: A&O times 3, CN and motor grossly WNL    Data Review:   Recent Labs      04/10/17   0332  04/08/17   0333   WBC  7.0  5.9   HGB  9.1*  10.0*   HCT  27.4*  29.6*   PLT  194  156     Recent Labs      04/10/17   0332  04/08/17   0333   NA  141  144   K  4.2  4.7   CL  110*  112*   CO2  19*  22   GLU  84  100   BUN  23*  15   CREA  1.41*  1.57*   CA  8.6  7.6*   INR   --   1.2*       No results for input(s): TROIQ, CPK, CKMB in the last 72 hours. Intake/Output Summary (Last 24 hours) at 04/10/17 1544  Last data filed at 04/10/17 1300   Gross per 24 hour   Intake           1245.6 ml   Output             1000 ml   Net            245.6 ml        Telemetry: normal sinus rhythm    Assessment:     Active Problems:    CAD (coronary artery disease) ()      Overview: 2006 - s/p stent       Dyslipidemia (1/25/2014)      Tobacco use disorder (1/25/2014)      AAA (abdominal aortic aneurysm) ([facility] [address] (4/1/2017)      PAD (peripheral artery disease) ([facility] [address] (4/2/2017)      Carotid stenosis, right (4/2/2017)      S/P PTCA (percutaneous transluminal coronary angioplasty) (4/2/2017)      Overview: 2006 stent ? Vessel and type of stent        Plan:        Very large AAA, h/o CAD:  Doing well s/p open repair. Tolerated surgery well. BP is up a bit- increase metoprolol to 25 twice daily. LDL is low, but he will benefit from lifelong statin. Start Lipitor 20 mg daily. Preoperative echocardiogram was normal  I would recommend ASA 81 mg daily at discharge if safe from a surgical/ GI standpoint    F/u with me 2-4 weeks after discharge (added to DC instructions).

## 2017-04-10 NOTE — INTERDISCIPLINARY ROUNDS
.Interdisciplinary team rounds were held 4/10/2017 with the following team members:Care Management, Diabetes Treatment Specialist, Nursing, Nutrition, Occupational Therapy, Pharmacy, Physical Therapy, Physician, Respiratory Therapy and Clinical Coordinator  Goals for today:  Pain management, up OOB, PT/OT  Plan of care discussed. See clinical pathway and/or care plan for interventions and desired outcomes.

## 2017-04-10 NOTE — PROGRESS NOTES
[facility]  Pulmonary, Critical Care, and Sleep Medicine    Name: Milan Kendrick MRN: [id number]   : 1945 Hospital: [address]   Date: 4/10/2017        Critical Care    IMPRESSION:   · S/P open AAA repair  · Acute renal failure (preop)  · COPD  · Anemia hgb of 10.4  · Smoker 2-3 ppd  · Obese  · CAD had prior stent      RECOMMENDATIONS:   · O2 -prn   · Wean IV precedex  · PT, OT   · Diet per Dr. Desean Pierce  · mouthcare  · Nicotine patch  · Bronchodilators   · Pulmonary toilet  · Ongoing evaluation per renal  · Postop care  · Pain control  · BP control - currently off nicardipine drip  · DVT prophylaxis     Subjective/History:       4-10-17: Hungry. Still on precedex. Sat up in chair last night and helped. Quiet BS but claims flatus  17: Precedex and one dose IV solucortef helped. Some rest overnight. Cooperative now. Sluggish. Less pain  17: restless, less confused but now c/o dry mouth, thirst and lower abdominal pain. Overnight became veri confused from IV dilaudid. On dose IV toradol helped but renal function precludes additional use. Difficult pain control. Epidural not working and removed. Confused & combative w/ narcs. Pulled out his A-line. VSS. Good UOP  17:  Seen in PACU post open AAA repair. Having some abdominal pain but no dyspnea. 17:  Didn't notice much difference with nebulizers. Having some melena. This patient has been seen and evaluated at the request of Dr. Desean Pierce for above. Patient is a 66 y.o. male reviewed Chart. Pt not a great historian. Denies any chest pain, back or abdominal pain during my evaluation. NO acute leg pain or swelling. ROS is negative.      Past Medical History:   Diagnosis Date    BPH (benign prostatic hyperplasia)     CAD (coronary artery disease)      - s/p stent     Essential hypertension 13    Hematuria     sees urologist    Hyperglycemia       Past Surgical History:   Procedure Laterality Date    CARDIAC SURG PROCEDURE UNLIST      stent 2006      Prior to Admission medications    Medication Sig Start Date End Date Taking? Authorizing Provider   aspirin delayed-release 81 mg tablet Take 2 Tabs by mouth daily. 1/30/14  Yes Olena Ayon MD   atorvastatin (LIPITOR) 10 mg tablet Take  by mouth daily. Yes Historical Provider   clopidogrel (PLAVIX) 75 mg tablet Take  by mouth daily. Yes Historical Provider   hydrochlorothiazide (HYDRODIURIL) 25 mg tablet Take 25 mg by mouth daily. Yes Historical Provider   pantoprazole (PROTONIX) 40 mg tablet Take 40 mg by mouth daily. Historical Provider     Current Facility-Administered Medications   Medication Dose Route Frequency    metoprolol tartrate (LOPRESSOR) tablet 12.5 mg  12.5 mg Oral Q12H    dexmedetomidine (PRECEDEX) 400 mcg in 0.9% sodium chloride 100 mL infusion  0.2-0.7 mcg/kg/hr IntraVENous TITRATE    sodium chloride (NS) flush 5-10 mL  5-10 mL IntraVENous Q8H    pantoprazole (PROTONIX) 40 mg in sodium chloride 0.9 % 10 mL injection  40 mg IntraVENous DAILY    0.45% sodium chloride with KCl 20 mEq/L infusion   IntraVENous CONTINUOUS    albuterol-ipratropium (DUO-NEB) 2.5 MG-0.5 MG/3 ML  3 mL Nebulization TID RT    nicotine (NICODERM CQ) 21 mg/24 hr patch 1 Patch  1 Patch TransDERmal DAILY     Allergies   Allergen Reactions    Lipitor [Atorvastatin] Other (comments)     \"muscle pain\"      Social History   Substance Use Topics    Smoking status: Current Every Day Smoker     Packs/day: 3.00     Years: 53.00    Smokeless tobacco: Not on file    Alcohol use No      History reviewed. No pertinent family history. Review of Systems:  A comprehensive review of systems was negative.     Objective:   Vital Signs:    Visit Vitals    /52    Pulse 66    Temp 97.6 °F (36.4 °C)    Resp 22    Ht 5' 9\" (1.753 m)    Wt 111.7 kg (246 lb 4.1 oz)    SpO2 96%    BMI 36.37 kg/m2       O2 Device: Nasal cannula   O2 Flow Rate (L/min): 4 l/min   Temp (24hrs), Av °F (36.7 °C), Min:97.6 °F (36.4 °C), Max:98.6 °F (37 °C)       Intake/Output:   Last shift:         Last 3 shifts: 1901 - 04/10 07  In: 2180.5 [I.V.:2180.5]  Out: 1400 [Urine:1400]    Intake/Output Summary (Last 24 hours) at 04/10/17 0745  Last data filed at 04/10/17 0600   Gross per 24 hour   Intake          1421.29 ml   Output              985 ml   Net           436.29 ml       Physical Exam:    General:  Alert, restless WM   Head:  Normocephalic, without obvious abnormality, atraumatic. Eyes:  Conjunctivae/corneas clear. Nose: Nares normal. Septum midline. Mucosa normal.     Throat: Lips, mucosa, and tongue normal.     Neck: Supple, symmetrical, trachea midline    Back:   Symmetric, no curvature. ROM normal.   Lungs: No wheeze today   Chest wall:  No tenderness or deformity. Heart:  Regular rate and rhythm    Abdomen:   Hypoactive bowel sounds, guarding lower abdomen   Extremities: Extremities normal, atraumatic, no cyanosis or edema. SCDS   Skin: Skin color, texture, turgor normal. No rashes or lesions   Neurologic: Grossly nonfocal     Data:   Labs:  Recent Labs      04/10/17   0332  17   0333  17   1345   WBC  7.0  5.9  10.8   HGB  9.1*  10.0*  10.2*   HCT  27.4*  29.6*  30.3*   PLT  194  156  166     Recent Labs      04/10/17   0332  17   0333  17   1345   NA  141  144  144   K  4.2  4.7  4.2   CL  110*  112*  112*   CO2  19*  22  22   GLU  84  100  162*   BUN  23*  15  14   CREA  1.41*  1.57*  1.59*   CA  8.6  7.6*  7.8*   INR   --   1.2*  1.1     Recent Labs      17   1344   PH  7.29*   PCO2  45   PO2  76*   HCO3  21*   FIO2  100       Imaging:  I have personally reviewed the patients radiographs:  None today        Daryl Barboza MD

## 2017-04-10 NOTE — ROUTINE PROCESS
Lipitor held, per patient and family pt had a severe reaction to Lipitor, he had bad muscle spasms and was unable to walk.

## 2017-04-10 NOTE — PROGRESS NOTES
Attended interdisciplinary rounds on patient floor where patient care was discussed. Rakan Mckeon M.S.   Spiritual Care Department  If need arise please call Willem-PRAAALIYAH ([phone])

## 2017-04-10 NOTE — PROGRESS NOTES
1900- bedside report received from [address]    2000-pt assessed, restless c/o of \"prostate pain\" 50 mcg of iv fentanyl given, pt voiding per urinal frequent adequate amounts of cherise urine, BS hypoactive, states he is passing flatus, however this is unwitnessed, ice chips given, tolerating without nausea, abdominal midline dressing, c,d,i  precedex increased to 0.4mcg, RFA piv painful to pt when flushed, dc'd piv, x1 attempt to place new piv unsuccessful, pt stated \"a special team had to place that iv\"    0000- precedex titrated to 0.3mcg, pt assessed VSS    0300- pt restless and lower back hurting, pt ambulated with assist of 2 to recliner, 50 mcg of fentanyl given for lower back pain, RIJ central line dressing changed,     0500- pt ambulated back to bed with assist of 2, stated he was able to sleep comfortable in the chair, vss,    0700- bedside report given to [address]

## 2017-04-10 NOTE — PROGRESS NOTES
GI Progress Note (for Racquel Loges)  NAME:Marlon Rosenberg ERNESTINA:[birth year] DJD:[id number]   ATTG: Franny Em MD  Prim GI: P. Duane Sabina, MD PCP: Beryl Cabello NP  Date/Time:  4/10/2017 12:12 PM   Assessment:   · Melena- resolved  · Esophagitis  · Duodenal diverticulosis     Plan:   · Continue daily PPI  · Advance diet as tolerated   Will sign off  Subjective:   Discussed with RN events overnight. No bleeding noted    Complaint Y/N Description   Abdominal Pain n    Hematemesis n    Hematochezia n    Melena n    Constipation n    Diarrhea n    Dyspepsia n    Dysphagia n    Jaundiced n    Nausea/vomiting n      Review of Systems:  Symptom Y/N Comments  Symptom Y/N Comments   Fever/Chills n   Chest Pain n    Cough n   Headaches n    Sputum n   Joint Pain n    SOB/TEIXEIRA n   Pruritis/Rash n    Tolerating Diet y clear  Other       Could NOT obtain due to:      Objective:   VITALS:   Last 24hrs VS reviewed since prior progress note. Most recent are:  Visit Vitals    /59    Pulse 76    Temp 98.8 °F (37.1 °C)    Resp 21    Ht 5' 9\" (1.753 m)    Wt 111.7 kg (246 lb 4.1 oz)    SpO2 97%    BMI 36.37 kg/m2       Intake/Output Summary (Last 24 hours) at 04/10/17 1212  Last data filed at 04/10/17 1000   Gross per 24 hour   Intake          1265.29 ml   Output              875 ml   Net           390.29 ml     PHYSICAL EXAM:  General: WD, WN. Alert, cooperative, no acute distress    HEENT: NC, Atraumatic. PERRL. Anicteric sclerae. Lungs:  CTA Bilaterally. No Wheezing/Rhonchi/Rales. Heart:  Regular  rhythm,  No murmur/Rub/Gallops  Abdomen: Soft, Non distended, Non tender.  +BS  Extremities: No c/c/e  Neurologic:  CN 2-12 gi, A/O X 3. No acute neurological distress   Psych:   Good insight. Not anxious nor agitated.     Lab and Radiology Data Reviewed: (see below)    Medications Reviewed: (see below)  PMH/SH reviewed - no change compared to H&P  ________________________________________________________________________  Total time spent with patient: 15 minutes ________________________________________________________________________  Care Plan discussed with:  Patient y   Family     RN y              Consultant:       Jose Armando Knight MD     Procedures: see electronic medical records for all procedures/Xrays and details which were not copied into this note but were reviewed prior to creation of Plan. LABS:  Recent Labs      04/10/17   0332 04/08/17   0333   WBC  7.0  5.9   HGB  9.1*  10.0*   HCT  27.4*  29.6*   PLT  194  156     Recent Labs      04/10/17   0332  04/08/17   0333  04/07/17   1345   NA  141  144  144   K  4.2  4.7  4.2   CL  110*  112*  112*   CO2  19*  22  22   BUN  23*  15  14   CREA  1.41*  1.57*  1.59*   GLU  84  100  162*   CA  8.6  7.6*  7.8*     No results for input(s): SGOT, GPT, AP, TBIL, TP, ALB, GLOB, GGT, AML, LPSE in the last 72 hours. No lab exists for component: AMYP, HLPSE  Recent Labs      04/08/17   0333  04/07/17   1345   INR  1.2*  1.1   PTP  12.0*  11.6*   APTT  27.9  24.5      No results for input(s): FE, TIBC, PSAT, FERR in the last 72 hours. No results found for: FOL, RBCF  Recent Labs      04/07/17   1344   PH  7.29*   PCO2  45   PO2  76*     No results for input(s): CPK, CKMB in the last 72 hours.     No lab exists for component: TROPONINI  Lab Results   Component Value Date/Time    Color YELLOW/STRAW 04/02/2017 12:43 AM    Appearance CLEAR 04/02/2017 12:43 AM    Specific gravity 1.024 04/02/2017 12:43 AM    pH (UA) 5.5 04/02/2017 12:43 AM    Protein 30 04/02/2017 12:43 AM    Glucose NEGATIVE  04/02/2017 12:43 AM    Ketone NEGATIVE  04/02/2017 12:43 AM    Bilirubin NEGATIVE  04/02/2017 12:43 AM    Urobilinogen 0.2 04/02/2017 12:43 AM    Nitrites NEGATIVE  04/02/2017 12:43 AM    Leukocyte Esterase NEGATIVE  04/02/2017 12:43 AM    Epithelial cells FEW 04/02/2017 12:43 AM    Bacteria NEGATIVE  04/02/2017 12:43 AM WBC 0-4 04/02/2017 12:43 AM    RBC 0-5 04/02/2017 12:43 AM       MEDICATIONS:  Current Facility-Administered Medications   Medication Dose Route Frequency    [START ON 4/11/2017] pantoprazole (PROTONIX) tablet 40 mg  40 mg Oral ACB    metoprolol tartrate (LOPRESSOR) tablet 12.5 mg  12.5 mg Oral Q12H    acetaminophen (TYLENOL) tablet 650 mg  650 mg Oral Q4H PRN    fentaNYL citrate (PF) injection 25-50 mcg  25-50 mcg IntraVENous Q2.5H PRN    artificial saliva (MOUTH KOTE) 1 Spray  1 Spray Oral PRN    sodium chloride (NS) flush 5-10 mL  5-10 mL IntraVENous Q8H    sodium chloride (NS) flush 5-10 mL  5-10 mL IntraVENous PRN    LORazepam (ATIVAN) injection 1 mg  1 mg IntraVENous Q6H PRN    ondansetron (ZOFRAN) injection 4 mg  4 mg IntraVENous Q6H PRN    0.45% sodium chloride with KCl 20 mEq/L infusion   IntraVENous CONTINUOUS    sodium chloride 0.9 % bolus infusion 1,000 mL  1,000 mL IntraVENous ONCE PRN    albuterol-ipratropium (DUO-NEB) 2.5 MG-0.5 MG/3 ML  3 mL Nebulization TID RT    nicotine (NICODERM CQ) 21 mg/24 hr patch 1 Patch  1 Patch TransDERmal DAILY

## 2017-04-11 LAB
ALBUMIN SERPL BCP-MCNC: 2.3 G/DL (ref 3.5–5)
ANION GAP BLD CALC-SCNC: 11 MMOL/L (ref 5–15)
BUN SERPL-MCNC: 19 MG/DL (ref 6–20)
BUN/CREAT SERPL: 16 (ref 12–20)
CALCIUM SERPL-MCNC: 8.8 MG/DL (ref 8.5–10.1)
CHLORIDE SERPL-SCNC: 109 MMOL/L (ref 97–108)
CO2 SERPL-SCNC: 22 MMOL/L (ref 21–32)
CREAT SERPL-MCNC: 1.19 MG/DL (ref 0.7–1.3)
ERYTHROCYTE [DISTWIDTH] IN BLOOD BY AUTOMATED COUNT: 13.6 % (ref 11.5–14.5)
GLUCOSE SERPL-MCNC: 93 MG/DL (ref 65–100)
HCT VFR BLD AUTO: 27.3 % (ref 36.6–50.3)
HGB BLD-MCNC: 9.3 G/DL (ref 12.1–17)
MAGNESIUM SERPL-MCNC: 2 MG/DL (ref 1.6–2.4)
MCH RBC QN AUTO: 31.8 PG (ref 26–34)
MCHC RBC AUTO-ENTMCNC: 34.1 G/DL (ref 30–36.5)
MCV RBC AUTO: 93.5 FL (ref 80–99)
PHOSPHATE SERPL-MCNC: 2.6 MG/DL (ref 2.6–4.7)
PLATELET # BLD AUTO: 205 K/UL (ref 150–400)
POTASSIUM SERPL-SCNC: 3.8 MMOL/L (ref 3.5–5.1)
RBC # BLD AUTO: 2.92 M/UL (ref 4.1–5.7)
SODIUM SERPL-SCNC: 142 MMOL/L (ref 136–145)
WBC # BLD AUTO: 5.5 K/UL (ref 4.1–11.1)

## 2017-04-11 PROCEDURE — 83735 ASSAY OF MAGNESIUM: CPT | Performed by: INTERNAL MEDICINE

## 2017-04-11 PROCEDURE — 74011250637 HC RX REV CODE- 250/637: Performed by: INTERNAL MEDICINE

## 2017-04-11 PROCEDURE — 74011250636 HC RX REV CODE- 250/636: Performed by: INTERNAL MEDICINE

## 2017-04-11 PROCEDURE — 77010033678 HC OXYGEN DAILY

## 2017-04-11 PROCEDURE — 65660000000 HC RM CCU STEPDOWN

## 2017-04-11 PROCEDURE — 74011250636 HC RX REV CODE- 250/636: Performed by: SURGERY

## 2017-04-11 PROCEDURE — 74011000250 HC RX REV CODE- 250: Performed by: INTERNAL MEDICINE

## 2017-04-11 PROCEDURE — 94640 AIRWAY INHALATION TREATMENT: CPT

## 2017-04-11 PROCEDURE — 97116 GAIT TRAINING THERAPY: CPT

## 2017-04-11 PROCEDURE — 36415 COLL VENOUS BLD VENIPUNCTURE: CPT | Performed by: INTERNAL MEDICINE

## 2017-04-11 PROCEDURE — 74011250637 HC RX REV CODE- 250/637: Performed by: NURSE PRACTITIONER

## 2017-04-11 PROCEDURE — 74011250637 HC RX REV CODE- 250/637: Performed by: SURGERY

## 2017-04-11 PROCEDURE — 85027 COMPLETE CBC AUTOMATED: CPT | Performed by: INTERNAL MEDICINE

## 2017-04-11 PROCEDURE — 80069 RENAL FUNCTION PANEL: CPT | Performed by: INTERNAL MEDICINE

## 2017-04-11 RX ORDER — EPINEPHRINE 0.1 MG/ML
INJECTION INTRACARDIAC; INTRAVENOUS
Status: DISPENSED
Start: 2017-04-11 | End: 2017-04-11

## 2017-04-11 RX ORDER — FACIAL-BODY WIPES
10 EACH TOPICAL DAILY PRN
Status: DISCONTINUED | OUTPATIENT
Start: 2017-04-11 | End: 2017-04-14 | Stop reason: HOSPADM

## 2017-04-11 RX ORDER — SIMETHICONE 80 MG
80 TABLET,CHEWABLE ORAL
Status: DISCONTINUED | OUTPATIENT
Start: 2017-04-11 | End: 2017-04-14 | Stop reason: HOSPADM

## 2017-04-11 RX ORDER — METOPROLOL TARTRATE 25 MG/1
25 TABLET, FILM COATED ORAL ONCE
Status: COMPLETED | OUTPATIENT
Start: 2017-04-11 | End: 2017-04-11

## 2017-04-11 RX ORDER — SODIUM BICARBONATE 1 MEQ/ML
SYRINGE (ML) INTRAVENOUS
Status: DISPENSED
Start: 2017-04-11 | End: 2017-04-11

## 2017-04-11 RX ORDER — METOPROLOL TARTRATE 50 MG/1
50 TABLET ORAL EVERY 12 HOURS
Status: DISCONTINUED | OUTPATIENT
Start: 2017-04-11 | End: 2017-04-14 | Stop reason: HOSPADM

## 2017-04-11 RX ADMIN — SODIUM CHLORIDE AND POTASSIUM CHLORIDE: 4.5; 1.49 INJECTION, SOLUTION INTRAVENOUS at 00:00

## 2017-04-11 RX ADMIN — SIMETHICONE 80 MG: 80 TABLET, CHEWABLE ORAL at 22:30

## 2017-04-11 RX ADMIN — FENTANYL CITRATE 50 MCG: 50 INJECTION, SOLUTION INTRAMUSCULAR; INTRAVENOUS at 22:33

## 2017-04-11 RX ADMIN — METOPROLOL TARTRATE 25 MG: 25 TABLET ORAL at 12:14

## 2017-04-11 RX ADMIN — IPRATROPIUM BROMIDE AND ALBUTEROL SULFATE 3 ML: .5; 3 SOLUTION RESPIRATORY (INHALATION) at 19:30

## 2017-04-11 RX ADMIN — FENTANYL CITRATE 50 MCG: 50 INJECTION, SOLUTION INTRAMUSCULAR; INTRAVENOUS at 12:15

## 2017-04-11 RX ADMIN — ONDANSETRON HYDROCHLORIDE 4 MG: 2 INJECTION, SOLUTION INTRAMUSCULAR; INTRAVENOUS at 03:03

## 2017-04-11 RX ADMIN — IPRATROPIUM BROMIDE AND ALBUTEROL SULFATE 3 ML: .5; 3 SOLUTION RESPIRATORY (INHALATION) at 13:35

## 2017-04-11 RX ADMIN — PANTOPRAZOLE SODIUM 40 MG: 40 TABLET, DELAYED RELEASE ORAL at 09:03

## 2017-04-11 RX ADMIN — FENTANYL CITRATE 50 MCG: 50 INJECTION, SOLUTION INTRAMUSCULAR; INTRAVENOUS at 00:00

## 2017-04-11 RX ADMIN — BISACODYL 10 MG: 10 SUPPOSITORY RECTAL at 22:30

## 2017-04-11 RX ADMIN — SODIUM CHLORIDE AND POTASSIUM CHLORIDE: 4.5; 1.49 INJECTION, SOLUTION INTRAVENOUS at 19:00

## 2017-04-11 RX ADMIN — METOPROLOL TARTRATE 50 MG: 50 TABLET, FILM COATED ORAL at 21:33

## 2017-04-11 RX ADMIN — Medication 10 ML: at 06:14

## 2017-04-11 RX ADMIN — METOPROLOL TARTRATE 25 MG: 25 TABLET ORAL at 09:03

## 2017-04-11 RX ADMIN — LORAZEPAM 1 MG: 2 INJECTION INTRAMUSCULAR at 23:54

## 2017-04-11 RX ADMIN — ATORVASTATIN CALCIUM 20 MG: 20 TABLET, FILM COATED ORAL at 09:03

## 2017-04-11 RX ADMIN — Medication 10 ML: at 00:00

## 2017-04-11 RX ADMIN — Medication 10 ML: at 21:33

## 2017-04-11 RX ADMIN — SIMETHICONE 80 MG: 80 TABLET, CHEWABLE ORAL at 15:45

## 2017-04-11 RX ADMIN — IPRATROPIUM BROMIDE AND ALBUTEROL SULFATE 3 ML: .5; 3 SOLUTION RESPIRATORY (INHALATION) at 09:10

## 2017-04-11 RX ADMIN — FENTANYL CITRATE 50 MCG: 50 INJECTION, SOLUTION INTRAMUSCULAR; INTRAVENOUS at 15:46

## 2017-04-11 RX ADMIN — FENTANYL CITRATE 50 MCG: 50 INJECTION, SOLUTION INTRAMUSCULAR; INTRAVENOUS at 09:26

## 2017-04-11 NOTE — PROGRESS NOTES
Felt a little bloated and nauseated with clears - no vomiting  Still having flatus but no BM yet  afeb VSS  Good UO  Labs OK  Feet warm  abd mildly distended and typanitic      Hold on liquids  Dulcolax supp  Cont to mobilize  To PCU

## 2017-04-11 NOTE — PROGRESS NOTES
[facility]  Pulmonary, Critical Care, and Sleep Medicine    Name: Jody Blanco MRN: [id number]   : 1945 Hospital: [facility]   Date: 2017        Critical Care    IMPRESSION:   · S/P open AAA repair  · Acute renal failure  · COPD  · Abdominal bloating, discomfort. · Anemia hgb of 10.4  · Smoker 2-3 ppd  · Obese  · CAD had prior stent      RECOMMENDATIONS:   · O2 -prn   · PT, OT   · Add simethicone drops  · Diet per Dr. Rhonda Garcia  · mouthcare  · Nicotine patch  · Bronchodilators   · Pulmonary toilet  · Ongoing evaluation per renal  · Postop care  · Pain control  · BP control - currently off nicardipine drip  · DVT prophylaxis     Subjective/History:     17: Seems better today. NO chest pain, no back pain, no leg pain. Complaint of abdominal distention. No BM.   4-10-17: Hungry. Still on precedex. Sat up in chair last night and helped. Quiet BS but claims flatus  17: Precedex and one dose IV solucortef helped. Some rest overnight. Cooperative now. Sluggish. Less pain  17: restless, less confused but now c/o dry mouth, thirst and lower abdominal pain. Overnight became veri confused from IV dilaudid. On dose IV toradol helped but renal function precludes additional use. Difficult pain control. Epidural not working and removed. Confused & combative w/ narcs. Pulled out his A-line. VSS. Good UOP  17:  Seen in PACU post open AAA repair. Having some abdominal pain but no dyspnea. 17:  Didn't notice much difference with nebulizers. Having some melena. This patient has been seen and evaluated at the request of Dr. Rhonda Garcia for above. Patient is a 66 y.o. male reviewed Chart. Pt not a great historian. Denies any chest pain, back or abdominal pain during my evaluation. NO acute leg pain or swelling. ROS is negative.      Past Medical History:   Diagnosis Date    BPH (benign prostatic hyperplasia)     CAD (coronary artery disease)      - s/p stent     Essential hypertension 12-12-13    Hematuria     sees urologist    Hyperglycemia       Past Surgical History:   Procedure Laterality Date    CARDIAC SURG PROCEDURE UNLIST      stent 2006      Prior to Admission medications    Medication Sig Start Date End Date Taking? Authorizing Provider   aspirin delayed-release 81 mg tablet Take 2 Tabs by mouth daily. 1/30/14  Yes Monica Lyons MD   atorvastatin (LIPITOR) 10 mg tablet Take  by mouth daily. Yes Historical Provider   clopidogrel (PLAVIX) 75 mg tablet Take  by mouth daily. Yes Historical Provider   hydrochlorothiazide (HYDRODIURIL) 25 mg tablet Take 25 mg by mouth daily. Yes Historical Provider   pantoprazole (PROTONIX) 40 mg tablet Take 40 mg by mouth daily. Historical Provider     Current Facility-Administered Medications   Medication Dose Route Frequency    pantoprazole (PROTONIX) tablet 40 mg  40 mg Oral ACB    metoprolol tartrate (LOPRESSOR) tablet 25 mg  25 mg Oral Q12H    atorvastatin (LIPITOR) tablet 20 mg  20 mg Oral DAILY    sodium chloride (NS) flush 5-10 mL  5-10 mL IntraVENous Q8H    0.45% sodium chloride with KCl 20 mEq/L infusion   IntraVENous CONTINUOUS    albuterol-ipratropium (DUO-NEB) 2.5 MG-0.5 MG/3 ML  3 mL Nebulization TID RT    nicotine (NICODERM CQ) 21 mg/24 hr patch 1 Patch  1 Patch TransDERmal DAILY     Allergies   Allergen Reactions    Lipitor [Atorvastatin] Other (comments)     \"muscle pain\"      Social History   Substance Use Topics    Smoking status: Current Every Day Smoker     Packs/day: 3.00     Years: 53.00    Smokeless tobacco: Not on file    Alcohol use No      History reviewed. No pertinent family history. Review of Systems:  A comprehensive review of systems was negative.     Objective:   Vital Signs:    Visit Vitals    /69    Pulse 74    Temp 98.8 °F (37.1 °C)    Resp 20    Ht 5' 9\" (1.753 m)    Wt 111.7 kg (246 lb 4.1 oz)    SpO2 97%    BMI 36.37 kg/m2       O2 Device: Nasal cannula   O2 Flow Rate (L/min): 4 l/min   Temp (24hrs), Av.5 °F (36.9 °C), Min:97.9 °F (36.6 °C), Max:98.8 °F (37.1 °C)       Intake/Output:   Last shift:         Last 3 shifts: 1901 -  07  In: 1787.2 [P.O.:50; I.V.:1737.2]  Out: 1700 [Urine:1700]    Intake/Output Summary (Last 24 hours) at 17 07  Last data filed at 17 0600   Gross per 24 hour   Intake            958.4 ml   Output             1025 ml   Net            -66.6 ml       Physical Exam:    General:  Alert, restless WM   Head:  Normocephalic, without obvious abnormality, atraumatic. Eyes:  Conjunctivae/corneas clear. Nose: Nares normal. Septum midline. Mucosa normal.     Throat: Lips, mucosa, and tongue normal.     Neck: Supple, symmetrical, trachea midline    Back:   Symmetric, no curvature. ROM normal.   Lungs: No wheeze today, some decrease BS in bases. Chest wall:  No tenderness or deformity. Heart:  Regular rate and rhythm    Abdomen:   Hypoactive bowel sounds, guarding lower abdomen   Extremities: Extremities normal, atraumatic, no cyanosis or edema. SCDS   Skin: Skin color, texture, turgor normal. No rashes or lesions   Neurologic: Grossly nonfocal     Data:   Labs:  Recent Labs      17   0307  04/10/17   0332   WBC  5.5  7.0   HGB  9.3*  9.1*   HCT  27.3*  27.4*   PLT  205  194     Recent Labs      17   0307  04/10/17   0332   NA  142  141   K  3.8  4.2   CL  109*  110*   CO2  22  19*   GLU  93  84   BUN  19  23*   CREA  1.19  1.41*   CA  8.8  8.6   MG  2.0   --    PHOS  2.6   --    ALB  2.3*   --      Imaging:  I have personally reviewed the patients radiographs:  None today        Fatimah Kebede MD

## 2017-04-11 NOTE — PROGRESS NOTES
Nutrition Assessment:    RECOMMENDATIONS:   Continue advancing diet as medically able per surgeon    DIETITIANS INTERVENTIONS/PLAN:   Advance diet as tolerated  Monitor appetite/PO intake    ASSESSMENT:   Pt admitted with AAA. PMH: CAD. Chart reviewed, case discussed during CCU rounds. Pt is s/p AAA repair. He was eating well prior to surgery. Postop he is having issues with abdominal distension and nausea. Surgeon wants diet held today, while he gets a dulcolax suppository. Labs reviewed, WNL. BM noted today. Will monitor diet advancement and tolerance upon F/U.     SUBJECTIVE/OBJECTIVE:   Pt working with therapy   Diet Order: Clear liquids  % Eaten:  No data found. Pertinent Medications:protonix, KCl; [email or web address]); PRN Meds: dulcolax. Chemistries:  Lab Results   Component Value Date/Time    Sodium 142 04/11/2017 03:07 AM    Potassium 3.8 04/11/2017 03:07 AM    Chloride 109 04/11/2017 03:07 AM    CO2 22 04/11/2017 03:07 AM    Anion gap 11 04/11/2017 03:07 AM    Glucose 93 04/11/2017 03:07 AM    BUN 19 04/11/2017 03:07 AM    Creatinine 1.19 04/11/2017 03:07 AM    BUN/Creatinine ratio 16 04/11/2017 03:07 AM    GFR est AA >60 04/11/2017 03:07 AM    GFR est non-AA 60 04/11/2017 03:07 AM    Calcium 8.8 04/11/2017 03:07 AM    AST (SGOT) 9 04/03/2017 03:57 AM    Alk. phosphatase 77 04/03/2017 03:57 AM    Protein, total 6.4 04/03/2017 03:57 AM    Albumin 2.3 04/11/2017 03:07 AM    Globulin 3.4 04/03/2017 03:57 AM    A-G Ratio 0.9 04/03/2017 03:57 AM    ALT (SGPT) 12 04/03/2017 03:57 AM      Anthropometrics: Height: 5' 9\" (175.3 cm) Weight: 111.7 kg (246 lb 4.1 oz)    IBW (%IBW): 72.7 kg (160 lb 4.4 oz) ( ) UBW (%UBW):   (  %)    BMI: Body mass index is 36.37 kg/(m^2). This BMI is indicative of:  []Underweight   []Normal   []Overweight   [x] Obesity   [] Extreme Obesity (BMI>40)  Estimated Nutrition Needs (Based on): 2223 Kcals/day (MSJ 1853 x 1.2) , 82 g (1gPro/kg ABW) Protein  Carbohydrate:  At Least 130 g/day  Fluids: 2200 mL/day    Last BM: 4/11   [x]Active     []Hyperactive  []Hypoactive       [] Absent   BS  Skin:    [] Intact   [x] Incision  [] Breakdown   [] DTI   [] Tears/Excoriation/Abrasion  []Edema [] Other: Wt Readings from Last 30 Encounters:   04/02/17 111.7 kg (246 lb 4.1 oz)   01/30/14 108.4 kg (239 lb)   01/24/14 108.1 kg (238 lb 6.4 oz)   12/26/13 107.5 kg (237 lb)   12/09/10 112.5 kg (248 lb)   11/18/10 112.5 kg (248 lb)      NUTRITION DIAGNOSES:   Problem:  Inadequate oral food/beverage intake      Etiology: related to nausea and abdominal distension     Signs/Symptoms: as evidenced by minimal PO intake since advancement postop. NUTRITION INTERVENTIONS:  Meals/Snacks: Other (advance diet as medically able per surgeon)                  GOAL:   Pt will advance to solid diet and consume >50% of meals in 4-6 days.      Cultural, Worship, or Ethnic Dietary Needs: None     LEARNING NEEDS (Diet, Food/Nutrient-Drug Interaction):    [x] None Identified   [] Identified and Education Provided/Documented   [] Identified and Pt declined/was not appropriate      [x] Interdisciplinary Care Plan Reviewed/Documented    [x] Participated in Discharge Planning:  Cardiac diet    [x] Interdisciplinary Rounds     NUTRITION RISK:    [] High              [x] Moderate           [x]  Low  []  Minimal/Uncompromised    PT SEEN FOR:    []  MD Consult: []Calorie Count      []Diabetic Diet Education        []Diet Education     []Electrolyte Management     []General Nutrition Management and Supplements     []Management of Tube Feeding     []TPN Recommendations    []  RN Referral:  []MST score >=2     []Enteral/Parenteral Nutrition PTA     []Pregnant: Gestational DM or Multigestation   []  Low BMI  [x]  DTR Referral/rescreen        Marii Leyva RD  Pager [phone]  Weekend Pager [phone]

## 2017-04-11 NOTE — PROGRESS NOTES
Problem: Mobility Impaired (Adult and Pediatric)  Goal: *Acute Goals and Plan of Care (Insert Text)  Physical Therapy Goals  Initiated 4/10/2017  1. Patient will move from supine to sit and sit to supine , scoot up and down and roll side to side in bed with independence within 7 day(s). 2. Patient will transfer from bed to chair and chair to bed with modified independence using the least restrictive device within 7 day(s). 3. Patient will perform sit to stand with modified independence within 7 day(s). 4. Patient will ambulate with modified independence for 150 feet with the least restrictive device within 7 day(s). 5. Patient will ascend/descend 5 stairs with bilateral handrail(s) with modified independence within 7 day(s). PHYSICAL THERAPY TREATMENT  Patient: Alison Barillas (61 y.o. male)  Date: 4/11/2017  Diagnosis: AAA (abdominal aortic aneurysm) (HCC)  AAA  GI bleed <principal problem not specified>  Procedure(s) (LRB):  OPEN ABDOMINAL AORTIC  ANEURYSM REPAIR (GEN W/ EPIDURAL) (N/A) 4 Days Post-Op  Precautions: Fall      ASSESSMENT:  Pt received sitting in recliner on 4L/min O2 and agreeable to PT intervention. RN cleared pt for mobility and VSS. Pt presenting this date with increased abdominal pain that worsened with activity (reported below). Pt performed transfers with CGA-Cameron, demonstrating overall fair balance in standing. Pt ambulated 40 feet using and relying heavily on cardiac cart with Cameron. He primarily required assistance for balance as pt demonstrates asymmetrical step length with narrow HENNY, antalgic gait, and forward flexed posture. Pt reporting fatigue and assisted back to bedside chair. Pt left sitting with all needs met and VSS. RN notified. Recommending rehab vs. [facility] for discharge. Pt would benefit from further skilled acute PT to improve independence and safety with all aspects of functional mobility.       Progression toward goals:  [ ]    Improving appropriately and progressing toward goals  [X]    Improving slowly and progressing toward goals  [ ]    Not making progress toward goals and plan of care will be adjusted       PLAN:  Patient continues to benefit from skilled intervention to address the above impairments. Continue treatment per established plan of care. Discharge Recommendations:  Rehab vs. Home health  Further Equipment Recommendations for Discharge:  rolling walker       SUBJECTIVE:   Patient stated It hurts more after walking.  (in reference to his abdomen)      OBJECTIVE DATA SUMMARY:   Critical Behavior:  Neurologic State: Alert, Irritable  Orientation Level: Oriented X4  Cognition: Appropriate decision making, Appropriate for age attention/concentration, Appropriate safety awareness, Follows commands  Safety/Judgement: Fall prevention  Functional Mobility Training:  Bed Mobility:  Scooting: Supervision        Transfers:  Sit to Stand: Minimum assistance (for anterior weight shift/support to stand)  Stand to Sit: Contact guard assistance (for safety)        Balance:  Sitting: Intact  Standing: Impaired  Standing - Static: Fair  Standing - Dynamic : Fair     Ambulation/Gait Training:  Distance (ft): 40 Feet (ft)  Assistive Device: Gait belt (cardiac cart)  Ambulation - Level of Assistance: Minimal assistance (for balance/support and cardiac cart management)        Gait Abnormalities: Antalgic;Trunk sway increased;Decreased step clearance (forward flexed trunk)  Right Side Weight Bearing: Full  Left Side Weight Bearing: Full  Base of Support: Narrowed     Speed/Sally: Pace decreased (<100 feet/min)  Step Length: Left shortened;Right shortened  Swing Pattern: Left asymmetrical     Interventions: Verbal cues (for cardiac cart management and upright posture)     Pain:  Pain in abdomen at start of session 6/10 and pain in abdomen 8/10 following ambulation. RN notified. Activity Tolerance:   Fair- Pt with increased abdominal pain with upright activity.  BP in sitting 149/70, BP in standing 161/76, BP in sitting post activity 150/61. All other VSS throughout treatment on 4L/min O2. After treatment:   [X]    Patient left in no apparent distress sitting up in chair  [ ]    Patient left in no apparent distress in bed  [X]    Call bell left within reach  [X]    Nursing notified  [ ]    Caregiver present  [ ]    Bed alarm activated      COMMUNICATION/COLLABORATION:   The patients plan of care was discussed with: Physical Therapist and Registered Nurse     Levy Bartlett, [facility]   Time Calculation: 20 mins              Regarding student involvement in patient care:  A student participated in this treatment session. Per CMS Medicare statements and APTA guidelines I certify that the following was true:  1. I was present and directly observed the entire session. 2. I made all skilled judgments and clinical decisions regarding care. 3. I am the practitioner responsible for assessment, treatment, and documentation.

## 2017-04-11 NOTE — PROGRESS NOTES
PICC order acknowledged. Patient is not receiving irritant or vesicant medication at this time and only requiring IV access to remove central R IJ.  20g PIV placed to L FA x 1 attempt. Patient tolerated procedure well, call bell in reach, primary RN notified.   Frederick Wilhelm RN / Vascular Access Team

## 2017-04-11 NOTE — PROGRESS NOTES
0900  Assumed care of patient from Cynthia Pereira RN. Patient is sitting up in the recliner chair; napping at intervals. Voiding small amounts (100-125 ml) urine; taking ice chips. Refuses clear liquid tray at this time. 6199  Medicated with fentanyl 50 mcg IV for pain, per patient request.  1030  Walked in hallway with PT. Denies complaints at this time. 1045  PIV placed by PICC team in right forearm. 1100  Napping in the chair, at intervals. 1200  Right IJ TLC removed. 1215  Medicated with fentanyl 50 mcg IV for pain per patient request.   1300  Patient now napping. Vitals stable. 1400  Continues to void small amounts via urinal. Taking only ice chips per his preference. 1500  Visitors at bedside; patient remains in the recliner. 1546  Medicated with fentanyl 50 mcg IV for pain and mylicon 80 mg po for gas - per patient request.   1600  Remains in the chair, napping at intervals. 1700  In chair, eating liquid diet; states that \"pain and stomach gas is much better! \"    1800  Took clear liquid diet well. Family members at bedside. 1900  Assisted to toilet in room; passed only flatus. Assisted back to recliner chair at bedside. Bedside and Verbal shift change report given to Afia Mendoza RN (oncoming nurse) by Bryson Sharp RN (offgoing nurse). Report included the following information SBAR, Kardex, Procedure Summary, Intake/Output, MAR, Accordion, Recent Results, Med Rec Status, Cardiac Rhythm NSR and Alarm Parameters .

## 2017-04-12 PROCEDURE — 74011000250 HC RX REV CODE- 250: Performed by: INTERNAL MEDICINE

## 2017-04-12 PROCEDURE — 77010033678 HC OXYGEN DAILY

## 2017-04-12 PROCEDURE — 74011250636 HC RX REV CODE- 250/636: Performed by: INTERNAL MEDICINE

## 2017-04-12 PROCEDURE — 97116 GAIT TRAINING THERAPY: CPT

## 2017-04-12 PROCEDURE — 94640 AIRWAY INHALATION TREATMENT: CPT

## 2017-04-12 PROCEDURE — 74011250637 HC RX REV CODE- 250/637: Performed by: SURGERY

## 2017-04-12 PROCEDURE — 74011250637 HC RX REV CODE- 250/637: Performed by: NURSE PRACTITIONER

## 2017-04-12 PROCEDURE — 74011250637 HC RX REV CODE- 250/637: Performed by: INTERNAL MEDICINE

## 2017-04-12 PROCEDURE — 65270000029 HC RM PRIVATE

## 2017-04-12 RX ORDER — OXYCODONE AND ACETAMINOPHEN 5; 325 MG/1; MG/1
1 TABLET ORAL
Status: DISCONTINUED | OUTPATIENT
Start: 2017-04-12 | End: 2017-04-14 | Stop reason: HOSPADM

## 2017-04-12 RX ORDER — AMOXICILLIN 250 MG
2 CAPSULE ORAL DAILY
Status: DISCONTINUED | OUTPATIENT
Start: 2017-04-12 | End: 2017-04-14 | Stop reason: HOSPADM

## 2017-04-12 RX ADMIN — ATORVASTATIN CALCIUM 20 MG: 20 TABLET, FILM COATED ORAL at 08:30

## 2017-04-12 RX ADMIN — PANTOPRAZOLE SODIUM 40 MG: 40 TABLET, DELAYED RELEASE ORAL at 08:30

## 2017-04-12 RX ADMIN — METOPROLOL TARTRATE 50 MG: 50 TABLET, FILM COATED ORAL at 08:30

## 2017-04-12 RX ADMIN — IPRATROPIUM BROMIDE AND ALBUTEROL SULFATE 3 ML: .5; 3 SOLUTION RESPIRATORY (INHALATION) at 20:38

## 2017-04-12 RX ADMIN — FENTANYL CITRATE 50 MCG: 50 INJECTION, SOLUTION INTRAMUSCULAR; INTRAVENOUS at 08:24

## 2017-04-12 RX ADMIN — SIMETHICONE 80 MG: 80 TABLET, CHEWABLE ORAL at 15:29

## 2017-04-12 RX ADMIN — OXYCODONE HYDROCHLORIDE AND ACETAMINOPHEN 1 TABLET: 5; 325 TABLET ORAL at 12:49

## 2017-04-12 RX ADMIN — SIMETHICONE 80 MG: 80 TABLET, CHEWABLE ORAL at 20:44

## 2017-04-12 RX ADMIN — Medication 10 ML: at 23:28

## 2017-04-12 RX ADMIN — FENTANYL CITRATE 50 MCG: 50 INJECTION, SOLUTION INTRAMUSCULAR; INTRAVENOUS at 01:24

## 2017-04-12 RX ADMIN — FENTANYL CITRATE 25 MCG: 50 INJECTION, SOLUTION INTRAMUSCULAR; INTRAVENOUS at 23:28

## 2017-04-12 RX ADMIN — SIMETHICONE 80 MG: 80 TABLET, CHEWABLE ORAL at 09:07

## 2017-04-12 RX ADMIN — OXYCODONE HYDROCHLORIDE AND ACETAMINOPHEN 1 TABLET: 5; 325 TABLET ORAL at 18:41

## 2017-04-12 RX ADMIN — METOPROLOL TARTRATE 50 MG: 50 TABLET, FILM COATED ORAL at 20:44

## 2017-04-12 RX ADMIN — Medication 10 ML: at 14:06

## 2017-04-12 RX ADMIN — SIMETHICONE 80 MG: 80 TABLET, CHEWABLE ORAL at 02:42

## 2017-04-12 RX ADMIN — IPRATROPIUM BROMIDE AND ALBUTEROL SULFATE 3 ML: .5; 3 SOLUTION RESPIRATORY (INHALATION) at 07:36

## 2017-04-12 NOTE — PROGRESS NOTES
End of Shift Nursing Note    Bedside shift change report given to Shala (oncoming nurse) by Radames Saleh (offgoing nurse). Report included the following information SBAR, Kardex, OR Summary, Intake/Output, MAR and Recent Results. Zone Phone:   [phone]    Significant changes during shift:    none   Non-emergent issues for physician to address:   none     Number times ambulated in hallway past shift: 0      Number of times OOB to chair past shift: in recliner entire shift    POD #: 5     Vital Signs:    Temp: 98.9 °F (37.2 °C)     Pulse (Heart Rate): 62     BP: 114/56     Resp Rate: 17     O2 Sat (%): 93 %    Lines & Drains:     Urinary Catheter? No     Central Line? No    PICC Line? No      NG tube [] in [] removed [x] not applicable   Drains [] in [] removed [x] not applicable     Skin Integrity:      Wounds: yes   Dressings Present: yes    Wound Concerns: yes      GI:    Current diet:  DIET CARDIAC Regular    Nausea: NO  Vomiting: NO  Bowel Sounds: YES  Flatus: YES  Last Bowel Movement: today       Respiratory:  Supplemental O2: Yes      Device: nasal cannula   via 4 Liters/min     Incentive Spirometer: YES  Volume: 1500  Coughing and Deep Breathing: YES  Oral Care: NO  Understanding (patient/family education): YES   Getting out of bed: YES  Head of bed elevation: YES    Patient Safety:    Falls Score: 2  Mobility Score: 1  Bed Alarm On? Yes  Sitter? No      Opportunity for questions and clarification was given to oncoming nurse. Patient bed is in low position, side rails are up x 2, door & observation blinds open as needed, call bell within reach and patient not in distress.     Clarence Delvalle

## 2017-04-12 NOTE — PROGRESS NOTES
Pt making slow progress with mobility using cardiac cart - still limited by post-op pain. Pt has been talking with therapists about dc options - knows that [facility] was mentioned but would prefer to go home with [facility]. He lives w/ his wife who is home 24/7 - has several yumiko's and grdau's who are either nurses or CNA's so appears he will have adequate support. Family is all in agreement with plan. Pt will likely need a RW and possibly shower chair (when allowed to bathe). Ref sent to [place] for RW and message left at PCP office advising that script will be needed for DME. Pt's yumiko Irma Villalta actually works at PCP office so message was left for her asking for assistance to expedite script. Family will look into getting shower chair. Pt and family were given [facility] list and will discuss with other family members and let CM know of their choice.     Dr. Pang File indicating pt may be stable for dc home on Friday so will work toward this goal.  Rasheeda Barfield, MSW

## 2017-04-12 NOTE — PROGRESS NOTES
POD#5    Looks and feels well  Neeraj clears => BM  abd soft  Feet warm    Line and tubes out  Adv diet  To floor  Discharge by Friday? ?

## 2017-04-12 NOTE — PROGRESS NOTES
2300 Received report from [facility], [address] patient requesting something to help him sleep. Patient given Ativan per orders. 0000 Assessment complete. Patient is alert and oriented. Sitting in recliner. No c/o pain at this time. Lung sounds are coarse. Pulses palpable. Midline abdominal incision with dressing dry and intact. PIV to right forearm with 1/2 Normal Saline with 20meq of Potassium. 0245 patient continuously c/o pain in his stomach. Explained to patient that his bowels are waking up and he might be having \"gas pains\". Medicated with Simethicone per orders. 0400 Assessment complete. No changes from previous.     0700 Bedside verbal report given to Beryle Bathe

## 2017-04-12 NOTE — PROGRESS NOTES
[address] [place] [address]  [phone]      Cardiology Progress Note      4/12/2017 2:01 PM    Admit Date: 4/1/2017    Admit Diagnosis:   AAA (abdominal aortic aneurysm) (HCC)  AAA  GI bleed    Subjective: Marlon Leonardo feeling somewhat better, BP controlled with increased BB.      Visit Vitals    /61    Pulse 74    Temp 98.7 °F (37.1 °C)    Resp 18    Ht 5' 9\" (1.753 m)    Wt 111.7 kg (246 lb 4.1 oz)    SpO2 93%    BMI 36.37 kg/m2       Current Facility-Administered Medications   Medication Dose Route Frequency    oxyCODONE-acetaminophen (PERCOCET) 5-325 mg per tablet 1 Tab  1 Tab Oral Q4H PRN    senna-docusate (PERICOLACE) 8.6-50 mg per tablet 2 Tab  2 Tab Oral DAILY    bisacodyl (DULCOLAX) suppository 10 mg  10 mg Rectal DAILY PRN    simethicone (MYLICON) tablet 80 mg  80 mg Oral QID PRN    metoprolol tartrate (LOPRESSOR) tablet 50 mg  50 mg Oral Q12H    pantoprazole (PROTONIX) tablet 40 mg  40 mg Oral ACB    atorvastatin (LIPITOR) tablet 20 mg  20 mg Oral DAILY    acetaminophen (TYLENOL) tablet 650 mg  650 mg Oral Q4H PRN    fentaNYL citrate (PF) injection 25-50 mcg  25-50 mcg IntraVENous Q2.5H PRN    artificial saliva (MOUTH KOTE) 1 Spray  1 Spray Oral PRN    sodium chloride (NS) flush 5-10 mL  5-10 mL IntraVENous Q8H    sodium chloride (NS) flush 5-10 mL  5-10 mL IntraVENous PRN    LORazepam (ATIVAN) injection 1 mg  1 mg IntraVENous Q6H PRN    ondansetron (ZOFRAN) injection 4 mg  4 mg IntraVENous Q6H PRN    sodium chloride 0.9 % bolus infusion 1,000 mL  1,000 mL IntraVENous ONCE PRN    albuterol-ipratropium (DUO-NEB) 2.5 MG-0.5 MG/3 ML  3 mL Nebulization TID RT    nicotine (NICODERM CQ) 21 mg/24 hr patch 1 Patch  1 Patch TransDERmal DAILY       Objective:      Physical Exam:  General Appearance:  WNWD  male in no acute distress  Chest:   diminished, but clear  Cardiovascular:  Regular rate and rhythm, no murmur.   Abdomen:   Dressing D/I, Soft, non-tender, hypoactive BS   Extremities: no peripheral edema  Skin:  Warm and dry.     Data Review:   Recent Labs      04/11/17   0307  04/10/17   0332   WBC  5.5  7.0   HGB  9.3*  9.1*   HCT  27.3*  27.4*   PLT  205  194     Recent Labs      04/11/17   0307  04/10/17   0332   NA  142  141   K  3.8  4.2   CL  109*  110*   CO2  22  19*   GLU  93  84   BUN  19  23*   CREA  1.19  1.41*   CA  8.8  8.6   MG  2.0   --    PHOS  2.6   --    ALB  2.3*   --        No results for input(s): TROIQ, CPK, CKMB in the last 72 hours. Intake/Output Summary (Last 24 hours) at 04/12/17 1401  Last data filed at 04/12/17 1314   Gross per 24 hour   Intake             1590 ml   Output             1600 ml   Net              -10 ml        Telemetry: SR      Assessment:     Active Problems:    CAD (coronary artery disease) ()      Overview: 2006 - s/p stent       Dyslipidemia (1/25/2014)      Tobacco use disorder (1/25/2014)      AAA (abdominal aortic aneurysm) ([facility] (4/1/2017)      PAD (peripheral artery disease) ([facility] (4/2/2017)      Carotid stenosis, right (4/2/2017)      S/P PTCA (percutaneous transluminal coronary angioplasty) (4/2/2017)      Overview: 2006 stent ? Vessel and type of stent        Plan:     Very large AAA, h/o CAD:  Doing well s/p open repair. Tolerated surgery well. BP better controlled on metoprolol 50mg BID. LDL is low, but he will benefit from lifelong statin. Start Lipitor 20 mg daily. Preoperative echocardiogram was normal  Recommend ASA 81 mg daily at discharge if safe from a surgical/ GI standpoint     RCA signing off   F/u with me 2-4 weeks after discharge (added to DC instructions).

## 2017-04-12 NOTE — PROGRESS NOTES
Problem: Mobility Impaired (Adult and Pediatric)  Goal: *Acute Goals and Plan of Care (Insert Text)  Physical Therapy Goals  Initiated 4/10/2017  1. Patient will move from supine to sit and sit to supine , scoot up and down and roll side to side in bed with independence within 7 day(s). 2. Patient will transfer from bed to chair and chair to bed with modified independence using the least restrictive device within 7 day(s). 3. Patient will perform sit to stand with modified independence within 7 day(s). 4. Patient will ambulate with modified independence for 150 feet with the least restrictive device within 7 day(s). 5. Patient will ascend/descend 5 stairs with bilateral handrail(s) with modified independence within 7 day(s). PHYSICAL THERAPY TREATMENT  Patient: Ora Alvarez (3 y.o. male)  Date: 4/12/2017  Diagnosis: AAA (abdominal aortic aneurysm) (HCC)  AAA  GI bleed <principal problem not specified>  Procedure(s) (LRB):  OPEN ABDOMINAL AORTIC  ANEURYSM REPAIR (GEN W/ EPIDURAL) (N/A) 5 Days Post-Op  Precautions: Fall      ASSESSMENT:  Pt received sitting in bedside chair on RA with daughter present, agreeable to PT intervention, and presenting with decreased abdominal pain this date. RN cleared pt for mobility, SBP 120s, and all other VSS. Pt performed transfers with improved ease, requiring Cameron to scoot trunk forward in recliner but otherwise CGA-SBA to complete sit to stand.  in standing but pt denied symptoms. All other VSS. Pt ambulated 110 ft, demonstrating overall improved endurance with task, and using cardiac cart for room negotiation and part of ambulation trial. Pt required CGA to ambulate with and without cardiac cart, exhibiting decreased arm sway, slow pace and mild instability as noted by minor path deviations.  VCs attempted to improve upright posture as well as encouragement to ambulate further; however, pt with minimal improvements in gait mechanics and requesting to return to room. Pt assisted to bedside chair and left with all needs met. Instructed pt to perform LE exercises throughout day to improve LE strength. SBP 120s at end of session, all other VSS on RA, and pt's pain did not worsen with upright activity. Recommending HH for discharge. Pt would benefit from further skilled acute PT to improve strength and stability with gait. Progression toward goals:  [ ]    Improving appropriately and progressing toward goals  [X]    Improving slowly and progressing toward goals  [ ]    Not making progress toward goals and plan of care will be adjusted       PLAN:  Patient continues to benefit from skilled intervention to address the above impairments. Continue treatment per established plan of care. Discharge Recommendations:  Home Health  Further Equipment Recommendations for Discharge:  rolling walker       SUBJECTIVE:   Patient stated \"My pain is not too bad now but I don't know how it will go after you get me up and moving.       OBJECTIVE DATA SUMMARY:   Critical Behavior:  Neurologic State: Alert  Orientation Level: Oriented X4  Cognition: Appropriate decision making, Appropriate safety awareness, Follows commands  Safety/Judgement: Fall prevention  Functional Mobility Training:  Bed Mobility:  Scooting: Supervision        Transfers:  Sit to Stand: Stand-by asssistance;Contact guard assistance;Assist x2 (for safety)  Stand to Sit: Stand-by asssistance           Balance:  Sitting: Intact  Standing: Impaired  Standing - Static: Good;Fair  Standing - Dynamic : Fair     Ambulation/Gait Training:  Distance (ft): 110 Feet (ft)  Assistive Device: Gait belt (60 ft using cardiac cart)  Ambulation - Level of Assistance: Contact guard assistance (for safety)        Gait Abnormalities: Antalgic; Altered arm swing (minimal forward flexed posture and minimal path deviations)  Right Side Weight Bearing: Full  Left Side Weight Bearing: Full  Base of Support: Narrowed     Speed/Sally: Pace decreased (<100 feet/min); Slow  Step Length: Left shortened;Right shortened  Swing Pattern: Left symmetrical;Right symmetrical     Interventions: Verbal cues (for upright posture and encouragement )           Therapeutic Exercises:   Instructed to perform LAQ, seated marches, and ankle pumps throughout day. Pain:  Pain Scale 1: Numeric (0 - 10)  Pain Intensity 1: 5  Pain Location 1: Abdomen     Pain Description 1: Constant; Throbbing     Activity Tolerance:   Fair- SBP 120s prior to activity, 154 in standing, and 120s post-activity. All other VSS on RA. After treatment:   [X]    Patient left in no apparent distress sitting up in chair  [ ]    Patient left in no apparent distress in bed  [X]    Call bell left within reach  [X]    Nursing notified  [ ]    Caregiver present  [ ]    Bed alarm activated      COMMUNICATION/COLLABORATION:   The patients plan of care was discussed with: Physical Therapist and Registered Nurse     ELIER Bell   Time Calculation: 17 mins              Regarding student involvement in patient care:  A student participated in this treatment session. Per CMS Medicare statements and APTA guidelines I certify that the following was true:  1. I was present and directly observed the entire session. 2. I made all skilled judgments and clinical decisions regarding care. 3. I am the practitioner responsible for assessment, treatment, and documentation.

## 2017-04-12 NOTE — PROGRESS NOTES
[facility]  Pulmonary, Critical Care, and Sleep Medicine    Name: Jose Alejandro Cummings MRN: [id number]   : 1945 Hospital: [facility]   Date: 2017        Critical Care    IMPRESSION:   · S/P open AAA repair  · Acute renal failure  · COPD-seems stable. · Abdominal bloating, discomfort feesl a little better today. Passing flatus. · Anemia hgb of 10.4  · Smoker 2-3 ppd  · Obese  · CAD had prior stent      RECOMMENDATIONS:   · O2 -prn   · PT, OT   · Diet per Dr. Amy Castro  · Nicotine patch  · Bronchodilators   · Pulmonary toilet  · Pain control  · BP control   · DVT prophylaxis  · Appears stable for transfer to Surgery Tele. Will defer to vascular surgery. Subjective/History:   17: No acute chest pain, no back pain, has some abdominal pain. 17: Seems better today. NO chest pain, no back pain, no leg pain. Complaint of abdominal distention. No BM.   4-10-17: Hungry. Still on precedex. Sat up in chair last night and helped. Quiet BS but claims flatus  17: Precedex and one dose IV solucortef helped. Some rest overnight. Cooperative now. Sluggish. Less pain  17: restless, less confused but now c/o dry mouth, thirst and lower abdominal pain. Overnight became veri confused from IV dilaudid. On dose IV toradol helped but renal function precludes additional use. Difficult pain control. Epidural not working and removed. Confused & combative w/ narcs. Pulled out his A-line. VSS. Good UOP  17:  Seen in PACU post open AAA repair. Having some abdominal pain but no dyspnea. 17:  Didn't notice much difference with nebulizers. Having some melena. This patient has been seen and evaluated at the request of Dr. Amy Castro for above. Patient is a 66 y.o. male reviewed Chart. Pt not a great historian. Denies any chest pain, back or abdominal pain during my evaluation. NO acute leg pain or swelling. ROS is negative.      Past Medical History:   Diagnosis Date    BPH (benign prostatic hyperplasia)     CAD (coronary artery disease)     2006 - s/p stent     Essential hypertension 12-12-13    Hematuria     sees urologist    Hyperglycemia       Past Surgical History:   Procedure Laterality Date    CARDIAC SURG PROCEDURE UNLIST      stent 2006      Prior to Admission medications    Medication Sig Start Date End Date Taking? Authorizing Provider   aspirin delayed-release 81 mg tablet Take 2 Tabs by mouth daily. 1/30/14  Yes Breanna Manriquez MD   atorvastatin (LIPITOR) 10 mg tablet Take  by mouth daily. Yes Historical Provider   clopidogrel (PLAVIX) 75 mg tablet Take  by mouth daily. Yes Historical Provider   hydrochlorothiazide (HYDRODIURIL) 25 mg tablet Take 25 mg by mouth daily. Yes Historical Provider   pantoprazole (PROTONIX) 40 mg tablet Take 40 mg by mouth daily. Historical Provider     Current Facility-Administered Medications   Medication Dose Route Frequency    metoprolol tartrate (LOPRESSOR) tablet 50 mg  50 mg Oral Q12H    pantoprazole (PROTONIX) tablet 40 mg  40 mg Oral ACB    atorvastatin (LIPITOR) tablet 20 mg  20 mg Oral DAILY    sodium chloride (NS) flush 5-10 mL  5-10 mL IntraVENous Q8H    0.45% sodium chloride with KCl 20 mEq/L infusion   IntraVENous CONTINUOUS    albuterol-ipratropium (DUO-NEB) 2.5 MG-0.5 MG/3 ML  3 mL Nebulization TID RT    nicotine (NICODERM CQ) 21 mg/24 hr patch 1 Patch  1 Patch TransDERmal DAILY     Allergies   Allergen Reactions    Lipitor [Atorvastatin] Other (comments)     \"muscle pain\"      Social History   Substance Use Topics    Smoking status: Current Every Day Smoker     Packs/day: 3.00     Years: 53.00    Smokeless tobacco: Not on file    Alcohol use No      History reviewed. No pertinent family history. Review of Systems:  A comprehensive review of systems was negative.     Objective:   Vital Signs:    Visit Vitals    /69 (BP 1 Location: Left arm, BP Patient Position: At rest)    Pulse 66  Temp 98.6 °F (37 °C)    Resp 18    Ht 5' 9\" (1.753 m)    Wt 111.7 kg (246 lb 4.1 oz)    SpO2 92%    BMI 36.37 kg/m2       O2 Device: Nasal cannula   O2 Flow Rate (L/min): 4 l/min   Temp (24hrs), Av.7 °F (37.1 °C), Min:98.3 °F (36.8 °C), Max:99.4 °F (37.4 °C)       Intake/Output:   Last shift:         Last 3 shifts: 04/10 1901 -  0700  In: 7130 [P.O.:890; I.V.:1650]  Out: 2400 [Urine:2400]    Intake/Output Summary (Last 24 hours) at 17 0755  Last data filed at 17 0600   Gross per 24 hour   Intake             2190 ml   Output             1650 ml   Net              540 ml       Physical Exam:    General:  Alert, restless WM   Head:  Normocephalic, without obvious abnormality, atraumatic. Eyes:  Conjunctivae/corneas clear. Nose: Nares normal. Septum midline. Mucosa normal.     Throat: Lips, mucosa, and tongue normal.     Neck: Supple, symmetrical, trachea midline    Back:   Symmetric, no curvature. ROM normal.   Lungs: No wheeze today, some decrease BS in bases. Appears comfortable. No increased work of breathing. Chest wall:  No tenderness or deformity. Heart:  Regular rate and rhythm    Abdomen:   Hypoactive bowel sounds, guarding lower abdomen   Extremities: Extremities normal, atraumatic, no cyanosis or edema. SCDS   Skin: Skin color, texture, turgor normal. No rashes or lesions   Neurologic: Grossly nonfocal     Data:   Labs:  Recent Labs      04/11/17   0307  04/10/17   0332   WBC  5.5  7.0   HGB  9.3*  9.1*   HCT  27.3*  27.4*   PLT  205  194     Recent Labs      17   0307  04/10/17   0332   NA  142  141   K  3.8  4.2   CL  109*  110*   CO2  22  19*   GLU  93  84   BUN  19  23*   CREA  1.19  1.41*   CA  8.8  8.6   MG  2.0   --    PHOS  2.6   --    ALB  2.3*   --      Imaging:  I have personally reviewed the patients radiographs:  None today        Esdras Mohr MD

## 2017-04-12 NOTE — PROGRESS NOTES
Report received from ROSENDO Mercado RN via SBAR and [email or web address]. Up in recliner chair, verbalizing chair is \"more comfortable than bed. \"  Dr. Monster Moise in.  C/o abd pain pointing to midline incisional area and left quadrant area along with c/o gas discomfort. Generalized edema. Scope SR.   1000 Complete hygiene care several open abrasion -like areas noted lower portion of back. Diet advanced. Tolerated well. 1100 dozing intermittently. Pain level 6/10 to 4 post medications. Up to bathroom with assistance for soft brown stool. Returning to recliner. 1250 Physical therapy in (see notes). Abdominal pain described as \"a constant hurting, here on this side\". Pointing midline and left of incisional area. Reports not hurting until after \"they want me to stand up straight. \" Medicated with po med (Percocet). Will continue to monitor. TRANSFER - OUT REPORT:    Verbal report given to Dionisio on Kaylan Melena  being transferred to [facility] for routine progression of care       Report consisted of patients Situation, Background, Assessment and   Recommendations(SBAR). Information from the following report(s) SBAR, Kardex, Procedure Summary, MAR, Med Rec Status and Cardiac Rhythm Sinus was reviewed with the receiving nurse. Lines:   Peripheral IV 04/11/17 Left Forearm (Active)   Site Assessment Clean, dry, & intact 4/12/2017  9:00 AM   Phlebitis Assessment 0 4/12/2017  9:00 AM   Infiltration Assessment 0 4/12/2017  9:00 AM   Dressing Status Clean, dry, & intact 4/12/2017  9:00 AM   Dressing Type Bacteriocidal 4/12/2017  9:00 AM   Hub Color/Line Status Pink;Capped 4/12/2017  9:00 AM   Action Taken Open ports on tubing capped 4/12/2017  4:00 AM   Alcohol Cap Used Yes 4/12/2017  9:00 AM        Opportunity for questions and clarification was provided. Patient transported with:   O2 @ 4 liters  Registered Nurse , eye glass 1 upper plate, cell phone, and blue colored neck support pillow.

## 2017-04-13 PROCEDURE — 74011250637 HC RX REV CODE- 250/637: Performed by: SURGERY

## 2017-04-13 PROCEDURE — 74011250637 HC RX REV CODE- 250/637: Performed by: NURSE PRACTITIONER

## 2017-04-13 PROCEDURE — 74011250637 HC RX REV CODE- 250/637: Performed by: INTERNAL MEDICINE

## 2017-04-13 PROCEDURE — 65270000029 HC RM PRIVATE

## 2017-04-13 PROCEDURE — 94640 AIRWAY INHALATION TREATMENT: CPT

## 2017-04-13 PROCEDURE — 74011000250 HC RX REV CODE- 250: Performed by: INTERNAL MEDICINE

## 2017-04-13 PROCEDURE — 74011250636 HC RX REV CODE- 250/636: Performed by: SURGERY

## 2017-04-13 RX ORDER — IPRATROPIUM BROMIDE AND ALBUTEROL SULFATE 2.5; .5 MG/3ML; MG/3ML
3 SOLUTION RESPIRATORY (INHALATION)
Status: DISCONTINUED | OUTPATIENT
Start: 2017-04-13 | End: 2017-04-14 | Stop reason: HOSPADM

## 2017-04-13 RX ADMIN — Medication 10 ML: at 21:00

## 2017-04-13 RX ADMIN — METOPROLOL TARTRATE 50 MG: 50 TABLET, FILM COATED ORAL at 09:26

## 2017-04-13 RX ADMIN — LORAZEPAM 1 MG: 2 INJECTION INTRAMUSCULAR at 22:40

## 2017-04-13 RX ADMIN — SIMETHICONE 80 MG: 80 TABLET, CHEWABLE ORAL at 22:45

## 2017-04-13 RX ADMIN — IPRATROPIUM BROMIDE AND ALBUTEROL SULFATE 3 ML: .5; 3 SOLUTION RESPIRATORY (INHALATION) at 08:36

## 2017-04-13 RX ADMIN — SIMETHICONE 80 MG: 80 TABLET, CHEWABLE ORAL at 06:41

## 2017-04-13 RX ADMIN — PANTOPRAZOLE SODIUM 40 MG: 40 TABLET, DELAYED RELEASE ORAL at 06:41

## 2017-04-13 RX ADMIN — ACETAMINOPHEN 650 MG: 325 TABLET, FILM COATED ORAL at 03:32

## 2017-04-13 RX ADMIN — OXYCODONE HYDROCHLORIDE AND ACETAMINOPHEN 1 TABLET: 5; 325 TABLET ORAL at 18:34

## 2017-04-13 RX ADMIN — SIMETHICONE 80 MG: 80 TABLET, CHEWABLE ORAL at 15:34

## 2017-04-13 RX ADMIN — ACETAMINOPHEN 650 MG: 325 TABLET, FILM COATED ORAL at 00:00

## 2017-04-13 RX ADMIN — Medication 10 ML: at 13:28

## 2017-04-13 RX ADMIN — IPRATROPIUM BROMIDE AND ALBUTEROL SULFATE 3 ML: .5; 3 SOLUTION RESPIRATORY (INHALATION) at 14:36

## 2017-04-13 RX ADMIN — METOPROLOL TARTRATE 50 MG: 50 TABLET, FILM COATED ORAL at 20:58

## 2017-04-13 RX ADMIN — ACETAMINOPHEN 650 MG: 325 TABLET, FILM COATED ORAL at 13:27

## 2017-04-13 NOTE — PROGRESS NOTES
CM received call from pt's daughter, April and she wanted to know if the walker was covered 100% by the insurance. CM contacted the DME company, [employer] and the pt would have a 80% copay when the deductible is met. CM called pt's daughter April and informed her and she said they have a walker and they don't want the new one. Pt's daughter said she will call back with a decision on the home health company. CM contacted [employer] and canceled the order for the walker.      Delilah Madera, [phone]

## 2017-04-13 NOTE — PROGRESS NOTES
End of Shift Nursing Note    Bedside shift change report given to Dereje Calle RN (oncoming nurse) by Sadiq Blunt RN (offgoing nurse). Report included the following information [facility]. [facility] Phone:   [phone]    Significant changes during shift:    Possible D/C tomorrow   Non-emergent issues for physician to address:        Number times ambulated in hallway past shift: 2      Number of times OOB to chair past shift: all day    POD #:      Vital Signs:    Temp: 98.4 °F (36.9 °C)     Pulse (Heart Rate): 67     BP: 143/73     Resp Rate: 20     O2 Sat (%): 94 %    Lines & Drains:     Urinary Catheter? No   Placement Date:    Medical Necessity:   Central Line? No   Placement Date:    Medical Necessity:   PICC Line? No   Placement Date:    Medical Necessity:     NG tube [] in [] removed [] not applicable   Drains [] in [] removed [] not applicable     Skin Integrity:      Wounds: yes   Dressings Present: yes    Wound Concerns: no      GI:    Current diet:  DIET CARDIAC Regular    Nausea: NO  Vomiting: NO  Bowel Sounds: YES  Flatus: YES  Last Bowel Movement: today   Appearance:     Respiratory:  Supplemental O2:       Device:    via  Liters/min     Incentive Spirometer: YES  Volume:   Coughing and Deep Breathing: YES  Oral Care: YES  Understanding (patient/family education): YES   Getting out of bed: YES  Head of bed elevation: YES    Patient Safety:    Falls Score: 1  Mobility Score: 1  Bed Alarm On? No  Sitter? No      Opportunity for questions and clarification was given to oncoming nurse. Patient bed is in lowest position, side rails are up x 2, door & observation blinds open as needed, call bell within reach and patient not in distress.     Radha Chau RN

## 2017-04-13 NOTE — PROGRESS NOTES
Met with pt to discuss discharge planning and he wanted me to return when his daughter Brittanie Lazaro is here. CM will f/u with pt later today to speak with his daughter Brittanie Lazaro.     Alia Del Rio, [phone]

## 2017-04-13 NOTE — PROGRESS NOTES
End of Shift Nursing Note    Bedside shift change report given to Sadiq Blunt RN (oncoming nurse) by Dereje Calle RN (offgoing nurse). Report included the following information SBAR, Kardex, Intake/Output and MAR. Zone Phone:   [phone]     Significant changes during shift:    Patient had IV infiltrate. Could not restart IV and had 2 previous IV's started by PICC team. Will contact PICC team today to restart IV. Patient states Percocet has given him too many nightmares and does not want to take it anymore. He did well with two doses of tylenol. Non-emergent issues for physician to address:   None      Number times ambulated in hallway past shift: 0      Number of times OOB to chair past shift: 2    POD #: 1     Vital Signs:    Temp: 98.9 °F (37.2 °C)     Pulse (Heart Rate): 74     BP: 135/63     Resp Rate: 16     O2 Sat (%): 93 %    Lines & Drains:     Urinary Catheter? No   Placement Date:    Medical Necessity:   Central Line? No   Placement Date:    Medical Necessity:   PICC Line? No   Placement Date:    Medical Necessity:     NG tube [] in [] removed [x] not applicable   Drains [] in [] removed [x] not applicable     Skin Integrity:      Wounds: yes   Dressings Present: yes    Wound Concerns: no      GI:    Current diet:  DIET CARDIAC Regular    Nausea: NO  Vomiting: NO  Bowel Sounds: YES  Flatus: YES  Last Bowel Movement: today   Appearance:     Respiratory:  Supplemental O2: No      Device:    via  Liters/min     Incentive Spirometer: NO  Volume:   Coughing and Deep Breathing: YES  Oral Care: YES  Understanding (patient/family education): YES   Getting out of bed: YES  Head of bed elevation: YES    Patient Safety:    Falls Score: Mobility Score: 3  Bed Alarm On? YES  Sitter? No      Opportunity for questions and clarification was given to oncoming nurse. Patient bed is in fowlers position, side rails are up x 2, door & observation blinds open as needed, call bell within reach and patient not in distress.     Shala LEMONS Min Velásquez

## 2017-04-13 NOTE — PROGRESS NOTES
A host of minor complaints (indicating it's prob time to get home)  He admits to eating well and ambulating in room  Feet hot  abd soft    ambulate in halls today  Discharge planning

## 2017-04-13 NOTE — PROGRESS NOTES
CM spoke with pt's daughter Juarez Stone ([phone]) and she decided on [facility] home health. Verbal agreement obtained for [address] form. CM sent referral to [facility] home health via allscripts. CM provided pt with the 2nd  Medicare letter, pt understood and signed it. Copy given to pt.      Kana Calderon, [phone]

## 2017-04-13 NOTE — PROGRESS NOTES
JET AEROSOL PROTOCOL - REASSESS    Patient: Milan Kendrick, 4/13/2017  2:43 PM     Breath Sounds:  RUL: Clear  RML: Clear  RLL: Clear  CASSIE: Clear  LLL: Clear    Breathing Pattern:  Regular    Respiratory Rate: 16    Peak Flow: (for asthmatics)  Pre-bronchodilator:  ,  Post-bronchodilator:       FVC/FEV:          Cough:  Clear        Heart Rate:  Pre:  63 ,  Post:  66    Repiratory Rate:  Pre:  14,  Post:  16    Oxygen:  .21     Oxygen changed:  No    SPO2:    Without oxygen:  93    Nebulizer Therapy:      Changed:  Changed to q6 prn    Respiratory Therapist: Renae Mercado RT

## 2017-04-14 VITALS
DIASTOLIC BLOOD PRESSURE: 67 MMHG | BODY MASS INDEX: 36.47 KG/M2 | RESPIRATION RATE: 16 BRPM | TEMPERATURE: 97.6 F | WEIGHT: 246.25 LBS | SYSTOLIC BLOOD PRESSURE: 126 MMHG | HEART RATE: 80 BPM | HEIGHT: 69 IN | OXYGEN SATURATION: 93 %

## 2017-04-14 PROCEDURE — 74011250637 HC RX REV CODE- 250/637: Performed by: INTERNAL MEDICINE

## 2017-04-14 RX ORDER — ZOLPIDEM TARTRATE 5 MG/1
5 TABLET ORAL
Qty: 30 TAB | Refills: 0 | Status: SHIPPED | OUTPATIENT
Start: 2017-04-14 | End: 2019-07-08

## 2017-04-14 RX ORDER — HYDROCODONE BITARTRATE AND ACETAMINOPHEN 5; 325 MG/1; MG/1
1 TABLET ORAL
Qty: 20 TAB | Refills: 0 | Status: SHIPPED | OUTPATIENT
Start: 2017-04-14 | End: 2019-07-08

## 2017-04-14 RX ADMIN — PANTOPRAZOLE SODIUM 40 MG: 40 TABLET, DELAYED RELEASE ORAL at 06:43

## 2017-04-14 RX ADMIN — ACETAMINOPHEN 650 MG: 325 TABLET, FILM COATED ORAL at 04:16

## 2017-04-14 RX ADMIN — Medication 10 ML: at 06:44

## 2017-04-14 NOTE — PROGRESS NOTES
Pt discharged and pt's daughter transported him home. CM spoke with pt's daughter Jak Chapman and she wanted to make the f/u appointments since she would have to drive him. CM sent updates to [facility] and informed them pt is discharged via allscripts.      Antonia Schilling, [phone]

## 2017-04-14 NOTE — PROGRESS NOTES
End of Shift Nursing Note    Bedside shift change report given to Linda Hernandez RN (oncoming nurse) by Farideh Contreras RN (offgoing nurse). Report included the following information SBAR, Kardex, Intake/Output and MAR. Zone Phone:   [phone]    Significant changes during shift:    None   Non-emergent issues for physician to address:  None      Number times ambulated in hallway past shift: 0      Number of times OOB to chair past shift: 3    POD #:      Vital Signs:    Temp: 97.9 °F (36.6 °C)     Pulse (Heart Rate): 76     BP: 146/69     Resp Rate: 14     O2 Sat (%): 94 %    Lines & Drains:     Urinary Catheter? No   Placement Date:    Medical Necessity:   Central Line? No   Placement Date:    Medical Necessity:   PICC Line? No   Placement Date:    Medical Necessity:     NG tube [] in [] removed [x] not applicable   Drains [] in [] removed [x] not applicable     Skin Integrity:      Wounds: yes   Dressings Present: yes    Wound Concerns: no      GI:    Current diet:  DIET CARDIAC Regular    Nausea: NO  Vomiting: NO  Bowel Sounds: YES  Flatus: YES  Last Bowel Movement: today   Appearance:     Respiratory:  Supplemental O2: No      Device:    via  Liters/min     Incentive Spirometer: YES  Volume:   Coughing and Deep Breathing: YES  Oral Care: YES  Understanding (patient/family education): YES   Getting out of bed: YES  Head of bed elevation: YES    Patient Safety:    Falls Score: Mobility Score: 2  Bed Alarm On? No  Sitter? No      Opportunity for questions and clarification was given to oncoming nurse. Patient bed is in fowlers position, side rails are up x 2, door & observation blinds open as needed, call bell within reach and patient not in distress.     Omid Christensen

## 2017-04-14 NOTE — DISCHARGE INSTRUCTIONS
Patient Discharge Instructions    Yoselin Wilson / [id number] : 1945    Admitted 2017 Discharged: 2017     What to do at Home  May shower  No driving, lifting, or straining    Need  for PT and nursing visits       Information obtained by :  I understand that if any problems occur once I am at home I am to contact my physician. I understand and acknowledge receipt of the instructions indicated above.                                                                                                                                            R.N.'s Signature                                                                  Date/Time                                                                                                                                              Patient or Representative Signature                                                          Date/Time      Akiko Richards MD

## 2017-04-14 NOTE — PROGRESS NOTES
I have reviewed discharge instructions with the patient. The patient verbalized understanding. Prescriptions for Norco and Ativan were given to patient. Discharge instructions reviewed with Patient and Daughter. Requested for patient to inform cardiologist of complication with Lipitor and to request new prescription if MD deems necessary. IV removed from right hand and dressing placed, pressure held. No s/s of distress. Patient has all of his belongings and was discharged via wheelchair and volunteer.

## 2017-04-19 NOTE — DISCHARGE SUMMARY
[facility], [address] [facility]   DISCHARGE SUMMARY       Name:  Star Hitchcock   MR#:  [id number]   :  1945   Account #:  [de-identified]        Date of Adm:  2017       ADMITTING DIAGNOSES:   1. Incarcerated umbilical hernia. 2. Incidentally discovered large abdominal aortic aneurysm. 3. Acute renal failure. PROCEDURES PERFORMED:   1. Upper endoscopy (2017, Dr. Artemio Chaudhary). 2. Open repair of abdominal aortic aneurysm (Dr. Caleb Herrera, 2017). HISTORY OF PRESENT ILLNESS/HOSPITAL COURSE: The patient   is a 73-year-old gentleman who presented to the emergency room with   abdominal pain, diarrhea, dehydration and acute renal failure. He was   found on CT scan to have a previously unknown very large juxtarenal   abdominal aortic aneurysm. He was admitted, underwent hydration,   resuscitation, manual reduction of his umbilical hernia which resolved   his abdominal pain. He had another day or 2 of melanotic diarrhea   which prompted upper endoscopy. This was unremarkable with the   exception of some duodenal diverticula and evidence of gastritis. With   appropriate hydration and other medical management, the patient's   renal failure improved and after Cardiology consultation, he was   advised to open aneurysm repair, which he underwent on 2017   without incident. His postoperative course was unremarkable. He   had rapid return of diet and activity status, and by the 7th postoperative   day was fully mobile, tolerating a general diet and ready for discharge. He was discharged on all his admission medications plus a small   supply of analgesics for pain, and a new prescription which he   specifically requested for Ambien for sleep. He was instructed to do no   heavy lifting or driving until seen back in my office and to return to my   office in 10-14 days.          MD ARELI Esquivel / [address] [facility]   D:  2017   17:28   T: 04/18/2017   22:08   Job #:  [id number]

## 2017-06-30 ENCOUNTER — TELEPHONE (OUTPATIENT)
Dept: CARDIOLOGY CLINIC | Age: 72
End: 2017-06-30

## 2017-06-30 ENCOUNTER — OFFICE VISIT (OUTPATIENT)
Dept: CARDIOLOGY CLINIC | Age: 72
End: 2017-06-30

## 2017-06-30 VITALS
WEIGHT: 225.3 LBS | BODY MASS INDEX: 33.37 KG/M2 | SYSTOLIC BLOOD PRESSURE: 150 MMHG | HEART RATE: 70 BPM | HEIGHT: 69 IN | OXYGEN SATURATION: 97 % | DIASTOLIC BLOOD PRESSURE: 80 MMHG | RESPIRATION RATE: 20 BRPM

## 2017-06-30 DIAGNOSIS — I71.40 ABDOMINAL AORTIC ANEURYSM (AAA) WITHOUT RUPTURE: ICD-10-CM

## 2017-06-30 DIAGNOSIS — F17.200 TOBACCO USE DISORDER: ICD-10-CM

## 2017-06-30 DIAGNOSIS — I65.21 CAROTID STENOSIS, RIGHT: ICD-10-CM

## 2017-06-30 DIAGNOSIS — I73.9 PAD (PERIPHERAL ARTERY DISEASE) (HCC): ICD-10-CM

## 2017-06-30 DIAGNOSIS — E78.5 DYSLIPIDEMIA: ICD-10-CM

## 2017-06-30 DIAGNOSIS — R06.09 DOE (DYSPNEA ON EXERTION): Primary | ICD-10-CM

## 2017-06-30 DIAGNOSIS — Z98.61 S/P PTCA (PERCUTANEOUS TRANSLUMINAL CORONARY ANGIOPLASTY): ICD-10-CM

## 2017-06-30 DIAGNOSIS — I25.9 MYOCARDIAL ISCHEMIA: ICD-10-CM

## 2017-06-30 DIAGNOSIS — I25.10 CORONARY ARTERY DISEASE INVOLVING NATIVE CORONARY ARTERY OF NATIVE HEART WITHOUT ANGINA PECTORIS: ICD-10-CM

## 2017-06-30 RX ORDER — ASCORBIC ACID 500 MG
TABLET ORAL
COMMUNITY
End: 2019-07-08

## 2017-06-30 RX ORDER — ACETAMINOPHEN/DIPHENHYDRAMINE 500MG-25MG
2 TABLET ORAL
COMMUNITY

## 2017-06-30 RX ORDER — METOPROLOL SUCCINATE 25 MG/1
25 TABLET, EXTENDED RELEASE ORAL DAILY
Status: ON HOLD | COMMUNITY
Start: 2017-05-24 | End: 2020-02-07 | Stop reason: SDUPTHER

## 2017-06-30 RX ORDER — LANOLIN ALCOHOL/MO/W.PET/CERES
CREAM (GRAM) TOPICAL
COMMUNITY
End: 2019-07-08

## 2017-06-30 RX ORDER — AMOXICILLIN 500 MG
CAPSULE ORAL
COMMUNITY
End: 2019-07-08

## 2017-06-30 RX ORDER — PRAVASTATIN SODIUM 20 MG/1
20 TABLET ORAL
Qty: 90 TAB | Refills: 1 | Status: SHIPPED | OUTPATIENT
Start: 2017-06-30 | End: 2019-07-08

## 2017-06-30 RX ORDER — RANITIDINE 150 MG/1
150 TABLET, FILM COATED ORAL EVERY EVENING
COMMUNITY
End: 2019-07-09

## 2017-06-30 NOTE — TELEPHONE ENCOUNTER
Please advise the patient that I noted after he had left that he had to stop Lipitor due to muscle aches. I have called in a low-dose of a gentle cholesterol medicine called pravastatin to his pharmacy to reduce the risk of stroke and heart attack. Please let him know.   If he has side effects, call us and we can come up with a plan C.

## 2017-06-30 NOTE — PROGRESS NOTES
Subjective/HPI:     Deng Soria is a 66 y.o. male is here for f/u appt after hospitalization AAA repair and incarcerated hernia. He reports since d/c he had an episode of BRB which is likely d/t hemorrhoids he has been told. Nothing like the dark stools he was having in the hospital with bleeding issues. He occasionally will be sob but he blames this on his smoking. He is having some tiredness in his legs in general.  The patient denies chest pain, orthopnea, PND, LE edema, palpitations, syncope, presyncope or fatigue. Patient Active Problem List   Diagnosis Code    Hyperglycemia R73.9    CAD (coronary artery disease) I25.10    Dyslipidemia E78.5    Tobacco use disorder F17.200    Atherosclerosis of native arteries of the extremities with intermittent claudication I70.219    Cerebrovascular disease I67.9    AAA (abdominal aortic aneurysm) ([facility]) I71.4    PAD (peripheral artery disease) ([facility]) I73.9    Carotid stenosis, right I65.21    S/P PTCA (percutaneous transluminal coronary angioplasty) Z98.61       PCP Provider  Allison Dodd NP  Past Medical History:   Diagnosis Date    BPH (benign prostatic hyperplasia)     CAD (coronary artery disease)     2006 - s/p stent     Essential hypertension 12-12-13    Hematuria     sees urologist    Hyperglycemia       Past Surgical History:   Procedure Laterality Date    CARDIAC SURG PROCEDURE UNLIST      stent 2006     Allergies   Allergen Reactions    Lipitor [Atorvastatin] Other (comments)     \"muscle pain\"      No family history on file. Current Outpatient Prescriptions   Medication Sig    metoprolol succinate (TOPROL-XL) 25 mg XL tablet Take 25 mg by mouth daily.  raNITIdine (ZANTAC) 150 mg tablet Take 150 mg by mouth daily.  fish oil-omega-3 fatty acids (FISH OIL) 300-1,000 mg cap Take  by mouth.  CRANBERRY FRUIT EXTRACT (CRANBERRY PO) Take  by mouth. 25,000 per day.     ascorbic acid, vitamin C, (VITAMIN C) 500 mg tablet Take  by mouth.  ferrous sulfate (IRON) 325 mg (65 mg iron) tablet Take  by mouth Daily (before breakfast).  diphenhydrAMINE-acetaminophen (TYLENOL PM EXTRA STRENGTH)  mg tab Take  by mouth. 2 QHS    pravastatin (PRAVACHOL) 20 mg tablet Take 1 Tab by mouth nightly. Lifelong medication to help prevent stroke and heart attack.  clopidogrel (PLAVIX) 75 mg tablet Take  by mouth daily.  HYDROcodone-acetaminophen (NORCO) 5-325 mg per tablet Take 1 Tab by mouth every four (4) hours as needed for Pain. Max Daily Amount: 6 Tabs.  zolpidem (AMBIEN) 5 mg tablet Take 1 Tab by mouth nightly as needed for Sleep. Max Daily Amount: 5 mg.  aspirin delayed-release 81 mg tablet Take 2 Tabs by mouth daily.  pantoprazole (PROTONIX) 40 mg tablet Take 40 mg by mouth daily.  hydrochlorothiazide (HYDRODIURIL) 25 mg tablet Take 25 mg by mouth daily. No current facility-administered medications for this visit. Vitals:    06/30/17 1502 06/30/17 1503   BP: 140/80 150/80   Pulse: 70    Resp: 20    SpO2: 97%    Weight: 225 lb 4.8 oz (102.2 kg)    Height: 5' 9\" (1.753 m)      Social History     Social History    Marital status:      Spouse name: N/A    Number of children: N/A    Years of education: N/A     Occupational History    Not on file. Social History Main Topics    Smoking status: Current Every Day Smoker     Packs/day: 3.00     Years: 53.00    Smokeless tobacco: Not on file    Alcohol use No    Drug use: Not on file    Sexual activity: Not on file     Other Topics Concern    Not on file     Social History Narrative       I have reviewed the nurses notes, vitals, problem list, allergy list, medical history, family, social history and medications. Review of Symptoms:    General: Pt denies excessive weight gain or loss. Pt is able to conduct ADL's  HEENT: Denies blurred vision, headaches, epistaxis and difficulty swallowing.   Respiratory: Denies shortness of breath, +TEIXEIRA, denies wheezing or stridor. Cardiovascular: Denies precordial pain, palpitations, edema or PND  Gastrointestinal: Denies poor appetite, indigestion, abdominal pain or blood in stool. +BRB yesterday  Urinary: Denies dysuria, pyuria  Musculoskeletal: Denies pain or swelling from muscles or joints. +leg tiredness  Neurologic: Denies tremor, paresthesias, or sensory motor disturbance  Skin: Denies rash, itching or texture change. Psych: Denies depression        Physical Exam:      General: Well developed, in no acute distress, cooperative and alert  HEENT: No carotid bruits, no JVD, trach is midline. Neck Supple, PEERL, EOM intact. Heart:  Normal S1/S2 negative S3 or S4. Regular, no murmur, gallop or rub.   Respiratory: diminished BS throughout, no wheezing or rales  Abdomen:   Soft, non-tender, no masses, bowel sounds are active.   Extremities:  No edema, normal cap refill, no cyanosis, atraumatic. Neuro: A&Ox3, speech clear, gait stable. Skin: Skin color is normal. Non diaphoretic  Vascular: 2+ pulses symmetric in all extremities    Cardiographics    ECG: SR, HR 70    Echo-LEFT VENTRICLE: Size was normal. Systolic function was normal. Ejection  fraction was estimated in the range of 55 % to 60 %. No obvious wall  motion abnormalities identified in the views obtained. Wall thickness was  below normal limits. DOPPLER: Left ventricular diastolic function  parameters were normal.    RIGHT VENTRICLE: The size was normal. Systolic function was normal.    LEFT ATRIUM: Size was normal.    RIGHT ATRIUM: Size was normal.    MITRAL VALVE: Normal valve structure. There was normal leaflet separation. DOPPLER: There was no evidence for stenosis. There was no significant  regurgitation. AORTIC VALVE: The valve was trileaflet. Leaflets exhibited normal  thickness and normal cuspal separation. DOPPLER: There was no stenosis. There was no regurgitation. TRICUSPID VALVE: Normal valve structure.  There was normal leaflet  separation. DOPPLER: There was no evidence for tricuspid stenosis. There  was trivial regurgitation. Pulmonary artery systolic pressure was within  the normal range.     Results for orders placed or performed during the hospital encounter of 04/01/17   EKG, 12 LEAD, INITIAL   Result Value Ref Range    Ventricular Rate 81 BPM    Atrial Rate 81 BPM    P-R Interval 174 ms    QRS Duration 76 ms    Q-T Interval 370 ms    QTC Calculation (Bezet) 429 ms    Calculated P Axis 58 degrees    Calculated R Axis 19 degrees    Calculated T Axis 61 degrees    Diagnosis       Normal sinus rhythm  Confirmed by Ke Song ([id number]) on 4/2/2017 11:09:49 PM     Results for orders placed or performed in visit on 12/26/13   CARDIAC HOLTER MONITOR, 24 HOURS    Narrative    ECG Monitor/24 hours, Complete    Reason for Holter Monitor   Near Syncope    Heartbeat    Slowest 44  Average 65  Fastest  105          Results:   Underlying Rhythm: Normal sinus rhythm      Atrial Arrhythmias: premature atrial contractions; rare : short  SVT vs PAF (8 beats)           AV Conduction: normal    Ventricular Arrhythmias: premature ventricular contractions;  occasional     ST Segment Analysis:normal     Symptom Correlation:  No reported    Comment:   Normal sinus rhythm with isolated ectopy and short PAF vs SVT (8  beatsTeretha MD [place]             Cardiology Labs:  Lab Results   Component Value Date/Time    Cholesterol, total 135 04/03/2017 03:57 AM    HDL Cholesterol 27 04/03/2017 03:57 AM    LDL, calculated 58 04/03/2017 03:57 AM    Triglyceride 250 04/03/2017 03:57 AM    CHOL/HDL Ratio 5.0 04/03/2017 03:57 AM       Lab Results   Component Value Date/Time    Sodium 142 04/11/2017 03:07 AM    Potassium 3.8 04/11/2017 03:07 AM    Chloride 109 04/11/2017 03:07 AM    CO2 22 04/11/2017 03:07 AM    Anion gap 11 04/11/2017 03:07 AM    Glucose 93 04/11/2017 03:07 AM    BUN 19 04/11/2017 03:07 AM    Creatinine 1.19 04/11/2017 03:07 AM BUN/Creatinine ratio 16 04/11/2017 03:07 AM    GFR est AA >60 04/11/2017 03:07 AM    GFR est non-AA 60 04/11/2017 03:07 AM    Calcium 8.8 04/11/2017 03:07 AM    AST (SGOT) 9 04/03/2017 03:57 AM    Alk. phosphatase 77 04/03/2017 03:57 AM    Protein, total 6.4 04/03/2017 03:57 AM    Albumin 2.3 04/11/2017 03:07 AM    Globulin 3.4 04/03/2017 03:57 AM    A-G Ratio 0.9 04/03/2017 03:57 AM    ALT (SGPT) 12 04/03/2017 03:57 AM           Assessment:     Assessment:     Marlon was seen today for hospital follow up. Diagnoses and all orders for this visit:    Coronary artery disease involving native coronary artery of native heart without angina pectoris  -     AMB POC EKG ROUTINE W/ 12 LEADS, INTER & REP  -     STRESS TEST LEXISCAN/CARDIOLITE; Future    Dyslipidemia  -     AMB POC EKG ROUTINE W/ 12 LEADS, INTER & REP  -     STRESS TEST LEXISCAN/CARDIOLITE; Future    S/P PTCA (percutaneous transluminal coronary angioplasty)  -     AMB POC EKG ROUTINE W/ 12 LEADS, INTER & REP  -     STRESS TEST LEXISCAN/CARDIOLITE; Future    Myocardial ischemia    Carotid stenosis, right  -     AMB POC EKG ROUTINE W/ 12 LEADS, INTER & REP  -     STRESS TEST LEXISCAN/CARDIOLITE; Future    Tobacco use disorder  -     AMB POC EKG ROUTINE W/ 12 LEADS, INTER & REP  -     STRESS TEST LEXISCAN/CARDIOLITE; Future    PAD (peripheral artery disease) (HCC)  -     AMB POC EKG ROUTINE W/ 12 LEADS, INTER & REP  -     STRESS TEST LEXISCAN/CARDIOLITE; Future    Abdominal aortic aneurysm (AAA) without rupture (HCC)  -     AMB POC EKG ROUTINE W/ 12 LEADS, INTER & REP  -     STRESS TEST LEXISCAN/CARDIOLITE; Future    TEIXEIRA (dyspnea on exertion)    Other orders  -     pravastatin (PRAVACHOL) 20 mg tablet; Take 1 Tab by mouth nightly. Lifelong medication to help prevent stroke and heart attack. ICD-10-CM ICD-9-CM    1.  Coronary artery disease involving native coronary artery of native heart without angina pectoris I25.10 414.01 metoprolol succinate (TOPROL-XL) 25 mg XL tablet      raNITIdine (ZANTAC) 150 mg tablet      fish oil-omega-3 fatty acids (FISH OIL) 300-1,000 mg cap      CRANBERRY FRUIT EXTRACT (CRANBERRY PO)      ascorbic acid, vitamin C, (VITAMIN C) 500 mg tablet      ferrous sulfate (IRON) 325 mg (65 mg iron) tablet      diphenhydrAMINE-acetaminophen (TYLENOL PM EXTRA STRENGTH)  mg tab      AMB POC EKG ROUTINE W/ 12 LEADS, INTER & REP      STRESS TEST LEXISCAN/CARDIOLITE   2. Dyslipidemia E78.5 272.4 metoprolol succinate (TOPROL-XL) 25 mg XL tablet      raNITIdine (ZANTAC) 150 mg tablet      fish oil-omega-3 fatty acids (FISH OIL) 300-1,000 mg cap      CRANBERRY FRUIT EXTRACT (CRANBERRY PO)      ascorbic acid, vitamin C, (VITAMIN C) 500 mg tablet      ferrous sulfate (IRON) 325 mg (65 mg iron) tablet      diphenhydrAMINE-acetaminophen (TYLENOL PM EXTRA STRENGTH)  mg tab      AMB POC EKG ROUTINE W/ 12 LEADS, INTER & REP      STRESS TEST LEXISCAN/CARDIOLITE   3. S/P PTCA (percutaneous transluminal coronary angioplasty) Z98.61 V45.82 metoprolol succinate (TOPROL-XL) 25 mg XL tablet      raNITIdine (ZANTAC) 150 mg tablet      fish oil-omega-3 fatty acids (FISH OIL) 300-1,000 mg cap      CRANBERRY FRUIT EXTRACT (CRANBERRY PO)      ascorbic acid, vitamin C, (VITAMIN C) 500 mg tablet      ferrous sulfate (IRON) 325 mg (65 mg iron) tablet      diphenhydrAMINE-acetaminophen (TYLENOL PM EXTRA STRENGTH)  mg tab      AMB POC EKG ROUTINE W/ 12 LEADS, INTER & REP      STRESS TEST LEXISCAN/CARDIOLITE   4. Myocardial ischemia I25.9 414.8    5.  Carotid stenosis, right I65.21 433.10 metoprolol succinate (TOPROL-XL) 25 mg XL tablet      raNITIdine (ZANTAC) 150 mg tablet      fish oil-omega-3 fatty acids (FISH OIL) 300-1,000 mg cap      CRANBERRY FRUIT EXTRACT (CRANBERRY PO)      ascorbic acid, vitamin C, (VITAMIN C) 500 mg tablet      ferrous sulfate (IRON) 325 mg (65 mg iron) tablet      diphenhydrAMINE-acetaminophen (TYLENOL PM EXTRA STRENGTH)  mg tab      AMB POC EKG ROUTINE W/ 12 LEADS, INTER & REP      STRESS TEST LEXISCAN/CARDIOLITE   6. Tobacco use disorder F17.200 305.1 metoprolol succinate (TOPROL-XL) 25 mg XL tablet      raNITIdine (ZANTAC) 150 mg tablet      fish oil-omega-3 fatty acids (FISH OIL) 300-1,000 mg cap      CRANBERRY FRUIT EXTRACT (CRANBERRY PO)      ascorbic acid, vitamin C, (VITAMIN C) 500 mg tablet      ferrous sulfate (IRON) 325 mg (65 mg iron) tablet      diphenhydrAMINE-acetaminophen (TYLENOL PM EXTRA STRENGTH)  mg tab      AMB POC EKG ROUTINE W/ 12 LEADS, INTER & REP      STRESS TEST LEXISCAN/CARDIOLITE   7. PAD (peripheral artery disease) ([facility]) I73.9 443.9 metoprolol succinate (TOPROL-XL) 25 mg XL tablet      raNITIdine (ZANTAC) 150 mg tablet      fish oil-omega-3 fatty acids (FISH OIL) 300-1,000 mg cap      CRANBERRY FRUIT EXTRACT (CRANBERRY PO)      ascorbic acid, vitamin C, (VITAMIN C) 500 mg tablet      ferrous sulfate (IRON) 325 mg (65 mg iron) tablet      diphenhydrAMINE-acetaminophen (TYLENOL PM EXTRA STRENGTH)  mg tab      AMB POC EKG ROUTINE W/ 12 LEADS, INTER & REP      STRESS TEST LEXISCAN/CARDIOLITE   8. Abdominal aortic aneurysm (AAA) without rupture ([facility]) I71.4 441.4 metoprolol succinate (TOPROL-XL) 25 mg XL tablet      raNITIdine (ZANTAC) 150 mg tablet      fish oil-omega-3 fatty acids (FISH OIL) 300-1,000 mg cap      CRANBERRY FRUIT EXTRACT (CRANBERRY PO)      ascorbic acid, vitamin C, (VITAMIN C) 500 mg tablet      ferrous sulfate (IRON) 325 mg (65 mg iron) tablet      diphenhydrAMINE-acetaminophen (TYLENOL PM EXTRA STRENGTH)  mg tab      AMB POC EKG ROUTINE W/ 12 LEADS, INTER & REP      STRESS TEST LEXISCAN/CARDIOLITE   9.  TEIXEIRA (dyspnea on exertion) R06.09 786.09      Orders Placed This Encounter    AMB POC EKG ROUTINE W/ 12 LEADS, INTER & REP     Order Specific Question:   Reason for Exam:     Answer:   routine    STRESS TEST LEXISCAN/CARDIOLITE     Standing Status:   Future Standing Expiration Date:   12/28/2017     Order Specific Question:   Reason for Exam:     Answer:   high risk stress test,    metoprolol succinate (TOPROL-XL) 25 mg XL tablet     Sig: Take 25 mg by mouth daily.  raNITIdine (ZANTAC) 150 mg tablet     Sig: Take 150 mg by mouth daily.  fish oil-omega-3 fatty acids (FISH OIL) 300-1,000 mg cap     Sig: Take  by mouth.  CRANBERRY FRUIT EXTRACT (CRANBERRY PO)     Sig: Take  by mouth. 25,000 per day.  ascorbic acid, vitamin C, (VITAMIN C) 500 mg tablet     Sig: Take  by mouth.  ferrous sulfate (IRON) 325 mg (65 mg iron) tablet     Sig: Take  by mouth Daily (before breakfast).  diphenhydrAMINE-acetaminophen (TYLENOL PM EXTRA STRENGTH)  mg tab     Sig: Take  by mouth. 2 QHS    pravastatin (PRAVACHOL) 20 mg tablet     Sig: Take 1 Tab by mouth nightly. Lifelong medication to help prevent stroke and heart attack. Dispense:  90 Tab     Refill:  1        Plan:     Patient presents today for f/u after hospitalization for AAA repair and incarcerated hernia. He had episode of BRB yesterday felt d/t hemorrhoid. No black stools. He has some TEIXEIRA at times. He has some leg fatigue. EKG remains SR. BP is normotensive. He had a stress test with high risk ischemia in the inferior wall in 2014 with multiple risk factors. I will evaluate with a lexiscan stress test. He was started on Lipitor and was unable to tolerate d/t muscle aches. Will try pravastatin as lifelong statin is indicated to reduce risk of MI and death with known atherosclerosis in the form of AAA. F/u if abnormal to discuss, otherwise f/u in 6 months.     Sanjuanita Coppola MD

## 2017-07-06 NOTE — TELEPHONE ENCOUNTER
Verified patient with two identifiers. Pt informed of med change and that pravastatin has been called in to his pharmacy. Pt verbalized understanding and he will keep us posted on any side effects.

## 2018-04-27 ENCOUNTER — HOSPITAL ENCOUNTER (OUTPATIENT)
Dept: VASCULAR SURGERY | Age: 73
Discharge: HOME | End: 2018-04-27
Payer: MEDICARE

## 2018-04-27 DIAGNOSIS — I25.10 ATHEROSCLEROTIC HEART DISEASE: ICD-10-CM

## 2018-04-27 DIAGNOSIS — R42 VERTIGO: ICD-10-CM

## 2018-04-27 DIAGNOSIS — N18.30 CHRONIC KIDNEY DISEASE, STAGE III (MODERATE) (HCC): ICD-10-CM

## 2018-04-27 DIAGNOSIS — I10 ESSENTIAL HYPERTENSION, MALIGNANT: ICD-10-CM

## 2018-04-27 PROCEDURE — 93880 EXTRACRANIAL BILAT STUDY: CPT

## 2018-04-27 NOTE — PROCEDURES
[facility]  *** FINAL REPORT ***    Name: Sha Tomas  MRN: [id number]    Outpatient  : 13 Aug 1945  HIS Order #: [id number]  [address] [facility] Visit #: [id number]  Date: 2018    TYPE OF TEST: Cerebrovascular Duplex    REASON FOR TEST  Dizziness/vertigo    Right Carotid:-             Proximal               Mid                 Distal  cm/s  Systolic  Diastolic  Systolic  Diastolic  Systolic  Diastolic  CCA:     46.0       4.0                            28.0      11.0  Bulb:  ECA:    389.0      40.0  ICA:      0.0                  0.0                  0.0  ICA/CCA:  0.0    ICA Stenosis: Occluded    Right Vertebral:-  Finding: Antegrade  Sys:       45.0  [place]:        0.0    Right Subclavian: Normal    Left Carotid:-            Proximal                Mid                 Distal  cm/s  Systolic  Diastolic  Systolic  Diastolic  Systolic  Diastolic  CCA:     22.6      22.0                            78.0      23.0  Bulb:  ECA:    141.0       9.0  ICA:     45.0      28.0       82.0      30.0       77.0      30.0  ICA/CCA:  0.6       1.2    ICA Stenosis: <50%    Left Vertebral:-  Finding: Antegrade  Sys:       30.0  [place]:       13.0    Left Subclavian: Normal    INTERPRETATION/FINDINGS  PROCEDURE:  Carotid Duplex Examination using B-mode, color and  spectral Doppler of the extracranial cerebrovascular arteries. 1. Probable occlusion of the right internal carotid artery, cannot  exclude extremely low flow. 2. <50% stenosis in the left internal carotid artery. 3. Probable hemodynamically significant stenosis in the right external   carotid artery. 4. No significant stenosis in the left external carotid artery. 5. Antegrade flow in both vertebral arteries. 6. Normal flow in both subclavian arteries. Plaque Morphology:  1. Heterogeneous plaque in the bulb and right ICA. 2. Heterogeneous plaque in the bulb and left ICA.     ADDITIONAL COMMENTS    Retrospective Comparison:  There has been no significant increase in  the degree of stenosis in both internal carotid arteries compared to  the previous exam 4/2/17. I have personally reviewed the data relevant to the interpretation of  this  study.     TECHNOLOGIST: Jae Preston RVT  Signed: 04/27/2018 01:16 PM    PHYSICIAN: Kim Cervantes MD  Signed: 05/02/2018 07:22 AM

## 2018-07-05 NOTE — PROGRESS NOTES
[address]  [phone]      Cardiology Progress Note      4/11/2017 10:10 AM    Admit Date: 4/1/2017    Admit Diagnosis:   AAA (abdominal aortic aneurysm) (HCC)  AAA  GI bleed    Subjective: Marlon Leonardo c/o soreness, but improving. BP remains elevated, increasing BB today.     Visit Vitals    /80    Pulse 89    Temp 99.4 °F (37.4 °C)    Resp 24    Ht 5' 9\" (1.753 m)    Wt 111.7 kg (246 lb 4.1 oz)    SpO2 95%    BMI 36.37 kg/m2       Current Facility-Administered Medications   Medication Dose Route Frequency    bisacodyl (DULCOLAX) suppository 10 mg  10 mg Rectal DAILY PRN    simethicone (MYLICON) tablet 80 mg  80 mg Oral QID PRN    EPINEPHrine (ADRENALIN) 0.1 mg/mL 0.1 mg/mL syringe        sodium bicarbonate 8.4 % (1 mEq/mL) injection        metoprolol tartrate (LOPRESSOR) tablet 25 mg  25 mg Oral ONCE    metoprolol tartrate (LOPRESSOR) tablet 50 mg  50 mg Oral Q12H    pantoprazole (PROTONIX) tablet 40 mg  40 mg Oral ACB    atorvastatin (LIPITOR) tablet 20 mg  20 mg Oral DAILY    acetaminophen (TYLENOL) tablet 650 mg  650 mg Oral Q4H PRN    fentaNYL citrate (PF) injection 25-50 mcg  25-50 mcg IntraVENous Q2.5H PRN    artificial saliva (MOUTH KOTE) 1 Spray  1 Spray Oral PRN    sodium chloride (NS) flush 5-10 mL  5-10 mL IntraVENous Q8H    sodium chloride (NS) flush 5-10 mL  5-10 mL IntraVENous PRN    LORazepam (ATIVAN) injection 1 mg  1 mg IntraVENous Q6H PRN    ondansetron (ZOFRAN) injection 4 mg  4 mg IntraVENous Q6H PRN    0.45% sodium chloride with KCl 20 mEq/L infusion   IntraVENous CONTINUOUS    sodium chloride 0.9 % bolus infusion 1,000 mL  1,000 mL IntraVENous ONCE PRN    albuterol-ipratropium (DUO-NEB) 2.5 MG-0.5 MG/3 ML  3 mL Nebulization TID RT    nicotine (NICODERM CQ) 21 mg/24 hr patch 1 Patch  1 Patch TransDERmal DAILY       Objective:      Physical Exam:  General Appearance:  WNWD  male in no acute distress  Chest:   diminished, but clear  Cardiovascular:  Regular rate and rhythm, no murmur.   Abdomen:   Dressing D/I, Soft, non-tender, hypoactive BS   Extremities: no peripheral edema  Skin:  Warm and dry.     Data Review:   Recent Labs      04/11/17   0307  04/10/17   0332   WBC  5.5  7.0   HGB  9.3*  9.1*   HCT  27.3*  27.4*   PLT  205  194     Recent Labs      04/11/17   0307  04/10/17   0332   NA  142  141   K  3.8  4.2   CL  109*  110*   CO2  22  19*   GLU  93  84   BUN  19  23*   CREA  1.19  1.41*   CA  8.8  8.6   MG  2.0   --    PHOS  2.6   --    ALB  2.3*   --        No results for input(s): TROIQ, CPK, CKMB in the last 72 hours. Intake/Output Summary (Last 24 hours) at 04/11/17 1010  Last data filed at 04/11/17 0900   Gross per 24 hour   Intake              800 ml   Output             1075 ml   Net             -275 ml        Telemetry: SR    Assessment:     Active Problems:    CAD (coronary artery disease) ()      Overview: 2006 - s/p stent       Dyslipidemia (1/25/2014)      Tobacco use disorder (1/25/2014)      AAA (abdominal aortic aneurysm) ([facility] [address] (4/1/2017)      [facility] (4/2/2017)      Carotid stenosis, right (4/2/2017)      S/P PTCA (percutaneous transluminal coronary angioplasty) (4/2/2017)      Overview: 2006 stent ? Vessel and type of stent        Plan:     Very large AAA, h/o CAD:  Doing well s/p open repair. Tolerated surgery well. BP continues to be elevated, increasing metoprolol to 50mg BID   LDL is low, but he will benefit from lifelong statin. Start Lipitor 20 mg daily. Preoperative echocardiogram was normal  Recommend ASA 81 mg daily at discharge if safe from a surgical/ GI standpoint     F/u with me 2-4 weeks after discharge (added to DC instructions).     no

## 2019-07-08 ENCOUNTER — HOSPITAL ENCOUNTER (OUTPATIENT)
Age: 74
Setting detail: OBSERVATION
Discharge: HOME | End: 2019-07-09
Attending: EMERGENCY MEDICINE | Admitting: INTERNAL MEDICINE
Payer: MEDICARE

## 2019-07-08 ENCOUNTER — APPOINTMENT (OUTPATIENT)
Dept: GENERAL RADIOLOGY | Age: 74
End: 2019-07-08
Attending: EMERGENCY MEDICINE
Payer: MEDICARE

## 2019-07-08 ENCOUNTER — ANESTHESIA EVENT (OUTPATIENT)
Dept: ENDOSCOPY | Age: 74
End: 2019-07-08
Payer: MEDICARE

## 2019-07-08 DIAGNOSIS — K92.2 LOWER GI BLEEDING: Primary | ICD-10-CM

## 2019-07-08 PROBLEM — R19.5 DARK STOOLS: Status: ACTIVE | Noted: 2019-07-08

## 2019-07-08 LAB
ABO + RH BLD: NORMAL
ALBUMIN SERPL-MCNC: 3.3 G/DL (ref 3.5–5)
ALBUMIN/GLOB SERPL: 0.8 {RATIO} (ref 1.1–2.2)
ALP SERPL-CCNC: 134 U/L (ref 45–117)
ALT SERPL-CCNC: 17 U/L (ref 12–78)
ANION GAP SERPL CALC-SCNC: 3 MMOL/L (ref 5–15)
APTT PPP: 25.7 SEC (ref 22.1–32)
AST SERPL-CCNC: 16 U/L (ref 15–37)
BASOPHILS # BLD: 0.1 K/UL (ref 0–0.1)
BASOPHILS NFR BLD: 1 % (ref 0–1)
BILIRUB SERPL-MCNC: 0.5 MG/DL (ref 0.2–1)
BLOOD GROUP ANTIBODIES SERPL: NORMAL
BUN SERPL-MCNC: 28 MG/DL (ref 6–20)
BUN/CREAT SERPL: 18 (ref 12–20)
CALCIUM SERPL-MCNC: 8.6 MG/DL (ref 8.5–10.1)
CHLORIDE SERPL-SCNC: 111 MMOL/L (ref 97–108)
CO2 SERPL-SCNC: 26 MMOL/L (ref 21–32)
CREAT SERPL-MCNC: 1.54 MG/DL (ref 0.7–1.3)
DIFFERENTIAL METHOD BLD: ABNORMAL
EOSINOPHIL # BLD: 0.3 K/UL (ref 0–0.4)
EOSINOPHIL NFR BLD: 4 % (ref 0–7)
ERYTHROCYTE [DISTWIDTH] IN BLOOD BY AUTOMATED COUNT: 14.6 % (ref 11.5–14.5)
GLOBULIN SER CALC-MCNC: 3.9 G/DL (ref 2–4)
GLUCOSE SERPL-MCNC: 107 MG/DL (ref 65–100)
HCT VFR BLD AUTO: 33.7 % (ref 36.6–50.3)
HEMOCCULT STL QL: POSITIVE
HGB BLD-MCNC: 10.8 G/DL (ref 12.1–17)
IMM GRANULOCYTES # BLD AUTO: 0 K/UL (ref 0–0.04)
IMM GRANULOCYTES NFR BLD AUTO: 0 % (ref 0–0.5)
INR PPP: 1 (ref 0.9–1.1)
LYMPHOCYTES # BLD: 1.9 K/UL (ref 0.8–3.5)
LYMPHOCYTES NFR BLD: 24 % (ref 12–49)
MCH RBC QN AUTO: 31.1 PG (ref 26–34)
MCHC RBC AUTO-ENTMCNC: 32 G/DL (ref 30–36.5)
MCV RBC AUTO: 97.1 FL (ref 80–99)
MONOCYTES # BLD: 0.6 K/UL (ref 0–1)
MONOCYTES NFR BLD: 8 % (ref 5–13)
NEUTS SEG # BLD: 4.9 K/UL (ref 1.8–8)
NEUTS SEG NFR BLD: 63 % (ref 32–75)
NRBC # BLD: 0 K/UL (ref 0–0.01)
NRBC BLD-RTO: 0 PER 100 WBC
PLATELET # BLD AUTO: 239 K/UL (ref 150–400)
PMV BLD AUTO: 10.4 FL (ref 8.9–12.9)
POTASSIUM SERPL-SCNC: 4.8 MMOL/L (ref 3.5–5.1)
PROT SERPL-MCNC: 7.2 G/DL (ref 6.4–8.2)
PROTHROMBIN TIME: 10.5 SEC (ref 9–11.1)
RBC # BLD AUTO: 3.47 M/UL (ref 4.1–5.7)
SODIUM SERPL-SCNC: 140 MMOL/L (ref 136–145)
SPECIMEN EXP DATE BLD: NORMAL
THERAPEUTIC RANGE,PTTT: NORMAL SECS (ref 58–77)
WBC # BLD AUTO: 7.9 K/UL (ref 4.1–11.1)

## 2019-07-08 PROCEDURE — 96374 THER/PROPH/DIAG INJ IV PUSH: CPT

## 2019-07-08 PROCEDURE — 74011250637 HC RX REV CODE- 250/637: Performed by: INTERNAL MEDICINE

## 2019-07-08 PROCEDURE — 65390000012 HC CONDITION CODE 44 OBSERVATION

## 2019-07-08 PROCEDURE — 85610 PROTHROMBIN TIME: CPT

## 2019-07-08 PROCEDURE — 65660000000 HC RM CCU STEPDOWN

## 2019-07-08 PROCEDURE — 74011250636 HC RX REV CODE- 250/636: Performed by: EMERGENCY MEDICINE

## 2019-07-08 PROCEDURE — 82272 OCCULT BLD FECES 1-3 TESTS: CPT

## 2019-07-08 PROCEDURE — 85730 THROMBOPLASTIN TIME PARTIAL: CPT

## 2019-07-08 PROCEDURE — 99285 EMERGENCY DEPT VISIT HI MDM: CPT

## 2019-07-08 PROCEDURE — 85025 COMPLETE CBC W/AUTO DIFF WBC: CPT

## 2019-07-08 PROCEDURE — C9113 INJ PANTOPRAZOLE SODIUM, VIA: HCPCS | Performed by: EMERGENCY MEDICINE

## 2019-07-08 PROCEDURE — 86900 BLOOD TYPING SEROLOGIC ABO: CPT

## 2019-07-08 PROCEDURE — 96361 HYDRATE IV INFUSION ADD-ON: CPT

## 2019-07-08 PROCEDURE — C9113 INJ PANTOPRAZOLE SODIUM, VIA: HCPCS | Performed by: NURSE PRACTITIONER

## 2019-07-08 PROCEDURE — 74011000250 HC RX REV CODE- 250: Performed by: NURSE PRACTITIONER

## 2019-07-08 PROCEDURE — 74011250636 HC RX REV CODE- 250/636: Performed by: INTERNAL MEDICINE

## 2019-07-08 PROCEDURE — 80053 COMPREHEN METABOLIC PANEL: CPT

## 2019-07-08 PROCEDURE — 93005 ELECTROCARDIOGRAM TRACING: CPT

## 2019-07-08 PROCEDURE — 74011250636 HC RX REV CODE- 250/636: Performed by: NURSE PRACTITIONER

## 2019-07-08 PROCEDURE — 71045 X-RAY EXAM CHEST 1 VIEW: CPT

## 2019-07-08 PROCEDURE — 77030032490 HC SLV COMPR SCD KNE COVD -B

## 2019-07-08 PROCEDURE — 36415 COLL VENOUS BLD VENIPUNCTURE: CPT

## 2019-07-08 RX ORDER — MORPHINE SULFATE 10 MG/ML
4 INJECTION, SOLUTION INTRAMUSCULAR; INTRAVENOUS
Status: DISCONTINUED | OUTPATIENT
Start: 2019-07-08 | End: 2019-07-09 | Stop reason: HOSPADM

## 2019-07-08 RX ORDER — TAMSULOSIN HYDROCHLORIDE 0.4 MG/1
0.4 CAPSULE ORAL EVERY EVENING
COMMUNITY
End: 2020-02-08

## 2019-07-08 RX ORDER — SODIUM BICARBONATE 650 MG/1
650 TABLET ORAL 2 TIMES DAILY
Status: CANCELLED | OUTPATIENT
Start: 2019-07-08

## 2019-07-08 RX ORDER — ONDANSETRON 2 MG/ML
4 INJECTION INTRAMUSCULAR; INTRAVENOUS
Status: DISCONTINUED | OUTPATIENT
Start: 2019-07-08 | End: 2019-07-09 | Stop reason: SDUPTHER

## 2019-07-08 RX ORDER — ASPIRIN 81 MG/1
81 TABLET ORAL EVERY EVENING
Status: ON HOLD | COMMUNITY
End: 2019-09-16

## 2019-07-08 RX ORDER — PANTOPRAZOLE SODIUM 40 MG/10ML
40 INJECTION, POWDER, LYOPHILIZED, FOR SOLUTION INTRAVENOUS
Status: COMPLETED | OUTPATIENT
Start: 2019-07-08 | End: 2019-07-08

## 2019-07-08 RX ORDER — AMLODIPINE BESYLATE 5 MG/1
5 TABLET ORAL DAILY
Status: ON HOLD | COMMUNITY
End: 2019-09-16

## 2019-07-08 RX ORDER — IBUPROFEN 200 MG
1 TABLET ORAL EVERY 24 HOURS
Status: DISCONTINUED | OUTPATIENT
Start: 2019-07-08 | End: 2019-07-09 | Stop reason: HOSPADM

## 2019-07-08 RX ORDER — SODIUM CHLORIDE 9 MG/ML
100 INJECTION, SOLUTION INTRAVENOUS CONTINUOUS
Status: DISCONTINUED | OUTPATIENT
Start: 2019-07-08 | End: 2019-07-09 | Stop reason: HOSPADM

## 2019-07-08 RX ORDER — ONDANSETRON 2 MG/ML
4 INJECTION INTRAMUSCULAR; INTRAVENOUS
Status: DISCONTINUED | OUTPATIENT
Start: 2019-07-08 | End: 2019-07-09 | Stop reason: HOSPADM

## 2019-07-08 RX ORDER — METOPROLOL SUCCINATE 50 MG/1
25 TABLET, EXTENDED RELEASE ORAL DAILY
Status: CANCELLED | OUTPATIENT
Start: 2019-07-09

## 2019-07-08 RX ORDER — TAMSULOSIN HYDROCHLORIDE 0.4 MG/1
0.4 CAPSULE ORAL EVERY EVENING
Status: CANCELLED | OUTPATIENT
Start: 2019-07-08

## 2019-07-08 RX ORDER — SODIUM BICARBONATE 650 MG/1
650 TABLET ORAL 2 TIMES DAILY
COMMUNITY

## 2019-07-08 RX ORDER — ACETAMINOPHEN 325 MG/1
650 TABLET ORAL
Status: DISCONTINUED | OUTPATIENT
Start: 2019-07-08 | End: 2019-07-09 | Stop reason: HOSPADM

## 2019-07-08 RX ADMIN — SODIUM CHLORIDE 100 ML/HR: 900 INJECTION, SOLUTION INTRAVENOUS at 15:54

## 2019-07-08 RX ADMIN — SODIUM CHLORIDE 500 ML: 900 INJECTION, SOLUTION INTRAVENOUS at 12:52

## 2019-07-08 RX ADMIN — PANTOPRAZOLE SODIUM 40 MG: 40 INJECTION, POWDER, FOR SOLUTION INTRAVENOUS at 12:52

## 2019-07-08 RX ADMIN — PANTOPRAZOLE SODIUM 40 MG: 40 INJECTION, POWDER, FOR SOLUTION INTRAVENOUS at 21:53

## 2019-07-08 NOTE — PROGRESS NOTES
Reason for Admission:   Lower GI bleed                   RRAT Score:          10 low risk           Plan for utilizing home health:      TBD                    Current Advanced Directive/Advance Care Plan: none                         Transition of Care Plan:           1. Patient in ED bed waiting for inpatient admission  2. Patient will need 2nd IM letter at discharge  3. Patient prefers to discharge home with family assistance and follow up appointments. Patient is a 74year old male admitted 7/8 for lower GI bleed. Patient alert and oriented, resting in bed with daughters present in room. Demographic  Information verified and correct. Insurance information verified and correct. Patient lives with his wife and granddaughter, no home oxygen, no DME and has used home health in the past.  Patient uses [facility]  Patient states he is independent with ADLs and can drive. Patient's wife can transport at discharge    Care Management Interventions  PCP Verified by CM: Yes(Dannette Donis Lennox NP)  Mode of Transport at Discharge:  Other (see comment)(pt's daughters can transport at [employer])  Transition of Care Consult (CM Consult): Discharge Planning  Discharge Durable Medical Equipment: No  Physical Therapy Consult: No  Occupational Therapy Consult: No  Speech Therapy Consult: No  Current Support Network: Lives with Spouse, Own Home(lives with wife and granddaughter, 1 story house, 2 steps to enter)  Confirm Follow Up Transport: Family  Plan discussed with Pt/Family/Caregiver: Yes  Discharge Location  Discharge Placement: [address], RN, 24 [facility]  [phone]

## 2019-07-08 NOTE — PROGRESS NOTES
Pharmacy Clarification of Prior to Admission Medication Regimen     The patient was interviewed regarding clarification of the prior to admission medication regimen. The patients wife was present in room and obtained permission from patient to discuss drug regimen with visitor(s) present. The patient was questioned regarding use of any other inhalers, topical products, over the counter medications, herbal medications, vitamin products or ophthalmic/nasal/otic medication use. Patient provided prescription bottles to T to reference during interview. Information Obtained From: Prescription bottles, RX Query, Patient, Patients Wife    Pertinent Pharmacy Findings:  Updated patient?s preferred outpatient pharmacy to: [address] [place] [address] medication list was corrected to the following:     Prior to Admission Medications   Prescriptions Last Dose Informant Patient Reported? Taking? amLODIPine (NORVASC) 5 mg tablet 7/8/2019 at Unknown time Self Yes Yes   Sig: Take 5 mg by mouth daily. aspirin delayed-release 81 mg tablet 7/7/2019 at Unknown time Self Yes Yes   Sig: Take 81 mg by mouth every evening. clopidogrel (PLAVIX) 75 mg tablet 7/8/2019 at Unknown time Self Yes Yes   Sig: Take 75 mg by mouth daily. diphenhydrAMINE-acetaminophen (TYLENOL PM EXTRA STRENGTH)  mg tab 7/7/2019 at Unknown time Self Yes Yes   Sig: Take 2 Tabs by mouth nightly. metoprolol succinate (TOPROL-XL) 25 mg XL tablet 7/8/2019 at Unknown time Self Yes Yes   Sig: Take 25 mg by mouth daily. raNITIdine (ZANTAC) 150 mg tablet 7/7/2019 at Unknown time Self Yes Yes   Sig: Take 150 mg by mouth every evening.   sodium bicarbonate 650 mg tablet 7/8/2019 at Unknown time Self Yes Yes   Sig: Take 650 mg by mouth two (2) times a day. tamsulosin (FLOMAX) 0.4 mg capsule 7/7/2019 at Unknown time Self Yes Yes   Sig: Take 0.4 mg by mouth every evening.       Facility-Administered Medications: None          Thank you,  Jenna Yu CPhT  Medication History Pharmacy Technician

## 2019-07-08 NOTE — CONSULTS
[address]  [address]              GI CONSULTATION NOTE   Jason French, ANGELICA  [phone] NP in-hospital cell phone M-F until 4:30  After 5pm or on weekends, please call  for physician on call    NAME: Lesley Pitts   :  1945   MRN:  [id number]   Attending: Dr. Cathalene Fleischer  PCP: Dr. Farhana Pinon  Primary GI: Juan Diego Barrett MD, covered by Dr. Gamal Tse for today    Date/Time:  2019 2:17 PM  Assessment:   -GI bleed, likely upper bleed  · Melena for past 5 days  · Elevated BUN and creat, decreased eGFR  · Stool occult +  -Anemia, 10.8 & 33.7 on admission  · plt WNL, INR 1.0  -On anticoagulation, last dose plavix this am  Plan:   -Plan for EGD tomorrow with Dr. Kumar Maher at 12:30  -Can have full liquids for now, needs to be NPO after midnight.  -Supportive measures as clinically indicated. Monitor for S&S of GI bleed. -CBC as clinically indicated. Goal hemoglobin >7.  -PPI 40mg IV BID, when tolerating diet can switch to PO  -Will continue to follow. Thank you! *Plan discussed with Dr. Gamal Tse  Subjective:     HISTORY OF PRESENT ILLNESS:     Lesley Pitts is an 74 y.o.  male who we are asked to see for complaint of GI bleed. Has been having melena since last Thursday. Came to ER because was feeling much weaker and stool became runny. Has some RLQ pain, described as crampy, intermittent, in past has had in on the left side. At that time was noted to have an aneurysm. No nausea or vomiting. No pain or trouble swallowing. No NSAID use, only takes tylenol as needed. In past has taken an aspirin daily, has stopped fairly recently. Baseline bowel movements are daily to twice daily, soft formed and easy to pass. No hematochezia, only melena. Has acid reflux, currently taking ranitidine nightly, had pretty good control with the ranitidine. No h/o of GI illnesses/cancers. Last EGD .  Last colonoscopy within the last 5-10 year, noted to be normal, denies h/o colon polyps. Last dose of Plavix this am.    [facility] Procedural History  -04/2017 EGD with Dr. Trina Farley for melena/hematochezia, noted to have minimal distal esophagitis, normal gastric mucosa, small duodenal diverticulum in second portion of duodenum (immediately distal to and opposite the ampulla), no active bleeding or hematin noted   Past Medical History:   Diagnosis Date    BPH (benign prostatic hyperplasia)     CAD (coronary artery disease)     2006 - s/p stent     Essential hypertension 12-12-13    Hematuria     sees urologist    Hyperglycemia       Past Surgical History:   Procedure Laterality Date    CARDIAC SURG PROCEDURE UNLIST      stent 2006     Social History     Tobacco Use    Smoking status: Current Every Day Smoker     Packs/day: 3.00     Years: 53.00     Pack years: 159.00   Substance Use Topics    Alcohol use: No      No family history on file. Allergies   Allergen Reactions    Lipitor [Atorvastatin] Other (comments)     \"muscle pain\"      Prior to Admission medications    Medication Sig Start Date End Date Taking? Authorizing Provider   aspirin delayed-release 81 mg tablet Take 81 mg by mouth every evening. Yes Provider, Historical   amLODIPine (NORVASC) 5 mg tablet Take 5 mg by mouth daily. Yes Provider, Historical   tamsulosin (FLOMAX) 0.4 mg capsule Take 0.4 mg by mouth every evening. Yes Provider, Historical   sodium bicarbonate 650 mg tablet Take 650 mg by mouth two (2) times a day. Yes Provider, Historical   metoprolol succinate (TOPROL-XL) 25 mg XL tablet Take 25 mg by mouth daily. 5/24/17  Yes Provider, Historical   raNITIdine (ZANTAC) 150 mg tablet Take 150 mg by mouth every evening. Yes Provider, Historical   diphenhydrAMINE-acetaminophen (TYLENOL PM EXTRA STRENGTH)  mg tab Take 2 Tabs by mouth nightly. Yes Provider, Historical   clopidogrel (PLAVIX) 75 mg tablet Take 75 mg by mouth daily.    Yes Provider, Historical       Patient Active Problem List   Diagnosis Code    Hyperglycemia R73.9    CAD (coronary artery disease) I25.10    Dyslipidemia E78.5    Tobacco use disorder F17.200    Atherosclerosis of native arteries of the extremities with intermittent claudication I70.219    Cerebrovascular disease I67.9    AAA (abdominal aortic aneurysm) ([facility]) I71.4    PAD (peripheral artery disease) ([facility]) I73.9    Carotid stenosis, right I65.21    S/P PTCA (percutaneous transluminal coronary angioplasty) Z98.61       REVIEW OF SYSTEMS:    Constitutional: negative fever, negative chills, negative weight loss  Eyes:   negative visual changes  ENT:   negative sore throat, tongue or lip swelling   Respiratory:  negative cough, negative dyspnea  Cards:  negative for chest pain, palpitations, lower extremity edema  GI:   See HPI  :  negative for frequency, dysuria  Integument:  negative for rash and pruritus  Heme:  negative for easy bruising and gum/nose bleeding  Musculoskel: negative for myalgias,  back pain and muscle weakness  Neuro: negative for headaches, dizziness, vertigo  Psych: negative for feelings of anxiety, depression     Pertinent Positives: melena, abdominal pain      Objective:   VITALS:    Visit Vitals  /76   Pulse (!) 59   Temp 97.6 °F (36.4 °C)   Resp 18   Ht 5' 9\" (1.753 m)   Wt 103.1 kg (227 lb 4.7 oz)   SpO2 99%   BMI 33.57 kg/m²       PHYSICAL EXAM:   General:          Alert, WD, WN, cooperative, no distress, appears stated age. Head:               Normocephalic, without obvious abnormality, atraumatic. Eyes:               Conjunctivae clear and pale, anicteric sclerae. Pupils are equal  Nose:               Nares normal. No drainage or sinus tenderness. Throat:             Lips, mucosa, and tongue normal.  No Thrush  Neck:               Supple, symmetrical,  no adenopathy, thyroid: non tender  Back:               Symmetric,  No CVA tenderness. Lungs:             CTA bilaterally. No wheezing/rhonchi/rales.   Chest wall:      No tenderness or deformity. No Accessory muscle use. Heart:              Regular rate and rhythm,  no murmur, rub or gallop. Abdomen:        Soft, non-tender. Not distended. Bowel sounds normal. No masses  Extremities:     Atraumatic, No cyanosis. No edema. No clubbing  Skin:                Texture, turgor normal. No rashes/lesions/jaundice  Lymph:            Cervical, supraclavicular normal.  Psych:             Good insight. Not depressed. Not anxious or agitated. Neurologic:      EOMs intact. No facial asymmetry. No aphasia or slurred speech. Normal strength, A/O X 3. LAB DATA REVIEWED:    Recent Results (from the past 24 hour(s))   CBC WITH AUTOMATED DIFF    Collection Time: 07/08/19 12:02 PM   Result Value Ref Range    WBC 7.9 4.1 - 11.1 K/uL    RBC 3.47 (L) 4.10 - 5.70 M/uL    HGB 10.8 (L) 12.1 - 17.0 g/dL    HCT 33.7 (L) 36.6 - 50.3 %    MCV 97.1 80.0 - 99.0 FL    MCH 31.1 26.0 - 34.0 PG    MCHC 32.0 30.0 - 36.5 g/dL    RDW 14.6 (H) 11.5 - 14.5 %    PLATELET 555 349 - 298 K/uL    MPV 10.4 8.9 - 12.9 FL    NRBC 0.0 0  WBC    ABSOLUTE NRBC 0.00 0.00 - 0.01 K/uL    NEUTROPHILS 63 32 - 75 %    LYMPHOCYTES 24 12 - 49 %    MONOCYTES 8 5 - 13 %    EOSINOPHILS 4 0 - 7 %    BASOPHILS 1 0 - 1 %    IMMATURE GRANULOCYTES 0 0.0 - 0.5 %    ABS. NEUTROPHILS 4.9 1.8 - 8.0 K/UL    ABS. LYMPHOCYTES 1.9 0.8 - 3.5 K/UL    ABS. MONOCYTES 0.6 0.0 - 1.0 K/UL    ABS. EOSINOPHILS 0.3 0.0 - 0.4 K/UL    ABS. BASOPHILS 0.1 0.0 - 0.1 K/UL    ABS. IMM.  GRANS. 0.0 0.00 - 0.04 K/UL    DF AUTOMATED     METABOLIC PANEL, COMPREHENSIVE    Collection Time: 07/08/19 12:02 PM   Result Value Ref Range    Sodium 140 136 - 145 mmol/L    Potassium 4.8 3.5 - 5.1 mmol/L    Chloride 111 (H) 97 - 108 mmol/L    CO2 26 21 - 32 mmol/L    Anion gap 3 (L) 5 - 15 mmol/L    Glucose 107 (H) 65 - 100 mg/dL    BUN 28 (H) 6 - 20 MG/DL    Creatinine 1.54 (H) 0.70 - 1.30 MG/DL    BUN/Creatinine ratio 18 12 - 20      GFR est AA 54 (L) >60 ml/min/1.73m2    GFR est non-AA 44 (L) >60 ml/min/1.73m2    Calcium 8.6 8.5 - 10.1 MG/DL    Bilirubin, total 0.5 0.2 - 1.0 MG/DL    ALT (SGPT) 17 12 - 78 U/L    AST (SGOT) 16 15 - 37 U/L    Alk.  phosphatase 134 (H) 45 - 117 U/L    Protein, total 7.2 6.4 - 8.2 g/dL    Albumin 3.3 (L) 3.5 - 5.0 g/dL    Globulin 3.9 2.0 - 4.0 g/dL    A-G Ratio 0.8 (L) 1.1 - 2.2     PTT    Collection Time: 07/08/19 12:02 PM   Result Value Ref Range    aPTT 25.7 22.1 - 32.0 sec    aPTT, therapeutic range     58.0 - 77.0 SECS   PROTHROMBIN TIME + INR    Collection Time: 07/08/19 12:02 PM   Result Value Ref Range    INR 1.0 0.9 - 1.1      Prothrombin time 10.5 9.0 - 11.1 sec   TYPE & SCREEN    Collection Time: 07/08/19 12:03 PM   Result Value Ref Range    Crossmatch Expiration 07/11/2019     ABO/Rh(D) O NEGATIVE     Antibody screen NEG    OCCULT BLOOD, STOOL    Collection Time: 07/08/19 12:44 PM   Result Value Ref Range    Occult blood, stool POSITIVE (A) NEG     EKG, 12 LEAD, INITIAL    Collection Time: 07/08/19 12:49 PM   Result Value Ref Range    Ventricular Rate 55 BPM    Atrial Rate 55 BPM    P-R Interval 168 ms    QRS Duration 84 ms    Q-T Interval 442 ms    QTC Calculation (Bezet) 422 ms    Calculated P Axis 15 degrees    Calculated R Axis 30 degrees    Calculated T Axis 57 degrees    Diagnosis       Sinus bradycardia  When compared with ECG of 01-APR-2017 23:09,  Criteria for Septal infarct are no longer present         IMAGING RESULTS:  I have personally reviewed the imaging reports      Total time spent with patient: 50 minutes ________________________________________________________________________  Care Plan discussed with:  Patient Y   Family  Y   RN               Consultant:       CT  7/8/2019:  ________________________________________________________________________    ___________________________________________________  Consulting Provider: Mary James NP      7/8/2019  2:17 PM

## 2019-07-08 NOTE — ED PROVIDER NOTES
EMERGENCY DEPARTMENT HISTORY AND PHYSICAL EXAM      Date: 7/8/2019  Patient Name: Dione Mendes    History of Presenting Illness     Chief Complaint   Patient presents with    Rectal Bleeding       History Provided By: Patient and Patient's Wife    HPI: Dione Mendes, 74 y.o. male with PMHx significant for gastrointestinal bleed, aortic aneurysm status post repair, known CAD, presents to the ED with cc of dark black stools and generalized weakness over the last 3 days. Patient reports having 3-4 episodes of day of the bleeding. She he is having no associated abdominal pain. He reports feeling weak but is not had any loss of consciousness, chest pain, shortness of breath, or any other associated symptoms. He takes both Plavix and aspirin for his blood thinners. He reports seeing Dr. Corinne Saa for GI 2 years ago for similar symptoms. That was his last endoscopy. No other associated symptoms. No other exacerbating or militating factors. There are no other complaints, changes, or physical findings at this time. PCP: Gabe Mack NP    No current facility-administered medications on file prior to encounter. Current Outpatient Medications on File Prior to Encounter   Medication Sig Dispense Refill    aspirin delayed-release 81 mg tablet Take 81 mg by mouth every evening.  amLODIPine (NORVASC) 5 mg tablet Take 5 mg by mouth daily.  tamsulosin (FLOMAX) 0.4 mg capsule Take 0.4 mg by mouth every evening.  sodium bicarbonate 650 mg tablet Take 650 mg by mouth two (2) times a day.  metoprolol succinate (TOPROL-XL) 25 mg XL tablet Take 25 mg by mouth daily.  raNITIdine (ZANTAC) 150 mg tablet Take 150 mg by mouth every evening.  diphenhydrAMINE-acetaminophen (TYLENOL PM EXTRA STRENGTH)  mg tab Take 2 Tabs by mouth nightly.  clopidogrel (PLAVIX) 75 mg tablet Take 75 mg by mouth daily.          Past History     Past Medical History:  Past Medical History: Diagnosis Date    BPH (benign prostatic hyperplasia)     CAD (coronary artery disease)     2006 - s/p stent     Essential hypertension 12-12-13    Hematuria     sees urologist    Hyperglycemia        Past Surgical History:  Past Surgical History:   Procedure Laterality Date    CARDIAC SURG PROCEDURE UNLIST      stent 2006       Family History:  No family history on file. Social History:  Social History     Tobacco Use    Smoking status: Current Every Day Smoker     Packs/day: 3.00     Years: 53.00     Pack years: 159.00   Substance Use Topics    Alcohol use: No    Drug use: Not on file       Allergies: Allergies   Allergen Reactions    Lipitor [Atorvastatin] Other (comments)     \"muscle pain\"         Review of Systems   Review of Systems   Constitutional: Negative for chills, diaphoresis, fatigue and fever. HENT: Negative for ear pain and sore throat. Eyes: Negative for pain and redness. Respiratory: Negative for cough and shortness of breath. Cardiovascular: Negative for chest pain and leg swelling. Gastrointestinal: Positive for blood in stool. Negative for abdominal pain, diarrhea, nausea and vomiting. Endocrine: Negative for cold intolerance and heat intolerance. Genitourinary: Negative for flank pain and hematuria. Musculoskeletal: Negative for back pain and neck stiffness. Skin: Negative for rash and wound. Neurological: Positive for weakness. Negative for dizziness, syncope and headaches. All other systems reviewed and are negative. Physical Exam   Physical Exam   Constitutional: He is oriented to person, place, and time. He appears well-developed and well-nourished. HENT:   Head: Normocephalic and atraumatic. Mouth/Throat: Oropharynx is clear and moist. No oropharyngeal exudate. Eyes: Pupils are equal, round, and reactive to light. Conjunctivae and EOM are normal.   Neck: Normal range of motion. Cardiovascular: Normal rate and regular rhythm.    No murmur heard.  Pulmonary/Chest: Effort normal and breath sounds normal. No respiratory distress. He has no wheezes. Abdominal: Soft. Bowel sounds are normal. He exhibits no distension. There is no tenderness. Genitourinary: Rectal exam shows guaiac positive stool. Genitourinary Comments: Black stool   Musculoskeletal: Normal range of motion. He exhibits no edema or deformity. Neurological: He is alert and oriented to person, place, and time. Coordination normal.   Skin: Skin is warm and dry. No rash noted. Psychiatric: He has a normal mood and affect. His behavior is normal.   Nursing note and vitals reviewed. Diagnostic Study Results     Labs -     Recent Results (from the past 24 hour(s))   CBC WITH AUTOMATED DIFF    Collection Time: 07/08/19 12:02 PM   Result Value Ref Range    WBC 7.9 4.1 - 11.1 K/uL    RBC 3.47 (L) 4.10 - 5.70 M/uL    HGB 10.8 (L) 12.1 - 17.0 g/dL    HCT 33.7 (L) 36.6 - 50.3 %    MCV 97.1 80.0 - 99.0 FL    MCH 31.1 26.0 - 34.0 PG    MCHC 32.0 30.0 - 36.5 g/dL    RDW 14.6 (H) 11.5 - 14.5 %    PLATELET 578 827 - 692 K/uL    MPV 10.4 8.9 - 12.9 FL    NRBC 0.0 0  WBC    ABSOLUTE NRBC 0.00 0.00 - 0.01 K/uL    NEUTROPHILS 63 32 - 75 %    LYMPHOCYTES 24 12 - 49 %    MONOCYTES 8 5 - 13 %    EOSINOPHILS 4 0 - 7 %    BASOPHILS 1 0 - 1 %    IMMATURE GRANULOCYTES 0 0.0 - 0.5 %    ABS. NEUTROPHILS 4.9 1.8 - 8.0 K/UL    ABS. LYMPHOCYTES 1.9 0.8 - 3.5 K/UL    ABS. MONOCYTES 0.6 0.0 - 1.0 K/UL    ABS. EOSINOPHILS 0.3 0.0 - 0.4 K/UL    ABS. BASOPHILS 0.1 0.0 - 0.1 K/UL    ABS. IMM.  GRANS. 0.0 0.00 - 0.04 K/UL    DF AUTOMATED     METABOLIC PANEL, COMPREHENSIVE    Collection Time: 07/08/19 12:02 PM   Result Value Ref Range    Sodium 140 136 - 145 mmol/L    Potassium 4.8 3.5 - 5.1 mmol/L    Chloride 111 (H) 97 - 108 mmol/L    CO2 26 21 - 32 mmol/L    Anion gap 3 (L) 5 - 15 mmol/L    Glucose 107 (H) 65 - 100 mg/dL    BUN 28 (H) 6 - 20 MG/DL    Creatinine 1.54 (H) 0.70 - 1.30 MG/DL    BUN/Creatinine ratio 18 12 - 20      GFR est AA 54 (L) >60 ml/min/1.73m2    GFR est non-AA 44 (L) >60 ml/min/1.73m2    Calcium 8.6 8.5 - 10.1 MG/DL    Bilirubin, total 0.5 0.2 - 1.0 MG/DL    ALT (SGPT) 17 12 - 78 U/L    AST (SGOT) 16 15 - 37 U/L    Alk. phosphatase 134 (H) 45 - 117 U/L    Protein, total 7.2 6.4 - 8.2 g/dL    Albumin 3.3 (L) 3.5 - 5.0 g/dL    Globulin 3.9 2.0 - 4.0 g/dL    A-G Ratio 0.8 (L) 1.1 - 2.2     PTT    Collection Time: 07/08/19 12:02 PM   Result Value Ref Range    aPTT 25.7 22.1 - 32.0 sec    aPTT, therapeutic range     58.0 - 77.0 SECS   PROTHROMBIN TIME + INR    Collection Time: 07/08/19 12:02 PM   Result Value Ref Range    INR 1.0 0.9 - 1.1      Prothrombin time 10.5 9.0 - 11.1 sec   TYPE & SCREEN    Collection Time: 07/08/19 12:03 PM   Result Value Ref Range    Crossmatch Expiration 07/11/2019     ABO/Rh(D) O NEGATIVE     Antibody screen NEG    OCCULT BLOOD, STOOL    Collection Time: 07/08/19 12:44 PM   Result Value Ref Range    Occult blood, stool POSITIVE (A) NEG     EKG, 12 LEAD, INITIAL    Collection Time: 07/08/19 12:49 PM   Result Value Ref Range    Ventricular Rate 55 BPM    Atrial Rate 55 BPM    P-R Interval 168 ms    QRS Duration 84 ms    Q-T Interval 442 ms    QTC Calculation (Bezet) 422 ms    Calculated P Axis 15 degrees    Calculated R Axis 30 degrees    Calculated T Axis 57 degrees    Diagnosis       Sinus bradycardia  When compared with ECG of 01-APR-2017 23:09,  Criteria for Septal infarct are no longer present         Radiologic Studies -   XR CHEST PORT    (Results Pending)     CT Results  (Last 48 hours)    None        CXR Results  (Last 48 hours)    None            Medical Decision Making   I am the first provider for this patient. I reviewed the vital signs, available nursing notes, past medical history, past surgical history, family history and social history. Vital Signs-Reviewed the patient's vital signs.   Patient Vitals for the past 24 hrs:   Temp Pulse Resp BP SpO2 07/08/19 1500  (!) 56 18 143/72 96 %   07/08/19 1430  (!) 58 20 154/81 97 %   07/08/19 1400  60 19 150/76 96 %   07/08/19 1330  (!) 52 18 137/71 99 %   07/08/19 1300  (!) 53 17 147/68 98 %   07/08/19 1257  (!) 55 20 139/73 98 %   07/08/19 1152 97.6 °F (36.4 °C) (!) 59 18 147/76 99 %       Pulse Oximetry Analysis -98 % on room air    Cardiac Monitor:   Rate: 59 bpm  Rhythm: Sinus pericardial      Records Reviewed: Nursing Notes and Old Medical Records    Differential Diagnosis:    Patient presents with lower GI bleeding. Currently with stable vitals and benign abdominal exam.  DDx: AVM, diverticulosis, diverticulitis, IBD, IBS, colitis, hemorrhoids. Will obtain two large bore IV's, provide IVF hydration, check labs including type and screen, check rectal exam and monitor closely for the need for additional imaging. Provider Notes (Medical Decision Making):   Case discussed with Dr. Tonja Barba from gastroenterology. Agreed with IV Protonix since admission for serial hemoglobin levels. Will likely need endoscopy and/or coronal colonoscopy to further evaluation. Patient hemodynamically stable at this time. ED Course:     Initial assessment performed. The patients presenting problems have been discussed, and they are in agreement with the care plan formulated and outlined with them. I have encouraged them to ask questions as they arise throughout their visit. Critical Care Time:     None    Disposition:  Admit Note:  3:36 PM  Pt is being admitted by Dr. Michale Roque. The results of their tests and reason(s) for their admission have been discussed with pt and/or available family. They convey agreement and understanding for the need to be admitted and for admission diagnosis. PLAN:  1. Current Discharge Medication List        2. Follow-up Information    None       Return to ED if worse     Diagnosis     Clinical Impression:   1.  Lower GI bleeding

## 2019-07-08 NOTE — ED TRIAGE NOTES
The patient reports \"passing real black and bloody stool since last Thursday, my stomach feels bloated, and my right side hurts\" Patient reports he had an aneurysm a few years ago and his left side hurt and now his right side hurts. Denies vomiting, denies blood in urine. Patient takes Plavix and ASA.

## 2019-07-08 NOTE — H&P
Hospitalist Admission Note    NAME: Clayborn Cheadle   :  1945   MRN:  [id number]     Date/Time:  2019 3:29 PM    Patient PCP: Eder Schulte, ANGELICA  _____________________________________________________________________  Given the patient's current clinical presentation, I have a high level of concern for decompensation if discharged from the emergency department. Complex decision making was performed, which includes reviewing the patient's available past medical records, laboratory results, and x-ray films. My assessment of this patient's clinical condition and my plan of care is as follows. Assessment / Plan:    Dark heme + stools  Hx Esophagitis  Hx duodenal diverticulum   Anemia  -hold asa and plavix. Stent is remote   -start IV protonix  -allow clears. NPO after midnight in anticipation of procedures tomorrow  -consult GI. Last EGD in 2017 by Dr Tapan Stephen    CAD, Hx remote stent  HTN  -holding DAPT due to bleeding  -continue BB  -hold norvasc for now    CKD3  -continue bicarb tabs    S/P AAA repair    BPH  -continue flomax    Smoker  -ordered nicoderm patch      Code Status: Full  Surrogate Decision Maker:      DVT Prophylaxis:  SCD    Baseline:  . Independent          Subjective:   CHIEF COMPLAINT:  Dark stools    HISTORY OF PRESENT ILLNESS:     Colleen Vazquez is a 74 y.o.  male with a history of HTN, CAD, CKD, S/P AAA repair and BPH who presents with dark stools. He noticed this 3 days ago. Stools were not liquid or tarry, just dark and he was having about one per day. He endorses associated weakness but denies CP, SOB, abdominal pain, heartburn, dizziness or syncope. ER evaluation remarkable for Hg 10 and Heme+ stools. We were asked to admit for work up and evaluation of the above problems.      Past Medical History:   Diagnosis Date    BPH (benign prostatic hyperplasia)     CAD (coronary artery disease)      - s/p stent     Essential hypertension 12-12-13    Hematuria     sees urologist    Hyperglycemia         Past Surgical History:   Procedure Laterality Date    CARDIAC SURG PROCEDURE UNLIST      stent 2006       Social History     Tobacco Use    Smoking status: Current Every Day Smoker     Packs/day: 3.00     Years: 53.00     Pack years: 159.00   Substance Use Topics    Alcohol use: No        FHx No early CAD    Allergies   Allergen Reactions    Lipitor [Atorvastatin] Other (comments)     \"muscle pain\"        Prior to Admission medications    Medication Sig Start Date End Date Taking? Authorizing Provider   aspirin delayed-release 81 mg tablet Take 81 mg by mouth every evening. Yes Provider, Historical   amLODIPine (NORVASC) 5 mg tablet Take 5 mg by mouth daily. Yes Provider, Historical   tamsulosin (FLOMAX) 0.4 mg capsule Take 0.4 mg by mouth every evening. Yes Provider, Historical   sodium bicarbonate 650 mg tablet Take 650 mg by mouth two (2) times a day. Yes Provider, Historical   metoprolol succinate (TOPROL-XL) 25 mg XL tablet Take 25 mg by mouth daily. 5/24/17  Yes Provider, Historical   raNITIdine (ZANTAC) 150 mg tablet Take 150 mg by mouth every evening. Yes Provider, Historical   diphenhydrAMINE-acetaminophen (TYLENOL PM EXTRA STRENGTH)  mg tab Take 2 Tabs by mouth nightly. Yes Provider, Historical   clopidogrel (PLAVIX) 75 mg tablet Take 75 mg by mouth daily.    Yes Provider, Historical       REVIEW OF SYSTEMS:       Total of 12 systems reviewed as follows:       POSITIVE= underlined text  Negative = text not underlined  General:  fever, chills, sweats, generalized weakness, weight loss/gain,      loss of appetite   Eyes:    blurred vision, eye pain, loss of vision, double vision  ENT:    rhinorrhea, pharyngitis   Respiratory:   cough, sputum production, SOB, TEXIEIRA, wheezing, pleuritic pain   Cardiology:   chest pain, palpitations, orthopnea, PND, edema, syncope   Gastrointestinal:  abdominal pain , N/V, diarrhea, dysphagia, constipation, bleeding / dark stools  Genitourinary:  frequency, urgency, dysuria, hematuria, incontinence   Muskuloskeletal :  arthralgia, myalgia, back pain  Hematology:  easy bruising, nose or gum bleeding, lymphadenopathy   Dermatological: rash, ulceration, pruritis, color change / jaundice  Endocrine:   hot flashes or polydipsia   Neurological:  headache, dizziness, confusion, focal weakness, paresthesia,     Speech difficulties, memory loss, gait difficulty  Psychological: Feelings of anxiety, depression, agitation    Objective:   VITALS:    Visit Vitals  /72   Pulse (!) 56   Temp 97.6 °F (36.4 °C)   Resp 18   Ht 5' 9\" (1.753 m)   Wt 103.1 kg (227 lb 4.7 oz)   SpO2 96%   BMI 33.57 kg/m²       PHYSICAL EXAM:    General:    Alert, cooperative, no distress, appears stated age. HEENT: Atraumatic, anicteric sclerae, pink conjunctivae     No oral ulcers, mucosa moist, throat clear, dentition fair  Neck:  Supple, symmetrical,  thyroid: non tender  Lungs:   Clear to auscultation bilaterally. No Wheezing or Rhonchi. No rales. Chest wall:  No tenderness  No Accessory muscle use. Heart:   Regular  rhythm,    No edema  Abdomen:   Soft, non-tender. Not distended. Bowel sounds normal  Extremities: No cyanosis. No clubbing,  Skin turgor normal, Capillary refill normal, Radial dial pulse 2+  Skin:     Not pale. Not Jaundiced  No rashes   Psych:  Good insight. Not depressed. Not anxious or agitated. Neurologic: EOMs intact. No facial asymmetry. No aphasia or slurred speech. Symmetrical strength, Sensation grossly intact.  Alert and oriented X 4.     _______________________________________________________________________  Care Plan discussed with:    Comments   Patient x    Family      RN     Care Manager                    Consultant:      _______________________________________________________________________  Expected  Disposition:   Home with Family x   HH/PT/OT/RN    SNF/LTC    [facility] ________________________________________________________________________  TOTAL TIME:  54  Minutes    Critical Care Provided     Minutes non procedure based      Comments    x Reviewed previous records   >50% of visit spent in counseling and coordination of care  Discussion with patient and/or family and questions answered       Given the patient's current clinical presentation, I have a high level of concern for decompensation if discharged from the ED. Complex decision making was performed which includes reviewing the patient's available past medical records, laboratory results, and Xray films. I have also directly communicated my plan and discussed this case with the involved ED physician.     ____________________________________________________________________  Pepito Solares MD    Procedures: see electronic medical records for all procedures/Xrays and details which were not copied into this note but were reviewed prior to creation of Plan. LAB DATA REVIEWED:    Recent Results (from the past 24 hour(s))   CBC WITH AUTOMATED DIFF    Collection Time: 07/08/19 12:02 PM   Result Value Ref Range    WBC 7.9 4.1 - 11.1 K/uL    RBC 3.47 (L) 4.10 - 5.70 M/uL    HGB 10.8 (L) 12.1 - 17.0 g/dL    HCT 33.7 (L) 36.6 - 50.3 %    MCV 97.1 80.0 - 99.0 FL    MCH 31.1 26.0 - 34.0 PG    MCHC 32.0 30.0 - 36.5 g/dL    RDW 14.6 (H) 11.5 - 14.5 %    PLATELET 313 576 - 586 K/uL    MPV 10.4 8.9 - 12.9 FL    NRBC 0.0 0  WBC    ABSOLUTE NRBC 0.00 0.00 - 0.01 K/uL    NEUTROPHILS 63 32 - 75 %    LYMPHOCYTES 24 12 - 49 %    MONOCYTES 8 5 - 13 %    EOSINOPHILS 4 0 - 7 %    BASOPHILS 1 0 - 1 %    IMMATURE GRANULOCYTES 0 0.0 - 0.5 %    ABS. NEUTROPHILS 4.9 1.8 - 8.0 K/UL    ABS. LYMPHOCYTES 1.9 0.8 - 3.5 K/UL    ABS. MONOCYTES 0.6 0.0 - 1.0 K/UL    ABS. EOSINOPHILS 0.3 0.0 - 0.4 K/UL    ABS. BASOPHILS 0.1 0.0 - 0.1 K/UL    ABS. IMM.  GRANS. 0.0 0.00 - 0.04 K/UL    DF AUTOMATED     METABOLIC PANEL, COMPREHENSIVE    Collection Time: 07/08/19 12:02 PM   Result Value Ref Range    Sodium 140 136 - 145 mmol/L    Potassium 4.8 3.5 - 5.1 mmol/L    Chloride 111 (H) 97 - 108 mmol/L    CO2 26 21 - 32 mmol/L    Anion gap 3 (L) 5 - 15 mmol/L    Glucose 107 (H) 65 - 100 mg/dL    BUN 28 (H) 6 - 20 MG/DL    Creatinine 1.54 (H) 0.70 - 1.30 MG/DL    BUN/Creatinine ratio 18 12 - 20      GFR est AA 54 (L) >60 ml/min/1.73m2    GFR est non-AA 44 (L) >60 ml/min/1.73m2    Calcium 8.6 8.5 - 10.1 MG/DL    Bilirubin, total 0.5 0.2 - 1.0 MG/DL    ALT (SGPT) 17 12 - 78 U/L    AST (SGOT) 16 15 - 37 U/L    Alk.  phosphatase 134 (H) 45 - 117 U/L    Protein, total 7.2 6.4 - 8.2 g/dL    Albumin 3.3 (L) 3.5 - 5.0 g/dL    Globulin 3.9 2.0 - 4.0 g/dL    A-G Ratio 0.8 (L) 1.1 - 2.2     PTT    Collection Time: 07/08/19 12:02 PM   Result Value Ref Range    aPTT 25.7 22.1 - 32.0 sec    aPTT, therapeutic range     58.0 - 77.0 SECS   PROTHROMBIN TIME + INR    Collection Time: 07/08/19 12:02 PM   Result Value Ref Range    INR 1.0 0.9 - 1.1      Prothrombin time 10.5 9.0 - 11.1 sec   TYPE & SCREEN    Collection Time: 07/08/19 12:03 PM   Result Value Ref Range    Crossmatch Expiration 07/11/2019     ABO/Rh(D) O NEGATIVE     Antibody screen NEG    OCCULT BLOOD, STOOL    Collection Time: 07/08/19 12:44 PM   Result Value Ref Range    Occult blood, stool POSITIVE (A) NEG     EKG, 12 LEAD, INITIAL    Collection Time: 07/08/19 12:49 PM   Result Value Ref Range    Ventricular Rate 55 BPM    Atrial Rate 55 BPM    P-R Interval 168 ms    QRS Duration 84 ms    Q-T Interval 442 ms    QTC Calculation (Bezet) 422 ms    Calculated P Axis 15 degrees    Calculated R Axis 30 degrees    Calculated T Axis 57 degrees    Diagnosis       Sinus bradycardia  When compared with ECG of 01-APR-2017 23:09,  Criteria for Septal infarct are no longer present

## 2019-07-09 ENCOUNTER — ANESTHESIA (OUTPATIENT)
Dept: ENDOSCOPY | Age: 74
End: 2019-07-09
Payer: MEDICARE

## 2019-07-09 VITALS
HEART RATE: 51 BPM | SYSTOLIC BLOOD PRESSURE: 156 MMHG | OXYGEN SATURATION: 96 % | DIASTOLIC BLOOD PRESSURE: 73 MMHG | TEMPERATURE: 97.6 F | BODY MASS INDEX: 33.67 KG/M2 | WEIGHT: 227.29 LBS | HEIGHT: 69 IN | RESPIRATION RATE: 19 BRPM

## 2019-07-09 LAB
ANION GAP SERPL CALC-SCNC: 3 MMOL/L (ref 5–15)
ATRIAL RATE: 55 BPM
BASOPHILS # BLD: 0 K/UL (ref 0–0.1)
BASOPHILS NFR BLD: 1 % (ref 0–1)
BUN SERPL-MCNC: 20 MG/DL (ref 6–20)
BUN/CREAT SERPL: 15 (ref 12–20)
CALCIUM SERPL-MCNC: 8.4 MG/DL (ref 8.5–10.1)
CALCULATED P AXIS, ECG09: 15 DEGREES
CALCULATED R AXIS, ECG10: 30 DEGREES
CALCULATED T AXIS, ECG11: 57 DEGREES
CHLORIDE SERPL-SCNC: 113 MMOL/L (ref 97–108)
CO2 SERPL-SCNC: 23 MMOL/L (ref 21–32)
CREAT SERPL-MCNC: 1.37 MG/DL (ref 0.7–1.3)
DIAGNOSIS, 93000: NORMAL
DIFFERENTIAL METHOD BLD: ABNORMAL
EOSINOPHIL # BLD: 0.3 K/UL (ref 0–0.4)
EOSINOPHIL NFR BLD: 5 % (ref 0–7)
ERYTHROCYTE [DISTWIDTH] IN BLOOD BY AUTOMATED COUNT: 14.3 % (ref 11.5–14.5)
GLUCOSE SERPL-MCNC: 95 MG/DL (ref 65–100)
HCT VFR BLD AUTO: 27.7 % (ref 36.6–50.3)
HGB BLD-MCNC: 9.3 G/DL (ref 12.1–17)
IMM GRANULOCYTES # BLD AUTO: 0 K/UL (ref 0–0.04)
IMM GRANULOCYTES NFR BLD AUTO: 0 % (ref 0–0.5)
LYMPHOCYTES # BLD: 1.6 K/UL (ref 0.8–3.5)
LYMPHOCYTES NFR BLD: 28 % (ref 12–49)
MAGNESIUM SERPL-MCNC: 2.1 MG/DL (ref 1.6–2.4)
MCH RBC QN AUTO: 31.7 PG (ref 26–34)
MCHC RBC AUTO-ENTMCNC: 33.6 G/DL (ref 30–36.5)
MCV RBC AUTO: 94.5 FL (ref 80–99)
MONOCYTES # BLD: 0.5 K/UL (ref 0–1)
MONOCYTES NFR BLD: 9 % (ref 5–13)
NEUTS SEG # BLD: 3.3 K/UL (ref 1.8–8)
NEUTS SEG NFR BLD: 57 % (ref 32–75)
NRBC # BLD: 0 K/UL (ref 0–0.01)
NRBC BLD-RTO: 0 PER 100 WBC
P-R INTERVAL, ECG05: 168 MS
PLATELET # BLD AUTO: 180 K/UL (ref 150–400)
PMV BLD AUTO: 9.7 FL (ref 8.9–12.9)
POTASSIUM SERPL-SCNC: 4.1 MMOL/L (ref 3.5–5.1)
Q-T INTERVAL, ECG07: 442 MS
QRS DURATION, ECG06: 84 MS
QTC CALCULATION (BEZET), ECG08: 422 MS
RBC # BLD AUTO: 2.93 M/UL (ref 4.1–5.7)
RETICS # AUTO: 0.11 M/UL (ref 0.03–0.1)
RETICS/RBC NFR AUTO: 3.5 % (ref 0.7–2.1)
SODIUM SERPL-SCNC: 139 MMOL/L (ref 136–145)
VENTRICULAR RATE, ECG03: 55 BPM
WBC # BLD AUTO: 5.7 K/UL (ref 4.1–11.1)

## 2019-07-09 PROCEDURE — 74011250636 HC RX REV CODE- 250/636: Performed by: INTERNAL MEDICINE

## 2019-07-09 PROCEDURE — 88305 TISSUE EXAM BY PATHOLOGIST: CPT

## 2019-07-09 PROCEDURE — 36415 COLL VENOUS BLD VENIPUNCTURE: CPT

## 2019-07-09 PROCEDURE — 76040000019: Performed by: INTERNAL MEDICINE

## 2019-07-09 PROCEDURE — 65390000012 HC CONDITION CODE 44 OBSERVATION

## 2019-07-09 PROCEDURE — 76060000031 HC ANESTHESIA FIRST 0.5 HR: Performed by: INTERNAL MEDICINE

## 2019-07-09 PROCEDURE — 77030019988 HC FCPS ENDOSC DISP BSC -B: Performed by: INTERNAL MEDICINE

## 2019-07-09 PROCEDURE — C9113 INJ PANTOPRAZOLE SODIUM, VIA: HCPCS | Performed by: NURSE PRACTITIONER

## 2019-07-09 PROCEDURE — 99218 HC RM OBSERVATION: CPT

## 2019-07-09 PROCEDURE — 74011250636 HC RX REV CODE- 250/636

## 2019-07-09 PROCEDURE — 83735 ASSAY OF MAGNESIUM: CPT

## 2019-07-09 PROCEDURE — 85045 AUTOMATED RETICULOCYTE COUNT: CPT

## 2019-07-09 PROCEDURE — 85025 COMPLETE CBC W/AUTO DIFF WBC: CPT

## 2019-07-09 PROCEDURE — 74011250637 HC RX REV CODE- 250/637: Performed by: INTERNAL MEDICINE

## 2019-07-09 PROCEDURE — 74011250636 HC RX REV CODE- 250/636: Performed by: NURSE PRACTITIONER

## 2019-07-09 PROCEDURE — 80048 BASIC METABOLIC PNL TOTAL CA: CPT

## 2019-07-09 PROCEDURE — 74011000250 HC RX REV CODE- 250: Performed by: NURSE PRACTITIONER

## 2019-07-09 RX ORDER — SODIUM CHLORIDE 0.9 % (FLUSH) 0.9 %
5-40 SYRINGE (ML) INJECTION EVERY 8 HOURS
Status: DISCONTINUED | OUTPATIENT
Start: 2019-07-09 | End: 2019-07-09 | Stop reason: HOSPADM

## 2019-07-09 RX ORDER — FLUMAZENIL 0.1 MG/ML
0.2 INJECTION INTRAVENOUS
Status: DISCONTINUED | OUTPATIENT
Start: 2019-07-09 | End: 2019-07-09 | Stop reason: HOSPADM

## 2019-07-09 RX ORDER — SODIUM CHLORIDE 0.9 % (FLUSH) 0.9 %
5-40 SYRINGE (ML) INJECTION AS NEEDED
Status: DISCONTINUED | OUTPATIENT
Start: 2019-07-09 | End: 2019-07-09 | Stop reason: HOSPADM

## 2019-07-09 RX ORDER — PANTOPRAZOLE SODIUM 40 MG/1
40 TABLET, DELAYED RELEASE ORAL
Qty: 30 TAB | Refills: 0 | Status: SHIPPED | OUTPATIENT
Start: 2019-07-10 | End: 2019-08-09

## 2019-07-09 RX ORDER — NALOXONE HYDROCHLORIDE 0.4 MG/ML
0.4 INJECTION, SOLUTION INTRAMUSCULAR; INTRAVENOUS; SUBCUTANEOUS
Status: DISCONTINUED | OUTPATIENT
Start: 2019-07-09 | End: 2019-07-09 | Stop reason: HOSPADM

## 2019-07-09 RX ORDER — LIDOCAINE HYDROCHLORIDE 20 MG/ML
INJECTION, SOLUTION EPIDURAL; INFILTRATION; INTRACAUDAL; PERINEURAL AS NEEDED
Status: DISCONTINUED | OUTPATIENT
Start: 2019-07-09 | End: 2019-07-09 | Stop reason: HOSPADM

## 2019-07-09 RX ORDER — ATROPINE SULFATE 0.1 MG/ML
0.5 INJECTION INTRAVENOUS
Status: DISCONTINUED | OUTPATIENT
Start: 2019-07-09 | End: 2019-07-09 | Stop reason: HOSPADM

## 2019-07-09 RX ORDER — PANTOPRAZOLE SODIUM 40 MG/1
40 TABLET, DELAYED RELEASE ORAL
Status: DISCONTINUED | OUTPATIENT
Start: 2019-07-10 | End: 2019-07-09 | Stop reason: HOSPADM

## 2019-07-09 RX ORDER — PROPOFOL 10 MG/ML
INJECTION, EMULSION INTRAVENOUS AS NEEDED
Status: DISCONTINUED | OUTPATIENT
Start: 2019-07-09 | End: 2019-07-09 | Stop reason: HOSPADM

## 2019-07-09 RX ORDER — DEXTROMETHORPHAN/PSEUDOEPHED 2.5-7.5/.8
1.2 DROPS ORAL
Status: DISCONTINUED | OUTPATIENT
Start: 2019-07-09 | End: 2019-07-09 | Stop reason: HOSPADM

## 2019-07-09 RX ORDER — LANOLIN ALCOHOL/MO/W.PET/CERES
6 CREAM (GRAM) TOPICAL
Status: DISCONTINUED | OUTPATIENT
Start: 2019-07-09 | End: 2019-07-09 | Stop reason: HOSPADM

## 2019-07-09 RX ORDER — EPINEPHRINE 0.1 MG/ML
1 INJECTION INTRACARDIAC; INTRAVENOUS
Status: DISCONTINUED | OUTPATIENT
Start: 2019-07-09 | End: 2019-07-09 | Stop reason: HOSPADM

## 2019-07-09 RX ADMIN — LIDOCAINE HYDROCHLORIDE 100 MG: 20 INJECTION, SOLUTION EPIDURAL; INFILTRATION; INTRACAUDAL; PERINEURAL at 12:30

## 2019-07-09 RX ADMIN — MELATONIN 6 MG: at 01:25

## 2019-07-09 RX ADMIN — PROPOFOL 60 MG: 10 INJECTION, EMULSION INTRAVENOUS at 12:30

## 2019-07-09 RX ADMIN — SODIUM CHLORIDE 100 ML/HR: 900 INJECTION, SOLUTION INTRAVENOUS at 12:22

## 2019-07-09 RX ADMIN — PROPOFOL 50 MG: 10 INJECTION, EMULSION INTRAVENOUS at 12:35

## 2019-07-09 RX ADMIN — PANTOPRAZOLE SODIUM 40 MG: 40 INJECTION, POWDER, FOR SOLUTION INTRAVENOUS at 08:14

## 2019-07-09 RX ADMIN — Medication 10 ML: at 15:46

## 2019-07-09 RX ADMIN — SODIUM CHLORIDE 100 ML/HR: 900 INJECTION, SOLUTION INTRAVENOUS at 06:33

## 2019-07-09 RX ADMIN — PROPOFOL 40 MG: 10 INJECTION, EMULSION INTRAVENOUS at 12:32

## 2019-07-09 NOTE — PROGRESS NOTES
Spoke to Dr Melvin German because pt stated they told him he can drink tonight, MD ok to give clears until MN

## 2019-07-09 NOTE — ANESTHESIA PREPROCEDURE EVALUATION
Anesthetic History   No history of anesthetic complications            Review of Systems / Medical History  Patient summary reviewed, nursing notes reviewed and pertinent labs reviewed    Pulmonary          Smoker      Comments: Current Every Day Smoker - 159 pack years   Neuro/Psych   Within defined limits           Cardiovascular    Hypertension          CAD, PAD, cardiac stents and hyperlipidemia    Exercise tolerance: >4 METS  Comments: S/P PTCA  AAA repair  Carotid stenosis, right    ECHO from 5/2017 showed a 55-60% EF with trivial TR    Asymptomatic since his stents and AAA repair     GI/Hepatic/Renal         Renal disease: CRI       Endo/Other        Obesity    Comments: Hx Hyperglycemia    Other Findings   Comments: BPH   Hx Hematuria   Cerebrovascular disease                 Physical Exam    Airway  Mallampati: III    Neck ROM: normal range of motion   Mouth opening: Normal     Cardiovascular  Regular rate and rhythm,  S1 and S2 normal,  no murmur, click, rub, or gallop  Rhythm: regular  Rate: normal         Dental    Dentition: Edentulous     Pulmonary  Breath sounds clear to auscultation               Abdominal  GI exam deferred       Other Findings            Anesthetic Plan    ASA: 3  Anesthesia type: total IV anesthesia          Induction: Intravenous  Anesthetic plan and risks discussed with: Patient      No BB on [facility]

## 2019-07-09 NOTE — PROGRESS NOTES
TRANSFER - OUT REPORT:    Verbal report given to [address], RN(name) on [address]  being transferred to Room 3116(unit) for routine progression of care       Report consisted of patients Situation, Background, Assessment and   Recommendations(SBAR). Information from the following report(s) SBAR, Procedure Summary and Intake/Output was reviewed with the receiving nurse. Lines:   Peripheral IV 07/08/19 Right Antecubital (Active)   Site Assessment Clean, dry, & intact 7/9/2019 12:00 PM   Phlebitis Assessment 0 7/9/2019 12:00 PM   Infiltration Assessment 0 7/9/2019 12:00 PM   Dressing Status Clean, dry, & intact 7/9/2019 12:00 PM   Dressing Type Transparent;Tape 7/9/2019 12:00 PM   Hub Color/Line Status Pink; Infusing;Flushed 7/9/2019 12:00 PM   Alcohol Cap Used Yes 7/9/2019 11:58 AM        Opportunity for questions and clarification was provided.

## 2019-07-09 NOTE — ANESTHESIA POSTPROCEDURE EVALUATION
Procedure(s):  ESOPHAGOGASTRODUODENOSCOPY (EGD)  ESOPHAGOGASTRODUODENAL (EGD) BIOPSY. total IV anesthesia    Anesthesia Post Evaluation        Patient location during evaluation: PACU  Note status: Adequate. Level of consciousness: responsive to verbal stimuli and sleepy but conscious  Pain management: satisfactory to patient  Airway patency: patent  Anesthetic complications: no  Cardiovascular status: acceptable  Respiratory status: acceptable  Hydration status: acceptable  Comments: +Post-Anesthesia Evaluation and Assessment    Patient: Zenaida Barrera MRN: [id number]  SSN: [id number]   YOB: 1945  Age: 74 y.o. Sex: male      Cardiovascular Function/Vital Signs    /60   Pulse (!) 54   Temp 36.7 °C (98 °F)   Resp 22   Ht 5' 9\" (1.753 m)   Wt 103.1 kg (227 lb 4.7 oz)   SpO2 98%   BMI 33.57 kg/m²     Patient is status post Procedure(s):  ESOPHAGOGASTRODUODENOSCOPY (EGD)  ESOPHAGOGASTRODUODENAL (EGD) BIOPSY. Nausea/Vomiting: Controlled. Postoperative hydration reviewed and adequate. Pain:  Pain Scale 1: Numeric (0 - 10) (07/09/19 1244)  Pain Intensity 1: 0 (07/09/19 1244)   Managed. Neurological Status: At baseline. Mental Status and Level of Consciousness: Arousable. Pulmonary Status:   O2 Device: Room air (07/09/19 1244)   Adequate oxygenation and airway patent. Complications related to anesthesia: None    Post-anesthesia assessment completed. No concerns. Signed By: Ayala Marie MD    7/9/2019  Post anesthesia nausea and vomiting:  controlled      No vitals data found for the desired time range.

## 2019-07-09 NOTE — DISCHARGE SUMMARY
Hospitalist Discharge Summary     Patient ID:  Sara Baxter  [id number]  74 y.o.  1945 7/8/2019    PCP on record: Catherine Ellis NP    Admit date: 7/8/2019  Discharge date and time: 7/9/2019    DISCHARGE DIAGNOSIS:      Heme positive stools, possible melena present on admission  Acute on chronic anemia, secondary to acute blood loss  S/p EGD, f/u bx results  History of esophagitis  History of duodenal diverticulum-history of coronary artery disease with a remote stent  Hypertension  Chronic kidney disease stage III  History of AAA repair  BPH  Tobacco use      CONSULTATIONS:  IP CONSULT TO GASTROENTEROLOGY  IP CONSULT TO HOSPITALIST    Excerpted HPI from H&P of Farideh Mac MD:  CHIEF COMPLAINT:  Dark stools     HISTORY OF PRESENT ILLNESS:     Justin Martinez is a 74 y.o.  male with a history of HTN, CAD, CKD, S/P AAA repair and BPH who presents with dark stools. He noticed this 3 days ago. Stools were not liquid or tarry, just dark and he was having about one per day. He endorses associated weakness but denies CP, SOB, abdominal pain, heartburn, dizziness or syncope. ER evaluation remarkable for Hg 10 and Heme+ stools. We were asked to admit for work up and evaluation of the above problems.            ______________________________________________________________________  DISCHARGE SUMMARY/HOSPITAL COURSE:  for full details see H&P, daily progress notes, labs, consult notes. Dark heme + stools  Hx Esophagitis  Hx duodenal diverticulum   Anemia  -hold asa and plavix. Stent is remote   -start IV protonix  -allow clears. NPO after midnight in anticipation of procedures tomorrow  -consult GI.   Last EGD in 2017 by Dr Alissa Gonzales     CAD, Hx remote stent  HTN  -holding DAPT due to bleeding  -continue BB  -hold norvasc for now     CKD3  -continue bicarb tabs     S/P AAA repair     BPH  -continue flomax     Smoker  -ordered nicoderm patch        Dispo:    Patient presented with dark stools for a few days that were heme positive. He was started on Protonix and GI was consulted. His aspirin and Plavix for her on hold. This morning he had an endoscopy with Dr. Josh Skinner which showed no area of bleeding biopsies were done and are pending    GI recommended continued acid suppression with PPI follow-up pathology follow symptoms okay to resume aspirin and Plavix after I confirmed with GI he will need an outpatient colonoscopy with Dr. Merry Youssef possible pill cam to identify source further. Patient will be discharged home today. He does not feel he needs any assistance of home or home health. I have asked him to follow his stools closely for signs of black stool or blood and for symptoms of anemia. If he has recurrent symptoms will need to return to the ER for further GI evaluation but okay for discharge per GI      _______________________________________________________________________  Patient seen and examined by me on discharge day. Pertinent Findings:  Patient says he is ready to go home whether I am ready to send number not. He denies feeling lightheaded dizzy short of breath or weak or tired. He is ambulating without difficulty. He has had no further bowel movement since a dark stool on Sunday. He denies any nausea vomiting or abdominal pain he tolerated his diet lunch after his EGD this morning. Patient is awake and alert oriented x3 no distress. Conjunctiva pink mucous memories moist cardiovascular regular rate lungs are clear abdomen is obese but benign exam is no clubbing cyanosis  _______________________________________________________________________  DISCHARGE MEDICATIONS:   Current Discharge Medication List      START taking these medications    Details   pantoprazole (PROTONIX) 40 mg tablet Take 1 Tab by mouth Daily (before breakfast) for 30 days.   Qty: 30 Tab, Refills: 0         CONTINUE these medications which have NOT CHANGED    Details   aspirin delayed-release 81 mg tablet Take 81 mg by mouth every evening. amLODIPine (NORVASC) 5 mg tablet Take 5 mg by mouth daily. tamsulosin (FLOMAX) 0.4 mg capsule Take 0.4 mg by mouth every evening.      sodium bicarbonate 650 mg tablet Take 650 mg by mouth two (2) times a day. metoprolol succinate (TOPROL-XL) 25 mg XL tablet Take 25 mg by mouth daily. Associated Diagnoses: S/P PTCA (percutaneous transluminal coronary angioplasty); Coronary artery disease involving native coronary artery of native heart without angina pectoris; Dyslipidemia; Carotid stenosis, right; Tobacco use disorder; PAD (peripheral artery disease) ([facility] [address] Abdominal aortic aneurysm (AAA) without rupture (HCC)      diphenhydrAMINE-acetaminophen (TYLENOL PM EXTRA STRENGTH)  mg tab Take 2 Tabs by mouth nightly. Associated Diagnoses: S/P PTCA (percutaneous transluminal coronary angioplasty); Coronary artery disease involving native coronary artery of native heart without angina pectoris; Dyslipidemia; Carotid stenosis, right; Tobacco use disorder; PAD (peripheral artery disease) ([facility] [address] Abdominal aortic aneurysm (AAA) without rupture (HCC)      clopidogrel (PLAVIX) 75 mg tablet Take 75 mg by mouth daily. STOP taking these medications       raNITIdine (ZANTAC) 150 mg tablet Comments:   Reason for Stopping:                 Patient Follow Up Instructions:      Follow-up Information     Follow up With Specialties Details Why Contact Info    Kaylynn Molina NP Nurse Practitioner   [address] [facility]  [address] [address] [facility]  [phone]      Kaylynn Molina NP Nurse Practitioner In 3 days  [address] [facility]  [address]  [phone]      Becky Delgado MD Gastroenterology In 1 week  [address]  [address] [facility] [address] [phone]          ________________________________________________________________    Risk of deterioration: Moderate    Condition at Discharge:  Stable  __________________________________________________________________    Disposition  Home with family, no needs    ____________________________________________________________________    Code Status: Full Code  ___________________________________________________________________      Total time in minutes spent coordinating this discharge (includes going over instructions, follow-up, prescriptions, and preparing report for sign off to her PCP) :  35 minutes    Signed:  Mariana Neal MD

## 2019-07-09 NOTE — PROCEDURES
NAME:  Leigh Ann Logan   :   1945   MRN:   [id number]     Date/Time:  2019 12:46 PM    Esophagogastroduodenoscopy (EGD) Procedure Note    Procedure: Esophagogastroduodenoscopy with biopsy    Indication:  Melena/hematochezia  Pre-operative Diagnosis: see indication above  Post-operative Diagnosis: see findings below    Primary GI:  Becky Riley MD  :  Samantha Conley MD  Referring Provider:   Rayray Woo NP    Exam:  Airway: clear, no airway problems anticipated  Heart: RRR, without gallops or rubs  Lungs: clear bilaterally without wheezes, crackles, or rhonchi  Abdomen: soft, nontender, nondistended, bowel sounds present  Mental Status: awake, alert and oriented to person, place and time     Anethesia/Sedation:  MAC anesthesia Propofol 250mg IV  Procedure Details   After informed consent was obtained for the procedure, with all risks and benefits of procedure explained the patient was taken to the endoscopy suite and placed in the left lateral decubitus position. Following sequential administration of sedation as per above, the [id number] gastroscope was inserted into the mouth and advanced under direct vision to third portion of the duodenum. A careful inspection was made as the gastroscope was withdrawn, including a retroflexed view of the proximal stomach; findings and interventions are described below.                                                                                                                                                                                  Findings:    -Normal esophagus; biopsied to exclude inflammation  -Small 2cm hiatal hernia from 41-43cm  -Normal gastric mucosa; biopsied to exclude inflammation  -Small duodenal diverticulum immediate past D1-D2 junction  -Large 7cm lateral duodenal diverticulum in second portion duodenum, immediately distal to and opposite ampulla  -Normal duodenal mucosa otherwise; biopsied to exclude inflammation  -No active bleeding or hematin noted     Therapies:  biopsy of esophagus,m stomach, and duodenum  Specimens:  #1 duod; #2 stomach; #3 g-e jxn  EBL:  None. Complications:   None; patient tolerated the procedure well. Impression:    -Normal esophagus; biopsied to exclude inflammation  -Small 2cm hiatal hernia from 41-43cm  -Normal gastric mucosa; biopsied to exclude inflammation  -Small duodenal diverticulum immediate past D1-D2 junction  -Large 7cm lateral duodenal diverticulum in second portion duodenum, immediately distal to and opposite ampulla  -Normal duodenal mucosa otherwise; biopsied to exclude inflammation  -No active bleeding or hematin noted    Recommendations:  -Continue acid suppression. ,   -Await pathology. ,   -Follow symptoms. ,   -Resume Plavix. ,   -Consider for Outpatient colonoscopy with  off of Plavix  -Could offer discharge home later today    Discharge disposition:  To room after recovery in Endoscopy    Sharath Hearn MD

## 2019-07-09 NOTE — ROUTINE PROCESS
[address] 1945 
[id number] Situation: 
Verbal report received from: CHRISSY Oliveira RN Procedure: Procedure(s): ESOPHAGOGASTRODUODENOSCOPY (EGD) ESOPHAGOGASTRODUODENAL (EGD) BIOPSY Background: 
 
Preoperative diagnosis: melena Postoperative diagnosis: Small hiatal hernia, duodenum diverticulum :  Dr. Moises Smith Assistant(s): Endoscopy Technician-1: Carolina Bazzi Endoscopy RN-1: Joshua Cui RN Specimens:  
ID Type Source Tests Collected by Time Destination 1 : duodenal bx Preservative Duodenum  Karoline Kendrick MD 7/9/2019 1236 Pathology 2 : Gastric bx Preservative Gastric  Karoline Kendrick MD 7/9/2019 1236 Pathology 3 : GE junction bx Preservative   Karoline Kendrick MD 7/9/2019 1237 Pathology H. Pylori  no Assessment: 
Intra-procedure medications Anesthesia gave intra-procedure sedation and medications, see anesthesia flow sheet Intravenous fluids: NS@ Mckenzie Lint Vital signs stable Abdominal assessment: round and soft Recommendation:. Return to floor [phone] Family or Friend Permission to share finding with family or friend

## 2019-07-09 NOTE — INTERDISCIPLINARY ROUNDS
Bedside interdisciplinary rounds were held today to discuss patient plan of care and outcomes. The following members were present: Physician, Nurse, Clinical Care Leader, Pharmacy, Physical Therapy, and Case Management. Plan: 
 
EGD today

## 2019-07-09 NOTE — PROGRESS NOTES
* No post-op diagnosis entered *  * No post-op diagnosis entered *  Bedside and Verbal shift change report given to Natalie Marcos RN (oncoming nurse) by Andry Begum RN (offgoing nurse). Report included the following information SBAR, Kardex, Recent Results, Med Rec Status and Cardiac Rhythm SB. Zone Phone:   [phone]      Significant changes during shift:  Admission        Patient Information    Zenaida Barrera  74 y.o.  7/8/2019 11:54 AM by Glenn Moran MD. Zenaida Barrera was admitted from Home    Problem List    Patient Active Problem List    Diagnosis Date Noted    Dark stools 07/08/2019    PAD (peripheral artery disease) ([facility] [address] 04/02/2017    Carotid stenosis, right 04/02/2017    S/P PTCA (percutaneous transluminal coronary angioplasty) 04/02/2017    AAA (abdominal aortic aneurysm) ([facility] [place] [address] 04/01/2017    Dyslipidemia 01/25/2014    Tobacco use disorder 01/25/2014    Atherosclerosis of native arteries of the extremities with intermittent claudication 01/25/2014    Cerebrovascular disease 01/25/2014    CAD (coronary artery disease)     Hyperglycemia      Past Medical History:   Diagnosis Date    BPH (benign prostatic hyperplasia)     CAD (coronary artery disease)     2006 - s/p stent     Essential hypertension 12-12-13    Hematuria     sees urologist    Hyperglycemia          Core Measures:    CVA: No No  CHF:No No  PNA:No No    Post Op Surgical (If Applicable):     Number times ambulated in hallway past shift:  0  Number of times OOB to chair past shift:   0  NG Tube: No  Incentive Spirometer: No  Drains: No   Volume  0  Dressing Present:  No  Flatus:  Not applicable    Activity Status:    OOB to Chair No  Ambulated this shift yes   Bed Rest Yes    Supplemental O2: (If Applicable)    NC No  NRB No  Venti-mask No  On 0 Liters/min      LINES AND DRAINS:    Central Line? No   PICC LINE? No   Urinary Catheter?  No   DVT prophylaxis:    DVT prophylaxis Med- No  DVT prophylaxis SCD or FRANCIA- Yes     Wounds: (If Applicable)    Wounds- No    Location 0    Patient Safety:    Falls Score Total Score: 1  Safety Level_______  Bed Alarm On? No  Sitter?  No    Plan for upcoming shift: IVF, monitor for bleeding, NPO past MN for EGD        Discharge Plan: Yes TBD    Active Consults:  IP CONSULT TO GASTROENTEROLOGY  IP CONSULT TO HOSPITALIST

## 2019-07-09 NOTE — DISCHARGE INSTRUCTIONS
HOSPITALIST DISCHARGE INSTRUCTIONS    NAME: Leigh Ann Logan   :  1945   MRN:  [id number]     Date/Time:  2019 4:05 PM    ADMIT DATE: 2019   DISCHARGE DATE: 2019         · It is important that you take the medication exactly as they are prescribed. · Keep your medication in the bottles provided by the pharmacist and keep a list of the medication names, dosages, and times to be taken in your wallet. · Do not take other medications without consulting your doctor. What to do at [address]:  Cardiac Diet    Recommended activity: Activity as tolerated      If you have questions regarding the hospital related prescriptions or hospital related issues please call [facility] at [phone]. You can always direct your questions to your primary care doctor if you are unable to reach your hospital physician; your PCP works as an extension of your hospital doctor just like your hospital doctor is an extension of your PCP for your time at the hospital [facility])    If you experience any of the following symptoms then please call your primary care physician or return to the emergency room if you cannot get hold of your doctor:    Fever, chills, nausea, vomiting, or persistent diarrhea  Worsening weakness or new problems with your speech or balance  Dark stools or visible blood in your stools  New Leg swelling or shortness of breath as these could be signs of a clot    Additional Instructions:      Bring these papers with you to your follow up appointments. The papers will help your doctors be sure to continue the care plan from the hospital.    F/u with pcp in 3-5 days  F/u with gi in 1 week  Call md or return to the ER if recurrent black or bloody stools or symptoms of shortness of breath, weakness, tired, light headed          Information obtained by :  I understand that if any problems occur once I am at home I am to contact my physician.     I understand and acknowledge receipt of the instructions indicated above.                                                                                                                                            Physician's or R.N.'s Signature                                                                  Date/Time                                                                                                                                              Patient or Representative Signature

## 2019-07-09 NOTE — PROGRESS NOTES
Received call from [facility] that  patient has been changed to observation status. Code 40 letter printed and placed on chart. MUNGUIA Letter and State Observation letter given to patient with explanation and opportunity for questions and he understands. Patient has copy of MUNGUIA letter and State Observation letter. Signed copy of MUNGUIA letter and State Observation placed on chart.

## 2019-07-09 NOTE — ROUTINE PROCESS
Patient has been calling out to ask when he will be leaving. Patient said, \"the doctor down there said I could go home today. \" Nurse explained that even though GI doctor said he could go home, hospitalist still has to see patient to make sure everything is okay. Patient stated, No. I am leaving at 4 today, regardless. \" Patient had family members in room who said, \"the doctor down there said he could leave right after the procedure. \" Patient very adamant that he is going to leave today 1600- MD made aware of patient's statements and will go in to see patient. 26- MD cleared patient for discharge and patient was notified that discharge paperwork will be brought into his room. 1630-Nurse was trying to schedule follow up appointments and patient came out of room and stood by the nurse's station while nurse on the phone. Nurse was on hold and explained that was trying to schedule GI appointment and patient stated that he will get his doctor to schedule the appointment. Primary care appointment scheduled. Patient was on wheelchair with tech still at the nurse's station and nurse asked if he wants nurse to go over discharge instructions and patient said \"no. Someone already went over them with me. \" patient signed hospital copy and copy kept on chart. IV taken out and telebox off.

## 2019-07-09 NOTE — PROGRESS NOTES
Anesthesia reports 150mg Propofol, 100mg Lidocaine and 300mL NS given during procedure. Received report from anesthesia staff on vital signs and status of patient.

## 2019-07-09 NOTE — PROGRESS NOTES
* No post-op diagnosis entered *  * No post-op diagnosis entered *  Bedside and Verbal shift change report given to [facility] (oncoming nurse) by Lai Keenan RN (offgoing nurse). Report included the following information SBAR, Kardex, Recent Results, Med Rec Status and Cardiac Rhythm SB. Zone Phone:   [phone]      Significant changes during shift:  NPO since midnight. Patient Information    Sony Ray  74 y.o.  7/8/2019 11:54 AM by Yvonne Sacks, MD. Sony Ray was admitted from Home    Problem List    Patient Active Problem List    Diagnosis Date Noted    Dark stools 07/08/2019    PAD (peripheral artery disease) ([facility] [address] 04/02/2017    Carotid stenosis, right 04/02/2017    S/P PTCA (percutaneous transluminal coronary angioplasty) 04/02/2017    AAA (abdominal aortic aneurysm) ([facility] [address] 04/01/2017    Dyslipidemia 01/25/2014    Tobacco use disorder 01/25/2014    Atherosclerosis of native arteries of the extremities with intermittent claudication 01/25/2014    Cerebrovascular disease 01/25/2014    CAD (coronary artery disease)     Hyperglycemia      Past Medical History:   Diagnosis Date    BPH (benign prostatic hyperplasia)     CAD (coronary artery disease)     2006 - s/p stent     Essential hypertension 12-12-13    Hematuria     sees urologist    Hyperglycemia          Core Measures:    CVA: No No  CHF:No No  PNA:No No    Post Op Surgical (If Applicable):     Number times ambulated in hallway past shift:  0  Number of times OOB to chair past shift:   0  NG Tube: No  Incentive Spirometer: No  Drains: No   Volume  0  Dressing Present:  No  Flatus:  Not applicable    Activity Status:    OOB to Chair No  Ambulated this shift yes   Bed Rest Yes    Supplemental O2: (If Applicable)    NC No  NRB No  Venti-mask No  On 0 Liters/min      LINES AND DRAINS:    Central Line? No   PICC LINE? No   Urinary Catheter?  No   DVT prophylaxis:    DVT prophylaxis Med- No  DVT prophylaxis SCD or FRANCIA- Yes     Wounds: (If Applicable)    Wounds- No    Location 0    Patient Safety:    Falls Score Total Score: 1  Safety Level_______  Bed Alarm On? No  Sitter?  No    Plan for upcoming shift: IVF, monitor for bleeding, NPO past MN for EGD        Discharge Plan: Yes TBD    Active Consults:  IP CONSULT TO GASTROENTEROLOGY  IP CONSULT TO HOSPITALIST

## 2019-09-16 ENCOUNTER — HOSPITAL ENCOUNTER (OUTPATIENT)
Age: 74
Setting detail: OUTPATIENT SURGERY
Discharge: HOME | End: 2019-09-16
Attending: INTERNAL MEDICINE | Admitting: INTERNAL MEDICINE
Payer: MEDICARE

## 2019-09-16 ENCOUNTER — ANESTHESIA (OUTPATIENT)
Dept: ENDOSCOPY | Age: 74
End: 2019-09-16
Payer: MEDICARE

## 2019-09-16 ENCOUNTER — ANESTHESIA EVENT (OUTPATIENT)
Dept: ENDOSCOPY | Age: 74
End: 2019-09-16
Payer: MEDICARE

## 2019-09-16 VITALS
WEIGHT: 226.31 LBS | DIASTOLIC BLOOD PRESSURE: 84 MMHG | BODY MASS INDEX: 33.52 KG/M2 | HEIGHT: 69 IN | TEMPERATURE: 97.6 F | OXYGEN SATURATION: 95 % | SYSTOLIC BLOOD PRESSURE: 159 MMHG | RESPIRATION RATE: 22 BRPM | HEART RATE: 60 BPM

## 2019-09-16 PROCEDURE — 77030004927 HC CATH ELECHEMSTAS BSC -C: Performed by: INTERNAL MEDICINE

## 2019-09-16 PROCEDURE — 74011250636 HC RX REV CODE- 250/636: Performed by: NURSE ANESTHETIST, CERTIFIED REGISTERED

## 2019-09-16 PROCEDURE — 76060000031 HC ANESTHESIA FIRST 0.5 HR: Performed by: INTERNAL MEDICINE

## 2019-09-16 PROCEDURE — 76040000019: Performed by: INTERNAL MEDICINE

## 2019-09-16 PROCEDURE — 74011250636 HC RX REV CODE- 250/636: Performed by: INTERNAL MEDICINE

## 2019-09-16 RX ORDER — DEXTROMETHORPHAN/PSEUDOEPHED 2.5-7.5/.8
1.2 DROPS ORAL
Status: DISCONTINUED | OUTPATIENT
Start: 2019-09-16 | End: 2019-09-16 | Stop reason: HOSPADM

## 2019-09-16 RX ORDER — PROPOFOL 10 MG/ML
INJECTION, EMULSION INTRAVENOUS AS NEEDED
Status: DISCONTINUED | OUTPATIENT
Start: 2019-09-16 | End: 2019-09-16 | Stop reason: HOSPADM

## 2019-09-16 RX ORDER — SODIUM CHLORIDE 0.9 % (FLUSH) 0.9 %
5-40 SYRINGE (ML) INJECTION AS NEEDED
Status: DISCONTINUED | OUTPATIENT
Start: 2019-09-16 | End: 2019-09-16 | Stop reason: HOSPADM

## 2019-09-16 RX ORDER — ATROPINE SULFATE 0.1 MG/ML
0.5 INJECTION INTRAVENOUS
Status: DISCONTINUED | OUTPATIENT
Start: 2019-09-16 | End: 2019-09-16 | Stop reason: HOSPADM

## 2019-09-16 RX ORDER — SODIUM CHLORIDE 9 MG/ML
100 INJECTION, SOLUTION INTRAVENOUS CONTINUOUS
Status: DISCONTINUED | OUTPATIENT
Start: 2019-09-16 | End: 2019-09-16 | Stop reason: HOSPADM

## 2019-09-16 RX ORDER — FLUMAZENIL 0.1 MG/ML
0.2 INJECTION INTRAVENOUS
Status: DISCONTINUED | OUTPATIENT
Start: 2019-09-16 | End: 2019-09-16 | Stop reason: HOSPADM

## 2019-09-16 RX ORDER — SODIUM CHLORIDE 0.9 % (FLUSH) 0.9 %
5-40 SYRINGE (ML) INJECTION EVERY 8 HOURS
Status: DISCONTINUED | OUTPATIENT
Start: 2019-09-16 | End: 2019-09-16 | Stop reason: HOSPADM

## 2019-09-16 RX ADMIN — PROPOFOL 20 MG: 10 INJECTION, EMULSION INTRAVENOUS at 15:45

## 2019-09-16 RX ADMIN — PROPOFOL 20 MG: 10 INJECTION, EMULSION INTRAVENOUS at 15:47

## 2019-09-16 RX ADMIN — PROPOFOL 20 MG: 10 INJECTION, EMULSION INTRAVENOUS at 15:39

## 2019-09-16 RX ADMIN — PROPOFOL 20 MG: 10 INJECTION, EMULSION INTRAVENOUS at 15:41

## 2019-09-16 RX ADMIN — SODIUM CHLORIDE 100 ML/HR: 900 INJECTION, SOLUTION INTRAVENOUS at 14:43

## 2019-09-16 RX ADMIN — PROPOFOL 50 MG: 10 INJECTION, EMULSION INTRAVENOUS at 15:37

## 2019-09-16 RX ADMIN — PROPOFOL 20 MG: 10 INJECTION, EMULSION INTRAVENOUS at 15:43

## 2019-09-16 RX ADMIN — PROPOFOL 20 MG: 10 INJECTION, EMULSION INTRAVENOUS at 15:50

## 2019-09-16 NOTE — ANESTHESIA POSTPROCEDURE EVALUATION
Procedure(s):  COLONOSCOPY  BICAP. total IV anesthesia, general    Anesthesia Post Evaluation        Patient location during evaluation: PACU  Note status: Adequate. Level of consciousness: responsive to verbal stimuli and sleepy but conscious  Pain management: satisfactory to patient  Airway patency: patent  Anesthetic complications: no  Cardiovascular status: acceptable  Respiratory status: acceptable  Hydration status: acceptable  Comments: +Post-Anesthesia Evaluation and Assessment    Patient: Lucy Farley MRN: [id number]  SSN: [id number]   YOB: 1945  Age: 66 y.o. Sex: male      Cardiovascular Function/Vital Signs    /75   Pulse 63   Temp 36.7 °C (98 °F)   Resp 21   Ht 5' 9\" (1.753 m)   Wt 102.7 kg (226 lb 5 oz)   SpO2 94%   BMI 33.42 kg/m²     Patient is status post Procedure(s):  COLONOSCOPY  BICAP. Nausea/Vomiting: Controlled. Postoperative hydration reviewed and adequate. Pain:  Pain Scale 1: Numeric (0 - 10) (09/16/19 1440)  Pain Intensity 1: 0 (09/16/19 1440)   Managed. Neurological Status: At baseline. Mental Status and Level of Consciousness: Arousable. Pulmonary Status:   O2 Device: CO2 nasal cannula (09/16/19 1555)   Adequate oxygenation and airway patent. Complications related to anesthesia: None    Post-anesthesia assessment completed. No concerns.     Signed By: Rosenda Street MD    9/16/2019  Post anesthesia nausea and vomiting:  controlled      Vitals Value Taken Time   /75 9/16/2019  3:55 PM   Temp     Pulse 63 9/16/2019  3:55 PM   Resp 21 9/16/2019  3:55 PM   SpO2 94 % 9/16/2019  3:55 PM

## 2019-09-16 NOTE — DISCHARGE INSTRUCTIONS
Anthony Chinchilla MD  [facility], [address]  [address]  [phone]  [email or web address]    Steffanie Bowen  [id number]  1945    COLON DISCHARGE INSTRUCTIONS  Discomfort:  Redness at IV site- apply warm compress to area; if redness or soreness persist- contact your physician  There may be a slight amount of blood passed from the rectum  Gaseous discomfort- walking, belching will help relieve any discomfort  You may not operate a vehicle for 12 hours  You may not engage in an occupation involving machinery or appliances for rest of today  You may not drink alcoholic beverages for at least 12 hours  Avoid making any critical decisions for at least 24 hour  DIET:   High fiber diet. - however -  remember your colon is empty and a heavy meal will produce gas. Avoid these foods:  vegetables, fried / greasy foods, carbonated drinks for today      ACTIVITY:  You may resume your normal daily activities it is recommended that you spend the remainder of the day resting -  avoid any strenuous activity. CALL M.D. ANY SIGN OF:   Increasing pain, nausea, vomiting  Abdominal distension (swelling)  New increased bleeding (oral or rectal)  Fever (chills)  COLONOSCOPY FINDINGS:  Your colonoscopy showed: large internal hemorrhoids which are the likely cause of bleeding. If the bleeding continues will need to see colorectal surgeon about treatment. 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 Follow-up Instructions:   Call Dr. Anthony Chinchilla if any questions or problems.    Telephone # [phone]

## 2019-09-16 NOTE — PROCEDURES
Yony Rivera                  Colonoscopy Operative Report    9/16/2019      Trice Steward  [id number]  1945    Procedure Type:   Colonoscopy --diagnostic     Indications:    Hematochezia/melena     Pre-operative Diagnosis: see indication above    Post-operative Diagnosis:  See findings below    :  Kiana Paulson MD    Referring Provider: Giovanna England NP      Sedation:  MAC anesthesia Propofol    Pre-Procedural Exam:      Airway: clear,  No airway problems anticipated  Heart: RRR, without gallops or rubs  Lungs: clear bilaterally without wheezes, crackles, or rhonchi  Abdomen: soft, nontender, nondistended, bowel sounds present  Mental Status: awake, alert and oriented to person, place and time     Procedure Details:  After informed consent was obtained with all risks and benefits of procedure explained and preoperative exam completed, the patient was taken to the endoscopy suite and placed in the left lateral decubitus position. Upon sequential sedation as per above, a digital rectal exam was performed . The Olympus videocolonoscope  was inserted in the rectum and carefully advanced to the cecum, which was identified by the ileocecal valve and appendiceal orifice. The cecum was identified by the ileocecal valve and appendiceal orifice. The quality of preparation was adequate. The colonoscope was slowly withdrawn with careful evaluation between folds. Retroflexion in the rectum was completed demonstrating internal hemorrhoids. Findings:   Rectum: Grade 2 internal hemorrhoid(s); Sigmoid:     - Diverticulosis  Descending Colon:     - Diverticulosis  Transverse Colon: normal  Ascending Colon: normal  Cecum: Two avm's---5-6 mm, bicap cauterized  Terminal Ileum: not intubated      Specimen Removed:  none    Complications: None. EBL:  None. Impression:    1. Large internal hemorrhoids   2. Diverticulosis coli  3.  Large internal hemorrhoids    Recommendations: - -If bleeding continues refer to colorectal surgery High fiber diet. Resume normal medication(s). No repeat screening colonoscopy indicated due to age. Discharge Disposition:  Home in the company of a  when able to ambulate. Tegan Myrick MD    9/16/2019     JODIE Mesa MD  [facility], [address] [facility]  [address]  [phone]  [email or web address]

## 2019-09-16 NOTE — PROGRESS NOTES
Anesthesia reports 170 mg Propofol, 0 mg Lidocaine and 300 ml of Normal Saline were given during procedure. Received report from anesthesia staff on vital signs and status of patient.

## 2019-09-16 NOTE — ANESTHESIA PREPROCEDURE EVALUATION
Anesthetic History   No history of anesthetic complications            Review of Systems / Medical History  Patient summary reviewed, nursing notes reviewed and pertinent labs reviewed    Pulmonary          Smoker      Comments: Current Every Day Smoker - 106 pack years     Neuro/Psych   Within defined limits          Comments: Cerebrovascular Disease Cardiovascular    Hypertension          CAD, PAD and hyperlipidemia    Exercise tolerance: >4 METS  Comments: S/P PTCA  AAA  Carotid stenosis, right    TTE (4/3/17):  EF=55-60%   GI/Hepatic/Renal         Renal disease: CRI      Comments: BLOOD IN STOOL Endo/Other        Obesity and anemia  Pertinent negatives: No diabetes  Comments: H/O Hyperglycemia    Other Findings   Comments: BPH   H/O Hematuria                    Physical Exam    Airway  Mallampati: II    Neck ROM: normal range of motion   Mouth opening: Normal     Cardiovascular  Regular rate and rhythm,  S1 and S2 normal,  no murmur, click, rub, or gallop             Dental    Dentition: Edentulous and Full upper dentures     Pulmonary  Breath sounds clear to auscultation               Abdominal  GI exam deferred       Other Findings            Anesthetic Plan    ASA: 3  Anesthesia type: total IV anesthesia and general          Induction: Intravenous  Anesthetic plan and risks discussed with: Patient

## 2019-09-16 NOTE — PROGRESS NOTES
Matias Regalado  1945  [id number]    Situation:  Verbal report received from: Otilio Kate RN  Procedure: Procedure(s):  COLONOSCOPY  BICAP    Background:    Preoperative diagnosis: BLOOD IN STOOL/CURRENT ANTICOAGULANT THERAPY/STAGE 3 KIDNEY DISEASE  Postoperative diagnosis: cecal AVMs; hemorrhoids    :  Dr. Luz Whitney  Assistant(s): Endoscopy Technician-1: Wood Wilde  Endoscopy RN-1: Candie Zelaya RN    Specimens: * No specimens in log *  H. Pylori  no    Assessment:  Anesthesia gave intra-procedure sedation and medications, see anesthesia flow sheet yes    Intravenous fluids: NS@ KVO     Vital signs stable      Abdominal assessment: round and soft      Recommendation:  Discharge patient per MD order .   Family or Friend    Permission to share finding with family or friend yes

## 2019-09-16 NOTE — H&P
Nuzhat Kraus MD  [facility], [address], [place] [address]  [address]  [phone]  [email or web address]      Gastroenterology Outpatient History and Physical    Patient: Rony Azevedo    Physician: Jamie Lopez MD    Vital Signs: Blood pressure 149/66, pulse 71, temperature 98 °F (36.7 °C), resp. rate 11, height 5' 9\" (1.753 m), weight 102.7 kg (226 lb 5 oz), SpO2 96 %. Allergies: Allergies   Allergen Reactions    Lipitor [Atorvastatin] Other (comments)     \"muscle pain\"       Chief Complaint: blood in stool    History of Present Illness: Here for colonoscopy to evaluate rectal bleeding. See OV note for full details. History:  Past Medical History:   Diagnosis Date    Aneurysm ([address]     BPH (benign prostatic hyperplasia)     CAD (coronary artery disease)     2006 - s/p stent     Essential hypertension 12-12-13    Hematuria     sees urologist    Hyperglycemia       Past Surgical History:   Procedure Laterality Date    CARDIAC SURG PROCEDURE UNLIST      stent 2006    UPPER GI ENDOSCOPY,BIOPSY  7/9/2019         VASCULAR SURGERY PROCEDURE UNLIST  2016    Aotric aneurysm      Social History     Socioeconomic History    Marital status:      Spouse name: Not on file    Number of children: Not on file    Years of education: Not on file    Highest education level: Not on file   Tobacco Use    Smoking status: Current Every Day Smoker     Packs/day: 2.00     Years: 53.00     Pack years: 106.00    Smokeless tobacco: Never Used   Substance and Sexual Activity    Alcohol use: No    Drug use: Never    History reviewed. No pertinent family history.    Patient Active Problem List   Diagnosis Code    Hyperglycemia R73.9    CAD (coronary artery disease) I25.10    Dyslipidemia E78.5    Tobacco use disorder F17.200    Atherosclerosis of native arteries of the extremities with intermittent claudication I70.219    Cerebrovascular disease I67.9    AAA [facility]abdominal aortic aneurysm) ([facility]) I71.4    PAD (peripheral artery disease) ([facility]) I73.9    Carotid stenosis, right I65.21    S/P PTCA (percutaneous transluminal coronary angioplasty) Z98.61    Dark stools R19.5         Medications:   Prior to Admission medications    Medication Sig Start Date End Date Taking? Authorizing Provider   tamsulosin (FLOMAX) 0.4 mg capsule Take 0.4 mg by mouth every evening. Yes Provider, Historical   sodium bicarbonate 650 mg tablet Take 650 mg by mouth two (2) times a day. Yes Provider, Historical   metoprolol succinate (TOPROL-XL) 25 mg XL tablet Take 25 mg by mouth daily. 5/24/17  Yes Provider, Historical   diphenhydrAMINE-acetaminophen (TYLENOL PM EXTRA STRENGTH)  mg tab Take 2 Tabs by mouth nightly. Yes Provider, Historical   clopidogrel (PLAVIX) 75 mg tablet Take 75 mg by mouth daily.    Yes Provider, Historical       Physical Exam:     General: well developed, well nourished   HEENT: unremarkable   Heart: regular rhythm no mumur    Lungs: clear   Abdominal:  benign   Neurological: unremarkable   Extremities: no edema     Findings/Diagnosis: Rectal bleeding    Plan of Care/Planned Procedure: Colonoscopy with  monitored anesthesia care sedation       Signed:  Beverly Chu MD 9/16/2019

## 2020-01-21 ENCOUNTER — HOSPITAL ENCOUNTER (OUTPATIENT)
Dept: GENERAL RADIOLOGY | Age: 75
Discharge: HOME | End: 2020-01-21
Payer: MEDICARE

## 2020-01-21 DIAGNOSIS — R07.89 OTHER CHEST PAIN: ICD-10-CM

## 2020-01-21 PROCEDURE — 71046 X-RAY EXAM CHEST 2 VIEWS: CPT

## 2020-01-26 ENCOUNTER — HOSPITAL ENCOUNTER (INPATIENT)
Age: 75
LOS: 13 days | Discharge: HOME HEALTH | DRG: 215 | End: 2020-02-08
Attending: EMERGENCY MEDICINE | Admitting: THORACIC SURGERY (CARDIOTHORACIC VASCULAR SURGERY)
Payer: MEDICARE

## 2020-01-26 ENCOUNTER — APPOINTMENT (OUTPATIENT)
Dept: GENERAL RADIOLOGY | Age: 75
DRG: 215 | End: 2020-01-26
Attending: EMERGENCY MEDICINE
Payer: MEDICARE

## 2020-01-26 DIAGNOSIS — I21.3 ST ELEVATION MYOCARDIAL INFARCTION (STEMI), UNSPECIFIED ARTERY (HCC): Primary | ICD-10-CM

## 2020-01-26 DIAGNOSIS — I25.10 CORONARY ARTERY DISEASE INVOLVING NATIVE CORONARY ARTERY OF NATIVE HEART WITHOUT ANGINA PECTORIS: ICD-10-CM

## 2020-01-26 DIAGNOSIS — I73.9 PAD (PERIPHERAL ARTERY DISEASE) (HCC): ICD-10-CM

## 2020-01-26 DIAGNOSIS — Z98.61 S/P PTCA (PERCUTANEOUS TRANSLUMINAL CORONARY ANGIOPLASTY): ICD-10-CM

## 2020-01-26 DIAGNOSIS — F17.200 TOBACCO USE DISORDER: ICD-10-CM

## 2020-01-26 DIAGNOSIS — I65.21 CAROTID STENOSIS, RIGHT: ICD-10-CM

## 2020-01-26 DIAGNOSIS — I25.10 CORONARY ARTERY DISEASE INVOLVING NATIVE CORONARY ARTERY OF NATIVE HEART, ANGINA PRESENCE UNSPECIFIED: ICD-10-CM

## 2020-01-26 DIAGNOSIS — R07.9 CHEST PAIN, UNSPECIFIED TYPE: ICD-10-CM

## 2020-01-26 DIAGNOSIS — I71.40 ABDOMINAL AORTIC ANEURYSM (AAA) WITHOUT RUPTURE: ICD-10-CM

## 2020-01-26 DIAGNOSIS — E78.5 DYSLIPIDEMIA: ICD-10-CM

## 2020-01-26 LAB
ALBUMIN SERPL-MCNC: 3.4 G/DL (ref 3.5–5)
ALBUMIN/GLOB SERPL: 0.8 {RATIO} (ref 1.1–2.2)
ALP SERPL-CCNC: 152 U/L (ref 45–117)
ALT SERPL-CCNC: 13 U/L (ref 12–78)
ANION GAP SERPL CALC-SCNC: 5 MMOL/L (ref 5–15)
APTT PPP: 27.6 SEC (ref 22.1–32)
AST SERPL-CCNC: 22 U/L (ref 15–37)
BASOPHILS # BLD: 0.1 K/UL (ref 0–0.1)
BASOPHILS NFR BLD: 1 % (ref 0–1)
BILIRUB SERPL-MCNC: 0.5 MG/DL (ref 0.2–1)
BUN SERPL-MCNC: 22 MG/DL (ref 6–20)
BUN/CREAT SERPL: 14 (ref 12–20)
CALCIUM SERPL-MCNC: 9.1 MG/DL (ref 8.5–10.1)
CHLORIDE SERPL-SCNC: 110 MMOL/L (ref 97–108)
CO2 SERPL-SCNC: 24 MMOL/L (ref 21–32)
CREAT SERPL-MCNC: 1.6 MG/DL (ref 0.7–1.3)
DIFFERENTIAL METHOD BLD: ABNORMAL
EOSINOPHIL # BLD: 0.2 K/UL (ref 0–0.4)
EOSINOPHIL NFR BLD: 3 % (ref 0–7)
ERYTHROCYTE [DISTWIDTH] IN BLOOD BY AUTOMATED COUNT: 16.7 % (ref 11.5–14.5)
GLOBULIN SER CALC-MCNC: 4.5 G/DL (ref 2–4)
GLUCOSE SERPL-MCNC: 172 MG/DL (ref 65–100)
HCT VFR BLD AUTO: 43.2 % (ref 36.6–50.3)
HGB BLD-MCNC: 14.2 G/DL (ref 12.1–17)
IMM GRANULOCYTES # BLD AUTO: 0 K/UL (ref 0–0.04)
IMM GRANULOCYTES NFR BLD AUTO: 0 % (ref 0–0.5)
LYMPHOCYTES # BLD: 1.7 K/UL (ref 0.8–3.5)
LYMPHOCYTES NFR BLD: 25 % (ref 12–49)
MCH RBC QN AUTO: 29.2 PG (ref 26–34)
MCHC RBC AUTO-ENTMCNC: 32.9 G/DL (ref 30–36.5)
MCV RBC AUTO: 88.7 FL (ref 80–99)
MONOCYTES # BLD: 0.6 K/UL (ref 0–1)
MONOCYTES NFR BLD: 8 % (ref 5–13)
NEUTS SEG # BLD: 4.3 K/UL (ref 1.8–8)
NEUTS SEG NFR BLD: 63 % (ref 32–75)
NRBC # BLD: 0 K/UL (ref 0–0.01)
NRBC BLD-RTO: 0 PER 100 WBC
PLATELET # BLD AUTO: 211 K/UL (ref 150–400)
PMV BLD AUTO: 10.5 FL (ref 8.9–12.9)
POTASSIUM SERPL-SCNC: 4.1 MMOL/L (ref 3.5–5.1)
PROT SERPL-MCNC: 7.9 G/DL (ref 6.4–8.2)
RBC # BLD AUTO: 4.87 M/UL (ref 4.1–5.7)
SODIUM SERPL-SCNC: 139 MMOL/L (ref 136–145)
THERAPEUTIC RANGE,PTTT: NORMAL SECS (ref 58–77)
TROPONIN I BLD-MCNC: 0.26 NG/ML (ref 0–0.08)
WBC # BLD AUTO: 6.9 K/UL (ref 4.1–11.1)

## 2020-01-26 PROCEDURE — 74011250636 HC RX REV CODE- 250/636: Performed by: INTERNAL MEDICINE

## 2020-01-26 PROCEDURE — 74011250636 HC RX REV CODE- 250/636: Performed by: EMERGENCY MEDICINE

## 2020-01-26 PROCEDURE — C1894 INTRO/SHEATH, NON-LASER: HCPCS | Performed by: INTERNAL MEDICINE

## 2020-01-26 PROCEDURE — 80053 COMPREHEN METABOLIC PANEL: CPT

## 2020-01-26 PROCEDURE — 85025 COMPLETE CBC W/AUTO DIFF WBC: CPT

## 2020-01-26 PROCEDURE — 99153 MOD SED SAME PHYS/QHP EA: CPT | Performed by: INTERNAL MEDICINE

## 2020-01-26 PROCEDURE — B41D1ZZ FLUOROSCOPY OF AORTA AND BILATERAL LOWER EXTREMITY ARTERIES USING LOW OSMOLAR CONTRAST: ICD-10-PCS | Performed by: INTERNAL MEDICINE

## 2020-01-26 PROCEDURE — 84484 ASSAY OF TROPONIN QUANT: CPT

## 2020-01-26 PROCEDURE — 65660000000 HC RM CCU STEPDOWN

## 2020-01-26 PROCEDURE — 96374 THER/PROPH/DIAG INJ IV PUSH: CPT

## 2020-01-26 PROCEDURE — 75716 ARTERY X-RAYS ARMS/LEGS: CPT | Performed by: INTERNAL MEDICINE

## 2020-01-26 PROCEDURE — 36415 COLL VENOUS BLD VENIPUNCTURE: CPT

## 2020-01-26 PROCEDURE — 77030019697 HC SYR ANGI INFL MRTM -B: Performed by: INTERNAL MEDICINE

## 2020-01-26 PROCEDURE — 77030004515 HC CATH ANGI DX AVU ANGI -C: Performed by: INTERNAL MEDICINE

## 2020-01-26 PROCEDURE — 4A023N7 MEASUREMENT OF CARDIAC SAMPLING AND PRESSURE, LEFT HEART, PERCUTANEOUS APPROACH: ICD-10-PCS | Performed by: INTERNAL MEDICINE

## 2020-01-26 PROCEDURE — 99152 MOD SED SAME PHYS/QHP 5/>YRS: CPT | Performed by: INTERNAL MEDICINE

## 2020-01-26 PROCEDURE — 93458 L HRT ARTERY/VENTRICLE ANGIO: CPT | Performed by: INTERNAL MEDICINE

## 2020-01-26 PROCEDURE — C1769 GUIDE WIRE: HCPCS | Performed by: INTERNAL MEDICINE

## 2020-01-26 PROCEDURE — B2111ZZ FLUOROSCOPY OF MULTIPLE CORONARY ARTERIES USING LOW OSMOLAR CONTRAST: ICD-10-PCS | Performed by: INTERNAL MEDICINE

## 2020-01-26 PROCEDURE — B2151ZZ FLUOROSCOPY OF LEFT HEART USING LOW OSMOLAR CONTRAST: ICD-10-PCS | Performed by: INTERNAL MEDICINE

## 2020-01-26 PROCEDURE — 74011000250 HC RX REV CODE- 250: Performed by: INTERNAL MEDICINE

## 2020-01-26 PROCEDURE — 77030029065 HC DRSG HEMO QCLOT ZMED -B: Performed by: INTERNAL MEDICINE

## 2020-01-26 PROCEDURE — 93005 ELECTROCARDIOGRAM TRACING: CPT

## 2020-01-26 PROCEDURE — 74011636320 HC RX REV CODE- 636/320: Performed by: INTERNAL MEDICINE

## 2020-01-26 PROCEDURE — 85730 THROMBOPLASTIN TIME PARTIAL: CPT

## 2020-01-26 PROCEDURE — 85347 COAGULATION TIME ACTIVATED: CPT

## 2020-01-26 PROCEDURE — 99285 EMERGENCY DEPT VISIT HI MDM: CPT

## 2020-01-26 PROCEDURE — 77030029488 HC ELECTRD PAD DEFIB ZOLL -B

## 2020-01-26 PROCEDURE — C1725 CATH, TRANSLUMIN NON-LASER: HCPCS | Performed by: INTERNAL MEDICINE

## 2020-01-26 PROCEDURE — 77030010852 HC CATH NB ADVNTG COOK -B: Performed by: INTERNAL MEDICINE

## 2020-01-26 RX ORDER — ACETAMINOPHEN 325 MG/1
650 TABLET ORAL
Status: DISCONTINUED | OUTPATIENT
Start: 2020-01-26 | End: 2020-02-04

## 2020-01-26 RX ORDER — FENTANYL CITRATE 50 UG/ML
INJECTION, SOLUTION INTRAMUSCULAR; INTRAVENOUS AS NEEDED
Status: DISCONTINUED | OUTPATIENT
Start: 2020-01-26 | End: 2020-01-26 | Stop reason: HOSPADM

## 2020-01-26 RX ORDER — MIDAZOLAM HYDROCHLORIDE 1 MG/ML
INJECTION, SOLUTION INTRAMUSCULAR; INTRAVENOUS AS NEEDED
Status: DISCONTINUED | OUTPATIENT
Start: 2020-01-26 | End: 2020-01-26 | Stop reason: HOSPADM

## 2020-01-26 RX ORDER — SODIUM CHLORIDE 0.9 % (FLUSH) 0.9 %
5-40 SYRINGE (ML) INJECTION EVERY 8 HOURS
Status: DISCONTINUED | OUTPATIENT
Start: 2020-01-27 | End: 2020-02-04

## 2020-01-26 RX ORDER — SODIUM CHLORIDE 9 MG/ML
75 INJECTION, SOLUTION INTRAVENOUS CONTINUOUS
Status: DISCONTINUED | OUTPATIENT
Start: 2020-01-27 | End: 2020-01-27

## 2020-01-26 RX ORDER — HEPARIN SODIUM 5000 [USP'U]/ML
5000 INJECTION, SOLUTION INTRAVENOUS; SUBCUTANEOUS ONCE
Status: COMPLETED | OUTPATIENT
Start: 2020-01-26 | End: 2020-01-26

## 2020-01-26 RX ORDER — NITROGLYCERIN 0.4 MG/1
0.4 TABLET SUBLINGUAL AS NEEDED
Status: DISCONTINUED | OUTPATIENT
Start: 2020-01-26 | End: 2020-02-08 | Stop reason: HOSPADM

## 2020-01-26 RX ORDER — SODIUM CHLORIDE 0.9 % (FLUSH) 0.9 %
5-40 SYRINGE (ML) INJECTION AS NEEDED
Status: DISCONTINUED | OUTPATIENT
Start: 2020-01-26 | End: 2020-02-03 | Stop reason: SDUPTHER

## 2020-01-26 RX ORDER — ASPIRIN 81 MG/1
81 TABLET ORAL DAILY
COMMUNITY

## 2020-01-26 RX ORDER — METOPROLOL SUCCINATE 25 MG/1
25 TABLET, EXTENDED RELEASE ORAL DAILY
Status: DISCONTINUED | OUTPATIENT
Start: 2020-01-27 | End: 2020-01-27

## 2020-01-26 RX ORDER — TAMSULOSIN HYDROCHLORIDE 0.4 MG/1
0.4 CAPSULE ORAL EVERY EVENING
Status: DISCONTINUED | OUTPATIENT
Start: 2020-01-27 | End: 2020-02-08 | Stop reason: HOSPADM

## 2020-01-26 RX ORDER — HEPARIN SODIUM 200 [USP'U]/100ML
INJECTION, SOLUTION INTRAVENOUS
Status: COMPLETED | OUTPATIENT
Start: 2020-01-26 | End: 2020-01-26

## 2020-01-26 RX ORDER — ASPIRIN 81 MG/1
81 TABLET ORAL DAILY
Status: DISCONTINUED | OUTPATIENT
Start: 2020-01-27 | End: 2020-02-05

## 2020-01-26 RX ORDER — LIDOCAINE HYDROCHLORIDE 10 MG/ML
INJECTION INFILTRATION; PERINEURAL AS NEEDED
Status: DISCONTINUED | OUTPATIENT
Start: 2020-01-26 | End: 2020-01-26 | Stop reason: HOSPADM

## 2020-01-26 RX ADMIN — SODIUM CHLORIDE 75 ML/HR: 900 INJECTION, SOLUTION INTRAVENOUS at 23:15

## 2020-01-26 RX ADMIN — HEPARIN SODIUM 5000 UNITS: 5000 INJECTION INTRAVENOUS; SUBCUTANEOUS at 21:08

## 2020-01-26 NOTE — Clinical Note
Lesion located in the Distal LM. Balloon inserted. Balloon inflated using multiple inflations inflation technique. Pressure = 15 sage; Duration = 15 sec. Inflation 2: Pressure: 15 sage; Duration: 12 sec.  Left main into circumflex

## 2020-01-26 NOTE — Clinical Note
Lesion located in the Distal LM. Balloon inserted. Balloon inflated using multiple inflations inflation technique. Pressure = 12 sage; Duration = 20 sec. Inflation 2: Pressure: 16 sage; Duration: 15 sec.

## 2020-01-26 NOTE — Clinical Note
Vessel: bilateral AA w/ Runoff, AA w/ Runoff, Power injection to the artery. Single view taken. Injection rate = 15 mL/sec. Total injected volume = 30 mL.

## 2020-01-26 NOTE — Clinical Note
TRANSFER - OUT REPORT:     Verbal report given to: YOLANDA Arboleda. Report consisted of patient's Situation, Background, Assessment and   Recommendations(SBAR). Opportunity for questions and clarification was provided. Patient transported with a Registered Nurse and [address] [place] [address] / [facility]. Patient transported to: 2162.

## 2020-01-26 NOTE — Clinical Note
TRANSFER - OUT REPORT:     Verbal report given to: Milton Bajwa. Report consisted of patient's Situation, Background, Assessment and   Recommendations(SBAR). Opportunity for questions and clarification was provided. Patient transported with a Registered Nurse. Patient transported to: CCU.

## 2020-01-26 NOTE — Clinical Note
Lesion: Located in the Distal LM. Stent inserted. Stent deployed. Single technique used. First inflation pressure = 15 sage; inflation time: 20 sec.

## 2020-01-26 NOTE — Clinical Note
Lesion: Located in the [place]. Stent deployed. Single technique used. First inflation pressure = 14 sage; inflation time: 20 sec.

## 2020-01-26 NOTE — Clinical Note
Sheath #1: Sheath: upsized 6 fr sheath removed from left femoral artery and 7 fr  25 cm sheath placed

## 2020-01-26 NOTE — Clinical Note
Lesion located in the Distal LM. Balloon inserted. Balloon inflated using single inflation technique. Pressure = 15 sage; Duration = 15 sec.

## 2020-01-26 NOTE — Clinical Note
Attempted to track glidewire without success, exchanged for glidewire advantage then sheath exchanged for a 6F x 25cm sheath.  Still unable to advance wire, angiogram performed, changing access site to left femoral.

## 2020-01-26 NOTE — Clinical Note
Lesion located in the Distal LM. Balloon inserted. Balloon inflated using single inflation technique. Pressure = 10 sage; Duration = 18 sec.

## 2020-01-27 ENCOUNTER — HOSPITAL ENCOUNTER (OUTPATIENT)
Dept: ULTRASOUND IMAGING | Age: 75
Discharge: HOME | DRG: 215 | End: 2020-01-27
Attending: INTERNAL MEDICINE
Payer: MEDICARE

## 2020-01-27 ENCOUNTER — APPOINTMENT (OUTPATIENT)
Dept: VASCULAR SURGERY | Age: 75
DRG: 215 | End: 2020-01-27
Attending: INTERNAL MEDICINE
Payer: MEDICARE

## 2020-01-27 ENCOUNTER — APPOINTMENT (OUTPATIENT)
Dept: VASCULAR SURGERY | Age: 75
DRG: 215 | End: 2020-01-27
Attending: PHYSICIAN ASSISTANT
Payer: MEDICARE

## 2020-01-27 ENCOUNTER — APPOINTMENT (OUTPATIENT)
Dept: NON INVASIVE DIAGNOSTICS | Age: 75
DRG: 215 | End: 2020-01-27
Attending: INTERNAL MEDICINE
Payer: MEDICARE

## 2020-01-27 LAB
ANION GAP SERPL CALC-SCNC: 7 MMOL/L (ref 5–15)
APTT PPP: 26.2 SEC (ref 22.1–32)
APTT PPP: 31.1 SEC (ref 22.1–32)
ARTERIAL PATENCY WRIST A: YES
ATRIAL RATE: 61 BPM
ATRIAL RATE: 67 BPM
AV VELOCITY RATIO: 0.74
BASE DEFICIT BLD-SCNC: 6 MMOL/L
BASOPHILS # BLD: 0.1 K/UL (ref 0–0.1)
BASOPHILS NFR BLD: 1 % (ref 0–1)
BDY SITE: ABNORMAL
BUN SERPL-MCNC: 22 MG/DL (ref 6–20)
BUN/CREAT SERPL: 15 (ref 12–20)
CA-I BLD-SCNC: 1.23 MMOL/L (ref 1.12–1.32)
CALCIUM SERPL-MCNC: 8.3 MG/DL (ref 8.5–10.1)
CALCULATED P AXIS, ECG09: 47 DEGREES
CALCULATED P AXIS, ECG09: 53 DEGREES
CALCULATED R AXIS, ECG10: 15 DEGREES
CALCULATED R AXIS, ECG10: 17 DEGREES
CALCULATED T AXIS, ECG11: 45 DEGREES
CALCULATED T AXIS, ECG11: 46 DEGREES
CHLORIDE SERPL-SCNC: 110 MMOL/L (ref 97–108)
CHOLEST SERPL-MCNC: 167 MG/DL
CK MB CFR SERPL CALC: 10.5 % (ref 0–2.5)
CK MB CFR SERPL CALC: 12.3 % (ref 0–2.5)
CK MB SERPL-MCNC: 57.9 NG/ML (ref 5–25)
CK MB SERPL-MCNC: 94.9 NG/ML (ref 5–25)
CK SERPL-CCNC: 553 U/L (ref 39–308)
CK SERPL-CCNC: 773 U/L (ref 39–308)
CO2 SERPL-SCNC: 23 MMOL/L (ref 21–32)
CREAT SERPL-MCNC: 1.48 MG/DL (ref 0.7–1.3)
DIAGNOSIS, 93000: NORMAL
DIAGNOSIS, 93000: NORMAL
DIFFERENTIAL METHOD BLD: ABNORMAL
ECHO AO ROOT DIAM: 3.53 CM
ECHO AV AREA PEAK VELOCITY: 3.3 CM2
ECHO AV AREA/BSA PEAK VELOCITY: 1.4 CM2/M2
ECHO AV PEAK GRADIENT: 8.1 MMHG
ECHO AV PEAK VELOCITY: 141.9 CM/S
ECHO EST RA PRESSURE: 10 MMHG
ECHO LA AREA 4C: 10.4 CM2
ECHO LA MAJOR AXIS: 4.46 CM
ECHO LA TO AORTIC ROOT RATIO: 1.26
ECHO LA VOL 4C: 21.13 ML (ref 18–58)
ECHO LA VOLUME INDEX A4C: 9.5 ML/M2 (ref 16–28)
ECHO LV E' LATERAL VELOCITY: 3.83 CM/S
ECHO LV E' SEPTAL VELOCITY: 4.02 CM/S
ECHO LV EDV TEICHHOLZ: 0.78 ML
ECHO LV ESV TEICHHOLZ: 0.38 ML
ECHO LV INTERNAL DIMENSION DIASTOLIC: 5.32 CM (ref 4.2–5.9)
ECHO LV INTERNAL DIMENSION SYSTOLIC: 3.93 CM
ECHO LV IVSD: 5.32 CM (ref 0.6–1)
ECHO LV MASS 2D: 1586.6 G (ref 88–224)
ECHO LV MASS INDEX 2D: 713.2 G/M2 (ref 49–115)
ECHO LV POSTERIOR WALL DIASTOLIC: 1.27 CM (ref 0.6–1)
ECHO LV POSTERIOR WALL SYSTOLIC: 1.48 CM
ECHO LVOT DIAM: 2.38 CM
ECHO LVOT PEAK GRADIENT: 4.4 MMHG
ECHO LVOT PEAK VELOCITY: 104.35 CM/S
ECHO LVOT SV: 104.4 ML
ECHO LVOT VTI: 23.4 CM
ECHO MV A VELOCITY: 83.65 CM/S
ECHO MV AREA VTI: 3.9 CM2
ECHO MV E DECELERATION TIME (DT): 208.7 MS
ECHO MV E VELOCITY: 39.51 CM/S
ECHO MV E/A RATIO: 0.47
ECHO MV E/E' LATERAL: 10.32
ECHO MV E/E' RATIO (AVERAGED): 10.07
ECHO MV E/E' SEPTAL: 9.83
ECHO MV MAX VELOCITY: 101.38 CM/S
ECHO MV MEAN GRADIENT: 0.9 MMHG
ECHO MV MEAN INFLOW VELOCITY: 0.43 M/S
ECHO MV PEAK GRADIENT: 4.1 MMHG
ECHO MV REGURGITANT PEAK GRADIENT: 8.5 MMHG
ECHO MV REGURGITANT PEAK VELOCITY: 146.04 CM/S
ECHO MV VTI: 26.61 CM
ECHO PULMONARY ARTERY SYSTOLIC PRESSURE (PASP): 36.8 MMHG
ECHO RA AREA 4C: 13.1 CM2
ECHO RIGHT VENTRICULAR SYSTOLIC PRESSURE (RVSP): 36.8 MMHG
ECHO RV INTERNAL DIMENSION: 1.74 CM
ECHO TV REGURGITANT MAX VELOCITY: 258.95 CM/S
ECHO TV REGURGITANT PEAK GRADIENT: 26.8 MMHG
EOSINOPHIL # BLD: 0.1 K/UL (ref 0–0.4)
EOSINOPHIL NFR BLD: 1 % (ref 0–7)
ERYTHROCYTE [DISTWIDTH] IN BLOOD BY AUTOMATED COUNT: 16.9 % (ref 11.5–14.5)
EST. AVERAGE GLUCOSE BLD GHB EST-MCNC: 120 MG/DL
GAS FLOW.O2 O2 DELIVERY SYS: ABNORMAL L/MIN
GLUCOSE SERPL-MCNC: 122 MG/DL (ref 65–100)
HBA1C MFR BLD: 5.8 % (ref 4–5.6)
HCO3 BLD-SCNC: 19 MMOL/L (ref 22–26)
HCT VFR BLD AUTO: 38.3 % (ref 36.6–50.3)
HDLC SERPL-MCNC: 24 MG/DL
HDLC SERPL: 7 {RATIO} (ref 0–5)
HGB BLD-MCNC: 12.4 G/DL (ref 12.1–17)
IMM GRANULOCYTES # BLD AUTO: 0 K/UL (ref 0–0.04)
IMM GRANULOCYTES NFR BLD AUTO: 0 % (ref 0–0.5)
LDLC SERPL CALC-MCNC: 92.8 MG/DL (ref 0–100)
LEFT ABI: 1.06
LEFT ANTERIOR TIBIAL: 153 MMHG
LEFT ARM BP: 144 MMHG
LEFT ATA BP LEVEL: NORMAL
LEFT CCA DIST DIAS: 27.2 CM/S
LEFT CCA DIST SYS: 96.6 CM/S
LEFT CCA PROX DIAS: 29 CM/S
LEFT CCA PROX SYS: 100.3 CM/S
LEFT ECA DIAS: 22.3 CM/S
LEFT ECA SYS: 258.2 CM/S
LEFT ICA DIST DIAS: 27.2 CM/S
LEFT ICA DIST SYS: 82 CM/S
LEFT ICA MID DIAS: 44.4 CM/S
LEFT ICA MID SYS: 119.6 CM/S
LEFT ICA PROX DIAS: 36.3 CM/S
LEFT ICA PROX SYS: 89.3 CM/S
LEFT ICA/CCA SYS: 1.2
LEFT POSTERIOR TIBIAL: 154 MMHG
LEFT SUBCLAVIAN DIAS: 0 CM/S
LEFT SUBCLAVIAN SYS: 93 CM/S
LEFT TBI: 0.9
LEFT TOE PRESSURE: 131 MMHG
LEFT VERTEBRAL DIAS: 0 CM/S
LEFT VERTEBRAL SYS: 22.6 CM/S
LIPID PROFILE,FLP: ABNORMAL
LVFS 2D: 26.04 %
LVOT MG: 2.61 MMHG
LVOT MV: 0.76 CM/S
LVSV (TEICH): 30.16 ML
LYMPHOCYTES # BLD: 1.3 K/UL (ref 0.8–3.5)
LYMPHOCYTES NFR BLD: 14 % (ref 12–49)
MCH RBC QN AUTO: 28.9 PG (ref 26–34)
MCHC RBC AUTO-ENTMCNC: 32.4 G/DL (ref 30–36.5)
MCV RBC AUTO: 89.3 FL (ref 80–99)
MONOCYTES # BLD: 0.8 K/UL (ref 0–1)
MONOCYTES NFR BLD: 9 % (ref 5–13)
MV DEC SLOPE: 1.89
NEUTS SEG # BLD: 6.9 K/UL (ref 1.8–8)
NEUTS SEG NFR BLD: 75 % (ref 32–75)
NRBC # BLD: 0.02 K/UL (ref 0–0.01)
NRBC BLD-RTO: 0.2 PER 100 WBC
P-R INTERVAL, ECG05: 190 MS
P-R INTERVAL, ECG05: 212 MS
PCO2 BLD: 30.4 MMHG (ref 35–45)
PH BLD: 7.4 [PH] (ref 7.35–7.45)
PLATELET # BLD AUTO: 187 K/UL (ref 150–400)
PMV BLD AUTO: 10.4 FL (ref 8.9–12.9)
PO2 BLD: 70 MMHG (ref 80–100)
POTASSIUM SERPL-SCNC: 4.4 MMOL/L (ref 3.5–5.1)
Q-T INTERVAL, ECG07: 390 MS
Q-T INTERVAL, ECG07: 412 MS
QRS DURATION, ECG06: 78 MS
QRS DURATION, ECG06: 88 MS
QTC CALCULATION (BEZET), ECG08: 412 MS
QTC CALCULATION (BEZET), ECG08: 414 MS
RBC # BLD AUTO: 4.29 M/UL (ref 4.1–5.7)
RIGHT ABI: 1.08
RIGHT ANTERIOR TIBIAL: 135 MMHG
RIGHT ARM BP: 145 MMHG
RIGHT ATA BP LEVEL: NORMAL
RIGHT CCA DIST DIAS: 0 CM/S
RIGHT CCA DIST SYS: 23 CM/S
RIGHT CCA PROX DIAS: 0 CM/S
RIGHT CCA PROX SYS: 40.6 CM/S
RIGHT ECA DIAS: 23.1 CM/S
RIGHT ECA SYS: 340.9 CM/S
RIGHT ICA DIST DIAS: 0 CM/S
RIGHT ICA DIST SYS: 0 CM/S
RIGHT ICA MID DIAS: 0 CM/S
RIGHT ICA MID SYS: 0 CM/S
RIGHT ICA PROX DIAS: 0 CM/S
RIGHT ICA PROX SYS: 0 CM/S
RIGHT ICA/CCA SYS: 0
RIGHT POSTERIOR TIBIAL: 156 MMHG
RIGHT SUBCLAVIAN DIAS: 0 CM/S
RIGHT SUBCLAVIAN SYS: 109.4 CM/S
RIGHT TBI: 0.92
RIGHT TOE PRESSURE: 133 MMHG
RIGHT VERTEBRAL DIAS: 0 CM/S
RIGHT VERTEBRAL SYS: 38.2 CM/S
SAO2 % BLD: 94 % (ref 92–97)
SODIUM SERPL-SCNC: 140 MMOL/L (ref 136–145)
SPECIMEN TYPE: ABNORMAL
THERAPEUTIC RANGE,PTTT: NORMAL SECS (ref 58–77)
THERAPEUTIC RANGE,PTTT: NORMAL SECS (ref 58–77)
TOTAL RESP. RATE, ITRR: 16
TRIGL SERPL-MCNC: 251 MG/DL (ref ?–150)
TROPONIN I SERPL-MCNC: 48.8 NG/ML
VENTRICULAR RATE, ECG03: 61 BPM
VENTRICULAR RATE, ECG03: 67 BPM
VLDLC SERPL CALC-MCNC: 50.2 MG/DL
WBC # BLD AUTO: 9.2 K/UL (ref 4.1–11.1)

## 2020-01-27 PROCEDURE — 74011250637 HC RX REV CODE- 250/637: Performed by: INTERNAL MEDICINE

## 2020-01-27 PROCEDURE — 76706 US ABDL AORTA SCREEN AAA: CPT

## 2020-01-27 PROCEDURE — 83036 HEMOGLOBIN GLYCOSYLATED A1C: CPT

## 2020-01-27 PROCEDURE — 36600 WITHDRAWAL OF ARTERIAL BLOOD: CPT

## 2020-01-27 PROCEDURE — 80048 BASIC METABOLIC PNL TOTAL CA: CPT

## 2020-01-27 PROCEDURE — 82553 CREATINE MB FRACTION: CPT

## 2020-01-27 PROCEDURE — 80061 LIPID PANEL: CPT

## 2020-01-27 PROCEDURE — 65660000000 HC RM CCU STEPDOWN

## 2020-01-27 PROCEDURE — 82803 BLOOD GASES ANY COMBINATION: CPT

## 2020-01-27 PROCEDURE — 87070 CULTURE OTHR SPECIMN AEROBIC: CPT

## 2020-01-27 PROCEDURE — 84484 ASSAY OF TROPONIN QUANT: CPT

## 2020-01-27 PROCEDURE — 93880 EXTRACRANIAL BILAT STUDY: CPT

## 2020-01-27 PROCEDURE — 93306 TTE W/DOPPLER COMPLETE: CPT

## 2020-01-27 PROCEDURE — 74011250637 HC RX REV CODE- 250/637: Performed by: PHYSICIAN ASSISTANT

## 2020-01-27 PROCEDURE — 85025 COMPLETE CBC W/AUTO DIFF WBC: CPT

## 2020-01-27 PROCEDURE — 36415 COLL VENOUS BLD VENIPUNCTURE: CPT

## 2020-01-27 PROCEDURE — 82550 ASSAY OF CK (CPK): CPT

## 2020-01-27 PROCEDURE — 85730 THROMBOPLASTIN TIME PARTIAL: CPT

## 2020-01-27 PROCEDURE — 74011250636 HC RX REV CODE- 250/636: Performed by: INTERNAL MEDICINE

## 2020-01-27 PROCEDURE — 93922 UPR/L XTREMITY ART 2 LEVELS: CPT

## 2020-01-27 PROCEDURE — 93005 ELECTROCARDIOGRAM TRACING: CPT

## 2020-01-27 PROCEDURE — 94375 RESPIRATORY FLOW VOLUME LOOP: CPT

## 2020-01-27 PROCEDURE — 94729 DIFFUSING CAPACITY: CPT

## 2020-01-27 RX ORDER — BENZONATATE 100 MG/1
100 CAPSULE ORAL
Status: DISCONTINUED | OUTPATIENT
Start: 2020-01-27 | End: 2020-02-08 | Stop reason: HOSPADM

## 2020-01-27 RX ORDER — NICOTINE 7MG/24HR
1 PATCH, TRANSDERMAL 24 HOURS TRANSDERMAL DAILY
Status: DISCONTINUED | OUTPATIENT
Start: 2020-01-27 | End: 2020-01-27

## 2020-01-27 RX ORDER — AMIODARONE HYDROCHLORIDE 200 MG/1
200 TABLET ORAL 2 TIMES DAILY
Status: DISCONTINUED | OUTPATIENT
Start: 2020-01-27 | End: 2020-02-05

## 2020-01-27 RX ORDER — HEPARIN SODIUM 5000 [USP'U]/ML
4000 INJECTION, SOLUTION INTRAVENOUS; SUBCUTANEOUS AS NEEDED
Status: DISCONTINUED | OUTPATIENT
Start: 2020-01-27 | End: 2020-01-28

## 2020-01-27 RX ORDER — SODIUM CHLORIDE 9 MG/ML
75 INJECTION, SOLUTION INTRAVENOUS CONTINUOUS
Status: DISPENSED | OUTPATIENT
Start: 2020-01-27 | End: 2020-01-27

## 2020-01-27 RX ORDER — HEPARIN SODIUM 5000 [USP'U]/ML
2000 INJECTION, SOLUTION INTRAVENOUS; SUBCUTANEOUS AS NEEDED
Status: DISCONTINUED | OUTPATIENT
Start: 2020-01-27 | End: 2020-01-28

## 2020-01-27 RX ORDER — ONDANSETRON 2 MG/ML
4 INJECTION INTRAMUSCULAR; INTRAVENOUS
Status: DISCONTINUED | OUTPATIENT
Start: 2020-01-27 | End: 2020-02-04

## 2020-01-27 RX ORDER — HEPARIN SODIUM 10000 [USP'U]/100ML
12-25 INJECTION, SOLUTION INTRAVENOUS
Status: DISCONTINUED | OUTPATIENT
Start: 2020-01-27 | End: 2020-01-28

## 2020-01-27 RX ORDER — METOPROLOL SUCCINATE 50 MG/1
50 TABLET, EXTENDED RELEASE ORAL DAILY
Status: DISCONTINUED | OUTPATIENT
Start: 2020-01-27 | End: 2020-02-05

## 2020-01-27 RX ORDER — PRAVASTATIN SODIUM 40 MG/1
40 TABLET ORAL
Status: DISCONTINUED | OUTPATIENT
Start: 2020-01-27 | End: 2020-02-02

## 2020-01-27 RX ADMIN — HEPARIN SODIUM AND DEXTROSE 9.3 UNITS/KG/HR: 10000; 5 INJECTION INTRAVENOUS at 08:52

## 2020-01-27 RX ADMIN — TAMSULOSIN HYDROCHLORIDE 0.4 MG: 0.4 CAPSULE ORAL at 17:45

## 2020-01-27 RX ADMIN — SODIUM CHLORIDE 75 ML/HR: 900 INJECTION, SOLUTION INTRAVENOUS at 08:42

## 2020-01-27 RX ADMIN — ASPIRIN 81 MG: 81 TABLET ORAL at 08:43

## 2020-01-27 RX ADMIN — METOPROLOL SUCCINATE 50 MG: 50 TABLET, EXTENDED RELEASE ORAL at 08:43

## 2020-01-27 RX ADMIN — Medication 10 ML: at 06:42

## 2020-01-27 RX ADMIN — Medication 10 ML: at 12:27

## 2020-01-27 RX ADMIN — NITROGLYCERIN 0.5 INCH: 20 OINTMENT TOPICAL at 12:26

## 2020-01-27 RX ADMIN — HEPARIN SODIUM 4000 UNITS: 5000 INJECTION INTRAVENOUS; SUBCUTANEOUS at 17:39

## 2020-01-27 RX ADMIN — ACETAMINOPHEN 650 MG: 325 TABLET ORAL at 20:01

## 2020-01-27 RX ADMIN — NITROGLYCERIN 1 INCH: 20 OINTMENT TOPICAL at 00:35

## 2020-01-27 RX ADMIN — PRAVASTATIN SODIUM 40 MG: 40 TABLET ORAL at 22:18

## 2020-01-27 RX ADMIN — NITROGLYCERIN 0.5 INCH: 20 OINTMENT TOPICAL at 17:45

## 2020-01-27 RX ADMIN — Medication 10 ML: at 22:18

## 2020-01-27 RX ADMIN — Medication 10 ML: at 02:18

## 2020-01-27 RX ADMIN — NITROGLYCERIN 1 INCH: 20 OINTMENT TOPICAL at 06:41

## 2020-01-27 RX ADMIN — AMIODARONE HYDROCHLORIDE 200 MG: 200 TABLET ORAL at 17:45

## 2020-01-27 NOTE — CONSULTS
[facility] Consult Service Note  Pulmonary, Critical Care, and Sleep Medicine    Name: Darby Zuluaga MRN: [id number]   : 1945 Hospital: [facility]   Date: 2020  Admission date: 2020 Hospital Day: 2       Subjective/Interval History:   Seen earlier today on rounds. Pt is unstable and acutely ill. Medical records and data reviewed. IMPRESSION:   1. Moderate Chronic Obstructive Pulmonary Disease FEV1 1.84( 62%)  to 2.23(74) with good bronchodilator response- 2017; now PFTS show FEV1 1.7 liters(64%) DLCO worse 46%  2. Preop Pulmonary exam  3. Severe CAD- LM, chronic LAD and RCA  4. HTN  5. GERD  6. Obesity  7. Heavy Tobacco use- active  8. CKD 3 Dr. Tanya Gr Cr 1.43  9. Anemia Hgb 9.3  10. H/o incarcerated umbilical hernia- manually reduced in 2017,   11. S/P AAA 8 cm repair with iliac disease- complicated by GI bleed, FRANSISCO  12. Total occlusion of BONNY  13. CVA  right BG CVA on MRI  14. EGD 2017 neg but colonoscopy noted for hemorrhoids, diverticular ds   15. Body mass index is 35.16 kg/m². 16. Prognosis guarded       RECOMMENDATIONS/PLAN:   1. Sputum for culture  2. Empiric abx thereafter  3. Await ABG  4. Pulmonary risks of surgery discussed with pt and family. Needs surgery. Can try to optimize his condition but still smoking. He did quite well after open AAA repair and will have as high a risk from CABG and lungs no stronger. He is awaiting other tests. 5. Albuterol bronchodilator prn   6. smoking cessation counseling done  7. Would add LABA to optimize lung function prior to surgery we will need to educate him prior to d/c if surgery on hold for plavix washout- no inhalers on formulary  8. Bronchial hygiene with respiratory therapy techniques, bronchodilators  9. DVT, SUP prophylaxis  10. Vaccination history reviewed with pt- he is at high risk for flu and will need to advise us promptly if sx or fever develops  11.  Pt needs IV fluids with additives and Drug therapy requiring intensive monitoring for toxicity  12. Prescription drug management with home med reconciliation reviewed          [x] High complexity decision making was performed  [x] See my orders for details      Subjective/Initial History:     I was asked by Nevin Zayas NP to see Valerie Mcallister  a 66 y.o.  male in consultation for a chief complaint of preop pulomanry evaluation    Excerpts from admission 1/26/2020 or consult notes as follows:     \"  Valerie Mcallister is a 66 y.o. hypertensive, non diabetic, heavy cigarette smoking, S/P PCI to circumflex and  of RCA, cau male who was referred for opinion and advice regarding coronary revascularization by Dr. Amalia Contreras / Justin Garvey NP. Patient was admitted yesterday for with unstable chest pain for 90 minutes and ST elevation in V1-V3. His pain was relieved with NL NTG and ASA. Troponin to 48. He underwent cath which revealed chronic disease without acute occlusion. \"    Pt is aheavy smoker with COPD per family. Does not see a lung specailist. Has bronchitis couple times a yr but none recently. Will cough up yellow sputum every AM. He has never been admitted for lung problems. Had AAA repair two yrs ago. Notes reviewed. No respiratory complications but did have some renal dysfunction, now followed by . Also seen by GI for melanotic stools. No problems since but has reflux. Pt has been slowing down since that surgery and not walking much. No wheeze. Not on inhalers. Has never had the flushot or pneumonia shot. Lives with wife. WEight stable. No swelling. PFTs from 2017 reviewed and just complted another today.  ABg pending    Allergies   Allergen Reactions    Lipitor [Atorvastatin] Other (comments)     \"muscle pain\"        MAR reviewed and pertinent medications noted or modified as needed     Current Facility-Administered Medications   Medication    nitroglycerin (NITROBID) 2 % ointment 0.5 Inch    metoprolol succinate (TOPROL-XL) XL tablet 50 mg    nitroglycerin (NITROBID) 2 % ointment 1 Inch    0.9% sodium chloride infusion    heparin 25,000 units in D5W 250 ml infusion    heparin (porcine) injection 2,000 Units    Or    heparin (porcine) injection 4,000 Units    pravastatin (PRAVACHOL) tablet 40 mg    amiodarone (CORDARONE) tablet 200 mg    benzonatate (TESSALON) capsule 100 mg    tamsulosin (FLOMAX) capsule 0.4 mg    aspirin delayed-release tablet 81 mg    sodium chloride (NS) flush 5-40 mL    sodium chloride (NS) flush 5-40 mL    acetaminophen (TYLENOL) tablet 650 mg    nitroglycerin (NITROSTAT) tablet 0.4 mg      Patient PCP: Krystin German NP  PMH:  has a past medical history of Aneurysm ([address] BPH (benign prostatic hyperplasia), CAD (coronary artery disease), Essential hypertension (12-12-13), Hematuria, Hyperglycemia, and Kidney failure. PSH:   has a past surgical history that includes pr cardiac surg procedure unlist; upper gi endoscopy,biopsy (2019); vascular surgery procedure unlist (); colonoscopy,diagnostic (2019); and colonoscopy (N/A, 2019). FHX: family history is not on file. SHX:  reports that he has been smoking. He has a 106.00 pack-year smoking history. He has never used smokeless tobacco. He reports that he does not drink alcohol or use drugs. ROS:A comprehensive review of systems was negative except for that written in the HPI.     Objective:     Vital Signs: Telemetry:    normal sinus rhythm Intake/Output:   Visit Vitals  /62 (BP 1 Location: Left arm, BP Patient Position: Sitting)   Pulse 66   Temp 98.6 °F (37 °C)   Resp 20   Ht 5' 9\" (1.753 m)   Wt 108 kg (238 lb 1.6 oz)   SpO2 96%   BMI 35.16 kg/m²       Temp (24hrs), Av.2 °F (36.8 °C), Min:97.9 °F (36.6 °C), Max:98.6 °F (37 °C)        O2 Device: Room air O2 Flow Rate (L/min): 2 l/min       Wt Readings from Last 4 Encounters:   20 108 kg (238 lb 1.6 oz) 09/16/19 102.7 kg (226 lb 5 oz)   07/08/19 103.1 kg (227 lb 4.7 oz)   06/30/17 102.2 kg (225 lb 4.8 oz)          Intake/Output Summary (Last 24 hours) at 1/27/2020 1611  Last data filed at 1/27/2020 1606  Gross per 24 hour   Intake 1336.08 ml   Output    Net 1336.08 ml       Last shift:      01/27 0701 - 01/27 1900  In: 754.8 [I.V.:754.8]  Out: -   Last 3 shifts: 01/25 1901 - 01/27 0700  In: 581.3 [I.V.:581.3]  Out: -        Physical Exam:   General:  male; in no respiratory distress and acyanotic and cooperative;    HEENT: NCAT, fair dentition, lips and mucosa dry  Eyes: anicteric; conjunctiva clear  Neck: no nodes, no cuff leak, no accessory MM use. Chest: no deformity,   Cardiac: regular rate, rhythm; no murmur  Lungs: distant breath sounds; no wheezes  Abd: soft, NT, hypoactive BS, OBESE soft  Ext: no edema; no joint swelling; No clubbing  : NO finley,   Neuro: alert;  Psych- no agitation, oriented to person; normal affect  Skin: warm, dry, no cyanosis;   Pulses: 1-2+ Bilateral pedal, radial  Capillary: brisk; pale             Labs:    Recent Labs     01/27/20  1014 01/27/20  0841 01/26/20  2111   WBC 9.2  --  6.9   HGB 12.4  --  14.2     --  211   APTT  --  26.2 27.6     Recent Labs     01/27/20  0324 01/26/20  2111    139   K 4.4 4.1   * 110*   CO2 23 24   * 172*   BUN 22* 22*   CREA 1.48* 1.60*   CA 8.3* 9.1   ALB  --  3.4*   SGOT  --  22   ALT  --  13     No results for input(s): PH, PCO2, PO2, HCO3, FIO2 in the last 72 hours.   Recent Labs     01/27/20  1014 01/27/20  0324   CKNDX 12.3*  --    TROIQ  --  48.80*     No results found for: BNPP, BNP   No results found for: CULT  Lab Results   Component Value Date/Time    TSH 2.180 01/30/2014 11:52 AM       Imaging:    CXR Results  (Last 48 hours)    None          Results from [place] encounter on 04/01/17   CTA ABD PELV W WO CONT    Narrative Contrast-enhanced CT angiogram of the abdomen and pelvis was performed using 100  cc Isovue-370. Three-dimensional postprocessing was performed. CT dose reduction was achieved through use of a standardized protocol tailored  for this examination and are automatic exposure control for dose modulation dose  reduction. INDICATION: Abdominal aortic aneurysm. FINDINGS:    There is a large juxtarenal fusiform abdominal aortic aneurysm. The aneurysm  begins immediately inferior to both renal arteries without a discernible neck. The aneurysm extends to the aortic bifurcation but does not involve the iliac  arteries. Maximal aneurysm diameter measures 7.8 cm. The celiac artery is widely patent. The superior mesenteric artery is calcified  at its origin with possible moderate stenosis. The extra iliac arteries are calcified but no definite focal stenosis. Right  external iliac is ectatic but not grossly aneurysmal. There is a focal aneurysm  at the right iliac bifurcation measuring 2.6 cm. The external iliac arteries are  calcified but otherwise patent. There is probable focal stenosis of the origin of the left renal artery. The  right renal artery is calcified but grossly patent. Lung bases are grossly clear with minimal atelectasis. The liver pancreas spleen  and kidneys are unremarkable given limitations of arterial phase contrast. There  are small bilateral renal cysts. The adrenals are unremarkable. No pelvic mass or adenopathy. No free fluid or focal fluid collection. Impression IMPRESSION: Juxtarenal fusiform abdominal aortic aneurysm extending to the  aortic bifurcation as detailed above.        This care involved high complexity medical decision making: I personally:  · Reviewed the flowsheet and previous days notes  · Reviewed and summarized records or history from previous days note or discussions with staff, family  · High Risk Drug therapy requiring intensive monitoring for toxicity: eg steroids, pressors, antibiotics  · Reviewed and/or ordered Clinical lab tests  · Reviewed images and/or ordered Radiology tests  · Reviewed the patients ECG / Telemetry  ·  discussed my assessment/management with : Nursing, Family for coordination of care    Marilee Ruggiero MD

## 2020-01-27 NOTE — PROGRESS NOTES
Cardiology Progress Note  1/27/2020     Admit Date: 1/26/2020  Admit Diagnosis: STEMI (ST elevation myocardial infarction) ([facility] [address] [I21.3]  CAD (coronary artery disease) [I25.10]  CC: none currently  Cardiac Assessment/Plan:   1) CAD:  Stent of unknown vessel at [facility] 2006. * followed by Dr. Katherine Gardner in 2014:  MPI reportedly showed inferior ischemia & cath recommended but not done. *Seen by Dr Chelita Elena in 2017: MPI rec but not done. Normal echo. *CP/TEIXEIRA 12/2019: Abnl PET (inf ischemia): pt delayed cath plan until 1/27/20. *Echo 1/10/20: EF 55%; AoScl only. Mild pulm HTN. *Cath 1/26/20: Severe LM w/ occluded LAD & RCA. 2) HTN  3) dyslipidemia  4) open AAA repair 4/2017 (8 cm; Dr. Mimi Cherry). 5) chronic BONNY occlusion. 6) right BG stroke on MRI 1/2014.  7) heavy tobacco use, 2-3 pack per day; no significant COPD on PFTs 4/2017  8) CKD:  Stage III (Cr 1.4-1.5/GFR 51 7/2019; Dr. Iain Meredith). Cr 1.5/GFR 45 1/21/20.  9) chronic anemia; hemoglobin 9.3  10) heme+ 2019:  Negative EGD 7/2019; internal hemorrhoids/diverticular disease on colonoscopy 9/2019. Retired ; from [employer]. 2 ppd & knows he should quit. No recent ethanol; heavy 50 years ago. 2 caffeinated elie/day. No added salt. He reports no CP in the last month until yesterday. For other plans, see orders. 1/27: BPs stable; Hg 14.2; Cr 1.48; trop 49. NSR; STs improved. No CP/dyspnea. Cont ASA, add hep gtt; Cont bblocker (XL to 50 qday); NTG paste; hold ACEi/ARB with CKD/recent contrast.    Needs surgical consult; Echo, Abd U/S, and carotids ordered. ________________________________________________________________  @ NP OV 12/5/19:   Mr Hafsa Magaña has a h/o:  1) CAD:  Stent of unknown vessel at [facility] 2006. * followed by Dr. Katherine Gardner in 2014:  MPI reportedly showed inferior ischemia & cath recommended but not done. *Seen by Dr Chelita Elena in 2017: MPI rec but not done. Normal echo.    *CP/TEIXEIRA 12/2019:  2) HTN  3) dyslipidemia  4) open AAA repair 4/2017 (8 cm; Dr. Orestes Hartley). 5) chronic BONNY occlusion. 6) right BG stroke on MRI 1/2014.  7) heavy tobacco use, 2-3 pack per day; no significant COPD on PFTs 4/2017  8) CKD:  Stage III (Cr 1.4-1.5/GFR 51 7/2019; Dr. Tonia Ross). 9) chronic anemia; hemoglobin 9.3  10) heme+ 2019:  Negative EGD 7/2019; internal hemorrhoids/diverticular disease on colonoscopy 9/2019. Retired ; from [place]. 2 ppd & knows he should quit. No recent ethanol; heavy 50 years ago. 2 caffeinated elie/day. No added salt. 12/2019:  New patient presenting with intermittent chest pain (6 months of worsening, 0-1 X/month;  15 minutes duration; central dull ache with radiation to both arms. Can occur with or without exertion. Increased dyspnea associated with discomfort). Chronic TEIXEIRA which is unchanged in years. No palpitations. No falling or bleeding. IMPRESSION AND PLAN  01. Atherosclerotic heart disease of native coronary artery with other forms of angina pectoris (I25.118):  Remote stent of unknown vessel; reportedly abnormal MPI 2014 without further evaluation. He needs a stress test but cannot walk on a treadmill. Therefore cardiac PET. We discussed the signs and symptoms of unstable angina, myocardial infarction and malignant arrhythmia. The patient knows to seek immediate medical attention should they occur. ECG done PET Cardiac test to be done. first available   02. Hyp hrt & chr kdny dis w/o hrt fail, w stg 1-4/unsp chr kdny (I13.10): This condition is stable. I have made no changes to the present regimen. 03. Mixed hyperlipidemia (E78.2): He should be on a statin unless previously intolerant; apparently had been on Lipitor in the past.   04. Occlusion and stenosis of bilateral carotid arteries (L82.75):  Chronic BONNY occlusion; he reports no recent follow-up. U/s ordered. Carotid Duplex to be done first available. 05.  Abdominal aortic aneurysm, without rupture (I71.4):  Surgically treated in 2017; follow-up per Dr. David Ku. 06. Shortness of breath (R06.02):  ?  Cardiac; also has some element of underlying pulmonary disease & ongoing tobacco abuse. Echocardiogram warranted. 2-D w/CFD Echo to be done first available. 07. Chronic kidney disease, stage 3 (moderate) (N18.3):  Managed by: Renal   08. Cerebral infarction, unspecified (I63.9):  Managed by: Other Physician   09. Nicotine dependence, cigarettes, uncomplicated (H06.449): The patient was instructed on smoking cessation. 10. Body mass index (BMI) 33.0-33.9, adult (A26.57): The patient was instructed on AHA diet and regular exercise. ORDERS:  1 Tobacco cessation counseling    2 Dietary management education, guidance, and counseling    3 ECG done    4 PET Cardiac test to be done    5 Carotid Duplex    6 2-D w/ CFD Echocardiogram    7 Return office visit with Kaylin Mcmullen MD in 3 Months. 8 The patient was instructed on AHA diet and regular exercise. 12/5/19 MEDICATION LIST  Medication Sig Desc   amlodipine 5 mg tablet take 1 tablet by oral route  every day   Aspirin Low Dose 81 mg tablet,delayed release take 1 tablet by oral route  every day   metoprolol succinate ER 25 mg tablet,extended release 24 hr take 1 tablet by oral route  every day   pantoprazole 40 mg tablet,delayed release take 1 tablet by oral route  every day   tamsulosin 0.4 mg capsule take 1 capsule by oral route  every day 1/2 hour following the same meal each day     _______________________________________________________________________  CARDIAC HISTORY  RISK FACTORS:  1 Hypertension   2 Tobacco Use: No/never       CARDIOVASCULAR PROCEDURES  Procedure Date Results   Carotid Duplex 05/02/2018 100% Right ICA, Less than 50% Left ICA, Vertebral: Bilateral Antegrade Flow, Unchanged versus 2017; at [address] Echo 04/03/2017 EF 55-60%; normal RV; no valve disease; normal PAp; by RCA at [address]    EKG 12/05/2019 Sinus Rhythm, Borderline PRWP, otherwise unremarkable. PFTs 2017 No obstructive disease; moderately decreased DLCO; at [facility]. ACTIVE ALLERGIES:  Ingredient Reaction Comment   FENOFIBRATE Hives    ALBUTEROL SULFATE Unknown    ATORVASTATIN Leg cramp Atorvastatin         PAST MEDICAL/SURGICAL HISTORY  (Detailed)    Disease/disorder Onset Date Surgical History Date Comments   GERD       Hypertension         Family History  (Detailed)    SOCIAL HISTORY  (Detailed)  Tobacco use reviewed. Preferred language is [place]. Smoking status: Heavy tobacco smoker. SMOKING STATUS  Use Status Type Smoking Status Usage Per Day Years Used Total Pack Years   yes Cigarette Heavy tobacco smoker 2 Packs       TOBACCO CESSATION INFORMATION  Date Counseled By Order Status Description Code Tobacco Cessation Information   2019 [place] [place] Tobacco cessation counseling completed   Counseling about tobacco use (procedure)           Hospital problem list   Active Hospital Problems    Diagnosis Date Noted    STEMI (ST elevation myocardial infarction) ([facility] [address] 2020     Priority: 1 - One    CAD (coronary artery disease)       - s/p stent           Subjective:  Marlon Leonardo reports   Chest pain X none  consistent with:  Non-cardiac CP         Atypical CP     None now  On going  Anginal CP     Dyspnea X none  at rest  with exertion         improved  unchanged  worse              PND X none  overnight       Orthopnea X none  improved  unchanged  worse   Presyncope X none  improved  unchanged  worse     Ambulated in hallway without symptoms   Yes   Ambulated in room without symptoms  Yes   Objective:    Physical Exam:  Overall VSSAF;    Visit Vitals  /64 (BP 1 Location: Left arm, BP Patient Position: Sitting)   Pulse 64   Temp 98.6 °F (37 °C)   Resp 18   Ht 5' 9\" (1.753 m)   Wt 108 kg (238 lb 1.6 oz)   SpO2 95%   BMI 35.16 kg/m²     Temp (24hrs), Av.2 °F (36.8 °C), Min:98 °F (36.7 °C), Max:98.6 °F (37 °C)    Patient Vitals for the past 8 hrs:   Pulse   01/27/20 0700 64   01/27/20 0502 64   01/27/20 0402 63   01/27/20 0302 75   01/27/20 0232 70   01/27/20 0132 82   01/27/20 0102 74   01/27/20 0032 68     Patient Vitals for the past 8 hrs:   Resp   01/27/20 0700 18   01/27/20 0502 17   01/27/20 0402 20   01/27/20 0302 25   01/27/20 0232 19   01/27/20 0132 20   01/27/20 0102 20   01/27/20 0032 24     Patient Vitals for the past 8 hrs:   BP   01/27/20 0700 138/64   01/27/20 0502 138/67   01/27/20 0402 140/79   01/27/20 0302 122/53   01/27/20 0232 157/86   01/27/20 0132 142/74   01/27/20 0102 141/81   01/27/20 0032 120/81   01/27/20 0017 131/77   01/27/20 0002 143/81       Intake/Output Summary (Last 24 hours) at 1/27/2020 0755  Last data filed at 1/27/2020 0700  Gross per 24 hour   Intake 581.25 ml   Output    Net 581.25 ml     General Appearance: Well developed, obsese, no acute distress. Ears/Nose/Mouth/Throat:   Normal MM; anicteric. JVP: WNL   Resp:   clear to auscultation bilaterally. Nl resp effort. Cardiovascular:  RRR, S1, S2 normal, no new murmur. No gallop or rub. Abdomen:   Soft, non-tender, bowel sounds are present. Extremities: No edema bilaterally. Skin:  Neuro: Warm and dry. A/O x3, grossly nonfocal   cath site intact w/o hematoma or new bruit; distal pulse unchanged x Yes   Data Review:     Telemetry independently reviewed x sinus  chronic afib  parox afib  NSVT     ECG independently reviewed x NSR  afib x Decreased ST elevation  NSST-Tw chgs    no new ECG provided for review   Lab results reviewed as noted below. Current medications reviewed as noted below. No results for input(s): PH, PCO2, PO2 in the last 72 hours.   Recent Labs     01/27/20 0324   TROIQ 48.80*     Recent Labs     01/27/20 0324 01/26/20  2111    139   K 4.4 4.1   * 110*   CO2 23 24   BUN 22* 22*   CREA 1.48* 1.60*   GFRAA 56* 51*   * 172*   CA 8.3* 9.1   ALB  --  3.4*   WBC  --  6.9   HGB --  14.2   HCT  --  43.2   PLT  --  211     Lab Results   Component Value Date/Time    Cholesterol, total 135 04/03/2017 03:57 AM    HDL Cholesterol 27 04/03/2017 03:57 AM    LDL, calculated 58 04/03/2017 03:57 AM    Triglyceride 250 (H) 04/03/2017 03:57 AM    CHOL/HDL Ratio 5.0 04/03/2017 03:57 AM     Recent Labs     01/26/20 2111   SGOT 22   *   TP 7.9   ALB 3.4*   GLOB 4.5*     Recent Labs     01/26/20 2111   APTT 27.6      No components found for: [place]    Current Facility-Administered Medications   Medication Dose Route Frequency    metoprolol succinate (TOPROL-XL) XL tablet 25 mg  25 mg Oral DAILY    tamsulosin (FLOMAX) capsule 0.4 mg  0.4 mg Oral QPM    aspirin delayed-release tablet 81 mg  81 mg Oral DAILY    sodium chloride (NS) flush 5-40 mL  5-40 mL IntraVENous Q8H    sodium chloride (NS) flush 5-40 mL  5-40 mL IntraVENous PRN    0.9% sodium chloride infusion  75 mL/hr IntraVENous CONTINUOUS    acetaminophen (TYLENOL) tablet 650 mg  650 mg Oral Q4H PRN    nitroglycerin (NITROBID) 2 % ointment 1 Inch  1 Inch Topical Q6H    nitroglycerin (NITROSTAT) tablet 0.4 mg  0.4 mg SubLINGual PRN        Dc Felipe MD

## 2020-01-27 NOTE — PROCEDURES
[facility] CATH    Name:  Boris Humphreys  MR#:  [id number]  :  1945  ACCOUNT #:  [de-identified]  DATE OF SERVICE:  2020      PREOPERATIVE DIAGNOSIS:  STEMI    POSTOPERATIVE DIAGNOSIS:  Severe triple-vessel disease. PROCEDURES PERFORMED:  1. Left heart cath. 2.  Selective coronary angiogram.  3.  Access site:  Right common femoral artery and left common femoral artery under fluoroscopic guidance. SURGEON:  Rhonda Morales MD    ASSISTANT:    None    ANESTHESIA:   Conscious sedation with Versed and fentanyl    COMPLICATIONS:   Right common femoral artery retrograde dissection without any compromise to the flow    SPECIMENS REMOVED:   none    IMPLANTS:   none    ESTIMATED BLOOD LOSS:   minimal    REFERRING PHYSICIAN:  Ney Rondon MD    PRIMARY CARDIOLOGIST:  Natividad Gong MD    PRIMARY CARE PHYSICIAN:      INDICATION FOR PROCEDURE:  Acute coronary syndrome. HISTORY OF PRESENT ILLNESS:  This is a 78-year-old male who presented to the ED with history of chest pain. Chest pain was retrosternal in nature. By the time the patient came to the ED, he was given sublingual nitroglycerin and the patient is pain-free at this time. EKG showed some diffuse ST-T changes and there was suspicion for acute MI in V1 and V2, upsloping ST elevation. When comparing it with the previous EKG, the ST appeared a little more than previous EKG, elevated. The patient was taken to the cardiac catheterization lab for cardiac cath and possible PCI. PROCEDURE:  After informed consent, the patient was taken to the cardiac catheterization lab. Procedures  was discussed by by Dr. Ronen Catalan to the patient in detail. The patient was agreeable to proceed with the same. Both the groins were prepped and draped in the usual sterile fashion. Anesthesia was obtained with 1% lidocaine and access was obtained with a micropuncture kit.   Then, micropuncture sheath was replaced with a 6-Jordanian sheath, but wire could not cross into the aorta due to extreme tortuosity in the iliac vessel. Sheath was  left in place and the wire was removed and  Femoral angiogram was performed. It seemed that there was a  Retrograde dissection noted in the external iliac artery distal to the bifurcation of common iliac artery. At this point in time,  The  access was obtained from the left common femoral artery. Again, the left common femoral artery was very tortuous and then, with difficulty, the wire was crossed up into the abdominal aorta. At this point, OmniFlush catheter was advanced into the distal abdominal aorta and distal abdominal aortic angiogram  With iliofemoral runoff was done and then OmniFlush catheter was crossed over the bifurcation and was positioned into the right common iliac artery and then right common iliac artery angiogram was  done. At this point in time, the  coronary angiogram was delayed  Due to run complication. No perforation was noted   And right common femoral artery and no flow-limiting dissection was noted in the right external iliac artery on the antegrade injection. At this point, then we proceeded with the coronary angiogram.  A 7-Jordanian 45 cm Terumo sheath was advanced through the tortuous left common iliac artery into the distal abdominal aorta, and into the thoracic aorta. Then, using the JR4 and JL4 diagnostic catheters, right and left coronary were selectively engaged, angiographic views were obtained. Using the [address] [place] catheter, LV was entered and  Left ventricular end-diastolic pressure was checked and a pullback was done across the aortic valve to see the aortic gradient. The findings were as follows:  1. On the peripheral angiogram, distal abdominal aorta has aneurysmal dilatation and is very tortuous in course. However, no significant stenosis was noted.   Then it bifurcates into distal common iliac artery, right common iliac artery is tortuous and has an aneurysm noted just before the bifurcation into the internal and external iliac artery, which is large in size. Then, no antegrade flow-limiting dissection was noted in the right external iliac artery. 2.  Left common iliac artery is patent and there is significant peripheral arterial disease of 50%-60% stenosis noted just below the bifurcation and then bifurcated into the external and internal iliac arteries. The external iliac artery is patent. 3.  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 CORONARY ANGIOGRAM:  Hemodynamics:  Aortic pressure 149/60. Left ventricular end-diastolic pressure 03-20 mmHg and no gradient was noted across the aortic valve on pullback. ANGIOGRAPHIC FINDINGS WERE AS FOLLOWS:  Left main:  Left main coronary artery is 4-4.5 mm in diameter. There appears to be an ulcerated plaque in the distal left main and just before the bifurcation into the LAD and the left circumflex branches. There is stenosis in the distal left main of around 60%-70%. Then it bifurcates into the LAD and the left circumflex branch. 3.  LAD:  LAD is 2.5 mm in diameter in the proximal portion and gives rise to a very large diagonal branch, it could be called as ramus intermedius, which is widely patent. The size of the ramus intermedius is around 3-3.5 mm in diameter. The LAD just beyond the first septal  has a stent noted that appears to be chronically occluded and distal LAD  Fills via  collateral coming from the left system. 4.  Left circumflex:  Left circumflex coronary artery appears to be 5 mm in diameter, gives rise to one large OM branch, which further bifurcates into two branches. There are collaterals noted from the left circumflex going into the RCA which fills the PDA and PL branches. 5.  RCA:  RCA is totally occluded  and distal RCA fills  Via collateral coming from the left system, especially the PDA appears to be of reasonable size.     MEDICATION: The patient was given Versed and fentanyl for sedation by the cath lab nurse under my supervision. The patient was monitored throughout the course, by cath lab nurse under my supervision. SUMMARY:  Severe triple vessel disease involving the distal left main of 60%-70% with ulcerated plaque, total occlusion of left anterior descending artery. Patent left circumflex and patent first large diagonal or the ramus intermedius branch. Totally occluded RCA with collateralization coming from the left system. PLAN:  At this point in time, we will refer the patient for the CT surgery. Removed both the sheaths with manual pressure on the table.         Luh Christopher MD      [id number]  D:  01/26/2020 22:55  T:  01/27/2020 9:22  JOB #:  [id number]  CC:  MD Chauncey Aggarwal MD

## 2020-01-27 NOTE — ED PROVIDER NOTES
EMERGENCY DEPARTMENT HISTORY AND PHYSICAL EXAM      Date: 1/26/2020  Patient Name: Kenneth Delvin Bonds    History of Presenting Illness     Chief Complaint   Patient presents with    Chest Pain       History Provided By: Patient and EMS    HPI: LisaKoko Delvin Bonds, 66 y.o. male  presents to the ED with cc of chest pain. Patient states he had chest pain on onset about an hour to hour and a half ago. Patient states he was just sitting at his home. Pain was substernal in nature and dull. Patient states he did have some shortness of breath and some nausea. Pain is starting to resolve and feels much better on his arrival to the ER. Prehospital EKG does show a STEMI with elevation in V1 through V3. He received aspirin and nitroglycerin prior to arrival which helped with his pain. Patient does have a history of coronary artery disease and is scheduled to have a catheterization performed tomorrow by his primary cardiologist who is Dr. Brisa Mckee. There are no other complaints, changes, or physical findings at this time. PCP: Junior Olivier NP    No current facility-administered medications on file prior to encounter. Current Outpatient Medications on File Prior to Encounter   Medication Sig Dispense Refill    aspirin delayed-release 81 mg tablet Take 81 mg by mouth daily.  tamsulosin (FLOMAX) 0.4 mg capsule Take 0.4 mg by mouth every evening.  sodium bicarbonate 650 mg tablet Take 650 mg by mouth two (2) times a day.  metoprolol succinate (TOPROL-XL) 25 mg XL tablet Take 25 mg by mouth daily.  diphenhydrAMINE-acetaminophen (TYLENOL PM EXTRA STRENGTH)  mg tab Take 2 Tabs by mouth nightly.  clopidogrel (PLAVIX) 75 mg tablet Take 75 mg by mouth daily.          Past History     Past Medical History:  Past Medical History:   Diagnosis Date    Aneurysm ([facility] [address]     BPH (benign prostatic hyperplasia)     CAD (coronary artery disease)     2006 - s/p stent     Essential hypertension 12-12-13  Hematuria     sees urologist    Hyperglycemia     Kidney failure        Past Surgical History:  Past Surgical History:   Procedure Laterality Date    CARDIAC SURG PROCEDURE UNLIST      stent 2006    COLONOSCOPY N/A 9/16/2019    COLONOSCOPY performed by Anabel [facility] at [facility] ENDOSCOPY    COLONOSCOPY,DIAGNOSTIC  9/16/2019         UPPER GI ENDOSCOPY,BIOPSY  7/9/2019         VASCULAR SURGERY PROCEDURE UNLIST  2016    Aotric aneurysm       Family History:  History reviewed. No pertinent family history. Social History:  Social History     Tobacco Use    Smoking status: Current Every Day Smoker     Packs/day: 2.00     Years: 53.00     Pack years: 106.00    Smokeless tobacco: Never Used   Substance Use Topics    Alcohol use: No    Drug use: Never       Allergies: Allergies   Allergen Reactions    Lipitor [Atorvastatin] Other (comments)     \"muscle pain\"         Review of Systems   Review of Systems   Constitutional: Negative for chills and fever. HENT: Negative for congestion, ear pain, rhinorrhea, sore throat and trouble swallowing. Eyes: Negative for visual disturbance. Respiratory: Positive for shortness of breath. Negative for cough and chest tightness. Cardiovascular: Positive for chest pain. Negative for palpitations. Gastrointestinal: Positive for nausea. Negative for abdominal pain, blood in stool, constipation, diarrhea and vomiting. Genitourinary: Negative for decreased urine volume, difficulty urinating, dysuria and frequency. Musculoskeletal: Negative for back pain and neck pain. Skin: Negative for color change and rash. Neurological: Negative for dizziness, weakness, light-headedness and headaches. Physical Exam   Physical Exam  Vitals signs and nursing note reviewed. Constitutional:       General: He is not in acute distress. Appearance: He is well-developed. He is not ill-appearing.    Eyes:      Conjunctiva/sclera: Conjunctivae normal. Neck:      Musculoskeletal: Neck supple. Cardiovascular:      Rate and Rhythm: Normal rate and regular rhythm. Pulmonary:      Effort: Pulmonary effort is normal. No accessory muscle usage or respiratory distress. Breath sounds: Normal breath sounds. Abdominal:      General: There is no distension. Palpations: Abdomen is soft. Tenderness: There is no tenderness. Comments: Large ventral hernia   Lymphadenopathy:      Cervical: No cervical adenopathy. Skin:     General: Skin is warm and dry. Neurological:      Mental Status: He is alert and oriented to person, place, and time. Cranial Nerves: No cranial nerve deficit. Sensory: No sensory deficit.          Diagnostic Study Results     Labs -     Recent Results (from the past 24 hour(s))   EKG, 12 LEAD, INITIAL    Collection Time: 01/26/20  9:02 PM   Result Value Ref Range    Ventricular Rate 67 BPM    Atrial Rate 67 BPM    P-R Interval 212 ms    QRS Duration 88 ms    Q-T Interval 390 ms    QTC Calculation (Bezet) 412 ms    Calculated P Axis 53 degrees    Calculated R Axis 15 degrees    Calculated T Axis 46 degrees    Diagnosis       Sinus rhythm with 1st degree AV block  Anteroseptal infarct , possibly acute  ** ** ACUTE MI / STEMI ** **  When compared with ECG of 08-JUL-2019 12:49,  NC interval has increased  Anteroseptal infarct is now present     CBC WITH AUTOMATED DIFF    Collection Time: 01/26/20  9:11 PM   Result Value Ref Range    WBC 6.9 4.1 - 11.1 K/uL    RBC 4.87 4.10 - 5.70 M/uL    HGB 14.2 12.1 - 17.0 g/dL    HCT 43.2 36.6 - 50.3 %    MCV 88.7 80.0 - 99.0 FL    MCH 29.2 26.0 - 34.0 PG    MCHC 32.9 30.0 - 36.5 g/dL    RDW 16.7 (H) 11.5 - 14.5 %    PLATELET 974 737 - 534 K/uL    MPV 10.5 8.9 - 12.9 FL    NRBC 0.0 0  WBC    ABSOLUTE NRBC 0.00 0.00 - 0.01 K/uL    NEUTROPHILS 63 32 - 75 %    LYMPHOCYTES 25 12 - 49 %    MONOCYTES 8 5 - 13 %    EOSINOPHILS 3 0 - 7 %    BASOPHILS 1 0 - 1 %    IMMATURE GRANULOCYTES 0 0.0 - 0.5 %    ABS. NEUTROPHILS 4.3 1.8 - 8.0 K/UL    ABS. LYMPHOCYTES 1.7 0.8 - 3.5 K/UL    ABS. MONOCYTES 0.6 0.0 - 1.0 K/UL    ABS. EOSINOPHILS 0.2 0.0 - 0.4 K/UL    ABS. BASOPHILS 0.1 0.0 - 0.1 K/UL    ABS. IMM. GRANS. 0.0 0.00 - 0.04 K/UL    DF AUTOMATED     PTT    Collection Time: 01/26/20  9:11 PM   Result Value Ref Range    aPTT 27.6 22.1 - 32.0 sec    aPTT, therapeutic range     58.0 - 77.0 SECS       Radiologic Studies -   XR CHEST PORT    (Results Pending)     CT Results  (Last 48 hours)    None        CXR Results  (Last 48 hours)    None            Medical Decision Making   I am the first provider for this patient. I reviewed the vital signs, available nursing notes, past medical history, past surgical history, family history and social history. Vital Signs-Reviewed the patient's vital signs. Patient Vitals for the past 24 hrs:   Temp Pulse Resp BP SpO2   01/26/20 2127  65 28 150/80 96 %   01/26/20 2118     95 %   01/26/20 2108 98 °F (36.7 °C) 71 20 162/77 95 %   01/26/20 2105  68 20  95 %         Records Reviewed: Nursing Notes and Old Medical Records    Provider Notes (Medical Decision Making):   Patient presents with chest pain and STEMI on his EKG. Cardiology immediately notified as well as Cath Lab. Patient received aspirin nitroglycerin by EMS prior to arrival and the patient is already started to feel better. EKG continues to show elevation through V1 through the 3. Patient was given a heparin IV bolus. Patient will be taken to the Cath Lab. ED Course:   Initial assessment performed. The patients presenting problems have been discussed, and they are in agreement with the care plan formulated and outlined with them. I have encouraged them to ask questions as they arise throughout their visit.          Orders Placed This Encounter    XR CHEST PORT    CBC WITH AUTOMATED DIFF    METABOLIC PANEL, COMPREHENSIVE    PTT    EKG, 12 LEAD, INITIAL    heparin (porcine) injection 5,000 Units    aspirin delayed-release 81 mg tablet    IP CONSULT TO CARDIOLOGY       Procedures      Critical Care Time:   I have spent 35 minutes of critical care time in evaluating and treating this patient. This includes time spent at bedside, time with family and decision makers, documentation, review of labs and imaging, and/or consultation with specialists. It does not include time spent on separately billed procedures. This patient presents with a critical illness or injury that acutely impairs one or more vital organ systems such that there is a high probability of imminent or life threatening deterioration in the patient's condition. This case involved decision making of high complexity to assess, manipulate, and support vital organ system failure and/or to prevent further life threatening deterioration of the patient's condition. Failure to initiate these interventions on an urgent basis would likely result in sudden, clinically significant or life threatening deterioration in the patient's condition. Abnormal findings supporting critical care: ST elevation myocardial infarction  Interventions to support critical care: Anticoagulation, emergent catheterization  Failure to intervene may result in: Cardiac failure, death      Disposition:  Admit        Diagnosis     Clinical Impression:   1. ST elevation myocardial infarction (STEMI), unspecified artery (HCC)    2. Chest pain, unspecified type            This note will not be viewable in [facility].

## 2020-01-27 NOTE — H&P
[address]  HISTORY AND PHYSICAL    Name:  Jodi Jefferson  MR#:  [id number]  :  1945  ACCOUNT #:  [de-identified]  ADMIT DATE:  2020      CHIEF COMPLAINT:  Chest pain. HISTORY OF PRESENT ILLNESS:  The patient is a 58-year-old man with history of known coronary artery disease. He was actually scheduled for an elective catheterization tomorrow morning. He is followed by Dr. Fany Betancur. He has a known chronically occluded right coronary artery and a prior stent to apparently the left circumflex a number of years ago. He saw Dr. Fany Betancur recently for office evaluation and had been having intermittent chest discomfort. He had a PET nuclear scan, which showed inferior ischemia. Because of this, he was set up for elective cardiac catheterization tomorrow. The patient developed midsternal chest discomfort tonight. He was brought [facility] ER. On arrival to the ER, the symptoms had been present for about 90 minutes. Shortly after arrival to the ER, the patient's chest discomfort resolved with nitroglycerin and aspirin. His EKG did show upsloping ST elevation in V1-V3 which was different from his prior tracing. Because of this, the cardiac catheterization lab was called for urgent catheterization and possible intervention. The patient currently denies chest discomfort. His risk factors include hypertension, tobacco abuse and dyslipidemia. He had a prior ejection fraction by echo, which was normal.    PAST MEDICAL HISTORY:  As noted above, prior stroke, mild renal insufficiency, BPH, esophagitis, heme-positive stool in 2019 with workup showing apparent internal hemorrhoids. He also has a known 100% right internal carotid artery occlusion. SURGERY:  Prior open abdominal aortic aneurysm repair. CURRENT MEDICATIONS:  Aspirin, Flomax, sodium bicarbonate, metoprolol, Tylenol PM, Plavix. SOCIAL HISTORY:  The patient is an active tobacco smoker.   He has a history of heavy alcohol use, although none recently. FAMILY HISTORY:  Noncontributory. REVIEW OF SYSTEMS:  As noted above, otherwise unremarkable and limited given the urgent situation. PHYSICAL EXAMINATION:  GENERAL:  Reveals an elderly white male in no acute distress. VITAL SIGNS:  Blood pressure 150/80, pulse 65, respirations 20-25, temperature 98. HEENT:  Pupils are equal.  Oropharynx, moist oral mucosa. NECK:  No masses or thyromegaly. No cervical or supraclavicular adenopathy. No obvious bruits or JVD. CHEST:  Clear anteriorly. SKIN:  Warm and dry. CARDIAC:  Regular rate and rhythm. No obvious murmurs or gallops. ABDOMEN:  Soft, nontender, no masses or organomegaly. Bowel sounds positive. EXTREMITIES:  No cyanosis or clubbing. Trace pedal edema. Distal pulse is not well palpated. NEURO:  No obvious gross motor deficits. LABORATORIES:  BUN 20, creatinine 1.4, hemoglobin 14.2, troponin pending. EKG as noted above, shows sinus rhythm, anterior septal upsloping ST elevation, normal axis, no Q-waves seen. IMPRESSION:  1. Acute ST elevation myocardial infarction. 2.  Known coronary artery disease with chronically occluded right coronary artery. 3.  Recent abnormal nuclear stress test showing inferior ischemia. 4.  Hypertension. 5.  Chronic tobacco abuse. 6.  Dyslipidemia. 7.  Probable underlying chronic obstructive pulmonary disease. 7.  Mild chronic renal insufficiency. 8.  Prior cerebrovascular accident with known 100% right internal carotid occlusion. 9.  History of heme-positive stool in 2019 with fairly unremarkable workup. RECOMMENDATIONS:  The patient will be taken urgently to the cardiac catheterization lab. This was explained to the patient and his wife. Further recommendations will follow.         MD BESSIE [id number]  D:  01/26/2020 22:00  T:  01/27/2020 1:06  JOB #:  [id number]

## 2020-01-27 NOTE — CONSULTS
Cardiothoracic Surgery Consult      Subjective:      Chief Complaint: chest pain    Elizabeth Leary is a 66 y.o. hypertensive, non diabetic, heavy cigarette smoking, S/P PCI to circumflex and  of RCA, cau male who was referred for opinion and advice regarding coronary revascularization by Dr. Marla Verde / Belem Wolff NP. Patient was admitted yesterday for with unstable chest pain for 90 minutes and ST elevation in V1-V3. His pain was relieved with NL NTG and ASA. Troponin to 48. He underwent cath which revealed chronic disease without acute occlusion. PLAVIX last dose 1/26/20    Apparent COPD. Heavy productive cough. CKD 3 Dr. Naveen Bran Cr 1.43    Anemia Hgb 9.3    S/P AAA repair with iliac disease    Total occlusion of BONNY    CVA 2014    Cardiac Testing:     Angiogram: 2/27/20  1. On the peripheral angiogram, distal abdominal aorta has aneurysmal dilatation and is very tortuous in course. However, no significant stenosis was noted. Then it bifurcates into distal common iliac artery, right common iliac artery is tortuous and has an aneurysm noted just before the bifurcation into the internal and external iliac artery, which is large in size. Then, no antegrade flow-limiting dissection was noted in the right external iliac artery. 2.  Left common iliac artery is patent and there is significant peripheral arterial disease of 50%-60% stenosis noted just below the bifurcation and then bifurcated into the external and internal iliac arteries. The external iliac artery is patent. 3.  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 Echocardiogram pending     Cardiac catheretization films were personally reviewed  CORONARY ANGIOGRAM:  Hemodynamics:  Aortic pressure 149/60.   Left ventricular end-diastolic pressure 14-43 mmHg and no gradient was noted across the aortic valve on pullback.     ANGIOGRAPHIC FINDINGS WERE AS FOLLOWS:  Left main:  Left main coronary artery is 4-4.5 mm in diameter. There appears to be an ulcerated plaque in the distal left main and just before the bifurcation into the LAD and the left circumflex branches. There is stenosis in the distal left main of around 60%-70%. Then it bifurcates into the LAD and the left circumflex branch. 3.  LAD:  LAD is 2.5 mm in diameter in the proximal portion and gives rise to a very large diagonal branch, it could be called as ramus intermedius, which is widely patent. The size of the ramus intermedius is around 3-3.5 mm in diameter. The LAD just beyond the first septal  has a stent noted that appears to be chronically occluded and distal LAD shows the collateral coming from the left system. 4.  Left circumflex:  Left circumflex coronary artery appears to be 5 mm in diameter, gives rise to one large OM branch, which further bifurcates into two branches. There are collaterals noted from the left circumflex going into the RCA which fills the PDA and PL branches. 5.  RCA:  RCA is totally occluded, distal RCA fills the left system, especially the PDA appears to be of reasonable size    No PCI performed.       Past Medical History:   Diagnosis Date    Aneurysm ([address]     BPH (benign prostatic hyperplasia)     CAD (coronary artery disease)     2006 - s/p stent     Essential hypertension 12-12-13    Hematuria     sees urologist    Hyperglycemia     Kidney failure      Past Surgical History:   Procedure Laterality Date    CARDIAC SURG PROCEDURE UNLIST      stent 2006    COLONOSCOPY N/A 9/16/2019    COLONOSCOPY performed by [facility] at [facility] ENDOSCOPY    COLONOSCOPY,DIAGNOSTIC  9/16/2019         UPPER GI ENDOSCOPY,BIOPSY  7/9/2019         VASCULAR SURGERY PROCEDURE UNLIST  2016    Aotric aneurysm      Social History     Tobacco Use    Smoking status: Current Every Day Smoker     Packs/day: 2.00     Years: 53.00     Pack years: 106.00    Smokeless tobacco: Never Used   Substance Use Topics    Alcohol use: No      History reviewed. No pertinent family history. Prior to Admission medications    Medication Sig Start Date End Date Taking? Authorizing Provider   aspirin delayed-release 81 mg tablet Take 81 mg by mouth daily. Yes Other, MD Denis   tamsulosin (FLOMAX) 0.4 mg capsule Take 0.4 mg by mouth every evening. Yes Provider, Historical   sodium bicarbonate 650 mg tablet Take 650 mg by mouth two (2) times a day. Yes Provider, Historical   metoprolol succinate (TOPROL-XL) 25 mg XL tablet Take 25 mg by mouth daily. 5/24/17  Yes Provider, Historical   diphenhydrAMINE-acetaminophen (TYLENOL PM EXTRA STRENGTH)  mg tab Take 2 Tabs by mouth nightly. Yes Provider, Historical   clopidogrel (PLAVIX) 75 mg tablet Take 75 mg by mouth daily. Provider, Historical       Allergies   Allergen Reactions    Lipitor [Atorvastatin] Other (comments)     \"muscle pain\"       Recreational drug use:NO    Ciaran status or cause of death in parents and siblings if  Heart problems, stroke, or vascular disease in family members : NO    HISTORY OF NON COMPLIANCE WITH MEDICINES:  NO    REVIEW OF SYSTEMS:     [] Unable to obtain  ROS due to  []mental status change  []sedated   []intubated   [x]Total of 13 systems reviewed as follows:  Constitutional: negative fever, negative chills  Eyes:   negative for amauroses fugax  ENT:   negative sore throat,oral absecess  Endocrine Negative for thyroid replacement Rx; goiter; DM  Respiratory:  + chronic cough,sputum production, SOB  Cards:  negative for  palpitations, lower extremity edema, varicosities, Claudication Chest Pain  GI:   negative for dysphagia, bleeding, +nausea, vomiting, diarrhea, and     abdominal pain  Genitourinary: negative for frequency, dysuria, BPH in men.   Integument:  negative for rash and pruritus  Hematologic:  negative for easy bruising; bleeding dyscarsia, ANEMIA  Musculoskel: negative for muscle weakness or implants inhibiting ambulation  Neurological:  negative for stroke, TIA, syncope, dizziness  Behavl/Psych: negative for feelings of anxiety, depression       Objective:     Visit Vitals  /69 (BP 1 Location: Left arm, BP Patient Position: Sitting;Post activity)   Pulse 62   Temp 97.9 °F (36.6 °C)   Resp 20   Ht 5' 9\" (1.753 m)   Wt 238 lb 1.6 oz (108 kg)   SpO2 94%   BMI 35.16 kg/m²       EXAM:  General:  Alert, cooperative, no distress   Mouth/Throat: Teeth and gums normal.  Chest: No lesions, surgical scars or pectus. Neck: Supple, symmetrical, trachea midline, no adenopathy, no thyroid enlargement/tenderness/nodules, no carotid bruit and no JVD. Lungs:   Clear to auscultation bilaterally. Heart:  Regular rate and rhythm, S1, S2 normal, no murmur, no click, no rub and no gallop. Abdomen:   Soft, non-tender. Bowel sounds normal. No masses,  No organomegaly. +Ventral hrernia   Extremities: Extremities normal, atraumatic, no cyanosis or edema. Pulses: 2+ and symmetric all extremities. Skin:  No rashes or lesions       Neurologic:  Gross motor and sensory apparatus intact.      Labs:   Recent Labs     01/27/20  1014 01/27/20  0324   WBC 9.2  --    HGB 12.4  --    HCT 38.3  --      --    NA  --  140   K  --  4.4   BUN  --  22*   CREA  --  1.48*   GLU  --  122*       DIAGNOSTICS:    Carotid Artery Doppler:    Pulmonary Function Testing:  FVC-   FEV1-     NICHOLAS:    Modified Shant's    Ultrasound Venous Mapping:        Assessment:     Active Problems:    STEMI (ST elevation myocardial infarction) ([facility] (1/26/2020)      CAD (coronary artery disease) ()      Overview: 2006 - s/p stent     STS Risk Calculator:  Procedure: Isolated CAB CALCULATE   Risk of Mortality:  6.862%    Renal Failure:  7.978%    Permanent Stroke:  4.186%    Prolonged Ventilation:  28.603%    DSW Infection:  0.554%    Reoperation:  2.912%    Morbidity or Mortality:  33.943%    Short Length of Stay:  14.956%    Long Length of Stay:  18.325%       Plan: Surgery is scheduled for early next week pursuant to pre-operative testing results and Plavix washout. Pre- operative assecessment in progress including: CTA C/A/P, Carotid doppler, INCHOLAS and PFT screening.     Serial Troponin levels    Vascular Consult: Sylvia Mckenzie    Pulmonary Consult: Helen Angel    Stop Plavix    Start Amiodarone in addition to ASA, BB, statin      Signed By: GEE Gerardo     January 27, 2020       ctcons

## 2020-01-27 NOTE — ED NOTES
Assumed care of pt from EMS. Pt CC STEMI. Pt has hx of kidney problems. Prealert at 8:41PM. Paged cath lab at 8:42PM. Pt arrived at [facility] at bedside. Pt starting having CP at 6:30PM. Pt took Asprin at 645PM. Pt vomited. EMS gave nitro and aspirin . Bilateral arm pain and CP at this time. Pt scheduled to get a stent placed tomorrow. Pt placed on monitor x3.

## 2020-01-27 NOTE — PROGRESS NOTES
Chart review. Patient scheduled for surgery with CTS team next week. CM will continue to follow for discharge needs.     Kristian Merida RN CM  Ext [phone]

## 2020-01-27 NOTE — PROGRESS NOTES
He has a 24mm infrarenal dacron tube graft. 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 He has been lost to follow up. Will get CT after cardiac issues resolved. Following at a distance.

## 2020-01-27 NOTE — PROGRESS NOTES
Failed attempt to meet with patient. Receiving bedside echo. 1120am  Patient is off unit for procedure. Family at bedside. Assisted with confirming demographics.     Uyen Atwood RN CM   Ext [phone]

## 2020-01-27 NOTE — PROGRESS NOTES
07:05 - Bedside report rec'd from YOLANDA Arboleda.    08:40 - Spoke with Fatmata in pharmacy regarding initiation of heparin gtt. They are able to use the CBC from last night but will need a repeat PTT before starting the gtt- but no need to hold gtt until resulted. 12:24 - Patient arrived back from testing with heparin gtt infiltration in R AC. DC PIV and switched to R hand. 16:00 - Patient and his wife state he has not taken Plavix in 2-3 months. 17:33 - Confirmed with pharmacy PTT 31.1. Heparin gtt to 13.3units kg/hr (from 9.3 units/kg/hr) with 4000 unit bolus. 18:30 - Patient insistent that he needs to keep his nicotine patch or \"go crazy and jump out that window. \"   Will address in AM.      19:00 - Bedside report given to Poonam Arellano RN

## 2020-01-27 NOTE — PROGRESS NOTES
GIGI  Home with HH? Follow up appointments    Reason for Admission:   STEMI                   RUR Score:      10               Plan for utilizing home health: To be determined. Has qualifying dx for [facility]; patient is presently off unit. Patient may benefit from PTOT evaluation                        Current Advanced Directive/Advance Care Plan:  Not on file                           CM met with family at bedside, pt is off unit. Daughters - Massimo Sheikh [phone] and Junior Hicks [phone]    Pt name and  confirmed as pt identifiers. Demographics confirmed. ADL's/IADL's - daughters report patient has difficulty navigating steps. There is a hand rail. Reports patient to [facility] stay home and only leaves when necessary\". Reports patient as being independent to include driving. Wife does chores and shopping. DME - none  Preferred Rx - [facility]  Patient's PCP Talisha Salmeron NP has left practice and patient is being absorbed by another provider in the practice. Daughter, Massimo Sheikh informed CM they have a [employer] application in progress. Yet to be completed. Family is working on [place] application process. Care Management Interventions  PCP Verified by CM: Yes(Marcial NP has left practice. Patient is being absorbed by other provider)  Mode of Transport at Discharge: Other (see comment)(family)  Transition of Care Consult (CM Consult): Discharge Planning(based on reports from family patient would benefit from PTOT evaluation )  Discharge Durable Medical Equipment: No  Physical Therapy Consult: No  Occupational Therapy Consult: No  Speech Therapy Consult: No  Current Support Network: Lives with Spouse(1 story home - 6 steps entry.   Family reports patient has difficulty getting up/down steps - there is a hand railing)  Confirm Follow Up Transport: Family  Discharge Location  Discharge Placement: Home with home health    CM will review 2nd IM with patient when he returns to the floor and will review H2H.   Eben Caballero RN CM  Ext [phone]

## 2020-01-27 NOTE — PROGRESS NOTES
LHC done  and dictated per Dr Ellie Chan. Signif LM dz. No PCI .  100% LAD and RCA- both appear chronic

## 2020-01-28 ENCOUNTER — APPOINTMENT (OUTPATIENT)
Dept: CT IMAGING | Age: 75
DRG: 215 | End: 2020-01-28
Attending: PHYSICIAN ASSISTANT
Payer: MEDICARE

## 2020-01-28 LAB
ACT BLD: 136 SECS (ref 79–138)
ANION GAP SERPL CALC-SCNC: 5 MMOL/L (ref 5–15)
APTT PPP: 42.8 SEC (ref 22.1–32)
APTT PPP: 43.3 SEC (ref 22.1–32)
APTT PPP: 58.3 SEC (ref 22.1–32)
BUN SERPL-MCNC: 23 MG/DL (ref 6–20)
BUN/CREAT SERPL: 16 (ref 12–20)
CALCIUM SERPL-MCNC: 8.3 MG/DL (ref 8.5–10.1)
CHLORIDE SERPL-SCNC: 112 MMOL/L (ref 97–108)
CHOLEST SERPL-MCNC: 148 MG/DL
CK MB CFR SERPL CALC: 7.8 % (ref 0–2.5)
CK MB SERPL-MCNC: 25 NG/ML (ref 5–25)
CK SERPL-CCNC: 319 U/L (ref 39–308)
CO2 SERPL-SCNC: 21 MMOL/L (ref 21–32)
CREAT SERPL-MCNC: 1.45 MG/DL (ref 0.7–1.3)
GLUCOSE SERPL-MCNC: 118 MG/DL (ref 65–100)
HDLC SERPL-MCNC: 27 MG/DL
HDLC SERPL: 5.5 {RATIO} (ref 0–5)
LDLC SERPL CALC-MCNC: 86.4 MG/DL (ref 0–100)
LIPID PROFILE,FLP: ABNORMAL
POTASSIUM SERPL-SCNC: 4.4 MMOL/L (ref 3.5–5.1)
SODIUM SERPL-SCNC: 138 MMOL/L (ref 136–145)
THERAPEUTIC RANGE,PTTT: ABNORMAL SECS (ref 58–77)
TRIGL SERPL-MCNC: 173 MG/DL (ref ?–150)
TROPONIN I SERPL-MCNC: 20.5 NG/ML
TSH SERPL DL<=0.05 MIU/L-ACNC: 3.13 UIU/ML (ref 0.36–3.74)
VLDLC SERPL CALC-MCNC: 34.6 MG/DL

## 2020-01-28 PROCEDURE — 74011250636 HC RX REV CODE- 250/636: Performed by: INTERNAL MEDICINE

## 2020-01-28 PROCEDURE — 85730 THROMBOPLASTIN TIME PARTIAL: CPT

## 2020-01-28 PROCEDURE — 80048 BASIC METABOLIC PNL TOTAL CA: CPT

## 2020-01-28 PROCEDURE — 82550 ASSAY OF CK (CPK): CPT

## 2020-01-28 PROCEDURE — 82553 CREATINE MB FRACTION: CPT

## 2020-01-28 PROCEDURE — 74011250637 HC RX REV CODE- 250/637: Performed by: INTERNAL MEDICINE

## 2020-01-28 PROCEDURE — 70498 CT ANGIOGRAPHY NECK: CPT

## 2020-01-28 PROCEDURE — 80061 LIPID PANEL: CPT

## 2020-01-28 PROCEDURE — 36415 COLL VENOUS BLD VENIPUNCTURE: CPT

## 2020-01-28 PROCEDURE — 84484 ASSAY OF TROPONIN QUANT: CPT

## 2020-01-28 PROCEDURE — 74011250637 HC RX REV CODE- 250/637: Performed by: PHYSICIAN ASSISTANT

## 2020-01-28 PROCEDURE — 71275 CT ANGIOGRAPHY CHEST: CPT

## 2020-01-28 PROCEDURE — 84443 ASSAY THYROID STIM HORMONE: CPT

## 2020-01-28 PROCEDURE — 74174 CTA ABD&PLVS W/CONTRAST: CPT

## 2020-01-28 PROCEDURE — 65660000000 HC RM CCU STEPDOWN

## 2020-01-28 PROCEDURE — 74011636320 HC RX REV CODE- 636/320: Performed by: INTERNAL MEDICINE

## 2020-01-28 RX ORDER — DIPHENHYDRAMINE HCL 25 MG
50 CAPSULE ORAL
Status: DISCONTINUED | OUTPATIENT
Start: 2020-01-28 | End: 2020-02-04

## 2020-01-28 RX ORDER — CEFUROXIME AXETIL 250 MG/1
250 TABLET ORAL EVERY 12 HOURS
Status: DISCONTINUED | OUTPATIENT
Start: 2020-01-28 | End: 2020-01-29

## 2020-01-28 RX ORDER — DIPHENHYDRAMINE HCL 25 MG
25 CAPSULE ORAL
Status: DISCONTINUED | OUTPATIENT
Start: 2020-01-28 | End: 2020-01-28

## 2020-01-28 RX ORDER — SODIUM CHLORIDE 0.9 % (FLUSH) 0.9 %
10 SYRINGE (ML) INJECTION
Status: COMPLETED | OUTPATIENT
Start: 2020-01-28 | End: 2020-01-28

## 2020-01-28 RX ORDER — AZITHROMYCIN 250 MG/1
500 TABLET, FILM COATED ORAL
Status: DISCONTINUED | OUTPATIENT
Start: 2020-01-28 | End: 2020-01-28

## 2020-01-28 RX ORDER — AZITHROMYCIN 250 MG/1
250 TABLET, FILM COATED ORAL DAILY
Status: DISCONTINUED | OUTPATIENT
Start: 2020-01-29 | End: 2020-01-28

## 2020-01-28 RX ORDER — NICOTINE 7MG/24HR
1 PATCH, TRANSDERMAL 24 HOURS TRANSDERMAL DAILY
Status: DISCONTINUED | OUTPATIENT
Start: 2020-01-28 | End: 2020-02-04

## 2020-01-28 RX ADMIN — HEPARIN SODIUM 2000 UNITS: 5000 INJECTION INTRAVENOUS; SUBCUTANEOUS at 16:44

## 2020-01-28 RX ADMIN — Medication 10 ML: at 01:05

## 2020-01-28 RX ADMIN — HEPARIN SODIUM 2000 UNITS: 5000 INJECTION INTRAVENOUS; SUBCUTANEOUS at 02:47

## 2020-01-28 RX ADMIN — Medication 10 ML: at 16:48

## 2020-01-28 RX ADMIN — DIPHENHYDRAMINE HYDROCHLORIDE 50 MG: 25 CAPSULE ORAL at 21:46

## 2020-01-28 RX ADMIN — BENZONATATE 100 MG: 100 CAPSULE ORAL at 23:48

## 2020-01-28 RX ADMIN — Medication 10 ML: at 10:55

## 2020-01-28 RX ADMIN — ACETAMINOPHEN 650 MG: 325 TABLET ORAL at 21:46

## 2020-01-28 RX ADMIN — HEPARIN SODIUM AND DEXTROSE 17.3 UNITS/KG/HR: 10000; 5 INJECTION INTRAVENOUS at 16:45

## 2020-01-28 RX ADMIN — Medication 10 ML: at 06:15

## 2020-01-28 RX ADMIN — AMIODARONE HYDROCHLORIDE 200 MG: 200 TABLET ORAL at 17:33

## 2020-01-28 RX ADMIN — BENZONATATE 100 MG: 100 CAPSULE ORAL at 18:20

## 2020-01-28 RX ADMIN — BENZONATATE 100 MG: 100 CAPSULE ORAL at 11:21

## 2020-01-28 RX ADMIN — DIPHENHYDRAMINE HYDROCHLORIDE 50 MG: 25 CAPSULE ORAL at 02:47

## 2020-01-28 RX ADMIN — CEFUROXIME AXETIL 250 MG: 250 TABLET ORAL at 23:48

## 2020-01-28 RX ADMIN — PRAVASTATIN SODIUM 40 MG: 40 TABLET ORAL at 21:46

## 2020-01-28 RX ADMIN — IOPAMIDOL 100 ML: 755 INJECTION, SOLUTION INTRAVENOUS at 10:55

## 2020-01-28 RX ADMIN — Medication 10 ML: at 21:47

## 2020-01-28 RX ADMIN — ACETAMINOPHEN 650 MG: 325 TABLET ORAL at 01:06

## 2020-01-28 RX ADMIN — TAMSULOSIN HYDROCHLORIDE 0.4 MG: 0.4 CAPSULE ORAL at 17:33

## 2020-01-28 RX ADMIN — NITROGLYCERIN 0.5 INCH: 20 OINTMENT TOPICAL at 01:06

## 2020-01-28 RX ADMIN — ASPIRIN 81 MG: 81 TABLET ORAL at 09:08

## 2020-01-28 RX ADMIN — CEFUROXIME AXETIL 250 MG: 250 TABLET ORAL at 11:21

## 2020-01-28 RX ADMIN — AMIODARONE HYDROCHLORIDE 200 MG: 200 TABLET ORAL at 09:08

## 2020-01-28 RX ADMIN — HEPARIN SODIUM AND DEXTROSE 15.3 UNITS/KG/HR: 10000; 5 INJECTION INTRAVENOUS at 02:45

## 2020-01-28 RX ADMIN — METOPROLOL SUCCINATE 50 MG: 50 TABLET, EXTENDED RELEASE ORAL at 09:08

## 2020-01-28 NOTE — PROGRESS NOTES
Problem: Falls - Risk of  Goal: *Absence of Falls  Description  Document Janessadenaechana  Fall Risk and appropriate interventions in the flowsheet. Outcome: Progressing Towards Goal  Note: Fall Risk Interventions:            Medication Interventions: Patient to call before getting OOB, Teach patient to arise slowly                   Problem: Pressure Injury - Risk of  Goal: *Prevention of pressure injury  Description  Document Aj Scale and appropriate interventions in the flowsheet.   Outcome: Progressing Towards Goal  Note: Pressure Injury Interventions:  Sensory Interventions: Minimize linen layers         Activity Interventions: Increase time out of bed, Pressure redistribution bed/mattress(bed type)    Mobility Interventions: Pressure redistribution bed/mattress (bed type)    Nutrition Interventions: Document food/fluid/supplement intake    Friction and Shear Interventions: Minimize layers

## 2020-01-28 NOTE — PROGRESS NOTES
1910 - Bedside report rec'd from YOLANDA Ruby. Pt sitting in recliner with no questions or complaints. Bilateral groin CDI. 2015 - patient back to bed     2045 - Patient up at side of bed c/o nausea. Given saltines and gingerale. No antiemetics ordered at this time. Physician paged. Callback received and Zofran ordered. See MAR for details.

## 2020-01-28 NOTE — PROGRESS NOTES
Cardiac Surgery Preoperative Note    Admit Date: 2020      Plan/Recommendations/Medical Decision Making:     Pre-surgical work up in progress    No Plavix per wife for 2-3 months    CTA done not resulted    Compensated respiratory acidosis? Nicotine with unstable angina poses spasm risk: patient demands patch. Reviewed B-Blocker/aspirin/statin/ACE criteria    Stop anticoagulation pre-op    Patient seen and reviewed with Dr. Santos Calvo MD        Assessment:     Active Problems:    STEMI (ST elevation myocardial infarction) ([address] (2020)      CAD (coronary artery disease) ()      Overview: 2006 - s/p stent           Summary:     Stable hemodynamics in sinus rhythm without ectopy on no support. No chest pain or shortness of breath. Objective:     FEV1 1.7 FVC 2.23  ABG 7.40/ 30/70/19/-6 room air    Echo: EF 40%, Trace MR mild TR    TSH 3.13    A1C 5.8    Troponin 20 (CKD)   from 773    Visit Vitals  /49 (BP 1 Location: Left arm, BP Patient Position: Sitting)   Pulse 62   Temp 97.7 °F (36.5 °C)   Resp 22   Ht 5' 9\" (1.753 m)   Wt 238 lb 1.6 oz (108 kg)   SpO2 92%   BMI 35.16 kg/m²       Temp (24hrs), Av.2 °F (36.8 °C), Min:97.7 °F (36.5 °C), Max:98.6 °F (37 °C)      Last 3 Recorded Weights in this Encounter    20 2114   Weight: 238 lb 1.6 oz (108 kg)       Lab Data Reviewed:   Recent Labs     20  0058 20  1014   WBC  --  9.2   HGB  --  12.4   HCT  --  38.3   PLT  --  187   CREA 1.45*  --        CXR Results  (Last 48 hours)    None          Last 24hr Input/Output:    Intake/Output Summary (Last 24 hours) at 2020 1303  Last data filed at 2020 0729  Gross per 24 hour   Intake 1040.51 ml   Output 325 ml   Net 715.51 ml          Admission Weight: Last Weight   Weight: 238 lb 1.6 oz (108 kg) Weight: 238 lb 1.6 oz (108 kg)       EXAM:      Lungs:   Clear to auscultation bilaterally.        Heart:  Regular rate and rhythm, S1, S2 normal, no murmur, no click, no rub Abdomen:   Soft, non-tender. Bowel sounds present. No masses,  No organomegaly. Extremities:  No edema     Neurologic:  Gross motor and sensory apparatus intact.          Signed By: GEE Christopher

## 2020-01-28 NOTE — PROGRESS NOTES
80-  Rec'd report from Norris Mchugh RN. I will assume care of pt.    0110-  Pt c/o headache as well as not being able to sleep. Gave Tylenol 650 mg po. Paged on-call physician, Dr. Shekhar Webber, for attempt to obtain order for Benadryl for sleep. Pt states he takes Tylenol PM 2 tabs as needed at bedtime. 0140-  Rec'd order for Benadryl 50 mg PRN bedtime from Dr. Ramakrishna Leal. 0205-  Lab called with critical result of Troponin 20.5. Physician not paged due to result trending down. 0505-  Pt resting quietly in chair with eyes closed. Heparin drip infusing per order. See MAR for current rate. 56-  Report given to YOLANDA Jordan. She will assume care of pt.

## 2020-01-28 NOTE — PROGRESS NOTES
07:15 - Bedside report rec'd from Rose Meza [place]. Heparin infusing at 15.3 units/kg/hr. 09:30 GEE Zabala aware that patient is insistent upon getting his nicotine patch. Will reorder. 11:20 - Back to room from CT. PTT therapeutic at 58. 3. No changes to heparin gtt. 16:40 - Spoke with Dominic Ag in pharmacy PTT 43.3. Give 2000 unit bolus and increase to 17.3 units/kg/hr. 17:57 - Spoke with radiologist regarding CT, moderate R retroperitoneal hemorrhage. Call placed to Dr Aguilar Finch. 18:17 - Telephone orders rec'd to DC hep gtt. 19:00 - Bedside report given to [facility] RN.

## 2020-01-28 NOTE — PROGRESS NOTES
Cardiology Progress Note  1/28/2020     Admit Date: 1/26/2020  Admit Diagnosis: STEMI (ST elevation myocardial infarction) ([facility] [address] [I21.3]  CAD (coronary artery disease) [I25.10]  CC: none currently  Cardiac Assessment/Plan:   1) CAD:  Stent of unknown vessel at [facility] 2006. * followed by Dr. Francis North in 2014:  MPI reportedly showed inferior ischemia & cath recommended but not done. *Seen by Dr Enma Loyola in 2017: MPI rec but not done. Normal echo. *CP/TEIXEIRA 12/2019: Abnl PET (inf ischemia): pt delayed cath plan until 1/27/20. *Echo 1/10/20: EF 55%; AoScl only. Mild pulm HTN. *Cath 1/26/20: Severe LM w/ occluded LAD & RCA. 2) HTN  3) dyslipidemia  4) open AAA repair 4/2017 (8 cm; Dr. Kathleen Santos). 5) chronic BONNY occlusion. 6) right BG stroke on MRI 1/2014.  7) heavy tobacco use, 2-3 pack per day; no significant COPD on PFTs 4/2017  8) CKD:  Stage III (Cr 1.4-1.5/GFR 51 7/2019; Dr. Nicole Schulz). Cr 1.5/GFR 45 1/21/20.  9) chronic anemia; hemoglobin 9.3  10) heme+ 2019:  Negative EGD 7/2019; internal hemorrhoids/diverticular disease on colonoscopy 9/2019. Retired ; from [employer]. 2 ppd & knows he should quit. No recent ethanol; heavy 50 years ago. 2 caffeinated elie/day. No added salt. He reports no CP in the last month until yesterday. For other plans, see orders. 1/27: BPs stable; Hg 14.2; Cr 1.48; trop 49. NSR; STs improved. No CP/dyspnea. Cont ASA, add hep gtt; Cont bblocker (XL to 50 qday); NTG paste; hold ACEi/ARB with CKD/recent contrast.  Needs surgical consult; Echo, Abd U/S, and carotids ordered. Echo 1/27: Normal cavity size. Mild LVH; EF 40 - 45%. Mid-distal Ant-sept AK; Mild TR. Abd U/S 1/27: \"Mid abdominal aorta measures 4.0 cm in diameter, although the distal abdominal aorta, where the largest aneurysm was seen on prior CT, is obscured by overlying bowel gas\"    Carotid U/S 1/27: Chronic BONNY occlusion;  There is mild stenosis in the left ICA (<50%). Bilateral vertebrals are antegrade. NICHOLAS 1/27: normal.    1/28: Stable BP/HR; NSR; Cr unchanged at 1.45; CKp was 700s (down since then); trop from 45 to 20. No CP/dyspnea;   Remains on IV hep gtt; toprol XL 50 qday; NTG paste; lipitor; Add ACEi or ARB after surgery/prior to d/c.  Sputum c/w chronic bronchitis. CABG timing per Dr Caron Antunez. Extensive CTA orders noted; increased risk of FRANSISCO. ________________________________________________________________  @ NP OV 12/5/19:   Mr Cody Lima has a h/o:  1) CAD:  Stent of unknown vessel at [facility] 2006. * followed by Dr. John Young in 2014:  MPI reportedly showed inferior ischemia & cath recommended but not done. *Seen by Dr Analy Peralta in 2017: MPI rec but not done. Normal echo. *CP/TEIXEIRA 12/2019:  2) HTN  3) dyslipidemia  4) open AAA repair 4/2017 (8 cm; Dr. Christina Pino). 5) chronic BONNY occlusion. 6) right BG stroke on MRI 1/2014.  7) heavy tobacco use, 2-3 pack per day; no significant COPD on PFTs 4/2017  8) CKD:  Stage III (Cr 1.4-1.5/GFR 51 7/2019; Dr. Suzie Yu). 9) chronic anemia; hemoglobin 9.3  10) heme+ 2019:  Negative EGD 7/2019; internal hemorrhoids/diverticular disease on colonoscopy 9/2019. Retired ; from [employer]. 2 ppd & knows he should quit. No recent ethanol; heavy 50 years ago. 2 caffeinated elie/day. No added salt. 12/2019:  New patient presenting with intermittent chest pain (6 months of worsening, 0-1 X/month;  15 minutes duration; central dull ache with radiation to both arms. Can occur with or without exertion. Increased dyspnea associated with discomfort). Chronic TEIXEIRA which is unchanged in years. No palpitations. No falling or bleeding. IMPRESSION AND PLAN  01. Atherosclerotic heart disease of native coronary artery with other forms of angina pectoris (I25.118):  Remote stent of unknown vessel; reportedly abnormal MPI 2014 without further evaluation. He needs a stress test but cannot walk on a treadmill. Therefore cardiac PET. We discussed the signs and symptoms of unstable angina, myocardial infarction and malignant arrhythmia. The patient knows to seek immediate medical attention should they occur. ECG done PET Cardiac test to be done. first available   02. Hyp hrt & chr kdny dis w/o hrt fail, w stg 1-4/unsp chr kdny (I13.10): This condition is stable. I have made no changes to the present regimen. 03. Mixed hyperlipidemia (E78.2): He should be on a statin unless previously intolerant; apparently had been on Lipitor in the past.   04. Occlusion and stenosis of bilateral carotid arteries (O43.89):  Chronic BONNY occlusion; he reports no recent follow-up. U/s ordered. Carotid Duplex to be done first available. 05. Abdominal aortic aneurysm, without rupture (I71.4):  Surgically treated in 2017; follow-up per Dr. Nellie Dunn. 06. Shortness of breath (R06.02):  ?  Cardiac; also has some element of underlying pulmonary disease & ongoing tobacco abuse. Echocardiogram warranted. 2-D w/CFD Echo to be done first available. 07. Chronic kidney disease, stage 3 (moderate) (N18.3):  Managed by: Renal   08. Cerebral infarction, unspecified (I63.9):  Managed by: Other Physician   09. Nicotine dependence, cigarettes, uncomplicated (W37.772): The patient was instructed on smoking cessation. 10. Body mass index (BMI) 33.0-33.9, adult (T86.92): The patient was instructed on AHA diet and regular exercise. ORDERS:  1 Tobacco cessation counseling   2 Dietary management education, guidance, and counseling   3 ECG done   4 PET Cardiac test to be done   5 Carotid Duplex   6 2-D w/ CFD Echocardiogram   7 Return office visit with Edin Mcmullen MD in 3 Months. 8 The patient was instructed on AHA diet and regular exercise.        12/5/19 MEDICATION LIST  Medication Sig Desc   amlodipine 5 mg tablet take 1 tablet by oral route  every day   Aspirin Low Dose 81 mg tablet,delayed release take 1 tablet by oral route  every day   metoprolol succinate ER 25 mg tablet,extended release 24 hr take 1 tablet by oral route  every day   pantoprazole 40 mg tablet,delayed release take 1 tablet by oral route  every day   tamsulosin 0.4 mg capsule take 1 capsule by oral route  every day 1/2 hour following the same meal each day     _______________________________________________________________________  CARDIAC HISTORY  RISK FACTORS:  1 Hypertension   2 Tobacco Use: No/never       CARDIOVASCULAR PROCEDURES  Procedure Date Results   Carotid Duplex 05/02/2018 100% Right ICA, Less than 50% Left ICA, Vertebral: Bilateral Antegrade Flow, Unchanged versus 2017; at [address] Echo 04/03/2017 EF 55-60%; normal RV; no valve disease; normal PAp; by RCA at [address] [address] EKG 12/05/2019 Sinus Rhythm, Borderline PRWP, otherwise unremarkable. PFTs 04/03/2017 No obstructive disease; moderately decreased DLCO; at [address] ACTIVE ALLERGIES:  Ingredient Reaction Comment   FENOFIBRATE Hives    ALBUTEROL SULFATE Unknown    ATORVASTATIN Leg cramp Atorvastatin         PAST MEDICAL/SURGICAL HISTORY  (Detailed)    Disease/disorder Onset Date Surgical History Date Comments   GERD       Hypertension         Family History  (Detailed)    SOCIAL HISTORY  (Detailed)  Tobacco use reviewed. Preferred language is [place]. Smoking status: Heavy tobacco smoker. SMOKING STATUS  Use Status Type Smoking Status Usage Per Day Years Used Total Pack Years   yes Cigarette Heavy tobacco smoker 2 Packs       TOBACCO CESSATION INFORMATION  Date Counseled By Order Status Description Code Tobacco Cessation Information   12/05/2019 Jonathan Browne Tobacco cessation counseling completed   Counseling about tobacco use (procedure)       Hospital problem list   Active Hospital Problems    Diagnosis Date Noted    STEMI (ST elevation myocardial infarction) ([facility] [address] 01/26/2020     Priority: 1 - One    CAD (coronary artery disease)      2006 - s/p stent           Subjective:  Marlon Leonardo reports Chest pain X none  consistent with:  Non-cardiac CP         Atypical CP     None now  On going  Anginal CP     Dyspnea X none  at rest  with exertion         improved  unchanged  worse              PND X none  overnight       Orthopnea X none  improved  unchanged  worse   Presyncope X none  improved  unchanged  worse     Ambulated in hallway without symptoms   Yes   Ambulated in room without symptoms  Yes   Objective:    Physical Exam:  Overall VSSAF;    Visit Vitals  /65 (BP 1 Location: Left arm, BP Patient Position: Sitting)   Pulse 67   Temp 98.4 °F (36.9 °C)   Resp 20   Ht 5' 9\" (1.753 m)   Wt 108 kg (238 lb 1.6 oz)   SpO2 92%   BMI 35.16 kg/m²     Temp (24hrs), Av.2 °F (36.8 °C), Min:97.9 °F (36.6 °C), Max:98.6 °F (37 °C)    Patient Vitals for the past 8 hrs:   Pulse   20 0731 67   20 0256 65     Patient Vitals for the past 8 hrs:   Resp   20 0731 20   20 0256 17     Patient Vitals for the past 8 hrs:   BP   20 0731 148/65   20 0256 122/63       Intake/Output Summary (Last 24 hours) at 2020 0939  Last data filed at 2020 0729  Gross per 24 hour   Intake 1040.51 ml   Output 325 ml   Net 715.51 ml     General Appearance: Well developed, obsese, no acute distress. Ears/Nose/Mouth/Throat:   Normal MM; anicteric. JVP: WNL   Resp:   clear to auscultation bilaterally. Nl resp effort. Cardiovascular:  RRR, S1, S2 normal, no new murmur. No gallop or rub. Abdomen:   Soft, non-tender, bowel sounds are present. Extremities: No edema bilaterally. Skin:  Neuro: Warm and dry. A/O x3, grossly nonfocal   cath sites intact w/o hematoma or new bruit; distal pulse unchanged x Yes   Data Review:     Telemetry independently reviewed x sinus  chronic afib  parox afib  NSVT     ECG independently reviewed  NSR  afib  Decreased ST elevation  NSST-Tw chgs   x no new ECG provided for review   Lab results reviewed as noted below.   Current medications reviewed as noted below. No results for input(s): PH, PCO2, PO2 in the last 72 hours.   Recent Labs     01/28/20 0058 01/27/20  1600 01/27/20  1014 01/27/20  0324   CKMB 25.0* 57.9* 94.9*  --    CKNDX 7.8* 10.5* 12.3*  --    TROIQ 20.50*  --   --  48.80*     Recent Labs     01/28/20 0058 01/27/20  1014 01/27/20 0324 01/26/20 2111     --  140 139   K 4.4  --  4.4 4.1   *  --  110* 110*   CO2 21  --  23 24   BUN 23*  --  22* 22*   CREA 1.45*  --  1.48* 1.60*   GFRAA 57*  --  56* 51*   *  --  122* 172*   CA 8.3*  --  8.3* 9.1   ALB  --   --   --  3.4*   WBC  --  9.2  --  6.9   HGB  --  12.4  --  14.2   HCT  --  38.3  --  43.2   PLT  --  187  --  211     Lab Results   Component Value Date/Time    Cholesterol, total 148 01/28/2020 12:58 AM    HDL Cholesterol 27 01/28/2020 12:58 AM    LDL, calculated 86.4 01/28/2020 12:58 AM    Triglyceride 173 (H) 01/28/2020 12:58 AM    CHOL/HDL Ratio 5.5 (H) 01/28/2020 12:58 AM     Recent Labs     01/26/20 2111   SGOT 22   *   TP 7.9   ALB 3.4*   GLOB 4.5*     Recent Labs     01/28/20 0058 01/27/20  1600 01/27/20  0841   APTT 42.8* 31.1 26.2      No components found for: GLPOC    Current Facility-Administered Medications   Medication Dose Route Frequency    diphenhydrAMINE (BENADRYL) capsule 50 mg  50 mg Oral QHS PRN    iopamidoL (ISOVUE-370) 76 % injection 100 mL  100 mL IntraVENous RAD ONCE    sodium chloride (NS) flush 10 mL  10 mL IntraVENous RAD ONCE    cefUROXime (CEFTIN) tablet 250 mg  250 mg Oral Q12H    nitroglycerin (NITROBID) 2 % ointment 0.5 Inch  0.5 Inch Topical Q6H    metoprolol succinate (TOPROL-XL) XL tablet 50 mg  50 mg Oral DAILY    nitroglycerin (NITROBID) 2 % ointment 1 Inch  1 Inch Topical Q6H PRN    heparin 25,000 units in D5W 250 ml infusion  12-25 Units/kg/hr IntraVENous TITRATE    heparin (porcine) injection 2,000 Units  2,000 Units IntraVENous PRN    Or    heparin (porcine) injection 4,000 Units  4,000 Units IntraVENous PRN    pravastatin (PRAVACHOL) tablet 40 mg  40 mg Oral QHS    amiodarone (CORDARONE) tablet 200 mg  200 mg Oral BID    benzonatate (TESSALON) capsule 100 mg  100 mg Oral TID PRN    ondansetron (ZOFRAN) injection 4 mg  4 mg IntraVENous Q4H PRN    tamsulosin (FLOMAX) capsule 0.4 mg  0.4 mg Oral QPM    aspirin delayed-release tablet 81 mg  81 mg Oral DAILY    sodium chloride (NS) flush 5-40 mL  5-40 mL IntraVENous Q8H    sodium chloride (NS) flush 5-40 mL  5-40 mL IntraVENous PRN    acetaminophen (TYLENOL) tablet 650 mg  650 mg Oral Q4H PRN    nitroglycerin (NITROSTAT) tablet 0.4 mg  0.4 mg SubLINGual PRN        Merlyn Fang MD

## 2020-01-28 NOTE — PROGRESS NOTES
Pharmacy - Heparin Monitoring    Labs:  Recent Labs     01/28/20  1529 01/28/20  0923 01/28/20  0058  01/27/20  1014   APTT 43.3* 58.3* 42.8*   < >  --    HGB  --   --   --   --  12.4   PLT  --   --   --   --  187    < > = values in this interval not displayed. Current rate:  15.3 unit/kg/hr    Impression/Plan:   New rate: 17.3 unit/kg/hr: increase rate by 2 units/kg/hr  PRN bolus dose (Yes/No): Y: 2000 Units (30 units/kg)  Infusion rate, PRN bolus dose and aPTT results were discussed with the nurse: (Yes/No): Y Ariel Kessler)     Thanks,  Umang [facility]    [email or web address]/[email or web address]/[email or web address]. pdf

## 2020-01-28 NOTE — PROGRESS NOTES
Cm has reviewed anticipated discharge date with attending and Cardiothoracic team.  Patient is scheduled for procedure next week. There is no anticipated discharge date at this time.     Mauro Reeder RN CM  Ext [phone]

## 2020-01-28 NOTE — PROGRESS NOTES
[facility] Pulmonary Consult Service Note  Pulmonary, Critical Care, and Sleep Medicine    Name: Channing Leigh MRN: [id number]   : 1945 Hospital: [address]   Date: 2020  Admission date: 2020 Hospital Day: 3       Subjective/Interval History:   Seen earlier today on rounds. Pt is unstable and acutely ill. Medical records and data reviewed. IMPRESSION:   1. Moderate Chronic Obstructive Pulmonary Disease FEV1 1.84( 62%)  to 2.23(74) with good bronchodilator response- 2017; now PFTS show FEV1 1.7 liters(64%) DLCO worse 46%  2. Preop Pulmonary exam  3. Severe CAD- LM, chronic LAD and RCA  4. Hyperchloremic met acidosis- RTA? compensated  5. HTN  6. GERD  7. Obesity  8. Heavy Tobacco use- active  9. CKD 3 Dr. Sandra Staples Cr 1.43  10. Anemia Hgb 9.3  11. H/o incarcerated umbilical hernia- manually reduced in 2017,   12. S/P AAA 8 cm repair with iliac disease- complicated by GI bleed, FRANSISCO  13. Total occlusion of BONNY  14. CVA  right BG CVA on MRI  15. EGD 2017 neg but colonoscopy noted for hemorrhoids, diverticular ds   Body mass index is 35.16 kg/m². 16. Prognosis guarded       RECOMMENDATIONS/PLAN:   1. Sputum for culture if available  2. Empiric abx thereafter  3. Pt reports passing out yrs ago when he used Albuterol MDI at home and daughter corroborated this though not witnessed  4. Pulmonary risks of surgery discussed with pt and family. Needs surgery. Can try to optimize his condition but still smoking. He did quite well after open AAA repair and will have as high a risk from CABG and lungs no stronger. He is awaiting other tests. 5. Albuterol bronchodilator prn   6. smoking cessation counseling done  7. Would ideally add LABA to optimize lung function prior to surgery, but given reaction to albuterol will hold; no inhalers on formulary to trial here anyways  8.  Bronchial hygiene with respiratory therapy techniques, bronchodilators  9. DVT, SUP prophylaxis  10. Vaccination history reviewed with pt- he is at high risk for flu and will need to advise us promptly if sx or fever develops  11. Pt needs IV fluids with additives and Drug therapy requiring intensive monitoring for toxicity  12. Prescription drug management with home med reconciliation reviewed          [x] High complexity decision making was performed  [x] See my orders for details      Subjective/Initial History:     I was asked by María He NP to see Jaxson Silveira  a 66 y.o.  male in consultation for a chief complaint of preop pulomanry evaluation    Excerpts from admission 1/26/2020 or consult notes as follows:     \"  Jaxson Silveira is a 66 y.o. hypertensive, non diabetic, heavy cigarette smoking, S/P PCI to circumflex and  of RCA, cau male who was referred for opinion and advice regarding coronary revascularization by Dr. Sonia Gonzales / Skye Ford NP. Patient was admitted yesterday for with unstable chest pain for 90 minutes and ST elevation in V1-V3. His pain was relieved with NL NTG and ASA. Troponin to 48. He underwent cath which revealed chronic disease without acute occlusion. \"    Pt is aheavy smoker with COPD per family. Does not see a lung specailist. Has bronchitis couple times a yr but none recently. Will cough up yellow sputum every AM. He has never been admitted for lung problems. Had AAA repair two yrs ago. Notes reviewed. No respiratory complications but did have some renal dysfunction, now followed by . Also seen by GI for melanotic stools. No problems since but has reflux. Pt has been slowing down since that surgery and not walking much. No wheeze. Not on inhalers. Has never had the flushot or pneumonia shot. Lives with wife. WEight stable. No swelling. PFTs from 2017 reviewed and just complted another today.  ABg pending    Allergies   Allergen Reactions    Lipitor [Atorvastatin] Other (comments)     \"muscle pain\"        MAR reviewed and pertinent medications noted or modified as needed     Current Facility-Administered Medications   Medication    diphenhydrAMINE (BENADRYL) capsule 50 mg    iopamidoL (ISOVUE-370) 76 % injection 100 mL    sodium chloride (NS) flush 10 mL    cefUROXime (CEFTIN) tablet 250 mg    nicotine (NICODERM CQ) 7 mg/24 hr patch 1 Patch    nitroglycerin (NITROBID) 2 % ointment 0.5 Inch    metoprolol succinate (TOPROL-XL) XL tablet 50 mg    nitroglycerin (NITROBID) 2 % ointment 1 Inch    heparin 25,000 units in D5W 250 ml infusion    heparin (porcine) injection 2,000 Units    Or    heparin (porcine) injection 4,000 Units    pravastatin (PRAVACHOL) tablet 40 mg    amiodarone (CORDARONE) tablet 200 mg    benzonatate (TESSALON) capsule 100 mg    ondansetron (ZOFRAN) injection 4 mg    tamsulosin (FLOMAX) capsule 0.4 mg    aspirin delayed-release tablet 81 mg    sodium chloride (NS) flush 5-40 mL    sodium chloride (NS) flush 5-40 mL    acetaminophen (TYLENOL) tablet 650 mg    nitroglycerin (NITROSTAT) tablet 0.4 mg      Patient PCP: Ninoska Ellis NP  PMH:  has a past medical history of Aneurysm ([address] BPH (benign prostatic hyperplasia), CAD (coronary artery disease), Essential hypertension (12-12-13), Hematuria, Hyperglycemia, and Kidney failure. PSH:   has a past surgical history that includes pr cardiac surg procedure unlist; upper gi endoscopy,biopsy (7/9/2019); vascular surgery procedure unlist (2016); colonoscopy,diagnostic (9/16/2019); and colonoscopy (N/A, 9/16/2019). FHX: family history is not on file. SHX:  reports that he has been smoking. He has a 106.00 pack-year smoking history. He has never used smokeless tobacco. He reports that he does not drink alcohol or use drugs. ROS:A comprehensive review of systems was negative except for that written in the HPI.     Objective:     Vital Signs: Telemetry:    normal sinus rhythm Intake/Output:   Visit Vitals  BP 148/65 (BP 1 Location: Left arm, BP Patient Position: Sitting)   Pulse 67   Temp 98.4 °F (36.9 °C)   Resp 20   Ht 5' 9\" (1.753 m)   Wt 108 kg (238 lb 1.6 oz)   SpO2 92%   BMI 35.16 kg/m²       Temp (24hrs), Av.2 °F (36.8 °C), Min:97.9 °F (36.6 °C), Max:98.6 °F (37 °C)        O2 Device: Room air O2 Flow Rate (L/min): 2 l/min       Wt Readings from Last 4 Encounters:   20 108 kg (238 lb 1.6 oz)   19 102.7 kg (226 lb 5 oz)   19 103.1 kg (227 lb 4.7 oz)   17 102.2 kg (225 lb 4.8 oz)          Intake/Output Summary (Last 24 hours) at 2020 1012  Last data filed at 2020 9547  Gross per 24 hour   Intake 1040.51 ml   Output 325 ml   Net 715.51 ml       Last shift:      701 -  190  In: 12.9 [I.V.:12.9]  Out: -   Last 3 shifts: 1901 -  0700  In: 1736.3 [P.O.:60; I.V.:1676.3]  Out: 325 [Urine:325]       Physical Exam:   General:  male; in no respiratory distress and acyanotic and cooperative;    HEENT: NCAT, fair dentition, lips and mucosa dry  Eyes: anicteric; conjunctiva clear  Neck: no nodes, no cuff leak, no accessory MM use. Chest: no deformity,   Cardiac: regular rate, rhythm; no murmur  Lungs: distant breath sounds; no wheezes  Abd: soft, NT, hypoactive BS, OBESE soft  Ext: no edema; no joint swelling;  No clubbing  : NO finley,   Neuro: alert;  Psych- no agitation, oriented to person; normal affect  Skin: warm, dry, no cyanosis;   Pulses: 1-2+ Bilateral pedal, radial  Capillary: brisk; pale             Labs:    Recent Labs     20  0058 20  1600 20  1014 20  0841 20   WBC  --   --  9.2  --  6.9   HGB  --   --  12.4  --  14.2   PLT  --   --  187  --  211   APTT 42.8* 31.1  --  26.2 27.6     Recent Labs     20  0058 20  0324 20    140 139   K 4.4 4.4 4.1   * 110* 110*   CO2 21 23 24   * 122* 172*   BUN 23* 22* 22*   CREA 1.45* 1.48* 1.60*   CA 8.3* 8.3* 9.1   ALB  --   -- 3.4*   SGOT  --   --  22   ALT  --   --  13     No results for input(s): PH, PCO2, PO2, HCO3, FIO2 in the last 72 hours. Recent Labs     01/28/20  0058 01/27/20  1600 01/27/20  1014 01/27/20  0324   CKNDX 7.8* 10.5* 12.3*  --    TROIQ 20.50*  --   --  48.80*     No results found for: BNPP, BNP   No results found for: CULT  Lab Results   Component Value Date/Time    TSH 3.13 01/28/2020 12:58 AM       Imaging:    CXR Results  (Last 48 hours)    None          Results from [facility] encounter on 04/01/17   CTA ABD PELV W WO CONT    Narrative Contrast-enhanced CT angiogram of the abdomen and pelvis was performed using 100  cc Isovue-370. Three-dimensional postprocessing was performed. CT dose reduction was achieved through use of a standardized protocol tailored  for this examination and are automatic exposure control for dose modulation dose  reduction. INDICATION: Abdominal aortic aneurysm. FINDINGS:    There is a large juxtarenal fusiform abdominal aortic aneurysm. The aneurysm  begins immediately inferior to both renal arteries without a discernible neck. The aneurysm extends to the aortic bifurcation but does not involve the iliac  arteries. Maximal aneurysm diameter measures 7.8 cm. The celiac artery is widely patent. The superior mesenteric artery is calcified  at its origin with possible moderate stenosis. The extra iliac arteries are calcified but no definite focal stenosis. Right  external iliac is ectatic but not grossly aneurysmal. There is a focal aneurysm  at the right iliac bifurcation measuring 2.6 cm. The external iliac arteries are  calcified but otherwise patent. There is probable focal stenosis of the origin of the left renal artery. The  right renal artery is calcified but grossly patent. Lung bases are grossly clear with minimal atelectasis.  The liver pancreas spleen  and kidneys are unremarkable given limitations of arterial phase contrast. There  are small bilateral renal cysts. The adrenals are unremarkable. No pelvic mass or adenopathy. No free fluid or focal fluid collection. Impression IMPRESSION: Juxtarenal fusiform abdominal aortic aneurysm extending to the  aortic bifurcation as detailed above.        This care involved high complexity medical decision making: I personally:  · Reviewed the flowsheet and previous days notes  · Reviewed and summarized records or history from previous days note or discussions with staff, family  · High Risk Drug therapy requiring intensive monitoring for toxicity: eg steroids, pressors, antibiotics  · Reviewed and/or ordered Clinical lab tests  · Reviewed images and/or ordered Radiology tests  · Reviewed the patients ECG / Telemetry  ·  discussed my assessment/management with : Nursing, Family for coordination of care    Vick Carnes MD

## 2020-01-29 LAB
ANION GAP SERPL CALC-SCNC: 8 MMOL/L (ref 5–15)
BUN SERPL-MCNC: 23 MG/DL (ref 6–20)
BUN/CREAT SERPL: 15 (ref 12–20)
CALCIUM SERPL-MCNC: 9 MG/DL (ref 8.5–10.1)
CHLORIDE SERPL-SCNC: 112 MMOL/L (ref 97–108)
CO2 SERPL-SCNC: 20 MMOL/L (ref 21–32)
CREAT SERPL-MCNC: 1.5 MG/DL (ref 0.7–1.3)
ERYTHROCYTE [DISTWIDTH] IN BLOOD BY AUTOMATED COUNT: 17 % (ref 11.5–14.5)
GLUCOSE SERPL-MCNC: 101 MG/DL (ref 65–100)
HCT VFR BLD AUTO: 33.4 % (ref 36.6–50.3)
HGB BLD-MCNC: 10.8 G/DL (ref 12.1–17)
MCH RBC QN AUTO: 29 PG (ref 26–34)
MCHC RBC AUTO-ENTMCNC: 32.3 G/DL (ref 30–36.5)
MCV RBC AUTO: 89.8 FL (ref 80–99)
NRBC # BLD: 0 K/UL (ref 0–0.01)
NRBC BLD-RTO: 0 PER 100 WBC
PLATELET # BLD AUTO: 187 K/UL (ref 150–400)
PMV BLD AUTO: 10.6 FL (ref 8.9–12.9)
POTASSIUM SERPL-SCNC: 4.3 MMOL/L (ref 3.5–5.1)
RBC # BLD AUTO: 3.72 M/UL (ref 4.1–5.7)
SODIUM SERPL-SCNC: 140 MMOL/L (ref 136–145)
TROPONIN I SERPL-MCNC: 10.9 NG/ML
WBC # BLD AUTO: 7.6 K/UL (ref 4.1–11.1)

## 2020-01-29 PROCEDURE — 74011000250 HC RX REV CODE- 250: Performed by: INTERNAL MEDICINE

## 2020-01-29 PROCEDURE — 94640 AIRWAY INHALATION TREATMENT: CPT

## 2020-01-29 PROCEDURE — 74011250637 HC RX REV CODE- 250/637: Performed by: PHYSICIAN ASSISTANT

## 2020-01-29 PROCEDURE — 84484 ASSAY OF TROPONIN QUANT: CPT

## 2020-01-29 PROCEDURE — 74011250637 HC RX REV CODE- 250/637: Performed by: INTERNAL MEDICINE

## 2020-01-29 PROCEDURE — 85027 COMPLETE CBC AUTOMATED: CPT

## 2020-01-29 PROCEDURE — 65660000000 HC RM CCU STEPDOWN

## 2020-01-29 PROCEDURE — 74011250636 HC RX REV CODE- 250/636: Performed by: INTERNAL MEDICINE

## 2020-01-29 PROCEDURE — 36415 COLL VENOUS BLD VENIPUNCTURE: CPT

## 2020-01-29 PROCEDURE — 80048 BASIC METABOLIC PNL TOTAL CA: CPT

## 2020-01-29 PROCEDURE — 74011000258 HC RX REV CODE- 258: Performed by: INTERNAL MEDICINE

## 2020-01-29 RX ORDER — ALBUTEROL SULFATE 0.83 MG/ML
1.25 SOLUTION RESPIRATORY (INHALATION)
Status: DISCONTINUED | OUTPATIENT
Start: 2020-01-29 | End: 2020-01-31

## 2020-01-29 RX ADMIN — BENZONATATE 100 MG: 100 CAPSULE ORAL at 23:07

## 2020-01-29 RX ADMIN — PRAVASTATIN SODIUM 40 MG: 40 TABLET ORAL at 23:07

## 2020-01-29 RX ADMIN — Medication 10 ML: at 23:08

## 2020-01-29 RX ADMIN — METOPROLOL SUCCINATE 50 MG: 50 TABLET, EXTENDED RELEASE ORAL at 08:16

## 2020-01-29 RX ADMIN — AMIODARONE HYDROCHLORIDE 200 MG: 200 TABLET ORAL at 08:16

## 2020-01-29 RX ADMIN — TAMSULOSIN HYDROCHLORIDE 0.4 MG: 0.4 CAPSULE ORAL at 17:29

## 2020-01-29 RX ADMIN — CEFUROXIME AXETIL 250 MG: 250 TABLET ORAL at 08:16

## 2020-01-29 RX ADMIN — CEFTRIAXONE 1 G: 1 INJECTION, POWDER, FOR SOLUTION INTRAMUSCULAR; INTRAVENOUS at 10:42

## 2020-01-29 RX ADMIN — Medication 10 ML: at 14:10

## 2020-01-29 RX ADMIN — ACETAMINOPHEN 650 MG: 325 TABLET ORAL at 23:07

## 2020-01-29 RX ADMIN — ASPIRIN 81 MG: 81 TABLET ORAL at 08:15

## 2020-01-29 RX ADMIN — ALBUTEROL SULFATE 1.25 MG: 2.5 SOLUTION RESPIRATORY (INHALATION) at 14:20

## 2020-01-29 RX ADMIN — ALBUTEROL SULFATE 1.25 MG: 2.5 SOLUTION RESPIRATORY (INHALATION) at 21:03

## 2020-01-29 RX ADMIN — DIPHENHYDRAMINE HYDROCHLORIDE 50 MG: 25 CAPSULE ORAL at 23:07

## 2020-01-29 RX ADMIN — AMIODARONE HYDROCHLORIDE 200 MG: 200 TABLET ORAL at 17:29

## 2020-01-29 RX ADMIN — BENZONATATE 100 MG: 100 CAPSULE ORAL at 15:54

## 2020-01-29 RX ADMIN — Medication 10 ML: at 05:53

## 2020-01-29 NOTE — PROGRESS NOTES
[facility] Consult Service Note  Pulmonary, Critical Care, and Sleep Medicine    Name: Niki Grewal MRN: [id number]   : 1945 Hospital: [facility]   Date: 2020  Admission date: 2020 Hospital Day: 4       Subjective/Interval History:   Seen earlier today on rounds. Pt is unstable and acutely ill. Medical records and data reviewed.  OOB. NO SOB. Sputum clearing. CT scan reviewed. RML, RLL infiltrates. Pt reports passing out yrs ago when he used Albuterol MDI at home and daughter corroborated this though not witnessed, willing to try nebs here. D/w - would like to optimize lungs and avoid air trapping    IMPRESSION:   1. Moderate Chronic Obstructive Pulmonary Disease FEV1 1.84( 62%)  to 2.23(74) with good bronchodilator response- 2017; now PFTS show FEV1 1.7 liters(64%) DLCO worse 46%  2. Preop Pulmonary exam  3. Abnormal chest CT-POA infiltrates with bronchitis-   4. Severe CAD- LM, chronic LAD and RCA  5. NSTEMI peak troponin close to 50  6. PAD  7. BONNY occlusion  8. GERD  9. Obesity  10. Heavy Tobacco use- active  11. CKD 3 Dr. Bryan Back Cr   12. Anemia Hgb 9.3  13. H/o incarcerated umbilical hernia- manually reduced in 2017,   14. S/P AAA 8 cm repair with iliac disease- complicated by GI bleed, FRANSISCO  15. Total occlusion of BONNY  16. CVA  right BG CVA on MRI  17. EGD 2017 neg but colonoscopy noted for hemorrhoids, diverticular ds 2019  Body mass index is 34.09 kg/m². 18. Prognosis guarded       RECOMMENDATIONS/PLAN:   1. Sputum for culture if available  2. Empiric abx thereafter- change ot IV ceftriaxone  3. Albuterol neb a low dose as scheduled and see if tolerated  4. Consult nephrology- Dr. Bryan Back  5. Pulmonary risks of surgery discussed with pt and family. Needs surgery. Can try to optimize his condition but still smoking.  He did quite well after open AAA repair and will have as high a risk from CABG and lungs no stronger. He is awaiting other tests. 6. Albuterol bronchodilator prn   7. smoking cessation counseling done  8. Would ideally add LABA to optimize lung function prior to surgery, but given reaction to albuterol will hold; no inhalers on formulary to trial here anyways  9. Bronchial hygiene with respiratory therapy techniques, bronchodilators  10. DVT, SUP prophylaxis  11. Vaccination history reviewed with pt- he is at high risk for flu and will need to advise us promptly if sx or fever develops  12. Pt needs IV fluids with additives and Drug therapy requiring intensive monitoring for toxicity  13. Prescription drug management with home med reconciliation reviewed          [x] High complexity decision making was performed  [x] See my orders for details      Subjective/Initial History:     I was asked by Rad Lozano NP to see Richard Duarte  a 66 y.o.  male in consultation for a chief complaint of preop pulomanry evaluation    Excerpts from admission 1/26/2020 or consult notes as follows:     \"  Richard Duarte is a 66 y.o. hypertensive, non diabetic, heavy cigarette smoking, S/P PCI to circumflex and  of RCA, cau male who was referred for opinion and advice regarding coronary revascularization by Dr. Gretel Cárdenas / Al Landaverde NP. Patient was admitted yesterday for with unstable chest pain for 90 minutes and ST elevation in V1-V3. His pain was relieved with NL NTG and ASA. Troponin to 48. He underwent cath which revealed chronic disease without acute occlusion. \"    Pt is aheavy smoker with COPD per family. Does not see a lung specailist. Has bronchitis couple times a yr but none recently. Will cough up yellow sputum every AM. He has never been admitted for lung problems. Had AAA repair two yrs ago. Notes reviewed. No respiratory complications but did have some renal dysfunction, now followed by . Also seen by GI for melanotic stools. No problems since but has reflux.  Pt has been slowing down since that surgery and not walking much. No wheeze. Not on inhalers. Has never had the flushot or pneumonia shot. Lives with wife. WEight stable. No swelling. PFTs from 2017 reviewed and just complted another today. ABg pending    Allergies   Allergen Reactions    Lipitor [Atorvastatin] Other (comments)     \"muscle pain\"        MAR reviewed and pertinent medications noted or modified as needed     Current Facility-Administered Medications   Medication    cefTRIAXone (ROCEPHIN) 1 g in 0.9% sodium chloride (MBP/ADV) 50 mL    diphenhydrAMINE (BENADRYL) capsule 50 mg    nicotine (NICODERM CQ) 7 mg/24 hr patch 1 Patch    nitroglycerin (NITROBID) 2 % ointment 0.5 Inch    metoprolol succinate (TOPROL-XL) XL tablet 50 mg    nitroglycerin (NITROBID) 2 % ointment 1 Inch    pravastatin (PRAVACHOL) tablet 40 mg    amiodarone (CORDARONE) tablet 200 mg    benzonatate (TESSALON) capsule 100 mg    ondansetron (ZOFRAN) injection 4 mg    tamsulosin (FLOMAX) capsule 0.4 mg    aspirin delayed-release tablet 81 mg    sodium chloride (NS) flush 5-40 mL    sodium chloride (NS) flush 5-40 mL    acetaminophen (TYLENOL) tablet 650 mg    nitroglycerin (NITROSTAT) tablet 0.4 mg      Patient PCP: Michael Vasquez NP  PMH:  has a past medical history of Aneurysm ([facility] [address] BPH (benign prostatic hyperplasia), CAD (coronary artery disease), Essential hypertension (12-12-13), Hematuria, Hyperglycemia, and Kidney failure. PSH:   has a past surgical history that includes pr cardiac surg procedure unlist; upper gi endoscopy,biopsy (7/9/2019); vascular surgery procedure unlist (2016); colonoscopy,diagnostic (9/16/2019); and colonoscopy (N/A, 9/16/2019). FHX: family history is not on file. SHX:  reports that he has been smoking. He has a 106.00 pack-year smoking history. He has never used smokeless tobacco. He reports that he does not drink alcohol or use drugs.      ROS:A comprehensive review of systems was negative except for that written in the HPI. Objective:     Vital Signs: Telemetry:    normal sinus rhythm Intake/Output:   Visit Vitals  /61 (BP 1 Location: Left arm, BP Patient Position: Sitting)   Pulse 61   Temp 98.4 °F (36.9 °C)   Resp 18   Ht 5' 9\" (1.753 m)   Wt 104.7 kg (230 lb 13.2 oz)   SpO2 95%   BMI 34.09 kg/m²       Temp (24hrs), Av °F (36.7 °C), Min:97.7 °F (36.5 °C), Max:98.4 °F (36.9 °C)        O2 Device: Room air O2 Flow Rate (L/min): 2 l/min       Wt Readings from Last 4 Encounters:   20 104.7 kg (230 lb 13.2 oz)   19 102.7 kg (226 lb 5 oz)   19 103.1 kg (227 lb 4.7 oz)   17 102.2 kg (225 lb 4.8 oz)          Intake/Output Summary (Last 24 hours) at 2020 0955  Last data filed at 2020 0837  Gross per 24 hour   Intake 1213.03 ml   Output 800 ml   Net 413.03 ml       Last shift:       07 -  1900  In: 360 [P.O.:360]  Out: 200 [Urine:200]  Last 3 shifts: 1901 -  0700  In: 1137.5 [P.O.:800; I.V.:337.5]  Out: 36 [Urine:925]       Physical Exam:   General:  male; in no respiratory distress and acyanotic and cooperative;    HEENT: NCAT, fair dentition, lips and mucosa dry  Eyes: anicteric; conjunctiva clear  Neck: no nodes, no cuff leak, no accessory MM use. Chest: no deformity,   Cardiac: regular rate, rhythm; no murmur  Lungs: distant breath sounds; no wheezes  Abd: soft, NT, hypoactive BS, OBESE soft  Ext: no edema; no joint swelling;  No clubbing  : NO finley,   Neuro: alert;  Psych- no agitation, oriented to person; normal affect  Skin: warm, dry, no cyanosis;   Pulses: 1-2+ Bilateral pedal, radial  Capillary: brisk; pale             Labs:    Recent Labs     20  0552 20  1529 20  0923 20  0058  20  1014  20   WBC 7.6  --   --   --   --  9.2  --  6.9   HGB 10.8*  --   --   --   --  12.4  --  14.2     --   --   --   --  187  --  211   APTT  --  43.3* 58.3* 42.8*   < >  --    < > 27.6 < > = values in this interval not displayed. Recent Labs     01/29/20  0552 01/28/20  0058 01/27/20  0324 01/26/20  2111    138 140 139   K 4.3 4.4 4.4 4.1   * 112* 110* 110*   CO2 20* 21 23 24   * 118* 122* 172*   BUN 23* 23* 22* 22*   CREA 1.50* 1.45* 1.48* 1.60*   CA 9.0 8.3* 8.3* 9.1   ALB  --   --   --  3.4*   SGOT  --   --   --  22   ALT  --   --   --  13     No results for input(s): PH, PCO2, PO2, HCO3, FIO2 in the last 72 hours.   Recent Labs     01/29/20  0552 01/28/20 0058 01/27/20  1600 01/27/20  1014 01/27/20  0324   CKNDX  --  7.8* 10.5* 12.3*  --    TROIQ 10.90* 20.50*  --   --  48.80*     No results found for: BNPP, BNP   Lab Results   Component Value Date/Time    Culture result: PENDING 01/27/2020 09:20 AM     Lab Results   Component Value Date/Time    TSH 3.13 01/28/2020 12:58 AM       Imaging:    CXR Results  (Last 48 hours)    None        CT chest reviewed as above        This care involved high complexity medical decision making: I personally:  · Reviewed the flowsheet and previous days notes  · Reviewed and summarized records or history from previous days note or discussions with staff, family  · High Risk Drug therapy requiring intensive monitoring for toxicity: eg steroids, pressors, antibiotics  · Reviewed and/or ordered Clinical lab tests  · Reviewed images and/or ordered Radiology tests  · Reviewed the patients ECG / Telemetry  ·  discussed my assessment/management with : Nursing, Family for coordination of care    Luis Merritt MD

## 2020-01-29 NOTE — PROGRESS NOTES
Spiritual Care Partner Volunteer visited patient in [place] [address] on January 29, 2020.     Documented by:     MICHAELLE Tavarez, [facility], Staff [address]    [address] Paging Service  [phone]

## 2020-01-29 NOTE — PROGRESS NOTES
Cardiology Progress Note  1/29/2020     Admit Date: 1/26/2020  Admit Diagnosis: STEMI (ST elevation myocardial infarction) ([facility] [address] [I21.3]  CAD (coronary artery disease) [I25.10]  CC: none currently  Cardiac Assessment/Plan:   1) CAD:  Stent of unknown vessel at [facility] 2006. * followed by Dr. Jana Cardoso in 2014:  MPI reportedly showed inferior ischemia & cath recommended but not done. *Seen by Dr Willian Collier in 2017: MPI rec but not done. Normal echo. *CP/TEIXEIRA 12/2019: Abnl PET (inf ischemia): pt delayed cath plan until 1/27/20. *Echo 1/10/20: EF 55%; AoScl only. Mild pulm HTN. *Cath 1/26/20: Severe LM w/ occluded LAD & RCA. 2) HTN  3) dyslipidemia  4) open AAA repair 4/2017 (8 cm; Dr. David Ku). 5) chronic BONNY occlusion. 6) right BG stroke on MRI 1/2014.  7) heavy tobacco use, 2-3 pack per day; no significant COPD on PFTs 4/2017  8) CKD:  Stage III (Cr 1.4-1.5/GFR 51 7/2019; Dr. Lorena Bhatia). Cr 1.5/GFR 45 1/21/20.  9) chronic anemia; hemoglobin 9.3  10) heme+ 2019:  Negative EGD 7/2019; internal hemorrhoids/diverticular disease on colonoscopy 9/2019. Retired ; from [place]. 2 ppd & knows he should quit. No recent ethanol; heavy 50 years ago. 2 caffeinated elie/day. No added salt. He reports no CP in the last month until yesterday. For other plans, see orders. 1/27: BPs stable; Hg 14.2; Cr 1.48; trop 49. NSR; STs improved. No CP/dyspnea. Cont ASA, add hep gtt; Cont bblocker (XL to 50 qday); NTG paste; hold ACEi/ARB with CKD/recent contrast.  Needs surgical consult; Echo, Abd U/S, and carotids ordered. Echo 1/27: Normal cavity size. Mild LVH; EF 40 - 45%. Mid-distal Ant-sept AK; Mild TR. Abd U/S 1/27: \"Mid abdominal aorta measures 4.0 cm in diameter, although the distal abdominal aorta, where the largest aneurysm was seen on prior CT, is obscured by overlying bowel gas\"    Carotid U/S 1/27: Chronic BONNY occlusion;  There is mild stenosis in the left ICA (<50%). Bilateral vertebrals are antegrade. NICHOLAS 1/27: normal.    CTA 1/28: Chronically occluded right internal carotid artery. Bilateral common and left internal carotid arteries remain patent without significant stenosis. Bilateral vertebral arteries are patent with mild atherosclerosis. The left vertebral artery arises from the aortic arch.     Relatively stable juxtarenal abdominal aortic aneurysmal dilatation. Sequelae of graft repair of infrarenal abdominal aortic aneurysm. There is minimal surrounding fat stranding without evidence for fluid collection or gas.     Increased size of the right common iliac artery aneurysm with mild hemorrhage in the right paracolic gutter and right hemipelvis. These findings were communicated to the nursing staff by the on-call radiologist.     Mild nonspecific right middle lobe and posterior right lower lobe airspace disease. Mild nonspecific colitis involving the ascending colon    1/28: Stable BP/HR; NSR; Cr unchanged at 1.45; CKp was 700s (down since then); trop from 45 to 20. No CP/dyspnea;   Remains on IV hep gtt; toprol XL 50 qday; NTG paste; lipitor; Add ACEi or ARB after surgery/prior to d/c.  Sputum c/w chronic bronchitis. CABG timing per Dr Rebeca Parra. Extensive CTA orders noted; increased risk of FRANSISCO. 1/29: VSSAF; NSR; Cr 1.5 from 1.45; Hg 10.8 from 12.4 from 14.2. No CP/dyspnea; less cough/sputum. IV hep d/cd after CTA above; Remains on asa, toprol XL 50, NTG paste, lipitor; Add ACEi or ARB prior to d/c. CABG timing per Dr Rebeca Parra; Vascular/retroperitoneal issues per Dr Silverio Sam. Benign cath sites. ________________________________________________________________  @ NP OV 12/5/19:   Mr Rosanne Salas has a h/o:  1) CAD:  Stent of unknown vessel at [facility] 2006. * followed by Dr. Sharon Richards in 2014:  MPI reportedly showed inferior ischemia & cath recommended but not done. *Seen by Dr Vikki Estrada in 2017: MPI rec but not done. Normal echo.    *CP/TEIXEIRA 12/2019:  2) HTN  3) dyslipidemia  4) open AAA repair 4/2017 (8 cm; Dr. Jett Byers). 5) chronic BONNY occlusion. 6) right BG stroke on MRI 1/2014.  7) heavy tobacco use, 2-3 pack per day; no significant COPD on PFTs 4/2017  8) CKD:  Stage III (Cr 1.4-1.5/GFR 51 7/2019; Dr. Adriana Vaz). 9) chronic anemia; hemoglobin 9.3  10) heme+ 2019:  Negative EGD 7/2019; internal hemorrhoids/diverticular disease on colonoscopy 9/2019. Retired ; from [place]. 2 ppd & knows he should quit. No recent ethanol; heavy 50 years ago. 2 caffeinated elie/day. No added salt. 12/2019:  New patient presenting with intermittent chest pain (6 months of worsening, 0-1 X/month;  15 minutes duration; central dull ache with radiation to both arms. Can occur with or without exertion. Increased dyspnea associated with discomfort). Chronic TEIXEIRA which is unchanged in years. No palpitations. No falling or bleeding. IMPRESSION AND PLAN  01. Atherosclerotic heart disease of native coronary artery with other forms of angina pectoris (I25.118):  Remote stent of unknown vessel; reportedly abnormal MPI 2014 without further evaluation. He needs a stress test but cannot walk on a treadmill. Therefore cardiac PET. We discussed the signs and symptoms of unstable angina, myocardial infarction and malignant arrhythmia. The patient knows to seek immediate medical attention should they occur. ECG done PET Cardiac test to be done. first available   02. Hyp hrt & chr kdny dis w/o hrt fail, w stg 1-4/unsp chr kdny (I13.10): This condition is stable. I have made no changes to the present regimen. 03. Mixed hyperlipidemia (E78.2): He should be on a statin unless previously intolerant; apparently had been on Lipitor in the past.   04. Occlusion and stenosis of bilateral carotid arteries (U35.16):  Chronic BONNY occlusion; he reports no recent follow-up. U/s ordered. Carotid Duplex to be done first available. 05.  Abdominal aortic aneurysm, without rupture (I71.4):  Surgically treated in 2017; follow-up per Dr. Orestes Hartley. 06. Shortness of breath (R06.02):  ?  Cardiac; also has some element of underlying pulmonary disease & ongoing tobacco abuse. Echocardiogram warranted. 2-D w/CFD Echo to be done first available. 07. Chronic kidney disease, stage 3 (moderate) (N18.3):  Managed by: Renal   08. Cerebral infarction, unspecified (I63.9):  Managed by: Other Physician   09. Nicotine dependence, cigarettes, uncomplicated (M40.064): The patient was instructed on smoking cessation. 10. Body mass index (BMI) 33.0-33.9, adult (S68.68): The patient was instructed on AHA diet and regular exercise. ORDERS:  1 Tobacco cessation counseling   2 Dietary management education, guidance, and counseling   3 ECG done   4 PET Cardiac test to be done   5 Carotid Duplex   6 2-D w/ CFD Echocardiogram   7 Return office visit with Williams Mcmullen MD in 3 Months. 8 The patient was instructed on AHA diet and regular exercise. 12/5/19 MEDICATION LIST  Medication Sig Desc   amlodipine 5 mg tablet take 1 tablet by oral route  every day   Aspirin Low Dose 81 mg tablet,delayed release take 1 tablet by oral route  every day   metoprolol succinate ER 25 mg tablet,extended release 24 hr take 1 tablet by oral route  every day   pantoprazole 40 mg tablet,delayed release take 1 tablet by oral route  every day   tamsulosin 0.4 mg capsule take 1 capsule by oral route  every day 1/2 hour following the same meal each day     _______________________________________________________________________  CARDIAC HISTORY  RISK FACTORS:  1 Hypertension   2 Tobacco Use: No/never       CARDIOVASCULAR PROCEDURES  Procedure Date Results   Carotid Duplex 05/02/2018 100% Right ICA, Less than 50% Left ICA, Vertebral: Bilateral Antegrade Flow, Unchanged versus 2017; at [facility]. Echo 04/03/2017 EF 55-60%; normal RV; no valve disease; normal PAp; by RCA at [facility].    EKG 12/05/2019 Sinus Rhythm, Borderline PRWP, otherwise unremarkable. PFTs 2017 No obstructive disease; moderately decreased DLCO; at [address] ACTIVE ALLERGIES:  Ingredient Reaction Comment   FENOFIBRATE Hives    ALBUTEROL SULFATE Unknown    ATORVASTATIN Leg cramp Atorvastatin         PAST MEDICAL/SURGICAL HISTORY  (Detailed)    Disease/disorder Onset Date Surgical History Date Comments   GERD       Hypertension         Family History  (Detailed)    SOCIAL HISTORY  (Detailed)  Tobacco use reviewed. Preferred language is [place]. Smoking status: Heavy tobacco smoker. SMOKING STATUS  Use Status Type Smoking Status Usage Per Day Years Used Total Pack Years   yes Cigarette Heavy tobacco smoker 2 Packs       TOBACCO CESSATION INFORMATION  Date Counseled By Order Status Description Code Tobacco Cessation Information   2019 Shayy Fearing. Green Tobacco cessation counseling completed   Counseling about tobacco use (procedure)       Hospital problem list   Active Hospital Problems    Diagnosis Date Noted    STEMI (ST elevation myocardial infarction) ([facility] [address] 2020     Priority: 1 - One    CAD (coronary artery disease)       - s/p stent           Subjective:  Marlon Leonardo reports   Chest pain X none  consistent with:  Non-cardiac CP         Atypical CP     None now  On going  Anginal CP     Dyspnea X none  at rest  with exertion         improved  unchanged  worse              PND X none  overnight       Orthopnea X none  improved  unchanged  worse   Presyncope X none  improved  unchanged  worse     Ambulated in hallway without symptoms   Yes   Ambulated in room without symptoms  Yes   Objective:    Physical Exam:  Overall VSSAF;    Visit Vitals  /61 (BP 1 Location: Left arm, BP Patient Position: Sitting)   Pulse 61   Temp 98.4 °F (36.9 °C)   Resp 18   Ht 5' 9\" (1.753 m)   Wt 104.7 kg (230 lb 13.2 oz)   SpO2 95%   BMI 34.09 kg/m²     Temp (24hrs), Av °F (36.7 °C), Min:97.7 °F (36.5 °C), Max:98.4 °F (36.9 °C)    Patient Vitals for the past 8 hrs:   Pulse   01/29/20 0702 61   01/29/20 0459 (!) 55     Patient Vitals for the past 8 hrs:   Resp   01/29/20 0702 18   01/29/20 0459 18     Patient Vitals for the past 8 hrs:   BP   01/29/20 0702 139/61   01/29/20 0459 136/66       Intake/Output Summary (Last 24 hours) at 1/29/2020 4922  Last data filed at 1/29/2020 0459  Gross per 24 hour   Intake 853.03 ml   Output 600 ml   Net 253.03 ml     General Appearance: Well developed, obsese, no acute distress. Ears/Nose/Mouth/Throat:   Normal MM; anicteric. JVP: WNL   Resp:   clear to auscultation bilaterally. Nl resp effort. Cardiovascular:  RRR, S1, S2 normal, no new murmur. No gallop or rub. Abdomen:   Soft, non-tender, bowel sounds are present. Extremities: No edema bilaterally. Skin:  Neuro: Warm and dry. A/O x3, grossly nonfocal   cath sites intact w/o hematoma or new bruit; distal pulse unchanged x Yes   Data Review:     Telemetry independently reviewed x sinus  chronic afib  parox afib  NSVT     ECG independently reviewed  NSR  afib  Decreased ST elevation  NSST-Tw chgs   x no new ECG provided for review   Lab results reviewed as noted below. Current medications reviewed as noted below. No results for input(s): PH, PCO2, PO2 in the last 72 hours.   Recent Labs     01/29/20  0552 01/28/20 0058 01/27/20  1600 01/27/20  1014 01/27/20  0324   CKMB  --  25.0* 57.9* 94.9*  --    CKNDX  --  7.8* 10.5* 12.3*  --    TROIQ 10.90* 20.50*  --   --  48.80*     Recent Labs     01/29/20  0552 01/28/20  0058 01/27/20  1014 01/27/20  0324 01/26/20  2111    138  --  140 139   K 4.3 4.4  --  4.4 4.1   * 112*  --  110* 110*   CO2 20* 21  --  23 24   BUN 23* 23*  --  22* 22*   CREA 1.50* 1.45*  --  1.48* 1.60*   GFRAA 55* 57*  --  56* 51*   * 118*  --  122* 172*   CA 9.0 8.3*  --  8.3* 9.1   ALB  --   --   --   --  3.4*   WBC 7.6  --  9.2  --  6.9   HGB 10.8*  --  12.4  --  14.2   HCT 33.4*  --  38.3  -- 43.2     --  187  --  211     Lab Results   Component Value Date/Time    Cholesterol, total 148 01/28/2020 12:58 AM    HDL Cholesterol 27 01/28/2020 12:58 AM    LDL, calculated 86.4 01/28/2020 12:58 AM    Triglyceride 173 (H) 01/28/2020 12:58 AM    CHOL/HDL Ratio 5.5 (H) 01/28/2020 12:58 AM     Recent Labs     01/26/20  2111   SGOT 22   *   TP 7.9   ALB 3.4*   GLOB 4.5*     Recent Labs     01/28/20  1529 01/28/20  0923 01/28/20  0058   APTT 43.3* 58.3* 42.8*      No components found for: GLPOC    Current Facility-Administered Medications   Medication Dose Route Frequency    diphenhydrAMINE (BENADRYL) capsule 50 mg  50 mg Oral QHS PRN    cefUROXime (CEFTIN) tablet 250 mg  250 mg Oral Q12H    nicotine (NICODERM CQ) 7 mg/24 hr patch 1 Patch  1 Patch TransDERmal DAILY    nitroglycerin (NITROBID) 2 % ointment 0.5 Inch  0.5 Inch Topical Q6H    metoprolol succinate (TOPROL-XL) XL tablet 50 mg  50 mg Oral DAILY    nitroglycerin (NITROBID) 2 % ointment 1 Inch  1 Inch Topical Q6H PRN    pravastatin (PRAVACHOL) tablet 40 mg  40 mg Oral QHS    amiodarone (CORDARONE) tablet 200 mg  200 mg Oral BID    benzonatate (TESSALON) capsule 100 mg  100 mg Oral TID PRN    ondansetron (ZOFRAN) injection 4 mg  4 mg IntraVENous Q4H PRN    tamsulosin (FLOMAX) capsule 0.4 mg  0.4 mg Oral QPM    aspirin delayed-release tablet 81 mg  81 mg Oral DAILY    sodium chloride (NS) flush 5-40 mL  5-40 mL IntraVENous Q8H    sodium chloride (NS) flush 5-40 mL  5-40 mL IntraVENous PRN    acetaminophen (TYLENOL) tablet 650 mg  650 mg Oral Q4H PRN    nitroglycerin (NITROSTAT) tablet 0.4 mg  0.4 mg SubLINGual PRN        Gia Montero MD

## 2020-01-30 ENCOUNTER — TELEPHONE (OUTPATIENT)
Dept: CARDIOLOGY CLINIC | Age: 75
End: 2020-01-30

## 2020-01-30 LAB
ALBUMIN SERPL-MCNC: 2.9 G/DL (ref 3.5–5)
ALBUMIN/GLOB SERPL: 0.7 {RATIO} (ref 1.1–2.2)
ALP SERPL-CCNC: 110 U/L (ref 45–117)
ALT SERPL-CCNC: 22 U/L (ref 12–78)
ANION GAP SERPL CALC-SCNC: 6 MMOL/L (ref 5–15)
AST SERPL-CCNC: 35 U/L (ref 15–37)
BACTERIA SPEC CULT: NORMAL
BILIRUB SERPL-MCNC: 0.5 MG/DL (ref 0.2–1)
BUN SERPL-MCNC: 21 MG/DL (ref 6–20)
BUN/CREAT SERPL: 13 (ref 12–20)
CALCIUM SERPL-MCNC: 8.9 MG/DL (ref 8.5–10.1)
CHLORIDE SERPL-SCNC: 112 MMOL/L (ref 97–108)
CO2 SERPL-SCNC: 21 MMOL/L (ref 21–32)
CREAT SERPL-MCNC: 1.58 MG/DL (ref 0.7–1.3)
ERYTHROCYTE [DISTWIDTH] IN BLOOD BY AUTOMATED COUNT: 17.2 % (ref 11.5–14.5)
GLOBULIN SER CALC-MCNC: 3.9 G/DL (ref 2–4)
GLUCOSE SERPL-MCNC: 108 MG/DL (ref 65–100)
GRAM STN SPEC: NORMAL
HCT VFR BLD AUTO: 31.4 % (ref 36.6–50.3)
HGB BLD-MCNC: 10 G/DL (ref 12.1–17)
MAGNESIUM SERPL-MCNC: 2.2 MG/DL (ref 1.6–2.4)
MCH RBC QN AUTO: 29.2 PG (ref 26–34)
MCHC RBC AUTO-ENTMCNC: 31.8 G/DL (ref 30–36.5)
MCV RBC AUTO: 91.5 FL (ref 80–99)
NRBC # BLD: 0 K/UL (ref 0–0.01)
NRBC BLD-RTO: 0 PER 100 WBC
PHOSPHATE SERPL-MCNC: 2.9 MG/DL (ref 2.6–4.7)
PLATELET # BLD AUTO: 187 K/UL (ref 150–400)
PMV BLD AUTO: 10.7 FL (ref 8.9–12.9)
POTASSIUM SERPL-SCNC: 4.4 MMOL/L (ref 3.5–5.1)
PROT SERPL-MCNC: 6.8 G/DL (ref 6.4–8.2)
RBC # BLD AUTO: 3.43 M/UL (ref 4.1–5.7)
SERVICE CMNT-IMP: NORMAL
SODIUM SERPL-SCNC: 139 MMOL/L (ref 136–145)
WBC # BLD AUTO: 7.7 K/UL (ref 4.1–11.1)

## 2020-01-30 PROCEDURE — 85027 COMPLETE CBC AUTOMATED: CPT

## 2020-01-30 PROCEDURE — 74011250637 HC RX REV CODE- 250/637: Performed by: PHYSICIAN ASSISTANT

## 2020-01-30 PROCEDURE — 74011000258 HC RX REV CODE- 258: Performed by: INTERNAL MEDICINE

## 2020-01-30 PROCEDURE — 74011250637 HC RX REV CODE- 250/637: Performed by: INTERNAL MEDICINE

## 2020-01-30 PROCEDURE — 83735 ASSAY OF MAGNESIUM: CPT

## 2020-01-30 PROCEDURE — 36415 COLL VENOUS BLD VENIPUNCTURE: CPT

## 2020-01-30 PROCEDURE — 65660000000 HC RM CCU STEPDOWN

## 2020-01-30 PROCEDURE — 94640 AIRWAY INHALATION TREATMENT: CPT

## 2020-01-30 PROCEDURE — 80053 COMPREHEN METABOLIC PANEL: CPT

## 2020-01-30 PROCEDURE — 84100 ASSAY OF PHOSPHORUS: CPT

## 2020-01-30 PROCEDURE — 74011000250 HC RX REV CODE- 250: Performed by: INTERNAL MEDICINE

## 2020-01-30 PROCEDURE — 74011250636 HC RX REV CODE- 250/636: Performed by: INTERNAL MEDICINE

## 2020-01-30 RX ORDER — CODEINE PHOSPHATE AND GUAIFENESIN 10; 100 MG/5ML; MG/5ML
5 SOLUTION ORAL
Status: DISCONTINUED | OUTPATIENT
Start: 2020-01-30 | End: 2020-01-30

## 2020-01-30 RX ORDER — GUAIFENESIN 600 MG/1
600 TABLET, EXTENDED RELEASE ORAL EVERY 12 HOURS
Status: DISCONTINUED | OUTPATIENT
Start: 2020-01-30 | End: 2020-02-01

## 2020-01-30 RX ORDER — ZOLPIDEM TARTRATE 5 MG/1
5 TABLET ORAL
Status: DISCONTINUED | OUTPATIENT
Start: 2020-01-30 | End: 2020-02-04

## 2020-01-30 RX ORDER — LANOLIN ALCOHOL/MO/W.PET/CERES
9 CREAM (GRAM) TOPICAL
Status: DISCONTINUED | OUTPATIENT
Start: 2020-01-30 | End: 2020-02-08 | Stop reason: HOSPADM

## 2020-01-30 RX ORDER — SODIUM BICARBONATE 650 MG/1
650 TABLET ORAL 2 TIMES DAILY
Status: DISCONTINUED | OUTPATIENT
Start: 2020-01-30 | End: 2020-02-04

## 2020-01-30 RX ORDER — ZOLPIDEM TARTRATE 5 MG/1
5 TABLET ORAL
Status: DISCONTINUED | OUTPATIENT
Start: 2020-01-30 | End: 2020-01-30 | Stop reason: SDUPTHER

## 2020-01-30 RX ADMIN — ALBUTEROL SULFATE 2.5 MG: 2.5 SOLUTION RESPIRATORY (INHALATION) at 13:29

## 2020-01-30 RX ADMIN — Medication 10 ML: at 06:18

## 2020-01-30 RX ADMIN — SODIUM BICARBONATE 650 MG: 650 TABLET ORAL at 14:15

## 2020-01-30 RX ADMIN — GUAIFENESIN 600 MG: 600 TABLET, EXTENDED RELEASE ORAL at 21:40

## 2020-01-30 RX ADMIN — TAMSULOSIN HYDROCHLORIDE 0.4 MG: 0.4 CAPSULE ORAL at 17:19

## 2020-01-30 RX ADMIN — ALBUTEROL SULFATE 2.5 MG: 2.5 SOLUTION RESPIRATORY (INHALATION) at 07:10

## 2020-01-30 RX ADMIN — DIPHENHYDRAMINE HYDROCHLORIDE 50 MG: 25 CAPSULE ORAL at 09:06

## 2020-01-30 RX ADMIN — Medication 10 ML: at 11:13

## 2020-01-30 RX ADMIN — BENZONATATE 100 MG: 100 CAPSULE ORAL at 09:03

## 2020-01-30 RX ADMIN — SODIUM BICARBONATE 650 MG: 650 TABLET ORAL at 17:20

## 2020-01-30 RX ADMIN — BENZONATATE 100 MG: 100 CAPSULE ORAL at 06:18

## 2020-01-30 RX ADMIN — GUAIFENESIN 600 MG: 600 TABLET, EXTENDED RELEASE ORAL at 11:12

## 2020-01-30 RX ADMIN — ACETAMINOPHEN 650 MG: 325 TABLET ORAL at 09:05

## 2020-01-30 RX ADMIN — ACETAMINOPHEN 650 MG: 325 TABLET ORAL at 21:40

## 2020-01-30 RX ADMIN — MELATONIN TAB 3 MG 9 MG: 3 TAB at 21:40

## 2020-01-30 RX ADMIN — METOPROLOL SUCCINATE 50 MG: 50 TABLET, EXTENDED RELEASE ORAL at 09:03

## 2020-01-30 RX ADMIN — ALBUTEROL SULFATE 1.25 MG: 2.5 SOLUTION RESPIRATORY (INHALATION) at 19:21

## 2020-01-30 RX ADMIN — ZOLPIDEM TARTRATE 5 MG: 5 TABLET ORAL at 23:29

## 2020-01-30 RX ADMIN — ASPIRIN 81 MG: 81 TABLET ORAL at 09:03

## 2020-01-30 RX ADMIN — PRAVASTATIN SODIUM 40 MG: 40 TABLET ORAL at 22:00

## 2020-01-30 RX ADMIN — CEFTRIAXONE 1 G: 1 INJECTION, POWDER, FOR SOLUTION INTRAMUSCULAR; INTRAVENOUS at 11:16

## 2020-01-30 RX ADMIN — Medication 10 ML: at 21:42

## 2020-01-30 RX ADMIN — AMIODARONE HYDROCHLORIDE 200 MG: 200 TABLET ORAL at 09:04

## 2020-01-30 RX ADMIN — AMIODARONE HYDROCHLORIDE 200 MG: 200 TABLET ORAL at 17:19

## 2020-01-30 NOTE — CONSULTS
CHRONIC KIDNEY DISEASE (CKD)    Subjective:     Patient is a 66 y.o.  male has been admitted to the hospital for Chest Pain . Patient was referred for follow up of renal disease. The pt is followed by me for CRI. He was at baseline with a creat of 1.6 when he was in the office in 12/19. He has hypertension and a h/o renal artery stenosis on CT in 4/17. He has been on sodium bicarbonate for a metabolic acidosis and on a mild dietary K restriction. He unfortunately has severe COPD and continues to smoke. He is also obese and is s/p an AAA repair in 4/17 as well as s/p repair of an incarcerated hernia in 4/17. The pt was admitted on 1/26 with an acute STMI. An emergent cath was done on 1/27 which showed:  \"severe triple vessel disease involving the distal left main of 60%-70% with ulcerated plaque, total occlusion of left anterior descending artery, a stent in the proximal portion. Patent left circumflex and patent first large diagonal or the ramus intermedius branch. Totally occluded RCA with collateralization coming from the left system. \"   A CABG is planned for Monday. The pt also had a CT angio done on 1/28. In addition to the other findings was seen: \"bilateral renal arteries are patent with moderate right and moderate severe left proximal atherosclerosis. \"    Other findings of significance: \"Complete occlusion of the right internal carotid artery is unchanged since ultrasound 1 day ago. Severe stenosis of the right vertebral artery V4 segment. Lobulated infrarenal abdominal aortic aneurysm measures 5.1 cm in AP diameter. Liver lesions may represent perfusional variant. Right common iliac artery aneurysm measures 3.3 cm. Heterogeneous hemorrhage surrounds the right external iliac artery and tracks proximally in the right retroperitoneum. \"    Today the pt admits to some SOB but is mostly bothered by insomnia and restlessness.   He denies any chest pain or other complaints. ROS: no HEENT complaints. No abd pain. No N/V. No fever/chills. No joint pain. No skin rashes or lesions. Patient Active Problem List    Diagnosis Date Noted    STEMI (ST elevation myocardial infarction) ([facility] 01/26/2020     Priority: 1 - One    Dark stools 07/08/2019    PAD (peripheral artery disease) ([facility] 04/02/2017    Carotid stenosis, right 04/02/2017    S/P PTCA (percutaneous transluminal coronary angioplasty) 04/02/2017    AAA (abdominal aortic aneurysm) ([facility] 04/01/2017    Dyslipidemia 01/25/2014    Tobacco use disorder 01/25/2014    Atherosclerosis of native arteries of the extremities with intermittent claudication 01/25/2014    Cerebrovascular disease 01/25/2014    CAD (coronary artery disease)     Hyperglycemia      Past Medical History:   Diagnosis Date    Aneurysm ([facility]     BPH (benign prostatic hyperplasia)     CAD (coronary artery disease)     2006 - s/p stent     Essential hypertension 12-12-13    Hematuria     sees urologist    Hyperglycemia     Kidney failure       Past Surgical History:   Procedure Laterality Date    CARDIAC SURG PROCEDURE UNLIST      stent 2006    COLONOSCOPY N/A 9/16/2019    COLONOSCOPY performed by Sharyle Goldstein., MD at \[facility]\"" ENDOSCOPY    COLONOSCOPY,DIAGNOSTIC  9/16/2019         UPPER GI ENDOSCOPY,BIOPSY  7/9/2019         VASCULAR SURGERY PROCEDURE UNLIST  2016    Aotric aneurysm      Prior to Admission medications    Medication Sig Start Date End Date Taking? Authorizing Provider   aspirin delayed-release 81 mg tablet Take 81 mg by mouth daily. Yes Other, MD Denis   tamsulosin (FLOMAX) 0.4 mg capsule Take 0.4 mg by mouth every evening. Yes Provider, Historical   sodium bicarbonate 650 mg tablet Take 650 mg by mouth two (2) times a day. Yes Provider, Historical   metoprolol succinate (TOPROL-XL) 25 mg XL tablet Take 25 mg by mouth daily.  5/24/17  Yes Provider, Historical   diphenhydrAMINE-acetaminophen (TYLENOL PM EXTRA STRENGTH)  mg tab Take 2 Tabs by mouth nightly. Yes Provider, Historical   clopidogrel (PLAVIX) 75 mg tablet Take 75 mg by mouth daily. Provider, Historical     Current Facility-Administered Medications   Medication Dose Route Frequency    melatonin tablet 9 mg  9 mg Oral QHS    zolpidem (AMBIEN) tablet 5 mg  5 mg Oral QHS PRN    guaiFENesin ER (MUCINEX) tablet 600 mg  600 mg Oral Q12H    cefTRIAXone (ROCEPHIN) 1 g in 0.9% sodium chloride (MBP/ADV) 50 mL  1 g IntraVENous Q24H    albuterol (PROVENTIL VENTOLIN) nebulizer solution 1.25 mg  1.25 mg Nebulization TID RT    diphenhydrAMINE (BENADRYL) capsule 50 mg  50 mg Oral QHS PRN    nicotine (NICODERM CQ) 7 mg/24 hr patch 1 Patch  1 Patch TransDERmal DAILY    nitroglycerin (NITROBID) 2 % ointment 0.5 Inch  0.5 Inch Topical Q6H    metoprolol succinate (TOPROL-XL) XL tablet 50 mg  50 mg Oral DAILY    nitroglycerin (NITROBID) 2 % ointment 1 Inch  1 Inch Topical Q6H PRN    pravastatin (PRAVACHOL) tablet 40 mg  40 mg Oral QHS    amiodarone (CORDARONE) tablet 200 mg  200 mg Oral BID    benzonatate (TESSALON) capsule 100 mg  100 mg Oral TID PRN    ondansetron (ZOFRAN) injection 4 mg  4 mg IntraVENous Q4H PRN    tamsulosin (FLOMAX) capsule 0.4 mg  0.4 mg Oral QPM    aspirin delayed-release tablet 81 mg  81 mg Oral DAILY    sodium chloride (NS) flush 5-40 mL  5-40 mL IntraVENous Q8H    sodium chloride (NS) flush 5-40 mL  5-40 mL IntraVENous PRN    acetaminophen (TYLENOL) tablet 650 mg  650 mg Oral Q4H PRN    nitroglycerin (NITROSTAT) tablet 0.4 mg  0.4 mg SubLINGual PRN     Allergies   Allergen Reactions    Lipitor [Atorvastatin] Other (comments)     \"muscle pain\"      Social History     Tobacco Use    Smoking status: Current Every Day Smoker     Packs/day: 2.00     Years: 53.00     Pack years: 106.00    Smokeless tobacco: Never Used   Substance Use Topics    Alcohol use: No      History reviewed. No pertinent family history. Review of Systems  A comprehensive review of systems was negative except for that written in the HPI. Objective:     Patient Vitals for the past 8 hrs:   BP Temp Pulse Resp SpO2 Weight   20 1050 136/59 97.4 °F (36.3 °C) 64 18 94 %    20 0726     98 %    20 0710     94 %    20 0700 129/67 98.2 °F (36.8 °C) 64 18 98 %    20 0620      105.5 kg (232 lb 9.4 oz)      Temp (24hrs), Av.9 °F (36.6 °C), Min:97.4 °F (36.3 °C), Max:98.2 °F (36.8 °C)    1901 -  0700  In: 2175 [P.O.:2175]  Out: 4526 [Urine:2300]  No intake/output data recorded. Visit Vitals  /59 (BP 1 Location: Left arm, BP Patient Position: At rest;Sitting)   Pulse 64   Temp 97.4 °F (36.3 °C)   Resp 18   Ht 5' 9\" (1.753 m)   Wt 105.5 kg (232 lb 9.4 oz)   SpO2 95%   BMI 34.35 kg/m²     General appearance: alert, cooperative, no distress, morbidly obese  Head: Normocephalic, without obvious abnormality, atraumatic  Eyes: conjunctivae/corneas clear. EOM's intact. Neck: supple, symmetrical, trachea midline and no JVD  Lungs: diminished breath sounds with a few slight wheezes in the left base; no resp distress   Heart: regular rate and rhythm; no gallop or rub  Abdomen: soft, non-tender.   No masses,  no organomegaly  Extremities: no edema  Skin: color, texture, turgor normal. No rashes or lesions  Neurologic: alert and OX 3; normal speech; no tremor  Psych: somewhat depressed affect and mood, appropriate for the situation        Assessment:     Data Review   CBC:   Lab Results   Component Value Date/Time    WBC 7.7 2020 05:36 AM    RBC 3.43 (L) 2020 05:36 AM    HGB 10.0 (L) 2020 05:36 AM    HCT 31.4 (L) 2020 05:36 AM    PLATELET 087  05:36 AM   , CMP:   Lab Results   Component Value Date/Time    Glucose 108 (H) 2020 05:36 AM    Sodium 139 2020 05:36 AM    Potassium 4.4 2020 05:36 AM    Chloride 112 (H) 2020 05:36 AM    CO2 21 01/30/2020 05:36 AM    BUN 21 (H) 01/30/2020 05:36 AM    Creatinine 1.58 (H) 01/30/2020 05:36 AM    Calcium 8.9 01/30/2020 05:36 AM    Anion gap 6 01/30/2020 05:36 AM    BUN/Creatinine ratio 13 01/30/2020 05:36 AM    AST (SGOT) 35 01/30/2020 05:36 AM    Alk. phosphatase 110 01/30/2020 05:36 AM    Protein, total 6.8 01/30/2020 05:36 AM    Albumin 2.9 (L) 01/30/2020 05:36 AM    Globulin 3.9 01/30/2020 05:36 AM    A-G Ratio 0.7 (L) 01/30/2020 05:36 AM       Reviewed: CT scan, x-rays and labs      ASSESSMENT:    Chronic renal insufficiency, stage III. Baseline creat ~1.6. Renal function has remained stable post cath and CT angio. Renal artery stenosis noted in 4/17. Angio CT on 2/18: bilateral renal arteries patent with moderate right and moderate severe left proximal atherosclerosis. H/o hyperkalemia, on a dietary K restriction. Metabolic acidosis, treated with sodium bicarbonate as an outpatient. Acute STMI. An emergent cath was done on 1/27 which showed:  \"severe triple vessel disease involving the distal left main of 60%-70% with ulcerated plaque, total occlusion of left anterior descending artery, a stent in the proximal portion. Patent left circumflex and patent first large diagonal or the ramus intermedius branch. Totally occluded RCA with collateralization coming from the left system. \"   A CABG is planned for Monday. Severe COPD and continued tobacco abuse    Hypertension. Obesity. s/p an AAA repair in 4/17. s/p repair of an incarcerated hernia in 4/17. PVD. CT angio done on 1/29: complete occlusion of the right internal carotid artery is unchanged since ultrasound 1 day ago. Severe stenosis of the right vertebral artery V4 segment. Lobulated infrarenal abdominal aortic aneurysm measures 5.1 cm in AP diameter. Liver lesions may represent perfusional variant. Right common iliac artery aneurysm measures 3.3 cm.  Heterogeneous hemorrhage surrounds the right external iliac artery and tracks proximally in the right retroperitoneum. Plan:       Recheck labs in AM.    Will reorder the pt's sodium bicarbonate. Will follow closely with you. Above d/w the pt. Chart reviewed. Pertinent Notes Reviewed. Medications list Personally Reviewed. Roque Bumpers, [address] [facility]  [facility] SYSTM FRANCISCAN [facility]  [address], [address]  [place], [address] [place]  Phone - [phone]    Fax - [phone]  [employer]                                         [facility]   [address] [facility] [address]  Phone - [phone]  Fax - [phone]. [employer]

## 2020-01-30 NOTE — TELEPHONE ENCOUNTER
Dr Trina Hills only:    Patient having CABG on February 4,2020 at [facility] by Dr Umang Mccray

## 2020-01-30 NOTE — PROGRESS NOTES
Cardiology Progress Note  1/30/2020     Admit Date: 1/26/2020  Admit Diagnosis: STEMI (ST elevation myocardial infarction) ([facility] [address] [I21.3]  CAD (coronary artery disease) [I25.10]  CC: none currently  Cardiac Assessment/Plan:   1) CAD:  Stent of unknown vessel at [facility] 2006. * followed by Dr. Sydnie Barnes in 2014:  MPI reportedly showed inferior ischemia & cath recommended but not done. *Seen by Dr Sharon James in 2017: MPI rec but not done. Normal echo. *CP/TEIXEIRA 12/2019: Abnl PET (inf ischemia): pt delayed cath plan until 1/27/20. *Echo 1/10/20: EF 55%; AoScl only. Mild pulm HTN. *Cath 1/26/20: Severe LM w/ occluded LAD & RCA. 2) HTN  3) dyslipidemia  4) open AAA repair 4/2017 (8 cm; Dr. No Bajwa). 5) chronic BONNY occlusion. 6) right BG stroke on MRI 1/2014.  7) heavy tobacco use, 2-3 pack per day; no significant COPD on PFTs 4/2017  8) CKD:  Stage III (Cr 1.4-1.5/GFR 51 7/2019; Dr. Paige Espinosa). Cr 1.5/GFR 45 1/21/20.  9) chronic anemia; hemoglobin 9.3  10) heme+ 2019:  Negative EGD 7/2019; internal hemorrhoids/diverticular disease on colonoscopy 9/2019. Retired ; from [place]. 2 ppd & knows he should quit. No recent ethanol; heavy 50 years ago. 2 caffeinated elie/day. No added salt. He reports no CP in the last month until yesterday. For other plans, see orders. 1/27: BPs stable; Hg 14.2; Cr 1.48; trop 49. NSR; STs improved. No CP/dyspnea. Cont ASA, add hep gtt; Cont bblocker (XL to 50 qday); NTG paste; hold ACEi/ARB with CKD/recent contrast.  Needs surgical consult; Echo, Abd U/S, and carotids ordered. Echo 1/27: Normal cavity size. Mild LVH; EF 40 - 45%. Mid-distal Ant-sept AK; Mild TR. Abd U/S 1/27: \"Mid abdominal aorta measures 4.0 cm in diameter, although the distal abdominal aorta, where the largest aneurysm was seen on prior CT, is obscured by overlying bowel gas\"    Carotid U/S 1/27: Chronic BONNY occlusion;  There is mild stenosis in the left ICA (<50%). Bilateral vertebrals are antegrade. NICHOLAS 1/27: normal.    CTA 1/28: Chronically occluded right internal carotid artery. Bilateral common and left internal carotid arteries remain patent without significant stenosis. Bilateral vertebral arteries are patent with mild atherosclerosis. The left vertebral artery arises from the aortic arch.     Relatively stable juxtarenal abdominal aortic aneurysmal dilatation. Sequelae of graft repair of infrarenal abdominal aortic aneurysm. There is minimal surrounding fat stranding without evidence for fluid collection or gas.     Increased size of the right common iliac artery aneurysm with mild hemorrhage in the right paracolic gutter and right hemipelvis. These findings were communicated to the nursing staff by the on-call radiologist.     Mild nonspecific right middle lobe and posterior right lower lobe airspace disease. Mild nonspecific colitis involving the ascending colon    1/28: Stable BP/HR; NSR; Cr unchanged at 1.45; CKp was 700s (down since then); trop from 45 to 20. No CP/dyspnea;   Remains on IV hep gtt; toprol XL 50 qday; NTG paste; lipitor; Add ACEi or ARB after surgery/prior to d/c.  Sputum c/w chronic bronchitis. CABG timing per Dr Castaneda Arms. Extensive CTA orders noted; increased risk of FRANSISCO. 1/29: VSSAF; NSR; Cr 1.5 from 1.45; Hg 10.8 from 12.4 from 14.2. No CP/dyspnea; less cough/sputum. IV hep d/cd after CTA above; Remains on asa, toprol XL 50, NTG paste, lipitor; Add ACEi or ARB prior to d/c. CABG timing per Dr Castaneda Arms; Vascular/retroperitoneal issues per Dr Ramila Weller. Benign cath sites. 1/30: VSSAF; NSR; Cr 1.58; Hg 10. No CP/dyspnea per se; cont cough/poor sleep: mucinex/ambien. Cardiac meds as above. Dr Lazarus Harris et al to see pt.   Appreciate Dr Sam Hobson input on CT findings: no further Rx needed @ this time/prior to CABG.  ________________________________________________________________  @ NP OV 12/5/19:   Mr Olga Carter has a h/o:  1) CAD:  Stent of unknown vessel at [facility] 2006. * followed by Dr. Geoff Bishop in 2014:  MPI reportedly showed inferior ischemia & cath recommended but not done. *Seen by Dr Smith Yo in 2017: MPI rec but not done. Normal echo. *CP/TEIXEIRA 12/2019:  2) HTN  3) dyslipidemia  4) open AAA repair 4/2017 (8 cm; Dr. Chester Penny). 5) chronic BONNY occlusion. 6) right BG stroke on MRI 1/2014.  7) heavy tobacco use, 2-3 pack per day; no significant COPD on PFTs 4/2017  8) CKD:  Stage III (Cr 1.4-1.5/GFR 51 7/2019; Dr. Tobias Arndt). 9) chronic anemia; hemoglobin 9.3  10) heme+ 2019:  Negative EGD 7/2019; internal hemorrhoids/diverticular disease on colonoscopy 9/2019. Retired ; from [place]. 2 ppd & knows he should quit. No recent ethanol; heavy 50 years ago. 2 caffeinated elie/day. No added salt. 12/2019:  New patient presenting with intermittent chest pain (6 months of worsening, 0-1 X/month;  15 minutes duration; central dull ache with radiation to both arms. Can occur with or without exertion. Increased dyspnea associated with discomfort). Chronic TEIXEIRA which is unchanged in years. No palpitations. No falling or bleeding. IMPRESSION AND PLAN  01. Atherosclerotic heart disease of native coronary artery with other forms of angina pectoris (I25.118):  Remote stent of unknown vessel; reportedly abnormal MPI 2014 without further evaluation. He needs a stress test but cannot walk on a treadmill. Therefore cardiac PET. We discussed the signs and symptoms of unstable angina, myocardial infarction and malignant arrhythmia. The patient knows to seek immediate medical attention should they occur. ECG done PET Cardiac test to be done. first available   02. Hyp hrt & chr kdny dis w/o hrt fail, w stg 1-4/unsp chr kdny (I13.10): This condition is stable. I have made no changes to the present regimen. 03. Mixed hyperlipidemia (E78.2):   He should be on a statin unless previously intolerant; apparently had been on Lipitor in the past.   04. Occlusion and stenosis of bilateral carotid arteries (D26.10):  Chronic BONNY occlusion; he reports no recent follow-up. U/s ordered. Carotid Duplex to be done first available. 05. Abdominal aortic aneurysm, without rupture (I71.4):  Surgically treated in 2017; follow-up per Dr. Jolly Chi. 06. Shortness of breath (R06.02):  ?  Cardiac; also has some element of underlying pulmonary disease & ongoing tobacco abuse. Echocardiogram warranted. 2-D w/CFD Echo to be done first available. 07. Chronic kidney disease, stage 3 (moderate) (N18.3):  Managed by: Renal   08. Cerebral infarction, unspecified (I63.9):  Managed by: Other Physician   09. Nicotine dependence, cigarettes, uncomplicated (H60.647): The patient was instructed on smoking cessation. 10. Body mass index (BMI) 33.0-33.9, adult (Y10.49): The patient was instructed on AHA diet and regular exercise. ORDERS:  1 Tobacco cessation counseling   2 Dietary management education, guidance, and counseling   3 ECG done   4 PET Cardiac test to be done   5 Carotid Duplex   6 2-D w/ CFD Echocardiogram   7 Return office visit with Karly Mcmullen MD in 3 Months. 8 The patient was instructed on AHA diet and regular exercise.        12/5/19 MEDICATION LIST  Medication Sig Desc   amlodipine 5 mg tablet take 1 tablet by oral route  every day   Aspirin Low Dose 81 mg tablet,delayed release take 1 tablet by oral route  every day   metoprolol succinate ER 25 mg tablet,extended release 24 hr take 1 tablet by oral route  every day   pantoprazole 40 mg tablet,delayed release take 1 tablet by oral route  every day   tamsulosin 0.4 mg capsule take 1 capsule by oral route  every day 1/2 hour following the same meal each day     _______________________________________________________________________  CARDIAC HISTORY  RISK FACTORS:  1 Hypertension   2 Tobacco Use: No/never       CARDIOVASCULAR PROCEDURES  Procedure Date Results   Carotid Duplex 05/02/2018 100% Right ICA, Less than 50% Left ICA, Vertebral: Bilateral Antegrade Flow, Unchanged versus 2017; at [facility]. Echo 04/03/2017 EF 55-60%; normal RV; no valve disease; normal PAp; by RCA at [facility]. EKG 12/05/2019 Sinus Rhythm, Borderline PRWP, otherwise unremarkable. PFTs 04/03/2017 No obstructive disease; moderately decreased DLCO; at [facility]. ACTIVE ALLERGIES:  Ingredient Reaction Comment   FENOFIBRATE Hives    ALBUTEROL SULFATE Unknown    ATORVASTATIN Leg cramp Atorvastatin         PAST MEDICAL/SURGICAL HISTORY  (Detailed)    Disease/disorder Onset Date Surgical History Date Comments   GERD       Hypertension         Family History  (Detailed)    SOCIAL HISTORY  (Detailed)  Tobacco use reviewed. Preferred language is [place]. Smoking status: Heavy tobacco smoker. SMOKING STATUS  Use Status Type Smoking Status Usage Per Day Years Used Total Pack Years   yes Cigarette Heavy tobacco smoker 2 Packs       TOBACCO CESSATION INFORMATION  Date Counseled By Order Status Description Code Tobacco Cessation Information   12/05/2019 Adolfo Quijano. [place] Tobacco cessation counseling completed   Counseling about tobacco use (procedure)       Hospital problem list   Active Hospital Problems    Diagnosis Date Noted    STEMI (ST elevation myocardial infarction) ([facility] [address] 01/26/2020     Priority: 1 - One    CAD (coronary artery disease)      2006 - s/p stent           Subjective:  Marlon Leonardo reports   Chest pain X none  consistent with:  Non-cardiac CP         Atypical CP     None now  On going  Anginal CP     Dyspnea X none  at rest  with exertion         improved  unchanged  worse              PND X none  overnight       Orthopnea X none  improved  unchanged  worse   Presyncope X none  improved  unchanged  worse     Ambulated in hallway without symptoms   Yes   Ambulated in room without symptoms  Yes   Objective:    Physical Exam:  Overall VSSAF;    Visit Vitals  /67 (BP 1 Location: Right arm, BP Patient Position: Sitting)   Pulse 64   Temp 98.2 °F (36.8 °C)   Resp 18   Ht 5' 9\" (1.753 m)   Wt 105.5 kg (232 lb 9.4 oz)   SpO2 98%   BMI 34.35 kg/m²     Temp (24hrs), Av °F (36.7 °C), Min:97.9 °F (36.6 °C), Max:98.2 °F (36.8 °C)    Patient Vitals for the past 8 hrs:   Pulse   20 0700 64   20 045 63     Patient Vitals for the past 8 hrs:   Resp   20 0700 18   20 0457 22     Patient Vitals for the past 8 hrs:   BP   20 0700 129/67   207 130/73       Intake/Output Summary (Last 24 hours) at 2020 1005  Last data filed at 2020 0650  Gross per 24 hour   Intake 1275 ml   Output 1700 ml   Net -425 ml     General Appearance: Well developed, obsese, no acute distress. Ears/Nose/Mouth/Throat:   Normal MM; anicteric. JVP: WNL   Resp:   clear to auscultation bilaterally. Nl resp effort. Cardiovascular:  RRR, S1, S2 normal, no new murmur. No gallop or rub. Abdomen:   Soft, non-tender, bowel sounds are present. Extremities: No edema bilaterally. Skin:  Neuro: Warm and dry. A/O x3, grossly nonfocal   cath sites intact w/o hematoma or new bruit; distal pulse unchanged x Yes   Data Review:     Telemetry independently reviewed x sinus  chronic afib  parox afib  NSVT     ECG independently reviewed  NSR  afib  Decreased ST elevation  NSST-Tw chgs   x no new ECG provided for review   Lab results reviewed as noted below. Current medications reviewed as noted below. No results for input(s): PH, PCO2, PO2 in the last 72 hours.   Recent Labs     20  0552 20  0058 20  1600 20  1014   CKMB  --  25.0* 57.9* 94.9*   CKNDX  --  7.8* 10.5* 12.3*   TROIQ 10.90* 20.50*  --   --      Recent Labs     20  0536 20  0552 20  0058 20  1014    140 138  --    K 4.4 4.3 4.4  --    * 112* 112*  --    CO2 21 20* 21  --    BUN 21* 23* 23*  --    CREA 1.58* 1.50* 1.45*  --    GFRAA 52* 55* 57*  --    * 101* 118*  -- PHOS 2.9  --   --   --    CA 8.9 9.0 8.3*  --    ALB 2.9*  --   --   --    WBC 7.7 7.6  --  9.2   HGB 10.0* 10.8*  --  12.4   HCT 31.4* 33.4*  --  38.3    187  --  187     Lab Results   Component Value Date/Time    Cholesterol, total 148 01/28/2020 12:58 AM    HDL Cholesterol 27 01/28/2020 12:58 AM    LDL, calculated 86.4 01/28/2020 12:58 AM    Triglyceride 173 (H) 01/28/2020 12:58 AM    CHOL/HDL Ratio 5.5 (H) 01/28/2020 12:58 AM     Recent Labs     01/30/20  0536   SGOT 35      TP 6.8   ALB 2.9*   GLOB 3.9     Recent Labs     01/28/20  1529 01/28/20  0923 01/28/20  0058   APTT 43.3* 58.3* 42.8*      No components found for: GLPOC    Current Facility-Administered Medications   Medication Dose Route Frequency    melatonin tablet 9 mg  9 mg Oral QHS    zolpidem (AMBIEN) tablet 5 mg  5 mg Oral QHS PRN    zolpidem (AMBIEN) tablet 5 mg  5 mg Oral QHS PRN    guaiFENesin ER (MUCINEX) tablet 600 mg  600 mg Oral Q12H    cefTRIAXone (ROCEPHIN) 1 g in 0.9% sodium chloride (MBP/ADV) 50 mL  1 g IntraVENous Q24H    albuterol (PROVENTIL VENTOLIN) nebulizer solution 1.25 mg  1.25 mg Nebulization TID RT    diphenhydrAMINE (BENADRYL) capsule 50 mg  50 mg Oral QHS PRN    nicotine (NICODERM CQ) 7 mg/24 hr patch 1 Patch  1 Patch TransDERmal DAILY    nitroglycerin (NITROBID) 2 % ointment 0.5 Inch  0.5 Inch Topical Q6H    metoprolol succinate (TOPROL-XL) XL tablet 50 mg  50 mg Oral DAILY    nitroglycerin (NITROBID) 2 % ointment 1 Inch  1 Inch Topical Q6H PRN    pravastatin (PRAVACHOL) tablet 40 mg  40 mg Oral QHS    amiodarone (CORDARONE) tablet 200 mg  200 mg Oral BID    benzonatate (TESSALON) capsule 100 mg  100 mg Oral TID PRN    ondansetron (ZOFRAN) injection 4 mg  4 mg IntraVENous Q4H PRN    tamsulosin (FLOMAX) capsule 0.4 mg  0.4 mg Oral QPM    aspirin delayed-release tablet 81 mg  81 mg Oral DAILY    sodium chloride (NS) flush 5-40 mL  5-40 mL IntraVENous Q8H    sodium chloride (NS) flush 5-40 mL 5-40 mL IntraVENous PRN    acetaminophen (TYLENOL) tablet 650 mg  650 mg Oral Q4H PRN    nitroglycerin (NITROSTAT) tablet 0.4 mg  0.4 mg SubLINGual PRN        Edilson Acosta MD

## 2020-01-30 NOTE — PROGRESS NOTES
[facility] Consult Service Note  Pulmonary, Critical Care, and Sleep Medicine    Name: Valerie Mcallister MRN: [id number]   : 1945 Hospital: [address]   Date: 2020  Admission date: 2020 Hospital Day: 5       Subjective/Interval History:   Seen earlier today on rounds. Pt is unstable and acutely ill. Medical records and data reviewed.  OOB. NO SOB. Sputum clearing. CT scan reviewed. RML, RLL infiltrates. Pt reports passing out yrs ago when he used Albuterol MDI at home and daughter corroborated this though not witnessed, willing to try nebs here. D/w - would like to optimize lungs and avoid air trapping     not sleeping . Chronic cough. Benadryl of no help. Uses 1/2 bottle nyquil at home to sleep. Used albuterol neb but no ill effects, also cannot tell it it helps. IMPRESSION:   1. Moderate Chronic Obstructive Pulmonary Disease FEV1 1.84( 62%)  to 2.23(74) with good bronchodilator response- 2017; now PFTS show FEV1 1.7 liters(64%) DLCO worse 46%  2. Preop Pulmonary exam  3. Chronic cough- smoker's  4. Insomnia- chronic  5. Abnormal chest CT-POA infiltrates with bronchitis-   6. Severe CAD- LM, chronic LAD and RCA  7. NSTEMI peak troponin close to 50  8. PAD  9. BONNY occlusion  10. GERD  11. Obesity  12. Heavy Tobacco use- active  13. CKD 3 Dr. Tonia Ross Cr   14. Anemia Hgb 9.3  15. H/o incarcerated umbilical hernia- manually reduced in 2017,   16. S/P AAA 8 cm repair with iliac disease- complicated by GI bleed, FRANSISCO  17. Total occlusion of BONNY  18. CVA 2014 right BG CVA on MRI  19. EGD 2017 neg but colonoscopy noted for hemorrhoids, diverticular ds   Body mass index is 34.35 kg/m². 20. Prognosis guarded       RECOMMENDATIONS/PLAN:   1. IV ceftriaxone  2. Albuterol neb a low dose as scheduled and see if tolerated  3. Consult nephrology- Dr. Tonia Ross  4. Pulmonary risks of surgery discussed with pt and family. Needs surgery. Can try to optimize his condition but still smoking. He did quite well after open AAA repair and will have as high a risk from CABG and lungs no stronger. He is awaiting other tests. 5. Albuterol bronchodilator prn   6. smoking cessation counseling done  7. Would ideally add LABA to optimize lung function prior to surgery, but given reaction to albuterol will hold; no inhalers on formulary to trial here anyways  8. Bronchial hygiene with respiratory therapy techniques, bronchodilators  9. DVT, SUP prophylaxis  10. Vaccination history reviewed with pt- he is at high risk for flu and will need to advise us promptly if sx or fever develops  11. Pt needs IV fluids with additives and Drug therapy requiring intensive monitoring for toxicity  12. Prescription drug management with home med reconciliation reviewed          [x] High complexity decision making was performed  [x] See my orders for details      Subjective/Initial History:     I was asked by Franck Lino NP to see Sukhjinder Sevilla  a 66 y.o.  male in consultation for a chief complaint of preop pulomanry evaluation    Excerpts from admission 1/26/2020 or consult notes as follows:     \"  Sukhjinder Sevilla is a 66 y.o. hypertensive, non diabetic, heavy cigarette smoking, S/P PCI to circumflex and  of RCA, cau male who was referred for opinion and advice regarding coronary revascularization by Dr. Altagracia Medina / Chloe Hu NP. Patient was admitted yesterday for with unstable chest pain for 90 minutes and ST elevation in V1-V3. His pain was relieved with NL NTG and ASA. Troponin to 48. He underwent cath which revealed chronic disease without acute occlusion. \"    Pt is aheavy smoker with COPD per family. Does not see a lung specailist. Has bronchitis couple times a yr but none recently. Will cough up yellow sputum every AM. He has never been admitted for lung problems. Had AAA repair two yrs ago. Notes reviewed.  No respiratory complications but did have some renal dysfunction, now followed by . Also seen by GI for melanotic stools. No problems since but has reflux. Pt has been slowing down since that surgery and not walking much. No wheeze. Not on inhalers. Has never had the flushot or pneumonia shot. Lives with wife. WEight stable. No swelling. PFTs from 2017 reviewed and just complted another today. ABg pending    Allergies   Allergen Reactions    Lipitor [Atorvastatin] Other (comments)     \"muscle pain\"        MAR reviewed and pertinent medications noted or modified as needed     Current Facility-Administered Medications   Medication    melatonin tablet 9 mg    guaiFENesin-codeine (ROBITUSSIN AC) 100-10 mg/5 mL solution 5 mL    cefTRIAXone (ROCEPHIN) 1 g in 0.9% sodium chloride (MBP/ADV) 50 mL    albuterol (PROVENTIL VENTOLIN) nebulizer solution 1.25 mg    diphenhydrAMINE (BENADRYL) capsule 50 mg    nicotine (NICODERM CQ) 7 mg/24 hr patch 1 Patch    nitroglycerin (NITROBID) 2 % ointment 0.5 Inch    metoprolol succinate (TOPROL-XL) XL tablet 50 mg    nitroglycerin (NITROBID) 2 % ointment 1 Inch    pravastatin (PRAVACHOL) tablet 40 mg    amiodarone (CORDARONE) tablet 200 mg    benzonatate (TESSALON) capsule 100 mg    ondansetron (ZOFRAN) injection 4 mg    tamsulosin (FLOMAX) capsule 0.4 mg    aspirin delayed-release tablet 81 mg    sodium chloride (NS) flush 5-40 mL    sodium chloride (NS) flush 5-40 mL    acetaminophen (TYLENOL) tablet 650 mg    nitroglycerin (NITROSTAT) tablet 0.4 mg      Patient PCP: Ninoska Ellis NP  PMH:  has a past medical history of Aneurysm ([address] BPH (benign prostatic hyperplasia), CAD (coronary artery disease), Essential hypertension (12-12-13), Hematuria, Hyperglycemia, and Kidney failure.   PSH:   has a past surgical history that includes pr cardiac surg procedure unlist; upper gi endoscopy,biopsy (7/9/2019); vascular surgery procedure unlist (2016); colonoscopy,diagnostic (2019); and colonoscopy (N/A, 2019). FHX: family history is not on file. SHX:  reports that he has been smoking. He has a 106.00 pack-year smoking history. He has never used smokeless tobacco. He reports that he does not drink alcohol or use drugs. ROS:A comprehensive review of systems was negative except for that written in the HPI. Objective:     Vital Signs: Telemetry:    normal sinus rhythm Intake/Output:   Visit Vitals  /67 (BP 1 Location: Right arm, BP Patient Position: Sitting)   Pulse 64   Temp 98.2 °F (36.8 °C)   Resp 18   Ht 5' 9\" (1.753 m)   Wt 105.5 kg (232 lb 9.4 oz)   SpO2 98%   BMI 34.35 kg/m²       Temp (24hrs), Av °F (36.7 °C), Min:97.9 °F (36.6 °C), Max:98.2 °F (36.8 °C)        O2 Device: Room air O2 Flow Rate (L/min): 2 l/min       Wt Readings from Last 4 Encounters:   20 105.5 kg (232 lb 9.4 oz)   19 102.7 kg (226 lb 5 oz)   19 103.1 kg (227 lb 4.7 oz)   17 102.2 kg (225 lb 4.8 oz)          Intake/Output Summary (Last 24 hours) at 2020 0934  Last data filed at 2020 0650  Gross per 24 hour   Intake 1275 ml   Output 1700 ml   Net -425 ml       Last shift:      No intake/output data recorded. Last 3 shifts: 1901 -  0700  In: 2175 [P.O.:2175]  Out: 2300 [Urine:2300]       Physical Exam:   General:  male; in no respiratory distress and acyanotic and cooperative;    HEENT: NCAT, fair dentition, lips and mucosa dry  Eyes: anicteric; conjunctiva clear  Neck: no nodes, no cuff leak, no accessory MM use. Chest: no deformity,   Cardiac: regular rate, rhythm; no murmur  Lungs: distant breath sounds; no wheezes  Abd: soft, NT, hypoactive BS, OBESE soft  Ext: no edema; no joint swelling;  No clubbing  : NO finley,   Neuro: alert;  Psych- no agitation, oriented to person; normal affect  Skin: warm, dry, no cyanosis;   Pulses: 1-2+ Bilateral pedal, radial  Capillary: brisk; pale             Labs:    Recent Labs 01/30/20  0536 01/29/20  0552 01/28/20  1529 01/28/20  0923 01/28/20  0058  01/27/20  1014   WBC 7.7 7.6  --   --   --   --  9.2   HGB 10.0* 10.8*  --   --   --   --  12.4    187  --   --   --   --  187   APTT  --   --  43.3* 58.3* 42.8*   < >  --     < > = values in this interval not displayed. Recent Labs     01/30/20  0536 01/29/20  0552 01/28/20  0058    140 138   K 4.4 4.3 4.4   * 112* 112*   CO2 21 20* 21   * 101* 118*   BUN 21* 23* 23*   CREA 1.58* 1.50* 1.45*   CA 8.9 9.0 8.3*   MG 2.2  --   --    PHOS 2.9  --   --    ALB 2.9*  --   --    SGOT 35  --   --    ALT 22  --   --      No results for input(s): PH, PCO2, PO2, HCO3, FIO2 in the last 72 hours.   Recent Labs     01/29/20  0552 01/28/20  0058 01/27/20  1600 01/27/20  1014   CKNDX  --  7.8* 10.5* 12.3*   TROIQ 10.90* 20.50*  --   --      No results found for: BNPP, BNP   Lab Results   Component Value Date/Time    Culture result: MODERATE NORMAL RESPIRATORY XUAN SO FAR 01/27/2020 09:20 AM     Lab Results   Component Value Date/Time    TSH 3.13 01/28/2020 12:58 AM       Imaging:    CXR Results  (Last 48 hours)    None        CT chest reviewed as above        This care involved high complexity medical decision making: I personally:  · Reviewed the flowsheet and previous days notes  · Reviewed and summarized records or history from previous days note or discussions with staff, family  · High Risk Drug therapy requiring intensive monitoring for toxicity: eg steroids, pressors, antibiotics  · Reviewed and/or ordered Clinical lab tests  · Reviewed images and/or ordered Radiology tests  · Reviewed the patients ECG / Telemetry  ·  discussed my assessment/management with : Nursing, Family for coordination of care    Marilee Ruggiero MD

## 2020-01-30 NOTE — PROGRESS NOTES
1900 - Bedside report from [facility]. NSR. Up ad veena in room. Has walked long distances in [place] today per surgeons ok (per pt). 2300 - tylenol, benadryl and tessalon per request.     - Bedside report to RN.   NSR.

## 2020-01-30 NOTE — PROGRESS NOTES
Cardiac Surgery Preoperative Note    Admit Date: 1/26/2020      Plan/Recommendations/Medical Decision Making:     Pre-surgical work up in progress    Cannot sleep: will add Ambien    No edema    Walked in room only: needs to walk in lee    No Plavix per wife for 2-3 months    Renal consult pending    Anticipating Tuesday CABG with [facility], pulmonary/renal work up pending    CTA:  Right middle lobe and bilateral lower lobe atelectasis  versus pneumonia  Right vertebral stenosis: intact [place] of [place]? Rectoperineal bleeding, heparin infusion stopped  Infrarenal abdominal aortic aneurysm measures 5.1 cm  Right common iliac artery aneurysm measures 3.3 cm  Celiac artery: Patent with mild to moderate ostial stenosis  SMA: Patent with mild to moderate proximal atherosclerotic narrowing  Renal arteries: Bilateral renal arteries are patent with moderate right and  moderate severe left proximal atherosclerosis: Need Renal consult in conjunction with compensated metabolic acidosis? DUKE: Occluded. Nicotine with unstable angina poses spasm risk: patient demands patch. Reviewed B-Blocker/aspirin/statin/ACE criteria    Stop anticoagulation pre-op    Patient seen and reviewed with Dr. Nir Cardona MD        Assessment:     Active Problems:    STEMI (ST elevation myocardial infarction) ([facility] [address] (1/26/2020)      CAD (coronary artery disease) ()      Overview: 2006 - s/p stent           Summary:     Stable hemodynamics in sinus rhythm without ectopy on no support. No chest pain or shortness of breath. Objective:     FEV1 1.7 FVC 2.23  ABG 7.40/ 30/70/19/-6 room air    Echo:1/28/20    · Normal cavity size. Mild concentric hypertrophy. Moderate systolic dysfunction. Estimated left ventricular ejection fraction is 40 - 45%. Visually measured ejection fraction. Abnormal left ventricular wall motion. · Trace mitral valve regurgitation is present. · Mild ricuspid valve regurgitation is present.       TSH 3.13    A1C 5.8    Troponin 11 (CKD)      Visit Vitals  /67 (BP 1 Location: Right arm, BP Patient Position: Sitting)   Pulse 64   Temp 98.2 °F (36.8 °C)   Resp 18   Ht 5' 9\" (1.753 m)   Wt 232 lb 9.4 oz (105.5 kg)   SpO2 98%   BMI 34.35 kg/m²       Temp (24hrs), Av °F (36.7 °C), Min:97.9 °F (36.6 °C), Max:98.2 °F (36.8 °C)      Last 3 Recorded Weights in this Encounter    20 0559 20 0620   Weight: 238 lb 1.6 oz (108 kg) 230 lb 13.2 oz (104.7 kg) 232 lb 9.4 oz (105.5 kg)       Lab Data Reviewed:   Recent Labs     20  0536   WBC 7.7   HGB 10.0*   HCT 31.4*      CREA 1.58*       CXR Results  (Last 48 hours)    None          Last 24hr Input/Output:    Intake/Output Summary (Last 24 hours) at 2020 0952  Last data filed at 2020 0650  Gross per 24 hour   Intake 1275 ml   Output 1700 ml   Net -425 ml          Admission Weight: Last Weight   Weight: 238 lb 1.6 oz (108 kg) Weight: 232 lb 9.4 oz (105.5 kg)       EXAM:      Lungs:   Clear to auscultation bilaterally. Heart:  Regular rate and rhythm, S1, S2 normal, no murmur, no click, no rub      Abdomen:   Soft, non-tender. Bowel sounds present. No masses,  No organomegaly. Extremities:  No edema     Neurologic:  Gross motor and sensory apparatus intact.          Signed By: GEE William

## 2020-01-31 LAB
APPEARANCE UR: ABNORMAL
BACTERIA URNS QL MICRO: NEGATIVE /HPF
BILIRUB UR QL: NEGATIVE
COLOR UR: ABNORMAL
COMMENT, HOLDF: NORMAL
EPITH CASTS URNS QL MICRO: ABNORMAL /LPF
ERYTHROCYTE [DISTWIDTH] IN BLOOD BY AUTOMATED COUNT: 17.4 % (ref 11.5–14.5)
GLUCOSE UR STRIP.AUTO-MCNC: NEGATIVE MG/DL
HCT VFR BLD AUTO: 31 % (ref 36.6–50.3)
HEMOCCULT STL QL: NEGATIVE
HGB BLD-MCNC: 9.8 G/DL (ref 12.1–17)
HGB UR QL STRIP: NEGATIVE
KETONES UR QL STRIP.AUTO: NEGATIVE MG/DL
LEUKOCYTE ESTERASE UR QL STRIP.AUTO: NEGATIVE
MCH RBC QN AUTO: 29.3 PG (ref 26–34)
MCHC RBC AUTO-ENTMCNC: 31.6 G/DL (ref 30–36.5)
MCV RBC AUTO: 92.5 FL (ref 80–99)
NITRITE UR QL STRIP.AUTO: NEGATIVE
NRBC # BLD: 0 K/UL (ref 0–0.01)
NRBC BLD-RTO: 0 PER 100 WBC
PH UR STRIP: 6 [PH] (ref 5–8)
PLATELET # BLD AUTO: 191 K/UL (ref 150–400)
PMV BLD AUTO: 10.9 FL (ref 8.9–12.9)
PROT UR STRIP-MCNC: 100 MG/DL
RBC # BLD AUTO: 3.35 M/UL (ref 4.1–5.7)
RBC #/AREA URNS HPF: ABNORMAL /HPF (ref 0–5)
SAMPLES BEING HELD,HOLD: NORMAL
SP GR UR REFRACTOMETRY: 1.02 (ref 1–1.03)
UA: UC IF INDICATED,UAUC: ABNORMAL
UROBILINOGEN UR QL STRIP.AUTO: 1 EU/DL (ref 0.2–1)
WBC # BLD AUTO: 7.2 K/UL (ref 4.1–11.1)
WBC URNS QL MICRO: ABNORMAL /HPF (ref 0–4)

## 2020-01-31 PROCEDURE — 74011000258 HC RX REV CODE- 258: Performed by: INTERNAL MEDICINE

## 2020-01-31 PROCEDURE — 74011250637 HC RX REV CODE- 250/637: Performed by: INTERNAL MEDICINE

## 2020-01-31 PROCEDURE — 85027 COMPLETE CBC AUTOMATED: CPT

## 2020-01-31 PROCEDURE — 36415 COLL VENOUS BLD VENIPUNCTURE: CPT

## 2020-01-31 PROCEDURE — 77030027138 HC INCENT SPIROMETER -A

## 2020-01-31 PROCEDURE — 74011250637 HC RX REV CODE- 250/637: Performed by: PHYSICIAN ASSISTANT

## 2020-01-31 PROCEDURE — 65660000000 HC RM CCU STEPDOWN

## 2020-01-31 PROCEDURE — 74011250636 HC RX REV CODE- 250/636: Performed by: INTERNAL MEDICINE

## 2020-01-31 PROCEDURE — 82272 OCCULT BLD FECES 1-3 TESTS: CPT

## 2020-01-31 PROCEDURE — 81001 URINALYSIS AUTO W/SCOPE: CPT

## 2020-01-31 RX ORDER — ALBUTEROL SULFATE 0.83 MG/ML
1.25 SOLUTION RESPIRATORY (INHALATION)
Status: DISCONTINUED | OUTPATIENT
Start: 2020-01-31 | End: 2020-02-04

## 2020-01-31 RX ADMIN — SODIUM BICARBONATE 650 MG: 650 TABLET ORAL at 09:17

## 2020-01-31 RX ADMIN — GUAIFENESIN 600 MG: 600 TABLET, EXTENDED RELEASE ORAL at 09:17

## 2020-01-31 RX ADMIN — AMIODARONE HYDROCHLORIDE 200 MG: 200 TABLET ORAL at 17:14

## 2020-01-31 RX ADMIN — MELATONIN TAB 3 MG 9 MG: 3 TAB at 22:36

## 2020-01-31 RX ADMIN — BENZONATATE 100 MG: 100 CAPSULE ORAL at 16:42

## 2020-01-31 RX ADMIN — DIPHENHYDRAMINE HYDROCHLORIDE 50 MG: 25 CAPSULE ORAL at 22:36

## 2020-01-31 RX ADMIN — AMIODARONE HYDROCHLORIDE 200 MG: 200 TABLET ORAL at 09:16

## 2020-01-31 RX ADMIN — Medication 10 ML: at 04:15

## 2020-01-31 RX ADMIN — METOPROLOL SUCCINATE 50 MG: 50 TABLET, EXTENDED RELEASE ORAL at 09:16

## 2020-01-31 RX ADMIN — CEFTRIAXONE 1 G: 1 INJECTION, POWDER, FOR SOLUTION INTRAMUSCULAR; INTRAVENOUS at 10:17

## 2020-01-31 RX ADMIN — TAMSULOSIN HYDROCHLORIDE 0.4 MG: 0.4 CAPSULE ORAL at 17:14

## 2020-01-31 RX ADMIN — SODIUM BICARBONATE 650 MG: 650 TABLET ORAL at 17:14

## 2020-01-31 RX ADMIN — ASPIRIN 81 MG: 81 TABLET ORAL at 09:17

## 2020-01-31 RX ADMIN — Medication 10 ML: at 22:38

## 2020-01-31 RX ADMIN — ZOLPIDEM TARTRATE 5 MG: 5 TABLET ORAL at 22:36

## 2020-01-31 RX ADMIN — Medication 10 ML: at 14:16

## 2020-01-31 RX ADMIN — GUAIFENESIN 600 MG: 600 TABLET, EXTENDED RELEASE ORAL at 22:36

## 2020-01-31 RX ADMIN — PRAVASTATIN SODIUM 40 MG: 40 TABLET ORAL at 22:36

## 2020-01-31 NOTE — PROGRESS NOTES
Cardiac Surgery Preoperative Note    Admit Date: 1/26/2020      Plan/Recommendations/Medical Decision Making:     Pre-surgical work up in progress - needs UA    No edema    Walked in room only: needs to walk in lee    No Plavix per wife for 2-3 months    Renal following    Anticipating Tuesday CABG with [facility], pulmonary/renal following    CTA:  Right middle lobe and bilateral lower lobe atelectasis  versus pneumonia  Right vertebral stenosis: intact [place] of [place]? Rectoperineal bleeding, heparin infusion stopped  Infrarenal abdominal aortic aneurysm measures 5.1 cm  Right common iliac artery aneurysm measures 3.3 cm  Celiac artery: Patent with mild to moderate ostial stenosis  SMA: Patent with mild to moderate proximal atherosclerotic narrowing  Renal arteries: Bilateral renal arteries are patent with moderate right and  moderate severe left proximal atherosclerosis: Need Renal consult in conjunction with compensated metabolic acidosis? DUKE: Occluded. Nicotine with unstable angina poses spasm risk: patient demands patch. Reviewed B-Blocker/aspirin/statin/ACE criteria    Stop anticoagulation pre-op    Complete occlusion BONNY    Patient seen and reviewed with Dr. Moi Stewart MD        Assessment:     Active Problems:    STEMI (ST elevation myocardial infarction) ([facility] [address] (1/26/2020)      CAD (coronary artery disease) ()      Overview: 2006 - s/p stent           Summary:     No chest pain or shortness of breath. Objective:     FEV1 1.7 FVC 2.23  ABG 7.40/ 30/70/19/-6 room air    Echo:1/28/20    · Normal cavity size. Mild concentric hypertrophy. Moderate systolic dysfunction. Estimated left ventricular ejection fraction is 40 - 45%. Visually measured ejection fraction. Abnormal left ventricular wall motion. · Trace mitral valve regurgitation is present. · Mild ricuspid valve regurgitation is present.     TSH 3.13    A1C 5.8    Troponin 11 (CKD)      Visit Vitals  /61 (BP 1 Location: Left arm, BP Patient Position: At rest)   Pulse 62   Temp 98.6 °F (37 °C)   Resp 14   Ht 5' 9\" (1.753 m)   Wt 231 lb 7.7 oz (105 kg)   SpO2 95%   BMI 34.18 kg/m²       Temp (24hrs), Av.4 °F (36.9 °C), Min:97.4 °F (36.3 °C), Max:99.1 °F (37.3 °C)      Last 3 Recorded Weights in this Encounter    20 0559 20 0620 20   Weight: 230 lb 13.2 oz (104.7 kg) 232 lb 9.4 oz (105.5 kg) 231 lb 7.7 oz (105 kg)       Lab Data Reviewed:   Recent Labs     20  0536   WBC 7.2 7.7   HGB 9.8* 10.0*   HCT 31.0* 31.4*    187   CREA  --  1.58*       CXR Results  (Last 48 hours)    None          Last 24hr Input/Output:    Intake/Output Summary (Last 24 hours) at 2020 0910  Last data filed at 2020 0754  Gross per 24 hour   Intake 580 ml   Output 500 ml   Net 80 ml          Admission Weight: Last Weight   Weight: 238 lb 1.6 oz (108 kg) Weight: 231 lb 7.7 oz (105 kg)       EXAM:      Lungs:   Clear to auscultation bilaterally. Heart:  Regular rate and rhythm, S1, S2 normal, no murmur, no click, no rub      Abdomen:   Soft, non-tender. Bowel sounds present. No masses,  No organomegaly. Extremities:  No edema     Neurologic:  Gross motor and sensory apparatus intact.          Signed By: GEE Howard

## 2020-01-31 NOTE — CDMP QUERY
Pt admitted with STEMI and has anemia documented. Please further specify type and acuity of anemia in the medical record. =>Anemia due to chronic kidney disease =>Anemia due to iron deficiency =>Anemia due to chronic blood loss =>Dilutional anemia 
=>Other, please specify 
=>Clinically unable to determine The medical record reflects the following: 
   Risk Factors: 74 WM w/hx: CKD3, chronic anemia, diverticular disease/internal hemorrhoids on colonoscopy 9/2019 Clinical Indicators: Per PN \"chronic anemia\", admit H/H: 14.2/43.2 and 1/31: H/H: 9.8/31.0 Treatment: Lab monitoring Thank you, [email or web address] 
[phone]

## 2020-01-31 NOTE — PROGRESS NOTES
Nephrology Progress Note     Yony Rivera     [facility]. [email or web address]                  Phone - [phone]   Patient: Navdeep Najera   Date- 1/31/2020        Admit Date: 1/26/2020  YOB: 1945             CC: Follow up for ckd          Subjective: Interval History:   -  Bp stable  Cr. Slightly increased  c/o sob,   No c/o chest pain,   No c/o nausea or vomiting  No c/o  fever. ROS:- as above   Assessment & Plan:     Chronic renal insufficiency, stage III. Baseline creat ~1.6. Renal function s/p cardiac cath and CT angio.       Renal artery stenosis noted in 4/17. Angio CT on 2/18: bilateral renal arteries patent with moderate right and moderate severe left proximal atherosclerosis.     H/o hyperkalemia, on a dietary K restriction.     Metabolic acidosis,   Continue sodium bicarb    Edema  Start lasix 40 mg daily starting tomorrow if cr. Stable     Acute STMI. An emergent cath was done on 1/27 which showed:  \"severe triple vessel disease involving the distal left main of 60%-70% with ulcerated plaque, total occlusion of left anterior descending artery, a stent in the proximal portion.  Patent left circumflex and patent first large diagonal or the ramus intermedius branch.  Totally occluded RCA with collateralization coming from the left system. \"   A CABG is planned for next week     Severe COPD and continued tobacco abuse     Hypertension.     Obesity. s/p an AAA repair in 4/17. s/p repair of an incarcerated hernia in 4/17.     PVD. CT angio done on 1/29: complete occlusion of the right internal carotid artery is unchanged since ultrasound 1 day ago. Severe stenosis of the right vertebral artery V4 segment. Lobulated infrarenal abdominal aortic aneurysm measures 5.1 cm in AP diameter. Liver lesions may represent perfusional variant. Right common iliac artery aneurysm measures 3.3 cm.  Heterogeneous hemorrhage surrounds the right external iliac artery and tracks proximally in the right retroperitoneum.              Physical exam:   GEN: NAD  NECK- Supple, no mass  RESP: Clear b/l, no wheezing, decreased bs b/l  CVS: S1,S2  RRR  ABDO: soft , non tender, No mass  NEURO: normal speech, non focal  EXT: + Edema     Care Plan discussed with: patient and family  Objective:   Visit Vitals  /65 (BP 1 Location: Left arm, BP Patient Position: Standing)   Pulse 64   Temp 97.5 °F (36.4 °C)   Resp 22   Ht 5' 9\" (1.753 m)   Wt 105 kg (231 lb 7.7 oz)   SpO2 93%   BMI 34.18 kg/m²     Last 3 Recorded Weights in this Encounter    01/29/20 0559 01/30/20 0620 01/31/20 0412   Weight: 104.7 kg (230 lb 13.2 oz) 105.5 kg (232 lb 9.4 oz) 105 kg (231 lb 7.7 oz)     01/29 1901 - 01/31 0700  In: 1055 [P.O.:1055]  Out: 800 [Urine:800]    Intake/Output Summary (Last 24 hours) at 1/31/2020 1441  Last data filed at 1/31/2020 0754  Gross per 24 hour   Intake 580 ml   Output 500 ml   Net 80 ml      Chart reviewed. Pertinent Notes reviewed.        Medication list  reviewed   Current Facility-Administered Medications   Medication    albuterol (PROVENTIL VENTOLIN) nebulizer solution 1.25 mg    melatonin tablet 9 mg    zolpidem (AMBIEN) tablet 5 mg    guaiFENesin ER (MUCINEX) tablet 600 mg    sodium bicarbonate tablet 650 mg    cefTRIAXone (ROCEPHIN) 1 g in 0.9% sodium chloride (MBP/ADV) 50 mL    diphenhydrAMINE (BENADRYL) capsule 50 mg    nicotine (NICODERM CQ) 7 mg/24 hr patch 1 Patch    nitroglycerin (NITROBID) 2 % ointment 0.5 Inch    metoprolol succinate (TOPROL-XL) XL tablet 50 mg    nitroglycerin (NITROBID) 2 % ointment 1 Inch    pravastatin (PRAVACHOL) tablet 40 mg    amiodarone (CORDARONE) tablet 200 mg    benzonatate (TESSALON) capsule 100 mg    ondansetron (ZOFRAN) injection 4 mg    tamsulosin (FLOMAX) capsule 0.4 mg    aspirin delayed-release tablet 81 mg    sodium chloride (NS) flush 5-40 mL    sodium chloride (NS) flush 5-40 mL    acetaminophen (TYLENOL) tablet 650 mg  nitroglycerin (NITROSTAT) tablet 0.4 mg           Data Review :  Recent Labs     01/30/20  0536 01/29/20  0552    140   K 4.4 4.3   * 112*   CO2 21 20*   BUN 21* 23*   CREA 1.58* 1.50*   * 101*   CA 8.9 9.0   MG 2.2  --    PHOS 2.9  --      Recent Labs     01/31/20  0412 01/30/20  0536 01/29/20  0552   WBC 7.2 7.7 7.6   HGB 9.8* 10.0* 10.8*   HCT 31.0* 31.4* 33.4*    187 187     No results for input(s): FE, TIBC, PSAT, FERR in the last 72 hours. Recent Labs     01/29/20  0552   TROIQ 10.90*     Lab Results   Component Value Date/Time    Color YELLOW/STRAW 01/31/2020 11:45 AM    Appearance TURBID (A) 01/31/2020 11:45 AM    Specific gravity 1.023 01/31/2020 11:45 AM    pH (UA) 6.0 01/31/2020 11:45 AM    Protein 100 (A) 01/31/2020 11:45 AM    Glucose NEGATIVE  01/31/2020 11:45 AM    Ketone NEGATIVE  01/31/2020 11:45 AM    Bilirubin NEGATIVE  01/31/2020 11:45 AM    Urobilinogen 1.0 01/31/2020 11:45 AM    Nitrites NEGATIVE  01/31/2020 11:45 AM    Leukocyte Esterase NEGATIVE  01/31/2020 11:45 AM    Epithelial cells FEW 01/31/2020 11:45 AM    Bacteria NEGATIVE  01/31/2020 11:45 AM    WBC 0-4 01/31/2020 11:45 AM    RBC 0-5 01/31/2020 11:45 AM     Lab Results   Component Value Date/Time    Culture result: MODERATE NORMAL RESPIRATORY XUAN 01/27/2020 09:20 AM     No results found for: SDES  Lab Results   Component Value Date/Time    Sodium,urine random 33 04/02/2017 09:38 AM    Creatinine, urine 129.00 04/02/2017 09:38 AM     Results from Hospital Encounter encounter on 01/21/20   XR CHEST PA LAT    Narrative EXAM:  XR CHEST PA LAT    INDICATION:   R07.89    COMPARISON: 2019. FINDINGS: PA and lateral radiographs of the chest demonstrate clear lungs. The  cardiac and mediastinal contours and pulmonary vascularity are normal.  There is  mild degenerative spurring mid to lower thoracic spine. .       Impression IMPRESSION: No acute abnormality identified.                              Gildardo BRIDGES [place], [place] [address] [facility]   [email or web address]  [facility]  [address]  [place], [address]  Phone - [phone]         Fax - [phone]

## 2020-01-31 NOTE — PROGRESS NOTES
1250: TRANSFER - IN REPORT:    Verbal report received from Kam (name) on [facility]   being received from [employer] (unit) for routine progression of care      Report consisted of patients Situation, Background, Assessment and   Recommendations(SBAR). Information from the following report(s) SBAR, Kardex, Procedure Summary, Intake/Output, MAR, Recent Results and Cardiac Rhythm NSR was reviewed with the receiving nurse. Opportunity for questions and clarification was provided. Assessment completed upon patients arrival to unit and care assumed. 1900:Bedside shift change report GIVEN TO Delilah Pollack RN. Report included the following information SBAR, Kardex, Intake/Output, MAR, Recent Results and Cardiac Rhythm NSR.         SIGNIFICANT CHANGES DURING SHIFT:  Walked from IVCU to 2212 w/ minimal SOB. CONCERNS TO ADDRESS WITH MD:  n/a          [facility] NURSING NOTE   Admission Date 1/26/2020   Admission Diagnosis STEMI (ST elevation myocardial infarction) ([facility] [address] [I21.3]  CAD (coronary artery disease) [I25.10]   Consults IP CONSULT TO CARDIAC SURGERY  IP CONSULT TO PULMONOLOGY  IP CONSULT TO NEPHROLOGY      Cardiac Monitoring [x] Yes [] No      Purposeful Hourly Rounding [x] Yes    Malena Score Total Score: 1   Malena score 3 or > [] Bed Alarm [] Avasys [] 1:1 sitter [] Patient refused (Signed refusal form in chart)   Aj Score Aj Score: 21   Aj score 14 or < [] PMT consult [] Wound Care consult    []  Specialty bed  [] Nutrition consult      Influenza Vaccine Received Flu Vaccine for Current Season (usually Sept-March): Yes(Oct 2019)           Oxygen needs? [x] Room air Oxygen @  []1L    []2L    []3L   []4L    []5L   []6L via  NC   Chronic home O2 use?  [] Yes [] No  Perform O2 challenge test and document in progress note using smartphrase (.Homeoxygen)      Last bowel movement Last Bowel Movement Date: 01/29/20      Urinary Catheter             LDAs               Peripheral IV 01/30/20 Anterior;Distal;Right Forearm (Active)   Site Assessment Clean, dry, & intact 1/31/2020  4:32 PM   Phlebitis Assessment 0 1/31/2020  4:32 PM   Infiltration Assessment 0 1/31/2020  4:32 PM   Dressing Status Clean, dry, & intact 1/31/2020  4:32 PM   Dressing Type Transparent 1/31/2020  4:32 PM   Hub Color/Line Status Blue;Flushed;Patent 1/31/2020  4:32 PM                         Readmission Risk Assessment Tool Score Medium Risk            18       Total Score        3 Has Seen PCP in Last 6 Months (Yes=3, No=0)    2 . Living with Significant Other. Assisted Living. LTAC. SNF. or   Rehab    4 IP Visits Last 12 Months (1-3=4, 4=9, >4=11)    5 Pt.  Coverage (Medicare=5 , Medicaid, or Self-Pay=4)    4 Charlson Comorbidity Score (Age + Comorbid Conditions)        Criteria that do not apply:    Patient Length of Stay (>5 days = 3)       Expected Length of Stay 2d 14h   Actual Length of Stay 5

## 2020-01-31 NOTE — PROGRESS NOTES
Chart review. Patient is scheduled for CABG 2/4/2020. Anticipate need for [facility]. CM will continue to follow for discharge planning.     Kristian Merida RN CM  Ext [phone]

## 2020-01-31 NOTE — PROGRESS NOTES
[facility] Consult Service Note  Pulmonary, Critical Care, and Sleep Medicine    Name: Alisa Noriega MRN: [id number]   : 1945 Hospital: [facility]   Date: 2020  Admission date: 2020 Hospital Day: 6       Subjective/Interval History:   Seen earlier today on rounds. Pt is unstable and acutely ill. Medical records and data reviewed.  OOB. NO SOB. Sputum clearing. CT scan reviewed. RML, RLL infiltrates. Pt reports passing out yrs ago when he used Albuterol MDI at home and daughter corroborated this though not witnessed, willing to try nebs here. D/w - would like to optimize lungs and avoid air trapping     not sleeping . Chronic cough. Benadryl of no help. Uses 1/2 bottle nyquil at home to sleep. Used albuterol neb but no ill effects, also cannot tell it it helps.  slept better. Less cough. Sputum still thick. No bleed. On nebs    IMPRESSION:   1. Moderate Chronic Obstructive Pulmonary Disease FEV1 1.84( 62%)  to 2.23(74) with good bronchodilator response- 2017; now PFTS show FEV1 1.7 liters(64%) DLCO worse 46%  2. Preop Pulmonary exam  3. Chronic cough- smoker's  4. Insomnia- chronic  5. Abnormal chest CT-POA infiltrates with bronchitis-   6. Severe CAD- LM, chronic LAD and RCA  7. NSTEMI peak troponin close to 50  8. PAD  9. BONNY occlusion  10. GERD  11. Obesity  12. Heavy Tobacco use- active  13. CKD 3 Dr. Warren Carpenter Cr   14. Anemia Hgb 9.3  15. H/o incarcerated umbilical hernia- manually reduced in 2017,   16. S/P AAA 8 cm repair with iliac disease- complicated by GI bleed, FRANSISCO  17. Total occlusion of BONNY  18. CVA  right BG CVA on MRI  19. EGD  neg but colonoscopy noted for hemorrhoids, diverticular ds   Body mass index is 34.18 kg/m². 20. Prognosis guarded       RECOMMENDATIONS/PLAN:   1. IV ceftriaxone  2. Albuterol neb a low dose as scheduled and see if tolerated  3.  Dr. Warren Carpenter help apprecaited  4. Pulmonary risks of surgery discussed with pt and family. Needs surgery. Can try to optimize his condition but still smoking. He did quite well after open AAA repair and will have as high a risk from CABG and lungs no stronger. He is awaiting other tests. 5. Albuterol bronchodilators  6. smoking cessation counseling done  7. Would ideally add LABA to optimize lung function prior to surgery, but given reaction to albuterol will hold; no inhalers on formulary to trial here anyways  8. Bronchial hygiene with respiratory therapy techniques, bronchodilators  9. DVT, SUP prophylaxis  10. Will see again after surgery or sooner prn          [x] High complexity decision making was performed  [x] See my orders for details      Subjective/Initial History:     I was asked by María He NP to see Jaxson Silveira  a 66 y.o.  male in consultation for a chief complaint of preop pulomanry evaluation    Excerpts from admission 1/26/2020 or consult notes as follows:     \"  Jaxson Silveira is a 66 y.o. hypertensive, non diabetic, heavy cigarette smoking, S/P PCI to circumflex and  of RCA, cau male who was referred for opinion and advice regarding coronary revascularization by Dr. Sonia Gonzales / Skye Ford NP. Patient was admitted yesterday for with unstable chest pain for 90 minutes and ST elevation in V1-V3. His pain was relieved with NL NTG and ASA. Troponin to 48. He underwent cath which revealed chronic disease without acute occlusion. \"    Pt is aheavy smoker with COPD per family. Does not see a lung specailist. Has bronchitis couple times a yr but none recently. Will cough up yellow sputum every AM. He has never been admitted for lung problems. Had AAA repair two yrs ago. Notes reviewed. No respiratory complications but did have some renal dysfunction, now followed by . Also seen by GI for melanotic stools. No problems since but has reflux.  Pt has been slowing down since that surgery and not walking much. No wheeze. Not on inhalers. Has never had the flushot or pneumonia shot. Lives with wife. WEight stable. No swelling. PFTs from 2017 reviewed and just complted another today. ABg pending    Allergies   Allergen Reactions    Lipitor [Atorvastatin] Other (comments)     \"muscle pain\"        MAR reviewed and pertinent medications noted or modified as needed     Current Facility-Administered Medications   Medication    albuterol (PROVENTIL VENTOLIN) nebulizer solution 1.25 mg    melatonin tablet 9 mg    zolpidem (AMBIEN) tablet 5 mg    guaiFENesin ER (MUCINEX) tablet 600 mg    sodium bicarbonate tablet 650 mg    cefTRIAXone (ROCEPHIN) 1 g in 0.9% sodium chloride (MBP/ADV) 50 mL    diphenhydrAMINE (BENADRYL) capsule 50 mg    nicotine (NICODERM CQ) 7 mg/24 hr patch 1 Patch    nitroglycerin (NITROBID) 2 % ointment 0.5 Inch    metoprolol succinate (TOPROL-XL) XL tablet 50 mg    nitroglycerin (NITROBID) 2 % ointment 1 Inch    pravastatin (PRAVACHOL) tablet 40 mg    amiodarone (CORDARONE) tablet 200 mg    benzonatate (TESSALON) capsule 100 mg    ondansetron (ZOFRAN) injection 4 mg    tamsulosin (FLOMAX) capsule 0.4 mg    aspirin delayed-release tablet 81 mg    sodium chloride (NS) flush 5-40 mL    sodium chloride (NS) flush 5-40 mL    acetaminophen (TYLENOL) tablet 650 mg    nitroglycerin (NITROSTAT) tablet 0.4 mg      Patient PCP: Dima Loja NP  PMH:  has a past medical history of Aneurysm ([address] BPH (benign prostatic hyperplasia), CAD (coronary artery disease), Essential hypertension (12-12-13), Hematuria, Hyperglycemia, and Kidney failure. PSH:   has a past surgical history that includes pr cardiac surg procedure unlist; upper gi endoscopy,biopsy (7/9/2019); vascular surgery procedure unlist (2016); colonoscopy,diagnostic (9/16/2019); and colonoscopy (N/A, 9/16/2019). FHX: family history is not on file. SHX:  reports that he has been smoking.  He has a 106.00 pack-year smoking history. He has never used smokeless tobacco. He reports that he does not drink alcohol or use drugs. ROS:A comprehensive review of systems was negative except for that written in the HPI. Objective:     Vital Signs: Telemetry:    normal sinus rhythm Intake/Output:   Visit Vitals  /61 (BP 1 Location: Left arm, BP Patient Position: At rest)   Pulse 62   Temp 98.6 °F (37 °C)   Resp 14   Ht 5' 9\" (1.753 m)   Wt 105 kg (231 lb 7.7 oz)   SpO2 95%   BMI 34.18 kg/m²       Temp (24hrs), Av.4 °F (36.9 °C), Min:97.4 °F (36.3 °C), Max:99.1 °F (37.3 °C)        O2 Device: Room air O2 Flow Rate (L/min): 2 l/min       Wt Readings from Last 4 Encounters:   20 105 kg (231 lb 7.7 oz)   19 102.7 kg (226 lb 5 oz)   19 103.1 kg (227 lb 4.7 oz)   17 102.2 kg (225 lb 4.8 oz)          Intake/Output Summary (Last 24 hours) at 2020 1027  Last data filed at 2020 0754  Gross per 24 hour   Intake 580 ml   Output 500 ml   Net 80 ml       Last shift:       07 -  1900  In: -   Out: 150 [Urine:150]  Last 3 shifts:  190 -  0700  In: 1055 [P.O.:1055]  Out: 800 [Urine:800]       Physical Exam:   General:  male; in no respiratory distress and acyanotic and cooperative;    HEENT: NCAT, fair dentition, lips and mucosa dry  Eyes: anicteric; conjunctiva clear  Neck: no nodes, no cuff leak, no accessory MM use. Chest: no deformity,   Cardiac: regular rate, rhythm; no murmur  Lungs: distant breath sounds; no wheezes  Abd: soft, NT, hypoactive BS, OBESE soft  Ext: no edema; no joint swelling;  No clubbing  : NO finley,   Neuro: alert;  Psych- no agitation, oriented to person; normal affect  Skin: warm, dry, no cyanosis;   Pulses: 1-2+ Bilateral pedal, radial  Capillary: brisk; pale             Labs:    Recent Labs     20  0412 20  0536 20  0552 20  1529   WBC 7.2 7.7 7.6  --    HGB 9.8* 10.0* 10.8*  --     187 187  --    APTT --   --   --  43.3*     Recent Labs     01/30/20  0536 01/29/20  0552    140   K 4.4 4.3   * 112*   CO2 21 20*   * 101*   BUN 21* 23*   CREA 1.58* 1.50*   CA 8.9 9.0   MG 2.2  --    PHOS 2.9  --    ALB 2.9*  --    SGOT 35  --    ALT 22  --      No results for input(s): PH, PCO2, PO2, HCO3, FIO2 in the last 72 hours.   Recent Labs     01/29/20  0552   TROIQ 10.90*     No results found for: BNPP, BNP   Lab Results   Component Value Date/Time    Culture result: MODERATE NORMAL RESPIRATORY XUAN 01/27/2020 09:20 AM     Lab Results   Component Value Date/Time    TSH 3.13 01/28/2020 12:58 AM       Imaging:    CXR Results  (Last 48 hours)    None        CT chest reviewed as above        This care involved high complexity medical decision making: I personally:  · Reviewed the flowsheet and previous days notes  · Reviewed and summarized records or history from previous days note or discussions with staff, family  · High Risk Drug therapy requiring intensive monitoring for toxicity: eg steroids, pressors, antibiotics  · Reviewed and/or ordered Clinical lab tests  · Reviewed images and/or ordered Radiology tests  · Reviewed the patients ECG / Telemetry  ·  discussed my assessment/management with : Nursing, Family for coordination of care    Vitaliy Giron MD

## 2020-01-31 NOTE — PROGRESS NOTES
Initial Nutrition Assessment:    INTERVENTIONS/RECOMMENDATIONS:   · Continue cardiac diet  · Attempt heart healthy diet education again prior to discharge    ASSESSMENT:   Chart reviewed, medically noted for STEMI, CAD, planned CABG next week and PMH shown below. Assessing pt due to LOS. Flowsheet documents good appetite with 100% meals consumed. Attempted to provide heart healthy diet education however pt declined. He was upset that he is on a cardiac diet. Per Cardiac Rehab RN's note pt has had family member bring in fast food for meals. Will attempt education again after CABG however it does not seem as if he is ready for lifestyle changes.      Past Medical History:   Diagnosis Date    Aneurysm ([facility] [address]     BPH (benign prostatic hyperplasia)     CAD (coronary artery disease)     2006 - s/p stent     Essential hypertension 12-12-13    Hematuria     sees urologist    Hyperglycemia     Kidney failure        Diet Order: Cardiac  % Eaten:    Patient Vitals for the past 72 hrs:   % Diet Eaten   01/30/20 1921 100 %   01/29/20 1730 100 %   01/29/20 1130 100 %   01/29/20 0837 100 %   01/28/20 1900 100 %     Pertinent Medications: [x]Reviewed:   Pertinent Labs: [x]Reviewed:   Food Allergies: [x]NKFA  []Other   Last BM: 1/29  Edema:   n/a     []RUE   []LUE   []RLE   []LLE      Pressure Injury:   n/a   [] Stage I   [] Stage II   [] Stage III   [] Stage IV      Wt Readings from Last 30 Encounters:   01/31/20 105 kg (231 lb 7.7 oz)   09/16/19 102.7 kg (226 lb 5 oz)   07/08/19 103.1 kg (227 lb 4.7 oz)   06/30/17 102.2 kg (225 lb 4.8 oz)   04/02/17 111.7 kg (246 lb 4.1 oz)   01/30/14 108.4 kg (239 lb)   01/24/14 108.1 kg (238 lb 6.4 oz)   12/26/13 107.5 kg (237 lb)   12/09/10 112.5 kg (248 lb)   11/18/10 112.5 kg (248 lb)       Anthropometrics:   Height: 5' 9\" (175.3 cm) Weight: 105 kg (231 lb 7.7 oz)   IBW (%IBW):   ( ) UBW (%UBW):   (  %)   Last Weight Metrics:  Weight Loss Metrics 1/31/2020 9/16/2019 7/8/2019 6/30/2017 4/2/2017 1/30/2014 1/24/2014   Today's Wt 231 lb 7.7 oz 226 lb 5 oz 227 lb 4.7 oz 225 lb 4.8 oz 246 lb 4.1 oz 239 lb 238 lb 6.4 oz   BMI 34.18 kg/m2 33.42 kg/m2 33.57 kg/m2 33.27 kg/m2 36.37 kg/m2 35.28 kg/m2 35.19 kg/m2       BMI: Body mass index is 34.18 kg/m². This BMI is indicative of:   []Underweight    []Normal    []Overweight    [x] Obesity   [] Extreme Obesity (BMI>40)     Estimated Nutrition Needs (Based on):   1925 Kcals/day(BMR: 1750 x 1.1) , 80 g(0.8 g/kg) Protein  Carbohydrate: At Least 130 g/day  Fluids: 1925 mL/day (1ml/kcal) or per primary team    NUTRITION DIAGNOSES:   Problem:  Undesirable food choices      Etiology: related to heart healthy diet     Signs/Symptoms: as evidenced by EMR documents pt getting fast food delivered to hospital      NUTRITION INTERVENTIONS:  Meals/Snacks: General/healthful diet                  GOAL:   teach back heart healthy diet in 3-5 days    LEARNING NEEDS (Diet, Food/Nutrient-Drug Interaction):    [] None Identified   [] Identified and Education Provided/Documented   [x] Identified and Pt declined/was not appropriate     Cultureal, [facility], OR Ethnic Dietary Needs:    [x] None Identified   [] Identified and Addressed     [x] Interdisciplinary Care Plan Reviewed/Documented    [x] Discharge Planning:   Heart healthy diet     MONITORING /EVALUATION:      Food/Nutrient Intake Outcomes:  Total energy intake  Physical Signs/Symptoms Outcomes: Weight/weight change, Electrolyte and renal profile, GI    NUTRITION RISK:    [] High              [] Moderate           [x]  Low  []  Minimal/Uncompromised    PT SEEN FOR:    []  MD Consult: []Calorie Count      []Diabetic Diet Education        []Diet Education     []Electrolyte Management     []General Nutrition Management and Supplements     []Management of Tube Feeding     []TPN Recommendations    []  RN Referral:  []MST score >=2     []Enteral/Parenteral Nutrition PTA     []Pregnant: Gestational DM or Multigestation     []Pressure Ulcer/Wound Care needs        []  Low BMI  [x]  LOS Referral       Joseluis Duarte RDN  Pager [phone]  Weekend Pager [phone]

## 2020-01-31 NOTE — CARDIO/PULMONARY
Cardiac Rehab Note: chart review Pt scheduled for CABG on 2/4/2020-adm with STEMI Smoking history assessed. Patient is a current everyday  smoker. Smoking Cessation Program link has been added to the AVS. EF 40-45%  on 1/27/2020 per echo CABG educational folder with \"Cardiac Surgery Post-Op Instructions\" book, heart healthy eating, warning signs, heart facts, heart aware, resources, and CABG Surgery booklet to Robert Young on 1/31/2020 Educated using teach back method. Assessed patient's understanding of diagnosis and upcoming surgery. Reviewed general pre-op instructions including the need for thermometer and scale post-op, use of incentive spirometer, understanding of pain scale, and answered general post-surgery questions. Pt states he had no questions,states he understood what was being done,states he knows how to use spirometer. At the time I was in room,his daughter called and pt looked under his lunch tray lid and said\" Yep it's chicken. \" \"Yep bring me a Big Mac and with fries and a Pepsi. \" 
 
Pt did not engage in teaching but he did state \"I ain't going to no rehab after my surgery,I'm going straight home. \" To which I replied,\"Your  will discuss that with you. \" Reported to Pt urieland Shiloh GUERRERO to please let Ramirez Diaz know pt plans to eat Big Mac meal for lunch as Ramirez Diaz not currently available for me to tell. Encouraged patient to consider participation in a Cardiac Rehab Program, after discharge, to assist with their risk modification and management. CP Rehab will continue to follow and educate as indicated.

## 2020-01-31 NOTE — PROGRESS NOTES
1900 - Bedside report from RN.  NSR.    2330 - Ambien for sleep per request.     0700 -Says he slept better  UP in hallway. Bedside report to RN.   NSR.

## 2020-01-31 NOTE — PROGRESS NOTES
Cardiology Progress Note  1/31/2020     Admit Date: 1/26/2020  Admit Diagnosis: STEMI (ST elevation myocardial infarction) ([facility] [address] [I21.3]  CAD (coronary artery disease) [I25.10]  CC: none currently  Cardiac Assessment/Plan:   1) CAD:  Stent of unknown vessel at [facility] 2006. * followed by Dr. Duke Crenshaw in 2014:  MPI reportedly showed inferior ischemia & cath recommended but not done. *Seen by Dr Michael Aparicio in 2017: MPI rec but not done. Normal echo. *CP/TEIXEIRA 12/2019: Abnl PET (inf ischemia): pt delayed cath plan until 1/27/20. *Echo 1/10/20: EF 55%; AoScl only. Mild pulm HTN. *Cath 1/26/20: Severe LM w/ occluded LAD & RCA. 2) HTN  3) dyslipidemia  4) open AAA repair 4/2017 (8 cm; Dr. Nellie Dunn). 5) chronic BONNY occlusion. 6) right BG stroke on MRI 1/2014.  7) heavy tobacco use, 2-3 pack per day; no significant COPD on PFTs 4/2017  8) CKD:  Stage III (Cr 1.4-1.5/GFR 51 7/2019; Dr. Demond Fan). Cr 1.5/GFR 45 1/21/20.  9) chronic anemia; hemoglobin 9.3: Chronic anemia related to CKD. 10) heme+ 2019:  Negative EGD 7/2019; internal hemorrhoids/diverticular disease on colonoscopy 9/2019. Retired ; from [place]. 2 ppd & knows he should quit. No recent ethanol; heavy 50 years ago. 2 caffeinated elie/day. No added salt. He reports no CP in the last month until yesterday. For other plans, see orders. 1/27: BPs stable; Hg 14.2; Cr 1.48; trop 49. NSR; STs improved. No CP/dyspnea. Cont ASA, add hep gtt; Cont bblocker (XL to 50 qday); NTG paste; hold ACEi/ARB with CKD/recent contrast.  Needs surgical consult; Echo, Abd U/S, and carotids ordered. Echo 1/27: Normal cavity size. Mild LVH; EF 40 - 45%. Mid-distal Ant-sept AK; Mild TR. Abd U/S 1/27: \"Mid abdominal aorta measures 4.0 cm in diameter, although the distal abdominal aorta, where the largest aneurysm was seen on prior CT, is obscured by overlying bowel gas\"    Carotid U/S 1/27: Chronic BONNY occlusion;  There is mild stenosis in the left ICA (<50%). Bilateral vertebrals are antegrade. NICHOLAS 1/27: normal.    CTA 1/28: Chronically occluded right internal carotid artery. Bilateral common and left internal carotid arteries remain patent without significant stenosis. Bilateral vertebral arteries are patent with mild atherosclerosis. The left vertebral artery arises from the aortic arch.     Relatively stable juxtarenal abdominal aortic aneurysmal dilatation. Sequelae of graft repair of infrarenal abdominal aortic aneurysm. There is minimal surrounding fat stranding without evidence for fluid collection or gas.     Increased size of the right common iliac artery aneurysm with mild hemorrhage in the right paracolic gutter and right hemipelvis. These findings were communicated to the nursing staff by the on-call radiologist.     Mild nonspecific right middle lobe and posterior right lower lobe airspace disease. Mild nonspecific colitis involving the ascending colon    1/28: Stable BP/HR; NSR; Cr unchanged at 1.45; CKp was 700s (down since then); trop from 45 to 20. No CP/dyspnea;   Remains on IV hep gtt; toprol XL 50 qday; NTG paste; lipitor; Add ACEi or ARB after surgery/prior to d/c.  Sputum c/w chronic bronchitis. CABG timing per Dr Randy Wolf. Extensive CTA orders noted; increased risk of FRANSISCO. 1/29: VSSAF; NSR; Cr 1.5 from 1.45; Hg 10.8 from 12.4 from 14.2. No CP/dyspnea; less cough/sputum. IV hep d/cd after CTA above; Remains on asa, toprol XL 50, NTG paste, lipitor; Add ACEi or ARB prior to d/c. CABG timing per Dr Randy Wolf; Vascular/retroperitoneal issues per Dr Jaquan Bah. Benign cath sites. 1/30: VSSAF; NSR; Cr 1.58; Hg 10. No CP/dyspnea per se; cont cough/poor sleep: mucinex/ambien. Cardiac meds as above. Dr Radha Garcia et al to see pt. Appreciate Dr Charly Tran input on CT findings: no further Rx needed @ this time/prior to CABG. 1/31: VSSAF; NSR; Less cough/sputum. No new cardiac plans; awaiting CABG.    Anemia related to CKD and dilution; some blood loss with cath.  ________________________________________________________________  @ NP OV 12/5/19:   Mr Hafsa Magaña has a h/o:  1) CAD:  Stent of unknown vessel at [facility] 2006. * followed by Dr. Katherine Gardner in 2014:  MPI reportedly showed inferior ischemia & cath recommended but not done. *Seen by Dr Chelita Elena in 2017: MPI rec but not done. Normal echo. *CP/TEIXEIRA 12/2019:  2) HTN  3) dyslipidemia  4) open AAA repair 4/2017 (8 cm; Dr. Mimi Cherry). 5) chronic BONNY occlusion. 6) right BG stroke on MRI 1/2014.  7) heavy tobacco use, 2-3 pack per day; no significant COPD on PFTs 4/2017  8) CKD:  Stage III (Cr 1.4-1.5/GFR 51 7/2019; Dr. Iain Meredith). 9) chronic anemia; hemoglobin 9.3  10) heme+ 2019:  Negative EGD 7/2019; internal hemorrhoids/diverticular disease on colonoscopy 9/2019. Retired ; from [place]. 2 ppd & knows he should quit. No recent ethanol; heavy 50 years ago. 2 caffeinated elie/day. No added salt. 12/2019:  New patient presenting with intermittent chest pain (6 months of worsening, 0-1 X/month;  15 minutes duration; central dull ache with radiation to both arms. Can occur with or without exertion. Increased dyspnea associated with discomfort). Chronic TEIXEIRA which is unchanged in years. No palpitations. No falling or bleeding. IMPRESSION AND PLAN  01. Atherosclerotic heart disease of native coronary artery with other forms of angina pectoris (I25.118):  Remote stent of unknown vessel; reportedly abnormal MPI 2014 without further evaluation. He needs a stress test but cannot walk on a treadmill. Therefore cardiac PET. We discussed the signs and symptoms of unstable angina, myocardial infarction and malignant arrhythmia. The patient knows to seek immediate medical attention should they occur. ECG done PET Cardiac test to be done. first available   02. Hyp hrt & chr kdny dis w/o hrt fail, w stg 1-4/unsp chr kdny (I13.10):   This condition is stable. I have made no changes to the present regimen. 03. Mixed hyperlipidemia (E78.2): He should be on a statin unless previously intolerant; apparently had been on Lipitor in the past.   04. Occlusion and stenosis of bilateral carotid arteries (D17.70):  Chronic BONNY occlusion; he reports no recent follow-up. U/s ordered. Carotid Duplex to be done first available. 05. Abdominal aortic aneurysm, without rupture (I71.4):  Surgically treated in 2017; follow-up per Dr. Ambrose Claros. 06. Shortness of breath (R06.02):  ?  Cardiac; also has some element of underlying pulmonary disease & ongoing tobacco abuse. Echocardiogram warranted. 2-D w/CFD Echo to be done first available. 07. Chronic kidney disease, stage 3 (moderate) (N18.3):  Managed by: Renal   08. Cerebral infarction, unspecified (I63.9):  Managed by: Other Physician   09. Nicotine dependence, cigarettes, uncomplicated (N01.827): The patient was instructed on smoking cessation. 10. Body mass index (BMI) 33.0-33.9, adult (M21.72): The patient was instructed on AHA diet and regular exercise. ORDERS:  1 Tobacco cessation counseling   2 Dietary management education, guidance, and counseling   3 ECG done   4 PET Cardiac test to be done   5 Carotid Duplex   6 2-D w/ CFD Echocardiogram   7 Return office visit with Elvis Mcmullen MD in 3 Months. 8 The patient was instructed on AHA diet and regular exercise.        12/5/19 MEDICATION LIST  Medication Sig Desc   amlodipine 5 mg tablet take 1 tablet by oral route  every day   Aspirin Low Dose 81 mg tablet,delayed release take 1 tablet by oral route  every day   metoprolol succinate ER 25 mg tablet,extended release 24 hr take 1 tablet by oral route  every day   pantoprazole 40 mg tablet,delayed release take 1 tablet by oral route  every day   tamsulosin 0.4 mg capsule take 1 capsule by oral route  every day 1/2 hour following the same meal each day _______________________________________________________________________  CARDIAC HISTORY  RISK FACTORS:  1 Hypertension   2 Tobacco Use: No/never       CARDIOVASCULAR PROCEDURES  Procedure Date Results   Carotid Duplex 05/02/2018 100% Right ICA, Less than 50% Left ICA, Vertebral: Bilateral Antegrade Flow, Unchanged versus 2017; at [facility]. Echo 04/03/2017 EF 55-60%; normal RV; no valve disease; normal PAp; by RCA at [facility]. EKG 12/05/2019 Sinus Rhythm, Borderline PRWP, otherwise unremarkable. PFTs 04/03/2017 No obstructive disease; moderately decreased DLCO; at [facility]. ACTIVE ALLERGIES:  Ingredient Reaction Comment   FENOFIBRATE Hives    ALBUTEROL SULFATE Unknown    ATORVASTATIN Leg cramp Atorvastatin         PAST MEDICAL/SURGICAL HISTORY  (Detailed)    Disease/disorder Onset Date Surgical History Date Comments   GERD       Hypertension         Family History  (Detailed)    SOCIAL HISTORY  (Detailed)  Tobacco use reviewed. Preferred language is [place]. Smoking status: Heavy tobacco smoker. SMOKING STATUS  Use Status Type Smoking Status Usage Per Day Years Used Total Pack Years   yes Cigarette Heavy tobacco smoker 2 Packs       TOBACCO CESSATION INFORMATION  Date Counseled By Order Status Description Code Tobacco Cessation Information   12/05/2019 Clemente Prado Pavan Tobacco cessation counseling completed   Counseling about tobacco use (procedure)       Hospital problem list   Active Hospital Problems    Diagnosis Date Noted    STEMI (ST elevation myocardial infarction) ([facility] [address] 01/26/2020     Priority: 1 - One    CAD (coronary artery disease)      2006 - s/p stent           Subjective:  Marlon Leonardo reports   Chest pain X none  consistent with:  Non-cardiac CP         Atypical CP     None now  On going  Anginal CP     Dyspnea X none  at rest  with exertion         improved  unchanged  worse              PND X none  overnight       Orthopnea X none  improved  unchanged  worse   Presyncope X none improved  unchanged  worse     Ambulated in hallway without symptoms   Yes   Ambulated in room without symptoms  Yes   Objective:    Physical Exam:  Overall VSSAF;    Visit Vitals  /61 (BP 1 Location: Left arm, BP Patient Position: At rest)   Pulse 62   Temp 98.6 °F (37 °C)   Resp 14   Ht 5' 9\" (1.753 m)   Wt 105 kg (231 lb 7.7 oz)   SpO2 95%   BMI 34.18 kg/m²     Temp (24hrs), Av.4 °F (36.9 °C), Min:97.4 °F (36.3 °C), Max:99.1 °F (37.3 °C)    Patient Vitals for the past 8 hrs:   Pulse   20 0752 62   20 041 61     Patient Vitals for the past 8 hrs:   Resp   20 0752 14   202 18     Patient Vitals for the past 8 hrs:   BP   20 0752 135/61   20 0412 123/50       Intake/Output Summary (Last 24 hours) at 2020 0826  Last data filed at 2020 0754  Gross per 24 hour   Intake 580 ml   Output 500 ml   Net 80 ml     General Appearance: Well developed, obsese, no acute distress. Ears/Nose/Mouth/Throat:   Normal MM; anicteric. JVP: WNL   Resp:   clear to auscultation bilaterally except minor rhonchi: clears w cough. Nl resp effort. Cardiovascular:  RRR, S1, S2 normal, no new murmur. No gallop or rub. Abdomen:   Soft, non-tender, bowel sounds are present. Extremities: No edema bilaterally. Skin:  Neuro: Warm and dry. A/O x3, grossly nonfocal   cath sites intact w/o hematoma or new bruit; distal pulse unchanged x Yes   Data Review:     Telemetry independently reviewed x sinus  chronic afib  parox afib  NSVT     ECG independently reviewed  NSR  afib  Decreased ST elevation  NSST-Tw chgs   x no new ECG provided for review   Lab results reviewed as noted below. Current medications reviewed as noted below. No results for input(s): PH, PCO2, PO2 in the last 72 hours.   Recent Labs     20  0552   TROIQ 10.90*     Recent Labs     20  0536 20  0552   NA  --  139 140   K  --  4.4 4.3   CL  --  112* 112*   CO2  --  21 20*   BUN --  21* 23*   CREA  --  1.58* 1.50*   GFRAA  --  52* 55*   GLU  --  108* 101*   PHOS  --  2.9  --    CA  --  8.9 9.0   ALB  --  2.9*  --    WBC 7.2 7.7 7.6   HGB 9.8* 10.0* 10.8*   HCT 31.0* 31.4* 33.4*    187 187     Lab Results   Component Value Date/Time    Cholesterol, total 148 01/28/2020 12:58 AM    HDL Cholesterol 27 01/28/2020 12:58 AM    LDL, calculated 86.4 01/28/2020 12:58 AM    Triglyceride 173 (H) 01/28/2020 12:58 AM    CHOL/HDL Ratio 5.5 (H) 01/28/2020 12:58 AM     Recent Labs     01/30/20  0536   SGOT 35      TP 6.8   ALB 2.9*   GLOB 3.9     Recent Labs     01/28/20  1529 01/28/20  0923   APTT 43.3* 58.3*      No components found for: GLPOC    Current Facility-Administered Medications   Medication Dose Route Frequency    albuterol (PROVENTIL VENTOLIN) nebulizer solution 1.25 mg  1.25 mg Nebulization TID PRN    melatonin tablet 9 mg  9 mg Oral QHS    zolpidem (AMBIEN) tablet 5 mg  5 mg Oral QHS PRN    guaiFENesin ER (MUCINEX) tablet 600 mg  600 mg Oral Q12H    sodium bicarbonate tablet 650 mg  650 mg Oral BID    cefTRIAXone (ROCEPHIN) 1 g in 0.9% sodium chloride (MBP/ADV) 50 mL  1 g IntraVENous Q24H    diphenhydrAMINE (BENADRYL) capsule 50 mg  50 mg Oral QHS PRN    nicotine (NICODERM CQ) 7 mg/24 hr patch 1 Patch  1 Patch TransDERmal DAILY    nitroglycerin (NITROBID) 2 % ointment 0.5 Inch  0.5 Inch Topical Q6H    metoprolol succinate (TOPROL-XL) XL tablet 50 mg  50 mg Oral DAILY    nitroglycerin (NITROBID) 2 % ointment 1 Inch  1 Inch Topical Q6H PRN    pravastatin (PRAVACHOL) tablet 40 mg  40 mg Oral QHS    amiodarone (CORDARONE) tablet 200 mg  200 mg Oral BID    benzonatate (TESSALON) capsule 100 mg  100 mg Oral TID PRN    ondansetron (ZOFRAN) injection 4 mg  4 mg IntraVENous Q4H PRN    tamsulosin (FLOMAX) capsule 0.4 mg  0.4 mg Oral QPM    aspirin delayed-release tablet 81 mg  81 mg Oral DAILY    sodium chloride (NS) flush 5-40 mL  5-40 mL IntraVENous Q8H    sodium chloride (NS) flush 5-40 mL  5-40 mL IntraVENous PRN    acetaminophen (TYLENOL) tablet 650 mg  650 mg Oral Q4H PRN    nitroglycerin (NITROSTAT) tablet 0.4 mg  0.4 mg SubLINGual PRN        Merlyn Fang MD

## 2020-02-01 LAB
ANION GAP SERPL CALC-SCNC: 6 MMOL/L (ref 5–15)
BUN SERPL-MCNC: 21 MG/DL (ref 6–20)
BUN/CREAT SERPL: 12 (ref 12–20)
CALCIUM SERPL-MCNC: 8.8 MG/DL (ref 8.5–10.1)
CHLORIDE SERPL-SCNC: 113 MMOL/L (ref 97–108)
CO2 SERPL-SCNC: 21 MMOL/L (ref 21–32)
CREAT SERPL-MCNC: 1.71 MG/DL (ref 0.7–1.3)
GLUCOSE SERPL-MCNC: 107 MG/DL (ref 65–100)
POTASSIUM SERPL-SCNC: 4.4 MMOL/L (ref 3.5–5.1)
SODIUM SERPL-SCNC: 140 MMOL/L (ref 136–145)

## 2020-02-01 PROCEDURE — 74011250637 HC RX REV CODE- 250/637: Performed by: INTERNAL MEDICINE

## 2020-02-01 PROCEDURE — 36415 COLL VENOUS BLD VENIPUNCTURE: CPT

## 2020-02-01 PROCEDURE — 74011250636 HC RX REV CODE- 250/636: Performed by: INTERNAL MEDICINE

## 2020-02-01 PROCEDURE — 94760 N-INVAS EAR/PLS OXIMETRY 1: CPT

## 2020-02-01 PROCEDURE — 80048 BASIC METABOLIC PNL TOTAL CA: CPT

## 2020-02-01 PROCEDURE — 65660000000 HC RM CCU STEPDOWN

## 2020-02-01 PROCEDURE — 74011250637 HC RX REV CODE- 250/637: Performed by: PHYSICIAN ASSISTANT

## 2020-02-01 PROCEDURE — 74011000258 HC RX REV CODE- 258: Performed by: INTERNAL MEDICINE

## 2020-02-01 RX ORDER — GUAIFENESIN/DEXTROMETHORPHAN 100-10MG/5
10 SYRUP ORAL
Status: DISCONTINUED | OUTPATIENT
Start: 2020-02-01 | End: 2020-02-06

## 2020-02-01 RX ADMIN — MELATONIN TAB 3 MG 9 MG: 3 TAB at 22:13

## 2020-02-01 RX ADMIN — Medication 10 ML: at 13:21

## 2020-02-01 RX ADMIN — SODIUM BICARBONATE 650 MG: 650 TABLET ORAL at 17:04

## 2020-02-01 RX ADMIN — GUAIFENESIN 600 MG: 600 TABLET, EXTENDED RELEASE ORAL at 09:26

## 2020-02-01 RX ADMIN — BENZONATATE 100 MG: 100 CAPSULE ORAL at 10:06

## 2020-02-01 RX ADMIN — DIPHENHYDRAMINE HYDROCHLORIDE 50 MG: 25 CAPSULE ORAL at 22:13

## 2020-02-01 RX ADMIN — ACETAMINOPHEN 650 MG: 325 TABLET ORAL at 22:13

## 2020-02-01 RX ADMIN — METOPROLOL SUCCINATE 50 MG: 50 TABLET, EXTENDED RELEASE ORAL at 09:25

## 2020-02-01 RX ADMIN — SODIUM BICARBONATE 650 MG: 650 TABLET ORAL at 09:26

## 2020-02-01 RX ADMIN — Medication 10 ML: at 22:15

## 2020-02-01 RX ADMIN — TAMSULOSIN HYDROCHLORIDE 0.4 MG: 0.4 CAPSULE ORAL at 17:04

## 2020-02-01 RX ADMIN — AMIODARONE HYDROCHLORIDE 200 MG: 200 TABLET ORAL at 17:04

## 2020-02-01 RX ADMIN — ASPIRIN 81 MG: 81 TABLET ORAL at 09:25

## 2020-02-01 RX ADMIN — GUAIFENESIN AND DEXTROMETHORPHAN 10 ML: 100; 10 SYRUP ORAL at 22:13

## 2020-02-01 RX ADMIN — Medication 10 ML: at 06:00

## 2020-02-01 RX ADMIN — CEFTRIAXONE 1 G: 1 INJECTION, POWDER, FOR SOLUTION INTRAMUSCULAR; INTRAVENOUS at 10:06

## 2020-02-01 RX ADMIN — GUAIFENESIN AND DEXTROMETHORPHAN 10 ML: 100; 10 SYRUP ORAL at 16:21

## 2020-02-01 RX ADMIN — AMIODARONE HYDROCHLORIDE 200 MG: 200 TABLET ORAL at 09:26

## 2020-02-01 RX ADMIN — ZOLPIDEM TARTRATE 5 MG: 5 TABLET ORAL at 22:12

## 2020-02-01 NOTE — PROGRESS NOTES
Nephrology Progress Note     [facility]       Patient: Debbra Dakin Date- 2/1/2020 Admit Date: 1/26/2020 YOB: 1945             CC: Follow up for ckd            Interval History:   He has no complaints   He wants to defer all his Rx decisions to his regular doctors   Denies any chest pain, sob, aponte  No c/o nausea or vomiting  No c/o  fever. ROS:- as above   Assessment & Plan:     Chronic renal insufficiency, stage III. Baseline creat ~1.6. Renal function s/p cardiac cath and CT angio. - Cr slightly up from baseline   - volume : overload, mild   - hold off on lasix for now     Renal artery stenosis noted in 4/17. Angio CT on 2/18: bilateral renal arteries patent with moderate right and moderate severe left proximal atherosclerosis.     H/o hyperkalemia, on a dietary K restriction.     Metabolic acidosis,   Continue sodium bicarb     Acute STMI. An emergent cath was done on 1/27 which showed:  \"severe triple vessel disease involving the distal left main of 60%-70% with ulcerated plaque, total occlusion of left anterior descending artery, a stent in the proximal portion.  Patent left circumflex and patent first large diagonal or the ramus intermedius branch.  Totally occluded RCA with collateralization coming from the left system. \"   A CABG is planned for next week     Severe COPD and continued tobacco abuse     Hypertension.  - stable      Obesity. s/p an AAA repair in 4/17. s/p repair of an incarcerated hernia in 4/17.     PVD. CT angio done on 1/29: complete occlusion of the right internal carotid artery is unchanged since ultrasound 1 day ago. Severe stenosis of the right vertebral artery V4 segment. Lobulated infrarenal abdominal aortic aneurysm measures 5.1 cm in AP diameter. Liver lesions may represent perfusional variant. Right common iliac artery aneurysm measures 3.3 cm.  Heterogeneous hemorrhage surrounds the right external iliac artery and tracks proximally in the right retroperitoneum.              Physical exam:   GEN: NAD  NECK- Supple, no mass  RESP: Clear b/l, no wheezing, decreased bs b/l  CVS: S1,S2  RRR  ABDO: soft , non tender, No mass  NEURO: normal speech, non focal  EXT: + Edema     Care Plan discussed with: patient and family  Objective:   Visit Vitals  /59 (BP 1 Location: Right arm, BP Patient Position: At rest;Sitting)   Pulse (!) 59   Temp 98.6 °F (37 °C)   Resp 18   Ht 5' 9\" (1.753 m)   Wt 105.8 kg (233 lb 4 oz)   SpO2 96%   BMI 34.44 kg/m²     Last 3 Recorded Weights in this Encounter    01/30/20 0620 01/31/20 0412 02/01/20 0421   Weight: 105.5 kg (232 lb 9.4 oz) 105 kg (231 lb 7.7 oz) 105.8 kg (233 lb 4 oz)     01/30 1901 - 02/01 0700  In: 820 [P.O.:820]  Out: 800 [Urine:800]    Intake/Output Summary (Last 24 hours) at 2/1/2020 0742  Last data filed at 2/1/2020 0421  Gross per 24 hour   Intake 240 ml   Output 450 ml   Net -210 ml      Chart reviewed. Pertinent Notes reviewed.        Medication list  reviewed   Current Facility-Administered Medications   Medication    albuterol (PROVENTIL VENTOLIN) nebulizer solution 1.25 mg    melatonin tablet 9 mg    zolpidem (AMBIEN) tablet 5 mg    guaiFENesin ER (MUCINEX) tablet 600 mg    sodium bicarbonate tablet 650 mg    cefTRIAXone (ROCEPHIN) 1 g in 0.9% sodium chloride (MBP/ADV) 50 mL    diphenhydrAMINE (BENADRYL) capsule 50 mg    nicotine (NICODERM CQ) 7 mg/24 hr patch 1 Patch    nitroglycerin (NITROBID) 2 % ointment 0.5 Inch    metoprolol succinate (TOPROL-XL) XL tablet 50 mg    nitroglycerin (NITROBID) 2 % ointment 1 Inch    pravastatin (PRAVACHOL) tablet 40 mg    amiodarone (CORDARONE) tablet 200 mg    benzonatate (TESSALON) capsule 100 mg    ondansetron (ZOFRAN) injection 4 mg    tamsulosin (FLOMAX) capsule 0.4 mg    aspirin delayed-release tablet 81 mg    sodium chloride (NS) flush 5-40 mL    sodium chloride (NS) flush 5-40 mL    acetaminophen (TYLENOL) tablet 650 mg    nitroglycerin (NITROSTAT) tablet 0.4 mg           Data Review :  Recent Labs     02/01/20  0422 01/30/20  0536    139   K 4.4 4.4   * 112*   CO2 21 21   BUN 21* 21*   CREA 1.71* 1.58*   * 108*   CA 8.8 8.9   MG  --  2.2   PHOS  --  2.9     Recent Labs     01/31/20  0412 01/30/20  0536   WBC 7.2 7.7   HGB 9.8* 10.0*   HCT 31.0* 31.4*    187     No results for input(s): FE, TIBC, PSAT, FERR in the last 72 hours. No results for input(s): CPK, CKNDX, TROIQ in the last 72 hours. No lab exists for component: CPKMB  Lab Results   Component Value Date/Time    Color YELLOW/STRAW 01/31/2020 11:45 AM    Appearance TURBID (A) 01/31/2020 11:45 AM    Specific gravity 1.023 01/31/2020 11:45 AM    pH (UA) 6.0 01/31/2020 11:45 AM    Protein 100 (A) 01/31/2020 11:45 AM    Glucose NEGATIVE  01/31/2020 11:45 AM    Ketone NEGATIVE  01/31/2020 11:45 AM    Bilirubin NEGATIVE  01/31/2020 11:45 AM    Urobilinogen 1.0 01/31/2020 11:45 AM    Nitrites NEGATIVE  01/31/2020 11:45 AM    Leukocyte Esterase NEGATIVE  01/31/2020 11:45 AM    Epithelial cells FEW 01/31/2020 11:45 AM    Bacteria NEGATIVE  01/31/2020 11:45 AM    WBC 0-4 01/31/2020 11:45 AM    RBC 0-5 01/31/2020 11:45 AM     Lab Results   Component Value Date/Time    Culture result: MODERATE NORMAL RESPIRATORY XUAN 01/27/2020 09:20 AM     No results found for: SDES  Lab Results   Component Value Date/Time    Sodium,urine random 33 04/02/2017 09:38 AM    Creatinine, urine 129.00 04/02/2017 09:38 AM     Results from Hospital Encounter encounter on 01/21/20   XR CHEST PA LAT    Narrative EXAM:  XR CHEST PA LAT    INDICATION:   R07.89    COMPARISON: 2019. FINDINGS: PA and lateral radiographs of the chest demonstrate clear lungs. The  cardiac and mediastinal contours and pulmonary vascularity are normal.  There is  mild degenerative spurring mid to lower thoracic spine. .       Impression IMPRESSION: No acute abnormality identified. Sarabjit Mcdonald MD  [facility]   [email or web address]  [facility]  [address], [place], [address]  Phone - [phone]         Fax - [phone]

## 2020-02-01 NOTE — PROGRESS NOTES
Report received from Cherise Blakely, [facility]. Introduced myself as primary RN. Assumed care of patient. Instructed patient to call for assistance prior to getting up. Pt verbalized understanding. Call bell within reach. 3:47 PM Patient requesting for alternative cough medication. Attending paged, awaiting telephone call back. 4:08 PM Attending returned page, informed of patients request. MD to place orders for Robitussin DM. Bedside and Verbal shift change report given to Cherise Blakely (oncoming nurse) by Kamron Tracey (offgoing nurse). Report included the following information SBAR, Kardex, MAR and Recent Results.

## 2020-02-01 NOTE — PROGRESS NOTES
Cardiology Progress Note  2/1/2020     Admit Date: 1/26/2020  Admit Diagnosis: STEMI (ST elevation myocardial infarction) ([facility] [address] [I21.3]  CAD (coronary artery disease) [I25.10]  CC: none currently  Cardiac Assessment/Plan:   1) CAD:  Stent of unknown vessel at [facility] 2006. * followed by Dr. Za Rosales in 2014:  MPI reportedly showed inferior ischemia & cath recommended but not done. *Seen by Dr Elsa Salomon in 2017: MPI rec but not done. Normal echo. *CP/TEIXEIRA 12/2019: Abnl PET (inf ischemia): pt delayed cath plan until 1/27/20. *Echo 1/10/20: EF 55%; AoScl only. Mild pulm HTN. *Cath 1/26/20: Severe LM w/ occluded LAD & RCA. 2) HTN  3) dyslipidemia  4) open AAA repair 4/2017 (8 cm; Dr. Harlo Nissen). 5) chronic BONNY occlusion. 6) right BG stroke on MRI 1/2014.  7) heavy tobacco use, 2-3 pack per day; no significant COPD on PFTs 4/2017  8) CKD:  Stage III (Cr 1.4-1.5/GFR 51 7/2019; Dr. Dhara Hamm). Cr 1.5/GFR 45 1/21/20.  9) chronic anemia; hemoglobin 9.3: Chronic anemia related to CKD. 10) heme+ 2019:  Negative EGD 7/2019; internal hemorrhoids/diverticular disease on colonoscopy 9/2019. Retired ; from [employer]. 2 ppd & knows he should quit. No recent ethanol; heavy 50 years ago. 2 caffeinated elie/day. No added salt. He reports no CP in the last month until yesterday. For other plans, see orders. 1/27: BPs stable; Hg 14.2; Cr 1.48; trop 49. NSR; STs improved. No CP/dyspnea. Cont ASA, add hep gtt; Cont bblocker (XL to 50 qday); NTG paste; hold ACEi/ARB with CKD/recent contrast.  Needs surgical consult; Echo, Abd U/S, and carotids ordered. Echo 1/27: Normal cavity size. Mild LVH; EF 40 - 45%. Mid-distal Ant-sept AK; Mild TR. Abd U/S 1/27: \"Mid abdominal aorta measures 4.0 cm in diameter, although the distal abdominal aorta, where the largest aneurysm was seen on prior CT, is obscured by overlying bowel gas\"    Carotid U/S 1/27: Chronic BONNY occlusion;  There is mild stenosis in the left ICA (<50%). Bilateral vertebrals are antegrade. NICHOLAS 1/27: normal.    CTA 1/28: Chronically occluded right internal carotid artery. Bilateral common and left internal carotid arteries remain patent without significant stenosis. Bilateral vertebral arteries are patent with mild atherosclerosis. The left vertebral artery arises from the aortic arch.     Relatively stable juxtarenal abdominal aortic aneurysmal dilatation. Sequelae of graft repair of infrarenal abdominal aortic aneurysm. There is minimal surrounding fat stranding without evidence for fluid collection or gas.     Increased size of the right common iliac artery aneurysm with mild hemorrhage in the right paracolic gutter and right hemipelvis. These findings were communicated to the nursing staff by the on-call radiologist.     Mild nonspecific right middle lobe and posterior right lower lobe airspace disease. Mild nonspecific colitis involving the ascending colon    1/28: Stable BP/HR; NSR; Cr unchanged at 1.45; CKp was 700s (down since then); trop from 45 to 20. No CP/dyspnea;   Remains on IV hep gtt; toprol XL 50 qday; NTG paste; lipitor; Add ACEi or ARB after surgery/prior to d/c.  Sputum c/w chronic bronchitis. CABG timing per Dr Beba Cabral. Extensive CTA orders noted; increased risk of FRANSISCO. 1/29: VSSAF; NSR; Cr 1.5 from 1.45; Hg 10.8 from 12.4 from 14.2. No CP/dyspnea; less cough/sputum. IV hep d/cd after CTA above; Remains on asa, toprol XL 50, NTG paste, lipitor; Add ACEi or ARB prior to d/c. CABG timing per Dr Beba Cabral; Vascular/retroperitoneal issues per Dr David Ku. Benign cath sites. 1/30: VSSAF; NSR; Cr 1.58; Hg 10. No CP/dyspnea per se; cont cough/poor sleep: mucinex/ambien. Cardiac meds as above. Dr Lorena Bhatia et al to see pt. Appreciate Dr Felicia Valdovinos input on CT findings: no further Rx needed @ this time/prior to CABG. 1/31: VSSAF; NSR; Less cough/sputum. No new cardiac plans; awaiting CABG.    Anemia related to CKD and dilution; some blood loss with cath. 2/1: VSSAF; NSR; Cont cough/less sputum; Cr 1.7. still getting ABx. No new cardiac plans; awaiting CABG.   ________________________________________________________________  @ NP OV 12/5/19:   Mr Gay Bae has a h/o:  1) CAD:  Stent of unknown vessel at [facility] 2006. * followed by Dr. Sanjuana Krishnamurthy in 2014:  MPI reportedly showed inferior ischemia & cath recommended but not done. *Seen by Dr Lizbeth Jack in 2017: MPI rec but not done. Normal echo. *CP/TEIXEIRA 12/2019:  2) HTN  3) dyslipidemia  4) open AAA repair 4/2017 (8 cm; Dr. Jolly Chi). 5) chronic BONNY occlusion. 6) right BG stroke on MRI 1/2014.  7) heavy tobacco use, 2-3 pack per day; no significant COPD on PFTs 4/2017  8) CKD:  Stage III (Cr 1.4-1.5/GFR 51 7/2019; Dr. Carlito Pressley). 9) chronic anemia; hemoglobin 9.3  10) heme+ 2019:  Negative EGD 7/2019; internal hemorrhoids/diverticular disease on colonoscopy 9/2019. Retired ; from [employer]. 2 ppd & knows he should quit. No recent ethanol; heavy 50 years ago. 2 caffeinated elie/day. No added salt. 12/2019:  New patient presenting with intermittent chest pain (6 months of worsening, 0-1 X/month;  15 minutes duration; central dull ache with radiation to both arms. Can occur with or without exertion. Increased dyspnea associated with discomfort). Chronic TEIXEIRA which is unchanged in years. No palpitations. No falling or bleeding. IMPRESSION AND PLAN  01. Atherosclerotic heart disease of native coronary artery with other forms of angina pectoris (I25.118):  Remote stent of unknown vessel; reportedly abnormal MPI 2014 without further evaluation. He needs a stress test but cannot walk on a treadmill. Therefore cardiac PET. We discussed the signs and symptoms of unstable angina, myocardial infarction and malignant arrhythmia. The patient knows to seek immediate medical attention should they occur. ECG done PET Cardiac test to be done.   first available   02. Hyp hrt & chr kdny dis w/o hrt fail, w stg 1-4/unsp chr kdny (I13.10): This condition is stable. I have made no changes to the present regimen. 03. Mixed hyperlipidemia (E78.2): He should be on a statin unless previously intolerant; apparently had been on Lipitor in the past.   04. Occlusion and stenosis of bilateral carotid arteries (K45.38):  Chronic BONNY occlusion; he reports no recent follow-up. U/s ordered. Carotid Duplex to be done first available. 05. Abdominal aortic aneurysm, without rupture (I71.4):  Surgically treated in 2017; follow-up per Dr. Maeve Moe. 06. Shortness of breath (R06.02):  ?  Cardiac; also has some element of underlying pulmonary disease & ongoing tobacco abuse. Echocardiogram warranted. 2-D w/CFD Echo to be done first available. 07. Chronic kidney disease, stage 3 (moderate) (N18.3):  Managed by: Renal   08. Cerebral infarction, unspecified (I63.9):  Managed by: Other Physician   09. Nicotine dependence, cigarettes, uncomplicated (N11.361): The patient was instructed on smoking cessation. 10. Body mass index (BMI) 33.0-33.9, adult (Z92.46): The patient was instructed on AHA diet and regular exercise. ORDERS:  1 Tobacco cessation counseling   2 Dietary management education, guidance, and counseling   3 ECG done   4 PET Cardiac test to be done   5 Carotid Duplex   6 2-D w/ CFD Echocardiogram   7 Return office visit with Renny Mcmullen MD in 3 Months. 8 The patient was instructed on AHA diet and regular exercise.        12/5/19 MEDICATION LIST  Medication Sig Desc   amlodipine 5 mg tablet take 1 tablet by oral route  every day   Aspirin Low Dose 81 mg tablet,delayed release take 1 tablet by oral route  every day   metoprolol succinate ER 25 mg tablet,extended release 24 hr take 1 tablet by oral route  every day   pantoprazole 40 mg tablet,delayed release take 1 tablet by oral route  every day   tamsulosin 0.4 mg capsule take 1 capsule by oral route every day 1/2 hour following the same meal each day     _______________________________________________________________________  CARDIAC HISTORY  RISK FACTORS:  1 Hypertension   2 Tobacco Use: No/never       CARDIOVASCULAR PROCEDURES  Procedure Date Results   Carotid Duplex 05/02/2018 100% Right ICA, Less than 50% Left ICA, Vertebral: Bilateral Antegrade Flow, Unchanged versus 2017; at [facility]. Echo 04/03/2017 EF 55-60%; normal RV; no valve disease; normal PAp; by RCA at [facility]. EKG 12/05/2019 Sinus Rhythm, Borderline PRWP, otherwise unremarkable. PFTs 04/03/2017 No obstructive disease; moderately decreased DLCO; at [facility]. ACTIVE ALLERGIES:  Ingredient Reaction Comment   FENOFIBRATE Hives    ALBUTEROL SULFATE Unknown    ATORVASTATIN Leg cramp Atorvastatin         PAST MEDICAL/SURGICAL HISTORY  (Detailed)    Disease/disorder Onset Date Surgical History Date Comments   GERD       Hypertension         Family History  (Detailed)    SOCIAL HISTORY  (Detailed)  Tobacco use reviewed. Preferred language is [place]. Smoking status: Heavy tobacco smoker. SMOKING STATUS  Use Status Type Smoking Status Usage Per Day Years Used Total Pack Years   yes Cigarette Heavy tobacco smoker 2 Packs       TOBACCO CESSATION INFORMATION  Date Counseled By Order Status Description Code Tobacco Cessation Information   12/05/2019 Benja Browne Tobacco cessation counseling completed   Counseling about tobacco use (procedure)       Hospital problem list   Active Hospital Problems    Diagnosis Date Noted    STEMI (ST elevation myocardial infarction) ([facility] [address] 01/26/2020     Priority: 1 - One    CAD (coronary artery disease)      2006 - s/p stent           Subjective:  Marlon Leonardo reports   Chest pain X none  consistent with:  Non-cardiac CP         Atypical CP     None now  On going  Anginal CP     Dyspnea X none  at rest  with exertion         improved  unchanged  worse              PND X none  overnight       Orthopnea X none improved  unchanged  worse   Presyncope X none  improved  unchanged  worse     Ambulated in hallway without symptoms   Yes   Ambulated in room without symptoms  Yes   Objective:    Physical Exam:  Overall VSSAF;    Visit Vitals  /63 (BP 1 Location: Right arm, BP Patient Position: Sitting)   Pulse 63   Temp 98.9 °F (37.2 °C)   Resp 18   Ht 5' 9\" (1.753 m)   Wt 105.8 kg (233 lb 4 oz)   SpO2 90%   BMI 34.44 kg/m²     Temp (24hrs), Av.4 °F (36.9 °C), Min:97.5 °F (36.4 °C), Max:98.9 °F (37.2 °C)    Patient Vitals for the past 8 hrs:   Pulse   20 0829 63   20 0415 (!) 59     Patient Vitals for the past 8 hrs:   Resp   20 0829 18   20 0415 18     Patient Vitals for the past 8 hrs:   BP   20 0829 140/63   20 0415 135/59       Intake/Output Summary (Last 24 hours) at 2020 1026  Last data filed at 2020 0421  Gross per 24 hour   Intake 240 ml   Output 300 ml   Net -60 ml     General Appearance: Well developed, obsese, no acute distress. Ears/Nose/Mouth/Throat:   Normal MM; anicteric. JVP: WNL   Resp:   clear to auscultation bilaterally except minor rhonchi: clears w cough. Nl resp effort. Cardiovascular:  RRR, S1, S2 normal, no new murmur. No gallop or rub. Abdomen:   Soft, non-tender, bowel sounds are present. Extremities: No edema bilaterally. Skin:  Neuro: Warm and dry. A/O x3, grossly nonfocal   cath sites intact w/o hematoma or new bruit; distal pulse unchanged x Yes   Data Review:     Telemetry independently reviewed x sinus  chronic afib  parox afib  NSVT     ECG independently reviewed  NSR  afib  Decreased ST elevation  NSST-Tw chgs   x no new ECG provided for review   Lab results reviewed as noted below. Current medications reviewed as noted below. No results for input(s): PH, PCO2, PO2 in the last 72 hours. No results for input(s): CPK, CKMB, CKNDX, TROIQ in the last 72 hours.   Recent Labs     20  0422 20  0412 20  0536   NA 140  --  139   K 4.4  --  4.4   *  --  112*   CO2 21  --  21   BUN 21*  --  21*   CREA 1.71*  --  1.58*   GFRAA 47*  --  52*   *  --  108*   PHOS  --   --  2.9   CA 8.8  --  8.9   ALB  --   --  2.9*   WBC  --  7.2 7.7   HGB  --  9.8* 10.0*   HCT  --  31.0* 31.4*   PLT  --  191 187     Lab Results   Component Value Date/Time    Cholesterol, total 148 01/28/2020 12:58 AM    HDL Cholesterol 27 01/28/2020 12:58 AM    LDL, calculated 86.4 01/28/2020 12:58 AM    Triglyceride 173 (H) 01/28/2020 12:58 AM    CHOL/HDL Ratio 5.5 (H) 01/28/2020 12:58 AM     Recent Labs     01/30/20  0536   SGOT 35      TP 6.8   ALB 2.9*   GLOB 3.9     No results for input(s): INR, PTP, APTT, INREXT, INREXT in the last 72 hours.    No components found for: GLPOC    Current Facility-Administered Medications   Medication Dose Route Frequency    albuterol (PROVENTIL VENTOLIN) nebulizer solution 1.25 mg  1.25 mg Nebulization TID PRN    melatonin tablet 9 mg  9 mg Oral QHS    zolpidem (AMBIEN) tablet 5 mg  5 mg Oral QHS PRN    guaiFENesin ER (MUCINEX) tablet 600 mg  600 mg Oral Q12H    sodium bicarbonate tablet 650 mg  650 mg Oral BID    cefTRIAXone (ROCEPHIN) 1 g in 0.9% sodium chloride (MBP/ADV) 50 mL  1 g IntraVENous Q24H    diphenhydrAMINE (BENADRYL) capsule 50 mg  50 mg Oral QHS PRN    nicotine (NICODERM CQ) 7 mg/24 hr patch 1 Patch  1 Patch TransDERmal DAILY    nitroglycerin (NITROBID) 2 % ointment 0.5 Inch  0.5 Inch Topical Q6H    metoprolol succinate (TOPROL-XL) XL tablet 50 mg  50 mg Oral DAILY    nitroglycerin (NITROBID) 2 % ointment 1 Inch  1 Inch Topical Q6H PRN    pravastatin (PRAVACHOL) tablet 40 mg  40 mg Oral QHS    amiodarone (CORDARONE) tablet 200 mg  200 mg Oral BID    benzonatate (TESSALON) capsule 100 mg  100 mg Oral TID PRN    ondansetron (ZOFRAN) injection 4 mg  4 mg IntraVENous Q4H PRN    tamsulosin (FLOMAX) capsule 0.4 mg  0.4 mg Oral QPM    aspirin delayed-release tablet 81 mg  81 mg Oral DAILY    sodium chloride (NS) flush 5-40 mL  5-40 mL IntraVENous Q8H    sodium chloride (NS) flush 5-40 mL  5-40 mL IntraVENous PRN    acetaminophen (TYLENOL) tablet 650 mg  650 mg Oral Q4H PRN    nitroglycerin (NITROSTAT) tablet 0.4 mg  0.4 mg SubLINGual PRN        Qiana Mercado MD

## 2020-02-02 LAB
ANION GAP SERPL CALC-SCNC: 8 MMOL/L (ref 5–15)
APTT PPP: 27.3 SEC (ref 22.1–32)
APTT PPP: 29.5 SEC (ref 22.1–32)
BASOPHILS # BLD: 0.1 K/UL (ref 0–0.1)
BASOPHILS NFR BLD: 1 % (ref 0–1)
BUN SERPL-MCNC: 24 MG/DL (ref 6–20)
BUN/CREAT SERPL: 13 (ref 12–20)
CALCIUM SERPL-MCNC: 8.5 MG/DL (ref 8.5–10.1)
CHLORIDE SERPL-SCNC: 110 MMOL/L (ref 97–108)
CO2 SERPL-SCNC: 21 MMOL/L (ref 21–32)
CREAT SERPL-MCNC: 1.82 MG/DL (ref 0.7–1.3)
DIFFERENTIAL METHOD BLD: ABNORMAL
EOSINOPHIL # BLD: 0.4 K/UL (ref 0–0.4)
EOSINOPHIL NFR BLD: 6 % (ref 0–7)
ERYTHROCYTE [DISTWIDTH] IN BLOOD BY AUTOMATED COUNT: 17 % (ref 11.5–14.5)
GLUCOSE SERPL-MCNC: 196 MG/DL (ref 65–100)
HCT VFR BLD AUTO: 30.4 % (ref 36.6–50.3)
HGB BLD-MCNC: 9.9 G/DL (ref 12.1–17)
IMM GRANULOCYTES # BLD AUTO: 0 K/UL (ref 0–0.04)
IMM GRANULOCYTES NFR BLD AUTO: 1 % (ref 0–0.5)
LYMPHOCYTES # BLD: 1.3 K/UL (ref 0.8–3.5)
LYMPHOCYTES NFR BLD: 19 % (ref 12–49)
MCH RBC QN AUTO: 29.4 PG (ref 26–34)
MCHC RBC AUTO-ENTMCNC: 32.6 G/DL (ref 30–36.5)
MCV RBC AUTO: 90.2 FL (ref 80–99)
MONOCYTES # BLD: 0.5 K/UL (ref 0–1)
MONOCYTES NFR BLD: 7 % (ref 5–13)
NEUTS SEG # BLD: 4.4 K/UL (ref 1.8–8)
NEUTS SEG NFR BLD: 66 % (ref 32–75)
NRBC # BLD: 0 K/UL (ref 0–0.01)
NRBC BLD-RTO: 0 PER 100 WBC
PLATELET # BLD AUTO: 210 K/UL (ref 150–400)
PMV BLD AUTO: 10.3 FL (ref 8.9–12.9)
POTASSIUM SERPL-SCNC: 4.1 MMOL/L (ref 3.5–5.1)
RBC # BLD AUTO: 3.37 M/UL (ref 4.1–5.7)
SODIUM SERPL-SCNC: 139 MMOL/L (ref 136–145)
THERAPEUTIC RANGE,PTTT: NORMAL SECS (ref 58–77)
THERAPEUTIC RANGE,PTTT: NORMAL SECS (ref 58–77)
WBC # BLD AUTO: 6.6 K/UL (ref 4.1–11.1)

## 2020-02-02 PROCEDURE — 94760 N-INVAS EAR/PLS OXIMETRY 1: CPT

## 2020-02-02 PROCEDURE — 74011250637 HC RX REV CODE- 250/637: Performed by: INTERNAL MEDICINE

## 2020-02-02 PROCEDURE — 74011250636 HC RX REV CODE- 250/636: Performed by: INTERNAL MEDICINE

## 2020-02-02 PROCEDURE — 74011000258 HC RX REV CODE- 258: Performed by: INTERNAL MEDICINE

## 2020-02-02 PROCEDURE — 74011250637 HC RX REV CODE- 250/637: Performed by: PHYSICIAN ASSISTANT

## 2020-02-02 PROCEDURE — 80048 BASIC METABOLIC PNL TOTAL CA: CPT

## 2020-02-02 PROCEDURE — 74011000250 HC RX REV CODE- 250: Performed by: INTERNAL MEDICINE

## 2020-02-02 PROCEDURE — 85730 THROMBOPLASTIN TIME PARTIAL: CPT

## 2020-02-02 PROCEDURE — 85025 COMPLETE CBC W/AUTO DIFF WBC: CPT

## 2020-02-02 PROCEDURE — 36415 COLL VENOUS BLD VENIPUNCTURE: CPT

## 2020-02-02 PROCEDURE — 93005 ELECTROCARDIOGRAM TRACING: CPT

## 2020-02-02 PROCEDURE — 65660000000 HC RM CCU STEPDOWN

## 2020-02-02 RX ORDER — HEPARIN SODIUM 10000 [USP'U]/100ML
9-25 INJECTION, SOLUTION INTRAVENOUS
Status: DISPENSED | OUTPATIENT
Start: 2020-02-02 | End: 2020-02-03

## 2020-02-02 RX ORDER — EZETIMIBE 10 MG/1
10 TABLET ORAL EVERY EVENING
Status: DISCONTINUED | OUTPATIENT
Start: 2020-02-02 | End: 2020-02-08 | Stop reason: HOSPADM

## 2020-02-02 RX ORDER — GUAIFENESIN 600 MG/1
600 TABLET, EXTENDED RELEASE ORAL EVERY 12 HOURS
Status: DISCONTINUED | OUTPATIENT
Start: 2020-02-02 | End: 2020-02-08 | Stop reason: HOSPADM

## 2020-02-02 RX ORDER — NITROGLYCERIN 20 MG/100ML
0-200 INJECTION INTRAVENOUS
Status: DISCONTINUED | OUTPATIENT
Start: 2020-02-02 | End: 2020-02-04

## 2020-02-02 RX ORDER — HEPARIN SODIUM 5000 [USP'U]/ML
5000 INJECTION, SOLUTION INTRAVENOUS; SUBCUTANEOUS ONCE
Status: COMPLETED | OUTPATIENT
Start: 2020-02-02 | End: 2020-02-02

## 2020-02-02 RX ORDER — HEPARIN SODIUM 5000 [USP'U]/ML
2000 INJECTION, SOLUTION INTRAVENOUS; SUBCUTANEOUS AS NEEDED
Status: DISCONTINUED | OUTPATIENT
Start: 2020-02-02 | End: 2020-02-06

## 2020-02-02 RX ORDER — HEPARIN SODIUM 5000 [USP'U]/ML
4000 INJECTION, SOLUTION INTRAVENOUS; SUBCUTANEOUS AS NEEDED
Status: DISCONTINUED | OUTPATIENT
Start: 2020-02-02 | End: 2020-02-06

## 2020-02-02 RX ADMIN — METOPROLOL SUCCINATE 50 MG: 50 TABLET, EXTENDED RELEASE ORAL at 10:19

## 2020-02-02 RX ADMIN — AMIODARONE HYDROCHLORIDE 200 MG: 200 TABLET ORAL at 18:19

## 2020-02-02 RX ADMIN — GUAIFENESIN 600 MG: 600 TABLET, EXTENDED RELEASE ORAL at 22:14

## 2020-02-02 RX ADMIN — NITROGLYCERIN 10 MCG/MIN: 20 INJECTION INTRAVENOUS at 12:00

## 2020-02-02 RX ADMIN — AMIODARONE HYDROCHLORIDE 200 MG: 200 TABLET ORAL at 10:18

## 2020-02-02 RX ADMIN — ASPIRIN 81 MG: 81 TABLET ORAL at 10:18

## 2020-02-02 RX ADMIN — CEFTRIAXONE 1 G: 1 INJECTION, POWDER, FOR SOLUTION INTRAMUSCULAR; INTRAVENOUS at 11:39

## 2020-02-02 RX ADMIN — EZETIMIBE 10 MG: 10 TABLET ORAL at 18:20

## 2020-02-02 RX ADMIN — MELATONIN TAB 3 MG 9 MG: 3 TAB at 22:14

## 2020-02-02 RX ADMIN — SODIUM BICARBONATE 650 MG: 650 TABLET ORAL at 10:19

## 2020-02-02 RX ADMIN — Medication 10 ML: at 15:19

## 2020-02-02 RX ADMIN — Medication 10 ML: at 06:44

## 2020-02-02 RX ADMIN — GUAIFENESIN 600 MG: 600 TABLET, EXTENDED RELEASE ORAL at 10:26

## 2020-02-02 RX ADMIN — BENZONATATE 100 MG: 100 CAPSULE ORAL at 20:07

## 2020-02-02 RX ADMIN — DIPHENHYDRAMINE HYDROCHLORIDE 50 MG: 25 CAPSULE ORAL at 22:18

## 2020-02-02 RX ADMIN — BENZONATATE 100 MG: 100 CAPSULE ORAL at 10:25

## 2020-02-02 RX ADMIN — HEPARIN SODIUM 4000 UNITS: 5000 INJECTION INTRAVENOUS; SUBCUTANEOUS at 19:16

## 2020-02-02 RX ADMIN — HEPARIN SODIUM 9 UNITS/KG/HR: 10000 INJECTION, SOLUTION INTRAVENOUS at 11:25

## 2020-02-02 RX ADMIN — NITROGLYCERIN 0.4 MG: 0.4 TABLET, ORALLY DISINTEGRATING SUBLINGUAL at 08:47

## 2020-02-02 RX ADMIN — NITROGLYCERIN 0.4 MG: 0.4 TABLET, ORALLY DISINTEGRATING SUBLINGUAL at 08:53

## 2020-02-02 RX ADMIN — SODIUM BICARBONATE 650 MG: 650 TABLET ORAL at 18:20

## 2020-02-02 RX ADMIN — Medication 5 ML: at 22:14

## 2020-02-02 RX ADMIN — TAMSULOSIN HYDROCHLORIDE 0.4 MG: 0.4 CAPSULE ORAL at 18:20

## 2020-02-02 RX ADMIN — ACETAMINOPHEN 650 MG: 325 TABLET ORAL at 12:07

## 2020-02-02 RX ADMIN — HEPARIN SODIUM 5000 UNITS: 5000 INJECTION INTRAVENOUS; SUBCUTANEOUS at 08:54

## 2020-02-02 NOTE — PROGRESS NOTES
Cardiology Progress Note  2/2/2020     Admit Date: 1/26/2020  Admit Diagnosis: STEMI (ST elevation myocardial infarction) ([facility] [address] [I21.3]  CAD (coronary artery disease) [I25.10]  CC: none currently  Cardiac Assessment/Plan:   1) CAD:  Stent of unknown vessel at [facility] 2006. * followed by Dr. Madalyn London in 2014:  MPI reportedly showed inferior ischemia & cath recommended but not done. *Seen by Dr Huyen Hull in 2017: MPI rec but not done. Normal echo. *CP/TEIXEIRA 12/2019: Abnl PET (inf ischemia): pt delayed cath plan until 1/27/20. *Echo 1/10/20: EF 55%; AoScl only. Mild pulm HTN. *Cath 1/26/20: Severe LM w/ occluded LAD & RCA. 2) HTN  3) dyslipidemia  4) open AAA repair 4/2017 (8 cm; Dr. Jaquan Bah). 5) chronic BONNY occlusion. 6) right BG stroke on MRI 1/2014.  7) heavy tobacco use, 2-3 pack per day; no significant COPD on PFTs 4/2017  8) CKD:  Stage III (Cr 1.4-1.5/GFR 51 7/2019; Dr. Radha Garcia). Cr 1.5/GFR 45 1/21/20.  9) chronic anemia; hemoglobin 9.3: Chronic anemia related to CKD. 10) heme+ 2019:  Negative EGD 7/2019; internal hemorrhoids/diverticular disease on colonoscopy 9/2019. Retired ; from [place]. 2 ppd & knows he should quit. No recent ethanol; heavy 50 years ago. 2 caffeinated elie/day. No added salt. He reports no CP in the last month until yesterday. For other plans, see orders. 1/27: BPs stable; Hg 14.2; Cr 1.48; trop 49. NSR; STs improved. No CP/dyspnea. Cont ASA, add hep gtt; Cont bblocker (XL to 50 qday); NTG paste; hold ACEi/ARB with CKD/recent contrast.  Needs surgical consult; Echo, Abd U/S, and carotids ordered. Echo 1/27: Normal cavity size. Mild LVH; EF 40 - 45%. Mid-distal Ant-sept AK; Mild TR. Abd U/S 1/27: \"Mid abdominal aorta measures 4.0 cm in diameter, although the distal abdominal aorta, where the largest aneurysm was seen on prior CT, is obscured by overlying bowel gas\"    Carotid U/S 1/27: Chronic BONNY occlusion;  There is mild stenosis in the left ICA (<50%). Bilateral vertebrals are antegrade. NICHOLAS 1/27: normal.    CTA 1/28: Chronically occluded right internal carotid artery. Bilateral common and left internal carotid arteries remain patent without significant stenosis. Bilateral vertebral arteries are patent with mild atherosclerosis. The left vertebral artery arises from the aortic arch.     Relatively stable juxtarenal abdominal aortic aneurysmal dilatation. Sequelae of graft repair of infrarenal abdominal aortic aneurysm. There is minimal surrounding fat stranding without evidence for fluid collection or gas.     Increased size of the right common iliac artery aneurysm with mild hemorrhage in the right paracolic gutter and right hemipelvis. These findings were communicated to the nursing staff by the on-call radiologist.     Mild nonspecific right middle lobe and posterior right lower lobe airspace disease. Mild nonspecific colitis involving the ascending colon    1/28: Stable BP/HR; NSR; Cr unchanged at 1.45; CKp was 700s (down since then); trop from 45 to 20. No CP/dyspnea;   Remains on IV hep gtt; toprol XL 50 qday; NTG paste; lipitor; Add ACEi or ARB after surgery/prior to d/c.  Sputum c/w chronic bronchitis. CABG timing per Dr Luann Banks. Extensive CTA orders noted; increased risk of FRANSISCO. 1/29: VSSAF; NSR; Cr 1.5 from 1.45; Hg 10.8 from 12.4 from 14.2. No CP/dyspnea; less cough/sputum. IV hep d/cd after CTA above; Remains on asa, toprol XL 50, NTG paste, lipitor; Add ACEi or ARB prior to d/c. CABG timing per Dr Luann Banks; Vascular/retroperitoneal issues per Dr Tyler Hernández. Benign cath sites. 1/30: VSSAF; NSR; Cr 1.58; Hg 10. No CP/dyspnea per se; cont cough/poor sleep: mucinex/ambien. Cardiac meds as above. Dr Chao Galaviz et al to see pt. Appreciate Dr Aurelia Alfaro input on CT findings: no further Rx needed @ this time/prior to CABG. 1/31: VSSAF; NSR; Less cough/sputum. No new cardiac plans; awaiting CABG.    Anemia related to CKD and dilution; some blood loss with cath. 2/1: VSSAF; NSR; Cont cough/less sputum; Cr 1.7. still getting ABx. No new cardiac plans; awaiting CABG.     2/2: Angina this am: improving with NTG SL but not gone. No acute ecg changes. Starting IV NTG and hep gtt; If doesn't become pain-free, will need CABG. Labs pending. He reports intolerance to \"3 statins\": change to zetia. He felt \"out of it\" with robitussin DM: back to just mucinex. High complexity decision making was performed  X Yes   High-risk of decompensation with multiple organ involvement X Yes       ________________________________________________________________  @ NP OV 12/5/19:   Mr Wilian Heard has a h/o:  1) CAD:  Stent of unknown vessel at [facility] 2006. * followed by Dr. Jai Betts in 2014:  MPI reportedly showed inferior ischemia & cath recommended but not done. *Seen by Dr Nava Crow in 2017: MPI rec but not done. Normal echo. *CP/TEIXEIRA 12/2019:  2) HTN  3) dyslipidemia  4) open AAA repair 4/2017 (8 cm; Dr. Ernestine Cook). 5) chronic BONNY occlusion. 6) right BG stroke on MRI 1/2014.  7) heavy tobacco use, 2-3 pack per day; no significant COPD on PFTs 4/2017  8) CKD:  Stage III (Cr 1.4-1.5/GFR 51 7/2019; Dr. Colt Bonilla). 9) chronic anemia; hemoglobin 9.3  10) heme+ 2019:  Negative EGD 7/2019; internal hemorrhoids/diverticular disease on colonoscopy 9/2019. Retired ; from [employer]. 2 ppd & knows he should quit. No recent ethanol; heavy 50 years ago. 2 caffeinated elie/day. No added salt. 12/2019:  New patient presenting with intermittent chest pain (6 months of worsening, 0-1 X/month;  15 minutes duration; central dull ache with radiation to both arms. Can occur with or without exertion. Increased dyspnea associated with discomfort). Chronic TEIXEIRA which is unchanged in years. No palpitations. No falling or bleeding. IMPRESSION AND PLAN  01.  Atherosclerotic heart disease of native coronary artery with other forms of angina pectoris (I25.118):  Remote stent of unknown vessel; reportedly abnormal MPI 2014 without further evaluation. He needs a stress test but cannot walk on a treadmill. Therefore cardiac PET. We discussed the signs and symptoms of unstable angina, myocardial infarction and malignant arrhythmia. The patient knows to seek immediate medical attention should they occur. ECG done PET Cardiac test to be done. first available   02. Hyp hrt & chr kdny dis w/o hrt fail, w stg 1-4/unsp chr kdny (I13.10): This condition is stable. I have made no changes to the present regimen. 03. Mixed hyperlipidemia (E78.2): He should be on a statin unless previously intolerant; apparently had been on Lipitor in the past.   04. Occlusion and stenosis of bilateral carotid arteries (K60.53):  Chronic BONNY occlusion; he reports no recent follow-up. U/s ordered. Carotid Duplex to be done first available. 05. Abdominal aortic aneurysm, without rupture (I71.4):  Surgically treated in 2017; follow-up per Dr. Jolly Chi. 06. Shortness of breath (R06.02):  ?  Cardiac; also has some element of underlying pulmonary disease & ongoing tobacco abuse. Echocardiogram warranted. 2-D w/CFD Echo to be done first available. 07. Chronic kidney disease, stage 3 (moderate) (N18.3):  Managed by: Renal   08. Cerebral infarction, unspecified (I63.9):  Managed by: Other Physician   09. Nicotine dependence, cigarettes, uncomplicated (E33.333): The patient was instructed on smoking cessation. 10. Body mass index (BMI) 33.0-33.9, adult (R74.78): The patient was instructed on AHA diet and regular exercise. ORDERS:  1 Tobacco cessation counseling   2 Dietary management education, guidance, and counseling   3 ECG done   4 PET Cardiac test to be done   5 Carotid Duplex   6 2-D w/ CFD Echocardiogram   7 Return office visit with Karly Mcmullen MD in 3 Months. 8 The patient was instructed on AHA diet and regular exercise.        12/5/19 MEDICATION LIST  Medication Sig Desc   amlodipine 5 mg tablet take 1 tablet by oral route  every day   Aspirin Low Dose 81 mg tablet,delayed release take 1 tablet by oral route  every day   metoprolol succinate ER 25 mg tablet,extended release 24 hr take 1 tablet by oral route  every day   pantoprazole 40 mg tablet,delayed release take 1 tablet by oral route  every day   tamsulosin 0.4 mg capsule take 1 capsule by oral route  every day 1/2 hour following the same meal each day     _______________________________________________________________________  CARDIAC HISTORY  RISK FACTORS:  1 Hypertension   2 Tobacco Use: No/never       CARDIOVASCULAR PROCEDURES  Procedure Date Results   Carotid Duplex 05/02/2018 100% Right ICA, Less than 50% Left ICA, Vertebral: Bilateral Antegrade Flow, Unchanged versus 2017; at [facility]. Echo 04/03/2017 EF 55-60%; normal RV; no valve disease; normal PAp; by RCA at [facility]. EKG 12/05/2019 Sinus Rhythm, Borderline PRWP, otherwise unremarkable. PFTs 04/03/2017 No obstructive disease; moderately decreased DLCO; at [facility]. ACTIVE ALLERGIES:  Ingredient Reaction Comment   FENOFIBRATE Hives    ALBUTEROL SULFATE Unknown    ATORVASTATIN Leg cramp Atorvastatin         PAST MEDICAL/SURGICAL HISTORY  (Detailed)    Disease/disorder Onset Date Surgical History Date Comments   GERD       Hypertension         Family History  (Detailed)    SOCIAL HISTORY  (Detailed)  Tobacco use reviewed. Preferred language is [place]. Smoking status: Heavy tobacco smoker. SMOKING STATUS  Use Status Type Smoking Status Usage Per Day Years Used Total Pack Years   yes Cigarette Heavy tobacco smoker 2 Packs       TOBACCO CESSATION INFORMATION  Date Counseled By Order Status Description Code Tobacco Cessation Information   12/05/2019 Sanjuanita Browne Tobacco cessation counseling completed   Counseling about tobacco use (procedure)       Hospital problem list   Active Hospital Problems    Diagnosis Date Noted    STEMI (ST elevation myocardial infarction) ([facility] [address] 2020     Priority: 1 - One    CAD (coronary artery disease)      2006 - s/p stent           Subjective: Marlon LEMONS Jorden reports   Chest pain X none  consistent with:  Non-cardiac CP         Atypical CP     None now  On going  Anginal CP     Dyspnea X none  at rest  with exertion         improved  unchanged  worse              PND X none  overnight       Orthopnea X none  improved  unchanged  worse   Presyncope X none  improved  unchanged  worse     Ambulated in hallway without symptoms   Yes   Ambulated in room without symptoms  Yes   Objective:    Physical Exam:  Overall VSSAF;    Visit Vitals  /59   Pulse 61   Temp 98.2 °F (36.8 °C)   Resp 20   Ht 5' 9\" (1.753 m)   Wt 105.7 kg (233 lb 0.4 oz)   SpO2 100%   BMI 34.41 kg/m²     Temp (24hrs), Av.5 °F (36.9 °C), Min:97.9 °F (36.6 °C), Max:99.1 °F (37.3 °C)    Patient Vitals for the past 8 hrs:   Pulse   20 0808 61   20 0330 62     Patient Vitals for the past 8 hrs:   Resp   20 0808 20   20 0330 21     Patient Vitals for the past 8 hrs:   BP   20 0852 120/59   20 0848 147/68   20 0808 156/69   20 0330 163/80       Intake/Output Summary (Last 24 hours) at 2020 0855  Last data filed at 2020 0051  Gross per 24 hour   Intake    Output 700 ml   Net -700 ml     General Appearance: Well developed, obsese, no acute distress. Ears/Nose/Mouth/Throat:   Normal MM; anicteric. JVP: WNL   Resp:   clear to auscultation bilaterally except minor rhonchi: clears w cough. Nl resp effort. Cardiovascular:  RRR, S1, S2 normal, no new murmur. No gallop or rub. Abdomen:   Soft, non-tender, bowel sounds are present. Extremities: No edema bilaterally. Skin:  Neuro: Warm and dry.   A/O x3, grossly nonfocal   cath sites intact w/o hematoma or new bruit; distal pulse unchanged x Yes   Data Review:     Telemetry independently reviewed x sinus  chronic afib  parox afib  NSVT ECG independently reviewed  NSR  afib  Decreased ST elevation  NSST-Tw chgs   x no new ECG provided for review   Lab results reviewed as noted below. Current medications reviewed as noted below. No results for input(s): PH, PCO2, PO2 in the last 72 hours. No results for input(s): CPK, CKMB, CKNDX, TROIQ in the last 72 hours. Recent Labs     02/01/20  0422 01/31/20  0412     --    K 4.4  --    *  --    CO2 21  --    BUN 21*  --    CREA 1.71*  --    GFRAA 47*  --    *  --    CA 8.8  --    WBC  --  7.2   HGB  --  9.8*   HCT  --  31.0*   PLT  --  191     Lab Results   Component Value Date/Time    Cholesterol, total 148 01/28/2020 12:58 AM    HDL Cholesterol 27 01/28/2020 12:58 AM    LDL, calculated 86.4 01/28/2020 12:58 AM    Triglyceride 173 (H) 01/28/2020 12:58 AM    CHOL/HDL Ratio 5.5 (H) 01/28/2020 12:58 AM     No results for input(s): SGOT, GPT, AP, TBIL, TP, ALB, GLOB, GGT, AML, LPSE in the last 72 hours. No lab exists for component: AMYP, HLPSE  No results for input(s): INR, PTP, APTT, INREXT, INREXT in the last 72 hours.    No components found for: GLPOC    Current Facility-Administered Medications   Medication Dose Route Frequency    nitroglycerin (Tridil) 200 mcg/ml infusion  0-200 mcg/min IntraVENous TITRATE    guaiFENesin-dextromethorphan (ROBITUSSIN DM) 100-10 mg/5 mL syrup 10 mL  10 mL Oral Q6H PRN    albuterol (PROVENTIL VENTOLIN) nebulizer solution 1.25 mg  1.25 mg Nebulization TID PRN    melatonin tablet 9 mg  9 mg Oral QHS    zolpidem (AMBIEN) tablet 5 mg  5 mg Oral QHS PRN    sodium bicarbonate tablet 650 mg  650 mg Oral BID    cefTRIAXone (ROCEPHIN) 1 g in 0.9% sodium chloride (MBP/ADV) 50 mL  1 g IntraVENous Q24H    diphenhydrAMINE (BENADRYL) capsule 50 mg  50 mg Oral QHS PRN    nicotine (NICODERM CQ) 7 mg/24 hr patch 1 Patch  1 Patch TransDERmal DAILY    metoprolol succinate (TOPROL-XL) XL tablet 50 mg  50 mg Oral DAILY    nitroglycerin (NITROBID) 2 % ointment 1 Inch  1 Inch Topical Q6H PRN    pravastatin (PRAVACHOL) tablet 40 mg  40 mg Oral QHS    amiodarone (CORDARONE) tablet 200 mg  200 mg Oral BID    benzonatate (TESSALON) capsule 100 mg  100 mg Oral TID PRN    ondansetron (ZOFRAN) injection 4 mg  4 mg IntraVENous Q4H PRN    tamsulosin (FLOMAX) capsule 0.4 mg  0.4 mg Oral QPM    aspirin delayed-release tablet 81 mg  81 mg Oral DAILY    sodium chloride (NS) flush 5-40 mL  5-40 mL IntraVENous Q8H    sodium chloride (NS) flush 5-40 mL  5-40 mL IntraVENous PRN    acetaminophen (TYLENOL) tablet 650 mg  650 mg Oral Q4H PRN    nitroglycerin (NITROSTAT) tablet 0.4 mg  0.4 mg SubLINGual PRN        Patrice Cabral MD

## 2020-02-02 NOTE — PROGRESS NOTES
9:36 AM  Pt transferred to PCU, pt moved from old bed to new and denies any CP at this time. VSS. 10:30 AM  Pt transferred to recliner upon request, pt continues to deny any chest pain. Updated MD -- [facility] for cardiac diet since pain free. Per MD titrate to pain as well as BP goals. New PIV placed -- PTT sent to lab, will start Heparin infusion, spoke with Pharmacy re: start dose. 12 PM  Nitro gtt started ('s)-- pt stated \"I had to stop using the Nitro patch because it gave me such a bad headache\" -- gave Tylenol to hopefully prevent this.

## 2020-02-02 NOTE — PROGRESS NOTES
2230: Pt states he has an allergy to statins. Pt was started on pravastatin recently, now pt complaining of leg cramps, muscle aches and weakness, which he says has happened before from taking statins. Holding evening dose of pravastatin. 0200: Pt states the Robitussin DM given at bedtime made him jittery and restless and he does not want to take this anymore. 0700: Pt says his legs are feeling much better this morning after leg cramp episode last night.

## 2020-02-02 NOTE — PROGRESS NOTES
RAPID RESPONSE TEAM    Responded to overhead page to [phone] for chest pain. Pt c/o mid-sternal chest pain radiating into bilateral upper extremities while sitting up in recliner. Pt denies n/v or SOB. Reports chest pain is same as on admission. Dr. Anthony Joseph at bedside. EKG in progress. VSS. /68. Ntg SL given x2. Chest pain resolved. Pain reported 0/10. /50. Labs sent. Pt transferred to [phone] for ntg gtt.       Eloise Medina RN  RRT, [phone]

## 2020-02-02 NOTE — PROGRESS NOTES
TRANSFER - OUT REPORT:    Verbal report given to Sandip Martínez RN(name) on [address]  being transferred to PCU(unit) for change in patient condition(chest pain, need nirto gtt)       Report consisted of patients Situation, Background, Assessment and   Recommendations(SBAR). Information from the following report(s) SBAR, Kardex, ED Summary, Procedure Summary, Intake/Output, MAR, Accordion, Recent Results, Med Rec Status and Cardiac Rhythm NSR was reviewed with the receiving nurse. Lines:   Peripheral IV 01/30/20 Anterior;Distal;Right Forearm (Active)   Site Assessment Clean, dry, & intact 2/2/2020  9:41 AM   Phlebitis Assessment 0 2/2/2020  3:21 AM   Infiltration Assessment 0 2/2/2020  3:21 AM   Dressing Status Clean, dry, & intact 2/2/2020  3:21 AM   Dressing Type Transparent 2/2/2020  3:21 AM   Hub Color/Line Status Blue 2/2/2020  3:21 AM        Opportunity for questions and clarification was provided.       Patient transported with:   Monitor  Registered Nurse

## 2020-02-03 ENCOUNTER — ANESTHESIA EVENT (OUTPATIENT)
Dept: CARDIOTHORACIC SURGERY | Age: 75
DRG: 215 | End: 2020-02-03
Payer: MEDICARE

## 2020-02-03 LAB
ANION GAP SERPL CALC-SCNC: 7 MMOL/L (ref 5–15)
APTT PPP: 33.3 SEC (ref 22.1–32)
APTT PPP: 44.3 SEC (ref 22.1–32)
APTT PPP: 44.4 SEC (ref 22.1–32)
APTT PPP: 55 SEC (ref 22.1–32)
ATRIAL RATE: 72 BPM
BASOPHILS # BLD: 0.1 K/UL (ref 0–0.1)
BASOPHILS NFR BLD: 1 % (ref 0–1)
BUN SERPL-MCNC: 23 MG/DL (ref 6–20)
BUN/CREAT SERPL: 14 (ref 12–20)
CALCIUM SERPL-MCNC: 8.7 MG/DL (ref 8.5–10.1)
CALCULATED P AXIS, ECG09: 64 DEGREES
CALCULATED R AXIS, ECG10: 54 DEGREES
CALCULATED T AXIS, ECG11: 52 DEGREES
CHLORIDE SERPL-SCNC: 111 MMOL/L (ref 97–108)
CO2 SERPL-SCNC: 20 MMOL/L (ref 21–32)
CREAT SERPL-MCNC: 1.64 MG/DL (ref 0.7–1.3)
DIAGNOSIS, 93000: NORMAL
DIFFERENTIAL METHOD BLD: ABNORMAL
EOSINOPHIL # BLD: 0.4 K/UL (ref 0–0.4)
EOSINOPHIL NFR BLD: 5 % (ref 0–7)
ERYTHROCYTE [DISTWIDTH] IN BLOOD BY AUTOMATED COUNT: 17 % (ref 11.5–14.5)
GLUCOSE SERPL-MCNC: 114 MG/DL (ref 65–100)
HCT VFR BLD AUTO: 31.3 % (ref 36.6–50.3)
HGB BLD-MCNC: 10.1 G/DL (ref 12.1–17)
IMM GRANULOCYTES # BLD AUTO: 0 K/UL (ref 0–0.04)
IMM GRANULOCYTES NFR BLD AUTO: 0 % (ref 0–0.5)
LYMPHOCYTES # BLD: 1.4 K/UL (ref 0.8–3.5)
LYMPHOCYTES NFR BLD: 21 % (ref 12–49)
MCH RBC QN AUTO: 29.2 PG (ref 26–34)
MCHC RBC AUTO-ENTMCNC: 32.3 G/DL (ref 30–36.5)
MCV RBC AUTO: 90.5 FL (ref 80–99)
MONOCYTES # BLD: 0.7 K/UL (ref 0–1)
MONOCYTES NFR BLD: 11 % (ref 5–13)
NEUTS SEG # BLD: 4.3 K/UL (ref 1.8–8)
NEUTS SEG NFR BLD: 62 % (ref 32–75)
NRBC # BLD: 0 K/UL (ref 0–0.01)
NRBC BLD-RTO: 0 PER 100 WBC
P-R INTERVAL, ECG05: 196 MS
PLATELET # BLD AUTO: 210 K/UL (ref 150–400)
PMV BLD AUTO: 10 FL (ref 8.9–12.9)
POTASSIUM SERPL-SCNC: 4.4 MMOL/L (ref 3.5–5.1)
Q-T INTERVAL, ECG07: 376 MS
QRS DURATION, ECG06: 94 MS
QTC CALCULATION (BEZET), ECG08: 411 MS
RBC # BLD AUTO: 3.46 M/UL (ref 4.1–5.7)
SODIUM SERPL-SCNC: 138 MMOL/L (ref 136–145)
THERAPEUTIC RANGE,PTTT: ABNORMAL SECS (ref 58–77)
VENTRICULAR RATE, ECG03: 72 BPM
WBC # BLD AUTO: 6.9 K/UL (ref 4.1–11.1)

## 2020-02-03 PROCEDURE — 80048 BASIC METABOLIC PNL TOTAL CA: CPT

## 2020-02-03 PROCEDURE — 74011250636 HC RX REV CODE- 250/636: Performed by: INTERNAL MEDICINE

## 2020-02-03 PROCEDURE — 65660000000 HC RM CCU STEPDOWN

## 2020-02-03 PROCEDURE — 74011250637 HC RX REV CODE- 250/637: Performed by: INTERNAL MEDICINE

## 2020-02-03 PROCEDURE — 74011250636 HC RX REV CODE- 250/636: Performed by: THORACIC SURGERY (CARDIOTHORACIC VASCULAR SURGERY)

## 2020-02-03 PROCEDURE — 86923 COMPATIBILITY TEST ELECTRIC: CPT

## 2020-02-03 PROCEDURE — 85730 THROMBOPLASTIN TIME PARTIAL: CPT

## 2020-02-03 PROCEDURE — 74011000258 HC RX REV CODE- 258: Performed by: THORACIC SURGERY (CARDIOTHORACIC VASCULAR SURGERY)

## 2020-02-03 PROCEDURE — 74011250636 HC RX REV CODE- 250/636: Performed by: PHYSICIAN ASSISTANT

## 2020-02-03 PROCEDURE — 85025 COMPLETE CBC W/AUTO DIFF WBC: CPT

## 2020-02-03 PROCEDURE — 86900 BLOOD TYPING SEROLOGIC ABO: CPT

## 2020-02-03 PROCEDURE — 74011000250 HC RX REV CODE- 250: Performed by: THORACIC SURGERY (CARDIOTHORACIC VASCULAR SURGERY)

## 2020-02-03 PROCEDURE — 74011636637 HC RX REV CODE- 636/637: Performed by: THORACIC SURGERY (CARDIOTHORACIC VASCULAR SURGERY)

## 2020-02-03 PROCEDURE — P9047 ALBUMIN (HUMAN), 25%, 50ML: HCPCS | Performed by: THORACIC SURGERY (CARDIOTHORACIC VASCULAR SURGERY)

## 2020-02-03 PROCEDURE — 36415 COLL VENOUS BLD VENIPUNCTURE: CPT

## 2020-02-03 PROCEDURE — 74011250637 HC RX REV CODE- 250/637: Performed by: PHYSICIAN ASSISTANT

## 2020-02-03 PROCEDURE — 74011000258 HC RX REV CODE- 258: Performed by: INTERNAL MEDICINE

## 2020-02-03 PROCEDURE — 74011000250 HC RX REV CODE- 250: Performed by: PHYSICIAN ASSISTANT

## 2020-02-03 RX ORDER — POTASSIUM CHLORIDE 29.8 MG/ML
20 INJECTION INTRAVENOUS ONCE
Status: DISCONTINUED | OUTPATIENT
Start: 2020-02-04 | End: 2020-02-04

## 2020-02-03 RX ORDER — DOPAMINE HYDROCHLORIDE 320 MG/100ML
5-20 INJECTION, SOLUTION INTRAVENOUS
Status: DISCONTINUED | OUTPATIENT
Start: 2020-02-04 | End: 2020-02-04

## 2020-02-03 RX ORDER — PROTAMINE SULFATE 10 MG/ML
250 INJECTION, SOLUTION INTRAVENOUS
Status: DISCONTINUED | OUTPATIENT
Start: 2020-02-04 | End: 2020-02-04

## 2020-02-03 RX ORDER — SODIUM CHLORIDE 0.9 % (FLUSH) 0.9 %
5-40 SYRINGE (ML) INJECTION EVERY 8 HOURS
Status: DISCONTINUED | OUTPATIENT
Start: 2020-02-03 | End: 2020-02-03 | Stop reason: SDUPTHER

## 2020-02-03 RX ORDER — SODIUM CHLORIDE 0.9 % (FLUSH) 0.9 %
5-40 SYRINGE (ML) INJECTION AS NEEDED
Status: DISCONTINUED | OUTPATIENT
Start: 2020-02-03 | End: 2020-02-04

## 2020-02-03 RX ORDER — DESMOPRESSIN ACETATE 4 UG/ML
2 INJECTION, SOLUTION INTRAVENOUS; SUBCUTANEOUS ONCE
Status: DISCONTINUED | OUTPATIENT
Start: 2020-02-04 | End: 2020-02-04

## 2020-02-03 RX ORDER — MAGNESIUM SULFATE HEPTAHYDRATE 40 MG/ML
2 INJECTION, SOLUTION INTRAVENOUS ONCE
Status: DISCONTINUED | OUTPATIENT
Start: 2020-02-04 | End: 2020-02-04

## 2020-02-03 RX ORDER — SODIUM CHLORIDE 9 MG/ML
75 INJECTION, SOLUTION INTRAVENOUS CONTINUOUS
Status: DISCONTINUED | OUTPATIENT
Start: 2020-02-03 | End: 2020-02-04

## 2020-02-03 RX ORDER — MUPIROCIN 20 MG/G
1 OINTMENT TOPICAL 2 TIMES DAILY
Status: DISCONTINUED | OUTPATIENT
Start: 2020-02-03 | End: 2020-02-06

## 2020-02-03 RX ORDER — CHLORHEXIDINE GLUCONATE 1.2 MG/ML
15 RINSE ORAL EVERY 12 HOURS
Status: DISCONTINUED | OUTPATIENT
Start: 2020-02-03 | End: 2020-02-06

## 2020-02-03 RX ORDER — NOREPINEPHRINE BITARTRATE/D5W 8 MG/250ML
2-16 PLASTIC BAG, INJECTION (ML) INTRAVENOUS
Status: DISCONTINUED | OUTPATIENT
Start: 2020-02-04 | End: 2020-02-03

## 2020-02-03 RX ORDER — NITROGLYCERIN 20 MG/100ML
0-200 INJECTION INTRAVENOUS
Status: DISCONTINUED | OUTPATIENT
Start: 2020-02-04 | End: 2020-02-06

## 2020-02-03 RX ORDER — DOBUTAMINE HYDROCHLORIDE 200 MG/100ML
0-10 INJECTION INTRAVENOUS
Status: DISCONTINUED | OUTPATIENT
Start: 2020-02-04 | End: 2020-02-04

## 2020-02-03 RX ADMIN — EZETIMIBE 10 MG: 10 TABLET ORAL at 17:48

## 2020-02-03 RX ADMIN — TAMSULOSIN HYDROCHLORIDE 0.4 MG: 0.4 CAPSULE ORAL at 17:48

## 2020-02-03 RX ADMIN — CEFTRIAXONE 1 G: 1 INJECTION, POWDER, FOR SOLUTION INTRAMUSCULAR; INTRAVENOUS at 10:44

## 2020-02-03 RX ADMIN — CHLORHEXIDINE GLUCONATE 0.12% ORAL RINSE 15 ML: 1.2 LIQUID ORAL at 20:20

## 2020-02-03 RX ADMIN — DIPHENHYDRAMINE HYDROCHLORIDE 50 MG: 25 CAPSULE ORAL at 22:41

## 2020-02-03 RX ADMIN — MELATONIN TAB 3 MG 9 MG: 3 TAB at 22:42

## 2020-02-03 RX ADMIN — BENZONATATE 100 MG: 100 CAPSULE ORAL at 09:30

## 2020-02-03 RX ADMIN — METOPROLOL SUCCINATE 50 MG: 50 TABLET, EXTENDED RELEASE ORAL at 09:00

## 2020-02-03 RX ADMIN — ASPIRIN 81 MG: 81 TABLET ORAL at 08:00

## 2020-02-03 RX ADMIN — HEPARIN SODIUM 4000 UNITS: 5000 INJECTION INTRAVENOUS; SUBCUTANEOUS at 02:37

## 2020-02-03 RX ADMIN — Medication 10 ML: at 22:42

## 2020-02-03 RX ADMIN — AMIODARONE HYDROCHLORIDE 200 MG: 200 TABLET ORAL at 08:00

## 2020-02-03 RX ADMIN — GUAIFENESIN 600 MG: 600 TABLET, EXTENDED RELEASE ORAL at 20:20

## 2020-02-03 RX ADMIN — MUPIROCIN 1 G: 20 OINTMENT TOPICAL at 09:23

## 2020-02-03 RX ADMIN — BENZONATATE 100 MG: 100 CAPSULE ORAL at 20:20

## 2020-02-03 RX ADMIN — HEPARIN SODIUM 2000 UNITS: 5000 INJECTION INTRAVENOUS; SUBCUTANEOUS at 16:41

## 2020-02-03 RX ADMIN — Medication 10 ML: at 14:00

## 2020-02-03 RX ADMIN — ACETAMINOPHEN 650 MG: 325 TABLET ORAL at 22:41

## 2020-02-03 RX ADMIN — AMIODARONE HYDROCHLORIDE 200 MG: 200 TABLET ORAL at 17:48

## 2020-02-03 RX ADMIN — MUPIROCIN 1 G: 20 OINTMENT TOPICAL at 17:49

## 2020-02-03 RX ADMIN — SODIUM BICARBONATE 650 MG: 650 TABLET ORAL at 08:03

## 2020-02-03 RX ADMIN — HEPARIN SODIUM 2000 UNITS: 5000 INJECTION INTRAVENOUS; SUBCUTANEOUS at 09:21

## 2020-02-03 RX ADMIN — CHLORHEXIDINE GLUCONATE 0.12% ORAL RINSE 15 ML: 1.2 LIQUID ORAL at 09:23

## 2020-02-03 RX ADMIN — BENZONATATE 100 MG: 100 CAPSULE ORAL at 15:51

## 2020-02-03 RX ADMIN — SODIUM BICARBONATE 650 MG: 650 TABLET ORAL at 17:48

## 2020-02-03 RX ADMIN — GUAIFENESIN 600 MG: 600 TABLET, EXTENDED RELEASE ORAL at 08:00

## 2020-02-03 RX ADMIN — ZOLPIDEM TARTRATE 5 MG: 5 TABLET ORAL at 22:42

## 2020-02-03 RX ADMIN — HEPARIN SODIUM 21 UNITS/KG/HR: 10000 INJECTION, SOLUTION INTRAVENOUS at 16:41

## 2020-02-03 RX ADMIN — Medication 5 ML: at 05:24

## 2020-02-03 NOTE — PROGRESS NOTES
Reviewed cath film with Dr Fay Leslie . He has severe CAD     RCA is chronic occlusion , with minimal filling of a PL branch by collaterals. LM has and ulcerated plaque distal.    There is moderate impingement of the origin of the large Ramus , and the origin of the Cx itself. The LAD is totally occluded after the origin of a septal . There is no hint of flow in the LAD, and No collateral filling , other than a faint visualization at the apex on RCA injection. There is a stent that looks to be in the prox LAD and is occluded. Echo shows the anterior wall and apex to be Akinetic . EF ~ 35 - 40 % . Severe Aorto - Iliac disease. Previous repaired AAA , and has moderate distal Aorta AAA . Very tortuous iliacs , with moderate disease on the left . Cath was from the Left femoral and did access the coronaries . Attempted access from the right resulted in dissection of the R Iliac . Unable to access that side. Neither side would be acceptable for an Impella. CT scan shows diffuse aortic disease and he would be high risk for embolization if cross clamp of aorta and cardiopulmonary bypass. I don't see anything in the LAD and it is likely chronically occluded with the infarcted and akinetic anterior wall. It might be possible to do an off pump LIMA to Ramus/    He is a high risk candidate for any form of intervention, be it surgical or percutaneous. I do think we could offer some help with PCI of the Ramus and the Cx artery , with Impella support. This would probably by preferable to CABG given his risks for CABG . Would need subclavian Impella placed prior to PCI . Can proceed this week if he agrees.

## 2020-02-03 NOTE — PROGRESS NOTES
Update in plan after re-reviewing films with Dr. Mae Gallo and discussion with Dr. Keesha Newton: Will proceed with rt axillary Impella 5.0 Wednesday late morning followed by PCI to ramus and cx the following day. Discussed with patient and family.

## 2020-02-03 NOTE — PROGRESS NOTES
Cardiology Progress Note  2/3/2020     Admit Date: 1/26/2020  Admit Diagnosis: STEMI (ST elevation myocardial infarction) ([facility] [address] [I21.3]  CAD (coronary artery disease) [I25.10]  CC: none currently  Cardiac Assessment/Plan:   1) CAD:  Stent of unknown vessel at [facility] 2006. * followed by Dr. Sid Rose in 2014:  MPI reportedly showed inferior ischemia & cath recommended but not done. *Seen by Dr Rancho Martin in 2017: MPI rec but not done. Normal echo. *CP/TEIXEIRA 12/2019: Abnl PET (inf ischemia): pt delayed cath plan until 1/27/20. *Echo 1/10/20: EF 55%; AoScl only. Mild pulm HTN. *Cath 1/26/20: Severe LM w/ occluded LAD & RCA. CKp ; Trop . 2) HTN  3) dyslipidemia  4) open AAA repair 4/2017 (8 cm; Dr. Rogers Spence). 5) chronic BONNY occlusion. 6) right BG stroke on MRI 1/2014.  7) heavy tobacco use, 2-3 pack per day; no significant COPD on PFTs 4/2017  8) CKD:  Stage III (Cr 1.4-1.5/GFR 51 7/2019; Dr. Francisca Pace). Cr 1.5/GFR 45 1/21/20.  9) chronic anemia; hemoglobin 9.3: Chronic anemia related to CKD. 10) heme+ 2019:  Negative EGD 7/2019; internal hemorrhoids/diverticular disease on colonoscopy 9/2019. Retired ; from [place]. 2 ppd & knows he should quit. No recent ethanol; heavy 50 years ago. 2 caffeinated elie/day. No added salt. He reports no CP in the last month until day of admit. hold ACEi/ARB for now with CKD/recent contrast.     For other plans, see orders. Echo 1/27: Normal cavity size. Mild LVH; EF 40 - 45%. Mid-distal Ant-sept AK; Mild TR. Abd U/S 1/27: \"Mid abdominal aorta measures 4.0 cm in diameter, although the distal abdominal aorta, where the largest aneurysm was seen on prior CT, is obscured by overlying bowel gas\"    Carotid U/S 1/27: Chronic BONNY occlusion; There is mild stenosis in the left ICA (<50%). Bilateral vertebrals are antegrade. NICHOLAS 1/27: normal.    CTA 1/28: Chronically occluded right internal carotid artery.  Bilateral common and left internal carotid arteries remain patent without significant stenosis. Bilateral vertebral arteries are patent with mild atherosclerosis. The left vertebral artery arises from the aortic arch.     Relatively stable juxtarenal abdominal aortic aneurysmal dilatation. Sequelae of graft repair of infrarenal abdominal aortic aneurysm. There is minimal surrounding fat stranding without evidence for fluid collection or gas.     Increased size of the right common iliac artery aneurysm with mild hemorrhage in the right paracolic gutter and right hemipelvis. These findings were communicated to the nursing staff by the on-call radiologist.     Mild nonspecific right middle lobe and posterior right lower lobe airspace disease. Mild nonspecific colitis involving the ascending colon    1/28: Stable BP/HR; NSR; Cr unchanged at 1.45; CKp was 700s (down since then); trop from 45 to 20. No CP/dyspnea;   Remains on IV hep gtt; toprol XL 50 qday; NTG paste; lipitor; Add ACEi or ARB after surgery/prior to d/c.  Sputum c/w chronic bronchitis. CABG timing per Dr Reddy Dear. Extensive CTA orders noted; increased risk of FRANSISCO. 1/29: VSSAF; NSR; Cr 1.5 from 1.45; Hg 10.8 from 12.4 from 14.2. No CP/dyspnea; less cough/sputum. IV hep d/cd after CTA above; Remains on asa, toprol XL 50, NTG paste, lipitor; Add ACEi or ARB prior to d/c. CABG timing per Dr Reddy Dear; Vascular/retroperitoneal issues per Dr Jolly Chi. Benign cath sites. 1/30: VSSAF; NSR; Cr 1.58; Hg 10. No CP/dyspnea per se; cont cough/poor sleep: mucinex/ambien. Cardiac meds as above. Dr Carlito Pressley et al to see pt. Appreciate Dr Julienne Ortiz input on CT findings: no further Rx needed @ this time/prior to CABG. 1/31: VSSAF; NSR; Less cough/sputum. No new cardiac plans; awaiting CABG. Anemia related to CKD and dilution; some blood loss with cath. 2/1: VSSAF; NSR; Cont cough/less sputum; Cr 1.7. still getting ABx.   No new cardiac plans; awaiting CABG.     2/2: Angina this am: improving with NTG SL but not gone. No acute ecg changes. Starting IV NTG and hep gtt; If doesn't become pain-free, will need CABG. Labs pending. He reports intolerance to \"3 statins\": changed to zetia. He felt \"out of it\" with robitussin DM: back to just mucinex. 2/3: Remains angina-free since yesterday am; Remains on IV hep gtt and IV NTG gtt. Hg 10.1; Cr 1.6; PTT 33. No new cardiac plans; awaiting CABG. High complexity decision making was performed  X Yes   High-risk of decompensation with multiple organ involvement X Yes     ________________________________________________________________  @ NP OV 12/5/19:   Mr Abbe Berry has a h/o:  1) CAD:  Stent of unknown vessel at [facility] 2006. * followed by Dr. Francis North in 2014:  MPI reportedly showed inferior ischemia & cath recommended but not done. *Seen by Dr Enma Loyola in 2017: MPI rec but not done. Normal echo. *CP/TEIXEIRA 12/2019:  2) HTN  3) dyslipidemia  4) open AAA repair 4/2017 (8 cm; Dr. Kathleen Santos). 5) chronic BONNY occlusion. 6) right BG stroke on MRI 1/2014.  7) heavy tobacco use, 2-3 pack per day; no significant COPD on PFTs 4/2017  8) CKD:  Stage III (Cr 1.4-1.5/GFR 51 7/2019; Dr. Nicole Schulz). 9) chronic anemia; hemoglobin 9.3  10) heme+ 2019:  Negative EGD 7/2019; internal hemorrhoids/diverticular disease on colonoscopy 9/2019. Retired ; from [employer]. 2 ppd & knows he should quit. No recent ethanol; heavy 50 years ago. 2 caffeinated elie/day. No added salt. 12/2019:  New patient presenting with intermittent chest pain (6 months of worsening, 0-1 X/month;  15 minutes duration; central dull ache with radiation to both arms. Can occur with or without exertion. Increased dyspnea associated with discomfort). Chronic TEIXEIRA which is unchanged in years. No palpitations. No falling or bleeding. IMPRESSION AND PLAN  01.  Atherosclerotic heart disease of native coronary artery with other forms of angina pectoris (I27.237): Remote stent of unknown vessel; reportedly abnormal MPI 2014 without further evaluation. He needs a stress test but cannot walk on a treadmill. Therefore cardiac PET. We discussed the signs and symptoms of unstable angina, myocardial infarction and malignant arrhythmia. The patient knows to seek immediate medical attention should they occur. ECG done PET Cardiac test to be done. first available   02. Hyp hrt & chr kdny dis w/o hrt fail, w stg 1-4/unsp chr kdny (I13.10): This condition is stable. I have made no changes to the present regimen. 03. Mixed hyperlipidemia (E78.2): He should be on a statin unless previously intolerant; apparently had been on Lipitor in the past.   04. Occlusion and stenosis of bilateral carotid arteries (G10.97):  Chronic BONNY occlusion; he reports no recent follow-up. U/s ordered. Carotid Duplex to be done first available. 05. Abdominal aortic aneurysm, without rupture (I71.4):  Surgically treated in 2017; follow-up per Dr. Ernestine Cook. 06. Shortness of breath (R06.02):  ?  Cardiac; also has some element of underlying pulmonary disease & ongoing tobacco abuse. Echocardiogram warranted. 2-D w/CFD Echo to be done first available. 07. Chronic kidney disease, stage 3 (moderate) (N18.3):  Managed by: Renal   08. Cerebral infarction, unspecified (I63.9):  Managed by: Other Physician   09. Nicotine dependence, cigarettes, uncomplicated (S14.640): The patient was instructed on smoking cessation. 10. Body mass index (BMI) 33.0-33.9, adult (O53.34): The patient was instructed on AHA diet and regular exercise. ORDERS:  1 Tobacco cessation counseling   2 Dietary management education, guidance, and counseling   3 ECG done   4 PET Cardiac test to be done   5 Carotid Duplex   6 2-D w/ CFD Echocardiogram   7 Return office visit with Ana Maria Hdz. Benedict ANDREWS in 3 Months. 8 The patient was instructed on AHA diet and regular exercise.        12/5/19 MEDICATION LIST  Medication Sig Desc amlodipine 5 mg tablet take 1 tablet by oral route  every day   Aspirin Low Dose 81 mg tablet,delayed release take 1 tablet by oral route  every day   metoprolol succinate ER 25 mg tablet,extended release 24 hr take 1 tablet by oral route  every day   pantoprazole 40 mg tablet,delayed release take 1 tablet by oral route  every day   tamsulosin 0.4 mg capsule take 1 capsule by oral route  every day 1/2 hour following the same meal each day     _______________________________________________________________________  CARDIAC HISTORY  RISK FACTORS:  1 Hypertension   2 Tobacco Use: No/never       CARDIOVASCULAR PROCEDURES  Procedure Date Results   Carotid Duplex 05/02/2018 100% Right ICA, Less than 50% Left ICA, Vertebral: Bilateral Antegrade Flow, Unchanged versus 2017; at [facility]. Echo 04/03/2017 EF 55-60%; normal RV; no valve disease; normal PAp; by RCA at [facility]. EKG 12/05/2019 Sinus Rhythm, Borderline PRWP, otherwise unremarkable. PFTs 04/03/2017 No obstructive disease; moderately decreased DLCO; at [facility]. ACTIVE ALLERGIES:  Ingredient Reaction Comment   FENOFIBRATE Hives    ALBUTEROL SULFATE Unknown    ATORVASTATIN Leg cramp Atorvastatin         PAST MEDICAL/SURGICAL HISTORY  (Detailed)    Disease/disorder Onset Date Surgical History Date Comments   GERD       Hypertension         Family History  (Detailed)    SOCIAL HISTORY  (Detailed)  Tobacco use reviewed. Preferred language is [place]. Smoking status: Heavy tobacco smoker. SMOKING STATUS  Use Status Type Smoking Status Usage Per Day Years Used Total Pack Years   yes Cigarette Heavy tobacco smoker 2 Packs       TOBACCO CESSATION INFORMATION  Date Counseled By Order Status Description Code Tobacco Cessation Information   12/05/2019 Marcelo Browne Tobacco cessation counseling completed   Counseling about tobacco use (procedure)       Hospital problem list   Active Hospital Problems    Diagnosis Date Noted    [facility]     Priority: 1 - One    CAD (coronary artery disease)      2006 - s/p stent           Subjective: Marlon Leonardo reports less cough/sputum   Chest pain X none  consistent with:  Non-cardiac CP         Atypical CP     None now  On going  Anginal CP     Dyspnea X none  at rest  with exertion         improved  unchanged  worse              PND X none  overnight       Orthopnea X none  improved  unchanged  worse   Presyncope X none  improved  unchanged  worse     Ambulated in hallway without symptoms   Yes   Ambulated in room without symptoms  Yes   Objective:    Physical Exam:  Overall VSSAF;    Visit Vitals  /58   Pulse (!) 59   Temp 98.2 °F (36.8 °C)   Resp 18   Ht 5' 9\" (1.753 m)   Wt 105.9 kg (233 lb 7.5 oz)   SpO2 94%   BMI 34.48 kg/m²     Temp (24hrs), Av.2 °F (36.8 °C), Min:97.9 °F (36.6 °C), Max:98.6 °F (37 °C)    Patient Vitals for the past 8 hrs:   Pulse   20 0714 (!) 59   20 0601 60   20 0404 (!) 57   20 0205 (!) 55     Patient Vitals for the past 8 hrs:   Resp   20 0714 18   20 0205 18     Patient Vitals for the past 8 hrs:   BP   20 0714 136/58   20 0601 130/63   20 0404 127/60   20 0205 125/57       Intake/Output Summary (Last 24 hours) at 2/3/2020 0846  Last data filed at 2/3/2020 0000  Gross per 24 hour   Intake    Output 1100 ml   Net -1100 ml     General Appearance: Well developed, obsese, no acute distress. Ears/Nose/Mouth/Throat:   Normal MM; anicteric. JVP: WNL   Resp:   clear to auscultation bilaterally; Nl resp effort. Cardiovascular:  RRR, S1, S2 normal, no new murmur. No gallop or rub. Abdomen:   Soft, non-tender, bowel sounds are present. Extremities: No edema bilaterally. Skin:  Neuro: Warm and dry.   A/O x3, grossly nonfocal   cath sites intact w/o hematoma or new bruit; distal pulse unchanged x Yes   Data Review:     Telemetry independently reviewed x sinus  chronic afib parox afib  NSVT     ECG independently reviewed x NSR  afib x No change vs baseline  NSST-Tw chgs    no new ECG provided for review   Lab results reviewed as noted below. Current medications reviewed as noted below. No results for input(s): PH, PCO2, PO2 in the last 72 hours. No results for input(s): CPK, CKMB, CKNDX, TROIQ in the last 72 hours. Recent Labs     02/03/20  0203 02/03/20  0201 02/02/20  0908 02/01/20  0422   NA  --  138 139 140   K  --  4.4 4.1 4.4   CL  --  111* 110* 113*   CO2  --  20* 21 21   BUN  --  23* 24* 21*   CREA  --  1.64* 1.82* 1.71*   GFRAA  --  50* 44* 47*   GLU  --  114* 196* 107*   CA  --  8.7 8.5 8.8   WBC 6.9  --  6.6  --    HGB 10.1*  --  9.9*  --    HCT 31.3*  --  30.4*  --      --  210  --      Lab Results   Component Value Date/Time    Cholesterol, total 148 01/28/2020 12:58 AM    HDL Cholesterol 27 01/28/2020 12:58 AM    LDL, calculated 86.4 01/28/2020 12:58 AM    Triglyceride 173 (H) 01/28/2020 12:58 AM    CHOL/HDL Ratio 5.5 (H) 01/28/2020 12:58 AM     No results for input(s): SGOT, GPT, AP, TBIL, TP, ALB, GLOB, GGT, AML, LPSE in the last 72 hours.     No lab exists for component: AMYP, HLPSE  Recent Labs     02/03/20  0201 02/02/20  1810 02/02/20  1112   APTT 33.3* 29.5 27.3      No components found for: GLPOC    Current Facility-Administered Medications   Medication Dose Route Frequency    nitroglycerin (Tridil) 200 mcg/ml infusion  0-200 mcg/min IntraVENous TITRATE    ezetimibe (ZETIA) tablet 10 mg  10 mg Oral QPM    guaiFENesin ER (MUCINEX) tablet 600 mg  600 mg Oral Q12H    heparin 25,000 units in D5W 250 ml infusion  9-25 Units/kg/hr IntraVENous TITRATE    heparin (porcine) injection 4,000 Units  4,000 Units IntraVENous PRN    heparin (porcine) injection 2,000 Units  2,000 Units IntraVENous PRN    guaiFENesin-dextromethorphan (ROBITUSSIN DM) 100-10 mg/5 mL syrup 10 mL  10 mL Oral Q6H PRN    albuterol (PROVENTIL VENTOLIN) nebulizer solution 1.25 mg  1.25 mg Nebulization TID PRN    melatonin tablet 9 mg  9 mg Oral QHS    zolpidem (AMBIEN) tablet 5 mg  5 mg Oral QHS PRN    sodium bicarbonate tablet 650 mg  650 mg Oral BID    cefTRIAXone (ROCEPHIN) 1 g in 0.9% sodium chloride (MBP/ADV) 50 mL  1 g IntraVENous Q24H    diphenhydrAMINE (BENADRYL) capsule 50 mg  50 mg Oral QHS PRN    nicotine (NICODERM CQ) 7 mg/24 hr patch 1 Patch  1 Patch TransDERmal DAILY    metoprolol succinate (TOPROL-XL) XL tablet 50 mg  50 mg Oral DAILY    nitroglycerin (NITROBID) 2 % ointment 1 Inch  1 Inch Topical Q6H PRN    amiodarone (CORDARONE) tablet 200 mg  200 mg Oral BID    benzonatate (TESSALON) capsule 100 mg  100 mg Oral TID PRN    ondansetron (ZOFRAN) injection 4 mg  4 mg IntraVENous Q4H PRN    tamsulosin (FLOMAX) capsule 0.4 mg  0.4 mg Oral QPM    aspirin delayed-release tablet 81 mg  81 mg Oral DAILY    sodium chloride (NS) flush 5-40 mL  5-40 mL IntraVENous Q8H    sodium chloride (NS) flush 5-40 mL  5-40 mL IntraVENous PRN    acetaminophen (TYLENOL) tablet 650 mg  650 mg Oral Q4H PRN    nitroglycerin (NITROSTAT) tablet 0.4 mg  0.4 mg SubLINGual PRN        Micah Patel MD

## 2020-02-03 NOTE — PROGRESS NOTES
Yony Rivera         NAME:Marlon Leblanc  FR   [facility]:     Cr 1.6  Patient doesn't want me to see him today  Plan for CABG noted  D/w [address] [facility]  [facility]  [address], [address]  [place], [address] [place]  Phone - [phone]         Fax - [phone] [facility]  [address] [place] [address]  Phone - [phone]        Fax - [phone]     [email or web address]. [email or web address]

## 2020-02-03 NOTE — PROGRESS NOTES
TRANSITION OF CARE PLAN:     Plan A: Home with home health    CM spoke with pt concerning need for home health at discharge. Pt states that he would prefer if the CM spoke with his daughter, Tomás Schulte ([phone]). Pt states that he lives with his daughter, wife, and granddaughter and has strong family support. CM will continue to follow patient for discharge planning needs and arrange for services as deemed necessary.     Gerry Alexander, Care Manager  [phone]

## 2020-02-03 NOTE — PROGRESS NOTES
Problem: Pressure Injury - Risk of  Goal: *Prevention of pressure injury  Description  Document Aj Scale and appropriate interventions in the flowsheet.   Outcome: Progressing Towards Goal  Note: Pressure Injury Interventions:  Sensory Interventions: Assess changes in LOC    Moisture Interventions: Absorbent underpads    Activity Interventions: Increase time out of bed    Mobility Interventions: HOB 30 degrees or less, Pressure redistribution bed/mattress (bed type), PT/OT evaluation    Nutrition Interventions: Document food/fluid/supplement intake, Discuss nutritional consult with provider, Offer support with meals,snacks and hydration    Friction and Shear Interventions: HOB 30 degrees or less                Problem: Cath Lab Procedures: Discharge Outcomes  Goal: *Stable cardiac rhythm  Outcome: Progressing Towards Goal  Goal: *Hemodynamically stable  Outcome: Progressing Towards Goal  Goal: *Optimal pain control at patient's stated goal  Outcome: Progressing Towards Goal  Goal: *Pulses palpable, skin color within defined limits, skin temperature warm  Outcome: Progressing Towards Goal  Goal: *Lungs clear or at baseline  Outcome: Progressing Towards Goal  Goal: *Demonstrates ability to perform prescribed activity without shortness of breath or discomfort  Outcome: Progressing Towards Goal  Goal: *Verbalizes home exercise program, activity guidelines, cardiac precautions  Outcome: Progressing Towards Goal  Goal: *Verbalizes understanding and describes prescribed diet  Outcome: Progressing Towards Goal  Goal: *Verbalizes understanding and describes medication purposes and frequencies  Outcome: Progressing Towards Goal  Goal: *Identifies cardiac risk factors  Outcome: Progressing Towards Goal  Goal: *No signs and symptoms of infection or wound complications  Outcome: Progressing Towards Goal  Goal: *Anxiety reduced or absent  Outcome: Progressing Towards Goal  Goal: *Verbalizes and demonstrates incision care  Outcome: Progressing Towards Goal  Goal: *Understands and describes signs and symptoms to report to providers(Stroke Metric)  Outcome: Progressing Towards Goal  Goal: *Describes follow-up/return visits to physicians  Outcome: Progressing Towards Goal  Goal: *Describes available resources and support systems  Outcome: Progressing Towards Goal  Goal: *Influenza immunization  Outcome: Progressing Towards Goal  Goal: *Pneumococcal immunization  Outcome: Progressing Towards Goal

## 2020-02-03 NOTE — PROGRESS NOTES
0700: Bedside shift change report given to Mojgan Orozco (oncoming nurse) by Shantell Murray RN (offgoing nurse). Report included the following information SBAR, Kardex, Intake/Output and MAR.     0700: Patient in recliner, resting quietly. Call bell in reach, will monitor. 0745: Dr. John Moreno rounding on patient. Will plan for CABG tomorrow. 0845: Dr. Juan Calderon rounding on patient. Told Dr. Juan Calderon I had held his metoprolol this am due to HR of 58. HR now in high  60s-70, Dr. Juan Calderon said give metoprolol. 0393: ptt sent to the lab    0920: ptt resulted. Pharmacy was called and confirmed on heparin dosing. 9818: Patient ambulating the hallway with nurse and cane. Patient c/o slight hip pain that is not new. VSS.     0950: Confirmed with Dr. Oscar Davis, patent will have CABG tomorrow and to turn the heparin drip off at midnight tonight. 1415: Patient walking the lee. Tolerating well.    2505: Type and cross sent to the blood bank    1437: Patient's SBP >120. Nitro drip increased to 25mcg/min. 76 [phone]: Consents signed for CABG    1530: ptt sent to the lab    426 1660: ptt 44.4. Pharmacy called. Gave 2,000 unit bolus and increased heparin by 2 units. 1653: Dr. Juan Calderon at bedside discussing plan of care. Dr. John Moreno does not think CABG is the best option. Will most likely plan to send patient to OR tomorrow for Impella and then to the cath lab on Wednesday to fix blockages. 1722: Dr. John Moreno at bedside speaking with patient and family about Impella procedure. 1900: Bedside shift change report given to Vladimir Ledezma RN (oncoming nurse) by Ronald Mcbride RN (offgoing nurse). Report included the following information SBAR, Kardex, Intake/Output and MAR.

## 2020-02-03 NOTE — PROGRESS NOTES
RAPID RESPONSE TEAM-Follow Up    Rounded on patient due to recent rapid response for chest pain on 2/2/20. Discussed with primary RN, Dash Cancer. No acute concerns. No patient complaints. Vitals stable. MEWS 1. No RRT interventions indicated at this time. RN encouraged to call with any questions or concerns.     [facility], RN, BSN, CCRN  Rapid Response   Ext. [phone]

## 2020-02-03 NOTE — PROGRESS NOTES
Change in plan d/w Virginia Cornell and pt/wife. He is agreeable to proceed; They understand this will be an incomplete revascularization.

## 2020-02-04 ENCOUNTER — HOSPITAL ENCOUNTER (OUTPATIENT)
Dept: NON INVASIVE DIAGNOSTICS | Age: 75
Discharge: HOME | End: 2020-02-04
Attending: THORACIC SURGERY (CARDIOTHORACIC VASCULAR SURGERY)

## 2020-02-04 ENCOUNTER — ANESTHESIA (OUTPATIENT)
Dept: CARDIOTHORACIC SURGERY | Age: 75
DRG: 215 | End: 2020-02-04
Payer: MEDICARE

## 2020-02-04 ENCOUNTER — APPOINTMENT (OUTPATIENT)
Dept: GENERAL RADIOLOGY | Age: 75
DRG: 215 | End: 2020-02-04
Attending: PHYSICIAN ASSISTANT
Payer: MEDICARE

## 2020-02-04 ENCOUNTER — APPOINTMENT (OUTPATIENT)
Dept: GENERAL RADIOLOGY | Age: 75
DRG: 215 | End: 2020-02-04
Attending: THORACIC SURGERY (CARDIOTHORACIC VASCULAR SURGERY)
Payer: MEDICARE

## 2020-02-04 LAB
ACT BLD: 109 SECS (ref 79–138)
ACT BLD: 109 SECS (ref 79–138)
ACT BLD: 114 SECS (ref 79–138)
ACT BLD: 114 SECS (ref 79–138)
ACT BLD: 125 SECS (ref 79–138)
ACT BLD: 131 SECS (ref 79–138)
ACT BLD: 136 SECS (ref 79–138)
ALBUMIN SERPL-MCNC: 2.4 G/DL (ref 3.5–5)
ALBUMIN/GLOB SERPL: 0.6 {RATIO} (ref 1.1–2.2)
ALP SERPL-CCNC: 84 U/L (ref 45–117)
ALT SERPL-CCNC: 16 U/L (ref 12–78)
ANION GAP SERPL CALC-SCNC: 4 MMOL/L (ref 5–15)
ANION GAP SERPL CALC-SCNC: 5 MMOL/L (ref 5–15)
ANION GAP SERPL CALC-SCNC: 7 MMOL/L (ref 5–15)
APTT PPP: 29.4 SEC (ref 22.1–32)
ARTERIAL PATENCY WRIST A: YES
AST SERPL-CCNC: 17 U/L (ref 15–37)
BASE DEFICIT BLD-SCNC: 7 MMOL/L
BASOPHILS # BLD: 0 K/UL (ref 0–0.1)
BASOPHILS NFR BLD: 1 % (ref 0–1)
BDY SITE: ABNORMAL
BILIRUB SERPL-MCNC: 0.6 MG/DL (ref 0.2–1)
BUN SERPL-MCNC: 21 MG/DL (ref 6–20)
BUN SERPL-MCNC: 22 MG/DL (ref 6–20)
BUN SERPL-MCNC: 23 MG/DL (ref 6–20)
BUN/CREAT SERPL: 12 (ref 12–20)
BUN/CREAT SERPL: 12 (ref 12–20)
BUN/CREAT SERPL: 13 (ref 12–20)
CA-I BLD-SCNC: 1.22 MMOL/L (ref 1.12–1.32)
CALCIUM SERPL-MCNC: 8.3 MG/DL (ref 8.5–10.1)
CALCIUM SERPL-MCNC: 8.5 MG/DL (ref 8.5–10.1)
CALCIUM SERPL-MCNC: 9 MG/DL (ref 8.5–10.1)
CHLORIDE SERPL-SCNC: 111 MMOL/L (ref 97–108)
CHLORIDE SERPL-SCNC: 113 MMOL/L (ref 97–108)
CHLORIDE SERPL-SCNC: 114 MMOL/L (ref 97–108)
CO2 SERPL-SCNC: 20 MMOL/L (ref 21–32)
CO2 SERPL-SCNC: 21 MMOL/L (ref 21–32)
CO2 SERPL-SCNC: 22 MMOL/L (ref 21–32)
COHGB MFR BLD: 1.6 % (ref 1–2)
CREAT SERPL-MCNC: 1.75 MG/DL (ref 0.7–1.3)
CREAT SERPL-MCNC: 1.78 MG/DL (ref 0.7–1.3)
CREAT SERPL-MCNC: 1.84 MG/DL (ref 0.7–1.3)
DIFFERENTIAL METHOD BLD: ABNORMAL
EOSINOPHIL # BLD: 0.3 K/UL (ref 0–0.4)
EOSINOPHIL NFR BLD: 5 % (ref 0–7)
ERYTHROCYTE [DISTWIDTH] IN BLOOD BY AUTOMATED COUNT: 16.8 % (ref 11.5–14.5)
GAS FLOW.O2 O2 DELIVERY SYS: ABNORMAL L/MIN
GAS FLOW.O2 SETTING OXYMISER: 14 BPM
GLOBULIN SER CALC-MCNC: 4 G/DL (ref 2–4)
GLUCOSE BLD STRIP.AUTO-MCNC: 101 MG/DL (ref 65–100)
GLUCOSE SERPL-MCNC: 102 MG/DL (ref 65–100)
GLUCOSE SERPL-MCNC: 107 MG/DL (ref 65–100)
GLUCOSE SERPL-MCNC: 110 MG/DL (ref 65–100)
HCO3 BLD-SCNC: 19.6 MMOL/L (ref 22–26)
HCT VFR BLD AUTO: 27.8 % (ref 36.6–50.3)
HCT VFR BLD AUTO: 27.9 % (ref 36.6–50.3)
HGB BLD OXIMETRY-MCNC: 9.8 G/DL (ref 14–17)
HGB BLD-MCNC: 8.8 G/DL (ref 12.1–17)
HGB BLD-MCNC: 8.8 G/DL (ref 12.1–17)
IMM GRANULOCYTES # BLD AUTO: 0 K/UL (ref 0–0.04)
IMM GRANULOCYTES NFR BLD AUTO: 0 % (ref 0–0.5)
INR PPP: 1.1 (ref 0.9–1.1)
LYMPHOCYTES # BLD: 1 K/UL (ref 0.8–3.5)
LYMPHOCYTES NFR BLD: 19 % (ref 12–49)
MAGNESIUM SERPL-MCNC: 1.9 MG/DL (ref 1.6–2.4)
MAGNESIUM SERPL-MCNC: 2 MG/DL (ref 1.6–2.4)
MCH RBC QN AUTO: 29.1 PG (ref 26–34)
MCHC RBC AUTO-ENTMCNC: 31.5 G/DL (ref 30–36.5)
MCV RBC AUTO: 92.4 FL (ref 80–99)
METHGB MFR BLD: 0.4 % (ref 0–1.4)
MONOCYTES # BLD: 0.5 K/UL (ref 0–1)
MONOCYTES NFR BLD: 9 % (ref 5–13)
NEUTS SEG # BLD: 3.5 K/UL (ref 1.8–8)
NEUTS SEG NFR BLD: 66 % (ref 32–75)
NRBC # BLD: 0 K/UL (ref 0–0.01)
NRBC BLD-RTO: 0 PER 100 WBC
O2/TOTAL GAS SETTING VFR VENT: 50 %
OXYHGB MFR BLD: 64.7 % (ref 94–97)
PCO2 BLD: 39.2 MMHG (ref 35–45)
PEEP RESPIRATORY: 6 CMH2O
PH BLD: 7.31 [PH] (ref 7.35–7.45)
PLATELET # BLD AUTO: 205 K/UL (ref 150–400)
PMV BLD AUTO: 9.7 FL (ref 8.9–12.9)
PO2 BLD: 107 MMHG (ref 80–100)
POTASSIUM SERPL-SCNC: 4.1 MMOL/L (ref 3.5–5.1)
POTASSIUM SERPL-SCNC: 4.5 MMOL/L (ref 3.5–5.1)
POTASSIUM SERPL-SCNC: 4.7 MMOL/L (ref 3.5–5.1)
PROT SERPL-MCNC: 6.4 G/DL (ref 6.4–8.2)
PROTHROMBIN TIME: 11.6 SEC (ref 9–11.1)
RBC # BLD AUTO: 3.02 M/UL (ref 4.1–5.7)
SAO2 % BLD: 66 % (ref 95–99)
SAO2 % BLD: 98 % (ref 92–97)
SERVICE CMNT-IMP: ABNORMAL
SODIUM SERPL-SCNC: 138 MMOL/L (ref 136–145)
SODIUM SERPL-SCNC: 139 MMOL/L (ref 136–145)
SODIUM SERPL-SCNC: 140 MMOL/L (ref 136–145)
SPECIMEN TYPE: ABNORMAL
THERAPEUTIC RANGE,PTTT: NORMAL SECS (ref 58–77)
TOTAL RESP. RATE, ITRR: 14
VENTILATION MODE VENT: ABNORMAL
VT SETTING VENT: 550 ML
WBC # BLD AUTO: 5.2 K/UL (ref 4.1–11.1)

## 2020-02-04 PROCEDURE — 74011250637 HC RX REV CODE- 250/637: Performed by: INTERNAL MEDICINE

## 2020-02-04 PROCEDURE — 77030026906 HC PMP CARD IMPELLA 5 ABIM -L: Performed by: THORACIC SURGERY (CARDIOTHORACIC VASCULAR SURGERY)

## 2020-02-04 PROCEDURE — 65620000000 HC RM CCU GENERAL

## 2020-02-04 PROCEDURE — 74011000250 HC RX REV CODE- 250: Performed by: NURSE ANESTHETIST, CERTIFIED REGISTERED

## 2020-02-04 PROCEDURE — 77030010506 HC ADH BIOGLU CRYO -G: Performed by: THORACIC SURGERY (CARDIOTHORACIC VASCULAR SURGERY)

## 2020-02-04 PROCEDURE — 77030018673: Performed by: THORACIC SURGERY (CARDIOTHORACIC VASCULAR SURGERY)

## 2020-02-04 PROCEDURE — C9113 INJ PANTOPRAZOLE SODIUM, VIA: HCPCS | Performed by: PHYSICIAN ASSISTANT

## 2020-02-04 PROCEDURE — 82803 BLOOD GASES ANY COMBINATION: CPT

## 2020-02-04 PROCEDURE — 02HP32Z INSERTION OF MONITORING DEVICE INTO PULMONARY TRUNK, PERCUTANEOUS APPROACH: ICD-10-PCS | Performed by: ANESTHESIOLOGY

## 2020-02-04 PROCEDURE — 77030014008 HC SPNG HEMSTAT J&J -C: Performed by: THORACIC SURGERY (CARDIOTHORACIC VASCULAR SURGERY)

## 2020-02-04 PROCEDURE — 74011250636 HC RX REV CODE- 250/636: Performed by: ANESTHESIOLOGY

## 2020-02-04 PROCEDURE — 02HA3RZ INSERTION OF SHORT-TERM EXTERNAL HEART ASSIST SYSTEM INTO HEART, PERCUTANEOUS APPROACH: ICD-10-PCS | Performed by: THORACIC SURGERY (CARDIOTHORACIC VASCULAR SURGERY)

## 2020-02-04 PROCEDURE — 85347 COAGULATION TIME ACTIVATED: CPT

## 2020-02-04 PROCEDURE — 74011250637 HC RX REV CODE- 250/637: Performed by: PHYSICIAN ASSISTANT

## 2020-02-04 PROCEDURE — 77030002524 HC INSTR CLMP FGRTY EDWD -B: Performed by: THORACIC SURGERY (CARDIOTHORACIC VASCULAR SURGERY)

## 2020-02-04 PROCEDURE — 77030002996 HC SUT SLK J&J -A: Performed by: THORACIC SURGERY (CARDIOTHORACIC VASCULAR SURGERY)

## 2020-02-04 PROCEDURE — 77030019908 HC STETH ESOPH SIMS -A: Performed by: NURSE ANESTHETIST, CERTIFIED REGISTERED

## 2020-02-04 PROCEDURE — 83735 ASSAY OF MAGNESIUM: CPT

## 2020-02-04 PROCEDURE — 74011250636 HC RX REV CODE- 250/636: Performed by: THORACIC SURGERY (CARDIOTHORACIC VASCULAR SURGERY)

## 2020-02-04 PROCEDURE — C1769 GUIDE WIRE: HCPCS | Performed by: THORACIC SURGERY (CARDIOTHORACIC VASCULAR SURGERY)

## 2020-02-04 PROCEDURE — 94002 VENT MGMT INPAT INIT DAY: CPT

## 2020-02-04 PROCEDURE — 77030004549 HC CATH ANGI DX PRF MRTM -A: Performed by: THORACIC SURGERY (CARDIOTHORACIC VASCULAR SURGERY)

## 2020-02-04 PROCEDURE — 77030014491 HC PLEDG PTFE BARD -A: Performed by: THORACIC SURGERY (CARDIOTHORACIC VASCULAR SURGERY)

## 2020-02-04 PROCEDURE — 74011000250 HC RX REV CODE- 250: Performed by: PHYSICIAN ASSISTANT

## 2020-02-04 PROCEDURE — 85025 COMPLETE CBC W/AUTO DIFF WBC: CPT

## 2020-02-04 PROCEDURE — 76000 FLUOROSCOPY <1 HR PHYS/QHP: CPT

## 2020-02-04 PROCEDURE — 77030040392 HC DRSG OPTIFOAM MDII -A

## 2020-02-04 PROCEDURE — 77030018836 HC SOL IRR NACL ICUM -A: Performed by: THORACIC SURGERY (CARDIOTHORACIC VASCULAR SURGERY)

## 2020-02-04 PROCEDURE — 77030018846 HC SOL IRR STRL H20 ICUM -A: Performed by: THORACIC SURGERY (CARDIOTHORACIC VASCULAR SURGERY)

## 2020-02-04 PROCEDURE — 77030011220 HC DEV ELECSURG COVD -B: Performed by: THORACIC SURGERY (CARDIOTHORACIC VASCULAR SURGERY)

## 2020-02-04 PROCEDURE — 77030018729 HC ELECTRD DEFIB PAD CARD -B: Performed by: THORACIC SURGERY (CARDIOTHORACIC VASCULAR SURGERY)

## 2020-02-04 PROCEDURE — 77030005513 HC CATH URETH FOL11 MDII -B: Performed by: THORACIC SURGERY (CARDIOTHORACIC VASCULAR SURGERY)

## 2020-02-04 PROCEDURE — 77030041076 HC DRSG AG OPTICELL MDII -A: Performed by: THORACIC SURGERY (CARDIOTHORACIC VASCULAR SURGERY)

## 2020-02-04 PROCEDURE — 82962 GLUCOSE BLOOD TEST: CPT

## 2020-02-04 PROCEDURE — P9045 ALBUMIN (HUMAN), 5%, 250 ML: HCPCS | Performed by: PHYSICIAN ASSISTANT

## 2020-02-04 PROCEDURE — 77030026438 HC STYL ET INTUB CARD -A: Performed by: NURSE ANESTHETIST, CERTIFIED REGISTERED

## 2020-02-04 PROCEDURE — C1768 GRAFT, VASCULAR: HCPCS | Performed by: THORACIC SURGERY (CARDIOTHORACIC VASCULAR SURGERY)

## 2020-02-04 PROCEDURE — 77030008462 HC STPLR SKN PROX J&J -A: Performed by: THORACIC SURGERY (CARDIOTHORACIC VASCULAR SURGERY)

## 2020-02-04 PROCEDURE — 82375 ASSAY CARBOXYHB QUANT: CPT

## 2020-02-04 PROCEDURE — 74011250636 HC RX REV CODE- 250/636: Performed by: NURSE ANESTHETIST, CERTIFIED REGISTERED

## 2020-02-04 PROCEDURE — 80048 BASIC METABOLIC PNL TOTAL CA: CPT

## 2020-02-04 PROCEDURE — 85018 HEMOGLOBIN: CPT

## 2020-02-04 PROCEDURE — 85730 THROMBOPLASTIN TIME PARTIAL: CPT

## 2020-02-04 PROCEDURE — 80053 COMPREHEN METABOLIC PANEL: CPT

## 2020-02-04 PROCEDURE — 77030008771 HC TU NG SALEM SUMP -A: Performed by: NURSE ANESTHETIST, CERTIFIED REGISTERED

## 2020-02-04 PROCEDURE — 36415 COLL VENOUS BLD VENIPUNCTURE: CPT

## 2020-02-04 PROCEDURE — 74011000258 HC RX REV CODE- 258: Performed by: NURSE ANESTHETIST, CERTIFIED REGISTERED

## 2020-02-04 PROCEDURE — 77030005401 HC CATH RAD ARRO -A: Performed by: NURSE ANESTHETIST, CERTIFIED REGISTERED

## 2020-02-04 PROCEDURE — 77030002986 HC SUT PROL J&J -A: Performed by: THORACIC SURGERY (CARDIOTHORACIC VASCULAR SURGERY)

## 2020-02-04 PROCEDURE — 74011000258 HC RX REV CODE- 258: Performed by: PHYSICIAN ASSISTANT

## 2020-02-04 PROCEDURE — 77030010938 HC CLP LIG TELE -A: Performed by: THORACIC SURGERY (CARDIOTHORACIC VASCULAR SURGERY)

## 2020-02-04 PROCEDURE — 76060000036 HC ANESTHESIA 2.5 TO 3 HR: Performed by: THORACIC SURGERY (CARDIOTHORACIC VASCULAR SURGERY)

## 2020-02-04 PROCEDURE — 77030003029 HC SUT VCRL J&J -B: Performed by: THORACIC SURGERY (CARDIOTHORACIC VASCULAR SURGERY)

## 2020-02-04 PROCEDURE — 71045 X-RAY EXAM CHEST 1 VIEW: CPT

## 2020-02-04 PROCEDURE — 74011000250 HC RX REV CODE- 250: Performed by: THORACIC SURGERY (CARDIOTHORACIC VASCULAR SURGERY)

## 2020-02-04 PROCEDURE — 77030013798 HC KT TRNSDUC PRSSR EDWD -B: Performed by: NURSE ANESTHETIST, CERTIFIED REGISTERED

## 2020-02-04 PROCEDURE — B24BZZ4 ULTRASONOGRAPHY OF HEART WITH AORTA, TRANSESOPHAGEAL: ICD-10-PCS | Performed by: ANESTHESIOLOGY

## 2020-02-04 PROCEDURE — 74011250637 HC RX REV CODE- 250/637: Performed by: THORACIC SURGERY (CARDIOTHORACIC VASCULAR SURGERY)

## 2020-02-04 PROCEDURE — 74011250636 HC RX REV CODE- 250/636: Performed by: PHYSICIAN ASSISTANT

## 2020-02-04 PROCEDURE — 5A0221D ASSISTANCE WITH CARDIAC OUTPUT USING IMPELLER PUMP, CONTINUOUS: ICD-10-PCS | Performed by: THORACIC SURGERY (CARDIOTHORACIC VASCULAR SURGERY)

## 2020-02-04 PROCEDURE — 85610 PROTHROMBIN TIME: CPT

## 2020-02-04 PROCEDURE — C1751 CATH, INF, PER/CENT/MIDLINE: HCPCS | Performed by: NURSE ANESTHETIST, CERTIFIED REGISTERED

## 2020-02-04 PROCEDURE — 74011250636 HC RX REV CODE- 250/636

## 2020-02-04 PROCEDURE — 76010000109 HC CV SURG 2.5 TO 3 HR: Performed by: THORACIC SURGERY (CARDIOTHORACIC VASCULAR SURGERY)

## 2020-02-04 PROCEDURE — 77030008684 HC TU ET CUF COVD -B: Performed by: NURSE ANESTHETIST, CERTIFIED REGISTERED

## 2020-02-04 PROCEDURE — 74011000250 HC RX REV CODE- 250: Performed by: INTERNAL MEDICINE

## 2020-02-04 PROCEDURE — 77030010516 HC APPL HEMA CLP TELE -B: Performed by: THORACIC SURGERY (CARDIOTHORACIC VASCULAR SURGERY)

## 2020-02-04 DEVICE — Z DUP USE 2466785 GRAFT VASC L30CM DIA10MM THOR STR DBL VEL PASS SEW TB: Type: IMPLANTABLE DEVICE | Site: AXILLARY | Status: FUNCTIONAL

## 2020-02-04 RX ORDER — FENTANYL CITRATE 50 UG/ML
25 INJECTION, SOLUTION INTRAMUSCULAR; INTRAVENOUS
Status: DISCONTINUED | OUTPATIENT
Start: 2020-02-04 | End: 2020-02-06

## 2020-02-04 RX ORDER — SODIUM CHLORIDE 0.9 % (FLUSH) 0.9 %
5-40 SYRINGE (ML) INJECTION EVERY 8 HOURS
Status: DISCONTINUED | OUTPATIENT
Start: 2020-02-04 | End: 2020-02-08 | Stop reason: HOSPADM

## 2020-02-04 RX ORDER — SODIUM CHLORIDE 0.9 % (FLUSH) 0.9 %
5-40 SYRINGE (ML) INJECTION EVERY 8 HOURS
Status: DISCONTINUED | OUTPATIENT
Start: 2020-02-04 | End: 2020-02-06

## 2020-02-04 RX ORDER — SODIUM CHLORIDE 0.9 % (FLUSH) 0.9 %
5-40 SYRINGE (ML) INJECTION AS NEEDED
Status: DISCONTINUED | OUTPATIENT
Start: 2020-02-04 | End: 2020-02-06

## 2020-02-04 RX ORDER — HEPARIN SODIUM 5000 [USP'U]/100ML
4-20 INJECTION, SOLUTION INTRAVENOUS
Status: DISCONTINUED | OUTPATIENT
Start: 2020-02-04 | End: 2020-02-06

## 2020-02-04 RX ORDER — MIDAZOLAM HYDROCHLORIDE 1 MG/ML
1 INJECTION, SOLUTION INTRAMUSCULAR; INTRAVENOUS AS NEEDED
Status: COMPLETED | OUTPATIENT
Start: 2020-02-04 | End: 2020-02-04

## 2020-02-04 RX ORDER — SODIUM CHLORIDE 9 MG/ML
9 INJECTION, SOLUTION INTRAVENOUS CONTINUOUS
Status: DISCONTINUED | OUTPATIENT
Start: 2020-02-04 | End: 2020-02-06

## 2020-02-04 RX ORDER — EPHEDRINE SULFATE/0.9% NACL/PF 50 MG/5 ML
SYRINGE (ML) INTRAVENOUS AS NEEDED
Status: DISCONTINUED | OUTPATIENT
Start: 2020-02-04 | End: 2020-02-04 | Stop reason: HOSPADM

## 2020-02-04 RX ORDER — NALOXONE HYDROCHLORIDE 0.4 MG/ML
0.4 INJECTION, SOLUTION INTRAMUSCULAR; INTRAVENOUS; SUBCUTANEOUS AS NEEDED
Status: DISCONTINUED | OUTPATIENT
Start: 2020-02-04 | End: 2020-02-06

## 2020-02-04 RX ORDER — GUAIFENESIN 100 MG/5ML
81 LIQUID (ML) ORAL DAILY
Status: DISCONTINUED | OUTPATIENT
Start: 2020-02-05 | End: 2020-02-08 | Stop reason: HOSPADM

## 2020-02-04 RX ORDER — POLYETHYLENE GLYCOL 3350 17 G/17G
34 POWDER, FOR SOLUTION ORAL DAILY
Status: DISCONTINUED | OUTPATIENT
Start: 2020-02-05 | End: 2020-02-08 | Stop reason: HOSPADM

## 2020-02-04 RX ORDER — ROCURONIUM BROMIDE 10 MG/ML
INJECTION, SOLUTION INTRAVENOUS AS NEEDED
Status: DISCONTINUED | OUTPATIENT
Start: 2020-02-04 | End: 2020-02-04 | Stop reason: HOSPADM

## 2020-02-04 RX ORDER — PROPOFOL 10 MG/ML
INJECTION, EMULSION INTRAVENOUS
Status: DISCONTINUED | OUTPATIENT
Start: 2020-02-04 | End: 2020-02-04 | Stop reason: HOSPADM

## 2020-02-04 RX ORDER — MORPHINE SULFATE 2 MG/ML
1-2 INJECTION, SOLUTION INTRAMUSCULAR; INTRAVENOUS
Status: DISCONTINUED | OUTPATIENT
Start: 2020-02-04 | End: 2020-02-06

## 2020-02-04 RX ORDER — SODIUM CHLORIDE 450 MG/100ML
10 INJECTION, SOLUTION INTRAVENOUS CONTINUOUS
Status: DISCONTINUED | OUTPATIENT
Start: 2020-02-04 | End: 2020-02-06

## 2020-02-04 RX ORDER — CHLORHEXIDINE GLUCONATE 1.2 MG/ML
10 RINSE ORAL 2 TIMES DAILY
Status: DISCONTINUED | OUTPATIENT
Start: 2020-02-04 | End: 2020-02-06

## 2020-02-04 RX ORDER — SODIUM CHLORIDE, SODIUM LACTATE, POTASSIUM CHLORIDE, CALCIUM CHLORIDE 600; 310; 30; 20 MG/100ML; MG/100ML; MG/100ML; MG/100ML
75 INJECTION, SOLUTION INTRAVENOUS CONTINUOUS
Status: DISCONTINUED | OUTPATIENT
Start: 2020-02-04 | End: 2020-02-04

## 2020-02-04 RX ORDER — FENTANYL CITRATE 50 UG/ML
50 INJECTION, SOLUTION INTRAMUSCULAR; INTRAVENOUS AS NEEDED
Status: COMPLETED | OUTPATIENT
Start: 2020-02-04 | End: 2020-02-04

## 2020-02-04 RX ORDER — LANOLIN ALCOHOL/MO/W.PET/CERES
400 CREAM (GRAM) TOPICAL 2 TIMES DAILY
Status: DISCONTINUED | OUTPATIENT
Start: 2020-02-05 | End: 2020-02-06

## 2020-02-04 RX ORDER — DOBUTAMINE HYDROCHLORIDE 200 MG/100ML
2.5-1 INJECTION INTRAVENOUS
Status: DISCONTINUED | OUTPATIENT
Start: 2020-02-04 | End: 2020-02-06

## 2020-02-04 RX ORDER — MORPHINE SULFATE 10 MG/ML
2 INJECTION, SOLUTION INTRAMUSCULAR; INTRAVENOUS
Status: DISCONTINUED | OUTPATIENT
Start: 2020-02-04 | End: 2020-02-06

## 2020-02-04 RX ORDER — MIDAZOLAM HYDROCHLORIDE 1 MG/ML
1 INJECTION, SOLUTION INTRAMUSCULAR; INTRAVENOUS AS NEEDED
Status: DISCONTINUED | OUTPATIENT
Start: 2020-02-04 | End: 2020-02-04

## 2020-02-04 RX ORDER — DIPHENHYDRAMINE HYDROCHLORIDE 50 MG/ML
12.5 INJECTION, SOLUTION INTRAMUSCULAR; INTRAVENOUS AS NEEDED
Status: ACTIVE | OUTPATIENT
Start: 2020-02-04 | End: 2020-02-04

## 2020-02-04 RX ORDER — MAGNESIUM SULFATE 1 G/100ML
1 INJECTION INTRAVENOUS ONCE
Status: COMPLETED | OUTPATIENT
Start: 2020-02-04 | End: 2020-02-04

## 2020-02-04 RX ORDER — HEPARIN SODIUM 10000 [USP'U]/100ML
500-2000 INJECTION, SOLUTION INTRAVENOUS
Status: DISCONTINUED | OUTPATIENT
Start: 2020-02-04 | End: 2020-02-06

## 2020-02-04 RX ORDER — SODIUM CHLORIDE 0.9 % (FLUSH) 0.9 %
5-40 SYRINGE (ML) INJECTION AS NEEDED
Status: DISCONTINUED | OUTPATIENT
Start: 2020-02-04 | End: 2020-02-08 | Stop reason: HOSPADM

## 2020-02-04 RX ORDER — MAGNESIUM SULFATE 1 G/100ML
1 INJECTION INTRAVENOUS DAILY
Status: DISCONTINUED | OUTPATIENT
Start: 2020-02-05 | End: 2020-02-06

## 2020-02-04 RX ORDER — ACETAMINOPHEN 10 MG/ML
1000 INJECTION, SOLUTION INTRAVENOUS EVERY 6 HOURS
Status: COMPLETED | OUTPATIENT
Start: 2020-02-04 | End: 2020-02-05

## 2020-02-04 RX ORDER — WATER FOR INJECTION,STERILE
VIAL (ML) INJECTION
Status: DISPENSED
Start: 2020-02-04 | End: 2020-02-05

## 2020-02-04 RX ORDER — SODIUM CHLORIDE 9 MG/ML
50 INJECTION, SOLUTION INTRAVENOUS CONTINUOUS
Status: DISCONTINUED | OUTPATIENT
Start: 2020-02-04 | End: 2020-02-04

## 2020-02-04 RX ORDER — ALBUMIN HUMAN 50 G/1000ML
12.5 SOLUTION INTRAVENOUS
Status: DISCONTINUED | OUTPATIENT
Start: 2020-02-04 | End: 2020-02-06

## 2020-02-04 RX ORDER — ONDANSETRON 2 MG/ML
4 INJECTION INTRAMUSCULAR; INTRAVENOUS
Status: DISCONTINUED | OUTPATIENT
Start: 2020-02-04 | End: 2020-02-08 | Stop reason: HOSPADM

## 2020-02-04 RX ORDER — SODIUM CHLORIDE, SODIUM LACTATE, POTASSIUM CHLORIDE, CALCIUM CHLORIDE 600; 310; 30; 20 MG/100ML; MG/100ML; MG/100ML; MG/100ML
75 INJECTION, SOLUTION INTRAVENOUS CONTINUOUS
Status: DISCONTINUED | OUTPATIENT
Start: 2020-02-04 | End: 2020-02-06

## 2020-02-04 RX ORDER — SODIUM CHLORIDE 9 MG/ML
50 INJECTION, SOLUTION INTRAVENOUS CONTINUOUS
Status: DISCONTINUED | OUTPATIENT
Start: 2020-02-04 | End: 2020-02-06

## 2020-02-04 RX ORDER — PROPOFOL 10 MG/ML
0-50 VIAL (ML) INTRAVENOUS
Status: DISCONTINUED | OUTPATIENT
Start: 2020-02-04 | End: 2020-02-06

## 2020-02-04 RX ORDER — BUPIVACAINE HYDROCHLORIDE 5 MG/ML
30 INJECTION, SOLUTION EPIDURAL; INTRACAUDAL ONCE
Status: COMPLETED | OUTPATIENT
Start: 2020-02-04 | End: 2020-02-04

## 2020-02-04 RX ORDER — LIDOCAINE HYDROCHLORIDE 20 MG/ML
INJECTION, SOLUTION EPIDURAL; INFILTRATION; INTRACAUDAL; PERINEURAL AS NEEDED
Status: DISCONTINUED | OUTPATIENT
Start: 2020-02-04 | End: 2020-02-04 | Stop reason: HOSPADM

## 2020-02-04 RX ORDER — OXYCODONE HYDROCHLORIDE 5 MG/1
5 TABLET ORAL
Status: DISCONTINUED | OUTPATIENT
Start: 2020-02-04 | End: 2020-02-06

## 2020-02-04 RX ORDER — SODIUM CHLORIDE, SODIUM LACTATE, POTASSIUM CHLORIDE, CALCIUM CHLORIDE 600; 310; 30; 20 MG/100ML; MG/100ML; MG/100ML; MG/100ML
25 INJECTION, SOLUTION INTRAVENOUS CONTINUOUS
Status: DISCONTINUED | OUTPATIENT
Start: 2020-02-04 | End: 2020-02-04

## 2020-02-04 RX ORDER — PANTOPRAZOLE SODIUM 40 MG/1
40 TABLET, DELAYED RELEASE ORAL
Status: DISCONTINUED | OUTPATIENT
Start: 2020-02-05 | End: 2020-02-08 | Stop reason: HOSPADM

## 2020-02-04 RX ORDER — ACETAMINOPHEN 325 MG/1
650 TABLET ORAL
Status: DISCONTINUED | OUTPATIENT
Start: 2020-02-04 | End: 2020-02-04

## 2020-02-04 RX ORDER — PHENYLEPHRINE HCL IN 0.9% NACL 30MG/250ML
10-100 PLASTIC BAG, INJECTION (ML) INTRAVENOUS
Status: DISCONTINUED | OUTPATIENT
Start: 2020-02-04 | End: 2020-02-06

## 2020-02-04 RX ORDER — SODIUM BICARBONATE 650 MG/1
650 TABLET ORAL 2 TIMES DAILY
Status: DISCONTINUED | OUTPATIENT
Start: 2020-02-04 | End: 2020-02-08 | Stop reason: HOSPADM

## 2020-02-04 RX ORDER — OXYCODONE AND ACETAMINOPHEN 5; 325 MG/1; MG/1
1 TABLET ORAL AS NEEDED
Status: DISCONTINUED | OUTPATIENT
Start: 2020-02-04 | End: 2020-02-06

## 2020-02-04 RX ORDER — ACETAMINOPHEN 325 MG/1
650 TABLET ORAL
Status: DISCONTINUED | OUTPATIENT
Start: 2020-02-05 | End: 2020-02-08 | Stop reason: HOSPADM

## 2020-02-04 RX ORDER — HYDROMORPHONE HYDROCHLORIDE 1 MG/ML
0.2 INJECTION, SOLUTION INTRAMUSCULAR; INTRAVENOUS; SUBCUTANEOUS
Status: DISCONTINUED | OUTPATIENT
Start: 2020-02-04 | End: 2020-02-06

## 2020-02-04 RX ORDER — CEFAZOLIN SODIUM 1 G/3ML
INJECTION, POWDER, FOR SOLUTION INTRAMUSCULAR; INTRAVENOUS AS NEEDED
Status: DISCONTINUED | OUTPATIENT
Start: 2020-02-04 | End: 2020-02-06

## 2020-02-04 RX ORDER — ONDANSETRON 2 MG/ML
4 INJECTION INTRAMUSCULAR; INTRAVENOUS AS NEEDED
Status: DISCONTINUED | OUTPATIENT
Start: 2020-02-04 | End: 2020-02-06

## 2020-02-04 RX ORDER — HEPARIN SODIUM 1000 [USP'U]/ML
2000 INJECTION, SOLUTION INTRAVENOUS; SUBCUTANEOUS ONCE
Status: DISCONTINUED | OUTPATIENT
Start: 2020-02-04 | End: 2020-02-04

## 2020-02-04 RX ORDER — SUCCINYLCHOLINE CHLORIDE 20 MG/ML
INJECTION INTRAMUSCULAR; INTRAVENOUS AS NEEDED
Status: DISCONTINUED | OUTPATIENT
Start: 2020-02-04 | End: 2020-02-04 | Stop reason: HOSPADM

## 2020-02-04 RX ORDER — ALBUTEROL SULFATE 0.83 MG/ML
2.5 SOLUTION RESPIRATORY (INHALATION)
Status: DISCONTINUED | OUTPATIENT
Start: 2020-02-04 | End: 2020-02-08 | Stop reason: HOSPADM

## 2020-02-04 RX ORDER — MIDAZOLAM HYDROCHLORIDE 1 MG/ML
0.5 INJECTION, SOLUTION INTRAMUSCULAR; INTRAVENOUS
Status: DISCONTINUED | OUTPATIENT
Start: 2020-02-04 | End: 2020-02-06

## 2020-02-04 RX ORDER — SODIUM CHLORIDE 9 MG/ML
INJECTION, SOLUTION INTRAVENOUS
Status: DISCONTINUED | OUTPATIENT
Start: 2020-02-04 | End: 2020-02-04 | Stop reason: HOSPADM

## 2020-02-04 RX ORDER — CALCIUM CHLORIDE INJECTION 100 MG/ML
1 INJECTION, SOLUTION INTRAVENOUS AS NEEDED
Status: DISCONTINUED | OUTPATIENT
Start: 2020-02-04 | End: 2020-02-06

## 2020-02-04 RX ORDER — HEPARIN SODIUM 5000 [USP'U]/ML
INJECTION, SOLUTION INTRAVENOUS; SUBCUTANEOUS AS NEEDED
Status: DISCONTINUED | OUTPATIENT
Start: 2020-02-04 | End: 2020-02-04 | Stop reason: HOSPADM

## 2020-02-04 RX ORDER — CEFAZOLIN SODIUM 1 G/3ML
INJECTION, POWDER, FOR SOLUTION INTRAMUSCULAR; INTRAVENOUS AS NEEDED
Status: DISCONTINUED | OUTPATIENT
Start: 2020-02-04 | End: 2020-02-04 | Stop reason: HOSPADM

## 2020-02-04 RX ORDER — LIDOCAINE HYDROCHLORIDE 10 MG/ML
0.1 INJECTION, SOLUTION EPIDURAL; INFILTRATION; INTRACAUDAL; PERINEURAL AS NEEDED
Status: DISCONTINUED | OUTPATIENT
Start: 2020-02-04 | End: 2020-02-04

## 2020-02-04 RX ORDER — AMOXICILLIN 250 MG
1 CAPSULE ORAL DAILY
Status: DISCONTINUED | OUTPATIENT
Start: 2020-02-05 | End: 2020-02-06

## 2020-02-04 RX ORDER — PROPOFOL 10 MG/ML
INJECTION, EMULSION INTRAVENOUS AS NEEDED
Status: DISCONTINUED | OUTPATIENT
Start: 2020-02-04 | End: 2020-02-04 | Stop reason: HOSPADM

## 2020-02-04 RX ADMIN — TICAGRELOR 90 MG: 90 TABLET ORAL at 18:27

## 2020-02-04 RX ADMIN — Medication 10 ML: at 06:00

## 2020-02-04 RX ADMIN — FENTANYL CITRATE 50 MCG: 50 INJECTION, SOLUTION INTRAMUSCULAR; INTRAVENOUS at 10:06

## 2020-02-04 RX ADMIN — LIDOCAINE HYDROCHLORIDE 60 MG: 20 INJECTION, SOLUTION EPIDURAL; INFILTRATION; INTRACAUDAL; PERINEURAL at 13:23

## 2020-02-04 RX ADMIN — SODIUM CHLORIDE, SODIUM LACTATE, POTASSIUM CHLORIDE, AND CALCIUM CHLORIDE 25 ML/HR: 600; 310; 30; 20 INJECTION, SOLUTION INTRAVENOUS at 09:26

## 2020-02-04 RX ADMIN — SODIUM BICARBONATE 650 MG: 650 TABLET ORAL at 19:36

## 2020-02-04 RX ADMIN — GUAIFENESIN 600 MG: 600 TABLET, EXTENDED RELEASE ORAL at 20:36

## 2020-02-04 RX ADMIN — ACETAMINOPHEN 1000 MG: 10 INJECTION, SOLUTION INTRAVENOUS at 18:02

## 2020-02-04 RX ADMIN — WATER 2 G: 1 INJECTION INTRAMUSCULAR; INTRAVENOUS; SUBCUTANEOUS at 20:41

## 2020-02-04 RX ADMIN — METOPROLOL SUCCINATE 50 MG: 50 TABLET, EXTENDED RELEASE ORAL at 08:17

## 2020-02-04 RX ADMIN — Medication 10 MG: at 14:30

## 2020-02-04 RX ADMIN — NITROGLYCERIN 30 MCG/MIN: 20 INJECTION INTRAVENOUS at 07:35

## 2020-02-04 RX ADMIN — PROPOFOL 150 MG: 10 INJECTION, EMULSION INTRAVENOUS at 13:23

## 2020-02-04 RX ADMIN — Medication 10 ML: at 22:16

## 2020-02-04 RX ADMIN — ROCURONIUM BROMIDE 20 MG: 10 INJECTION INTRAVENOUS at 15:12

## 2020-02-04 RX ADMIN — Medication 10 ML: at 18:15

## 2020-02-04 RX ADMIN — DEXMEDETOMIDINE HYDROCHLORIDE 0.5 MCG: 100 INJECTION, SOLUTION, CONCENTRATE INTRAVENOUS at 15:45

## 2020-02-04 RX ADMIN — MIDAZOLAM HYDROCHLORIDE 1 MG: 1 INJECTION INTRAMUSCULAR; INTRAVENOUS at 10:07

## 2020-02-04 RX ADMIN — Medication 10 MCG/MIN: at 18:40

## 2020-02-04 RX ADMIN — SODIUM CHLORIDE 40 MG: 9 INJECTION INTRAMUSCULAR; INTRAVENOUS; SUBCUTANEOUS at 18:02

## 2020-02-04 RX ADMIN — FENTANYL CITRATE 50 MCG: 50 INJECTION, SOLUTION INTRAMUSCULAR; INTRAVENOUS at 14:41

## 2020-02-04 RX ADMIN — MUPIROCIN 1 G: 20 OINTMENT TOPICAL at 18:02

## 2020-02-04 RX ADMIN — ROCURONIUM BROMIDE 20 MG: 10 INJECTION INTRAVENOUS at 14:33

## 2020-02-04 RX ADMIN — DOBUTAMINE IN DEXTROSE 1 MCG/KG/MIN: 200 INJECTION, SOLUTION INTRAVENOUS at 20:00

## 2020-02-04 RX ADMIN — MIDAZOLAM HYDROCHLORIDE 1 MG: 1 INJECTION INTRAMUSCULAR; INTRAVENOUS at 10:01

## 2020-02-04 RX ADMIN — FENTANYL CITRATE 50 MCG: 50 INJECTION, SOLUTION INTRAMUSCULAR; INTRAVENOUS at 14:46

## 2020-02-04 RX ADMIN — BUPIVACAINE HYDROCHLORIDE 150 MG: 5 INJECTION, SOLUTION EPIDURAL; INTRACAUDAL; PERINEURAL at 15:00

## 2020-02-04 RX ADMIN — Medication 10 MG: at 14:27

## 2020-02-04 RX ADMIN — SODIUM CHLORIDE 9 ML/HR: 900 INJECTION, SOLUTION INTRAVENOUS at 17:17

## 2020-02-04 RX ADMIN — ROCURONIUM BROMIDE 40 MG: 10 INJECTION INTRAVENOUS at 13:41

## 2020-02-04 RX ADMIN — ACETAMINOPHEN 1000 MG: 10 INJECTION, SOLUTION INTRAVENOUS at 22:12

## 2020-02-04 RX ADMIN — ALBUMIN (HUMAN) 12.5 G: 12.5 INJECTION, SOLUTION INTRAVENOUS at 22:00

## 2020-02-04 RX ADMIN — PROPOFOL 50 MCG/KG/MIN: 10 INJECTION, EMULSION INTRAVENOUS at 16:00

## 2020-02-04 RX ADMIN — DEXMEDETOMIDINE HYDROCHLORIDE 0.6 MCG/KG/HR: 100 INJECTION, SOLUTION, CONCENTRATE INTRAVENOUS at 17:58

## 2020-02-04 RX ADMIN — ROCURONIUM BROMIDE 20 MG: 10 INJECTION INTRAVENOUS at 14:08

## 2020-02-04 RX ADMIN — ROCURONIUM BROMIDE 10 MG: 10 INJECTION INTRAVENOUS at 13:23

## 2020-02-04 RX ADMIN — MIDAZOLAM HYDROCHLORIDE 1 MG: 1 INJECTION INTRAMUSCULAR; INTRAVENOUS at 09:54

## 2020-02-04 RX ADMIN — CHLORHEXIDINE GLUCONATE 0.12% ORAL RINSE 15 ML: 1.2 LIQUID ORAL at 20:42

## 2020-02-04 RX ADMIN — FENTANYL CITRATE 50 MCG: 50 INJECTION, SOLUTION INTRAMUSCULAR; INTRAVENOUS at 09:42

## 2020-02-04 RX ADMIN — Medication 10 ML: at 18:16

## 2020-02-04 RX ADMIN — HEPARIN SODIUM 5000 UNITS: 5000 INJECTION, SOLUTION INTRAVENOUS; SUBCUTANEOUS at 14:48

## 2020-02-04 RX ADMIN — SUCCINYLCHOLINE CHLORIDE 140 MG: 20 INJECTION, SOLUTION INTRAMUSCULAR; INTRAVENOUS at 13:23

## 2020-02-04 RX ADMIN — CEFAZOLIN 2 G: 1 INJECTION, POWDER, FOR SOLUTION INTRAMUSCULAR; INTRAVENOUS; PARENTERAL at 14:05

## 2020-02-04 RX ADMIN — Medication 10 ML: at 22:15

## 2020-02-04 RX ADMIN — CALCIUM CHLORIDE 1 G: 100 INJECTION INTRAVENOUS; INTRAVENTRICULAR at 18:45

## 2020-02-04 RX ADMIN — Medication 10 MG: at 15:34

## 2020-02-04 RX ADMIN — TAMSULOSIN HYDROCHLORIDE 0.4 MG: 0.4 CAPSULE ORAL at 18:02

## 2020-02-04 RX ADMIN — MAGNESIUM SULFATE HEPTAHYDRATE 1 G: 1 INJECTION, SOLUTION INTRAVENOUS at 22:28

## 2020-02-04 RX ADMIN — PROPOFOL 35 MCG/KG/MIN: 10 INJECTION, EMULSION INTRAVENOUS at 18:44

## 2020-02-04 RX ADMIN — Medication 3 AMPULE: at 09:26

## 2020-02-04 RX ADMIN — EZETIMIBE 10 MG: 10 TABLET ORAL at 18:02

## 2020-02-04 RX ADMIN — SODIUM CHLORIDE 75 ML/HR: 900 INJECTION, SOLUTION INTRAVENOUS at 00:26

## 2020-02-04 RX ADMIN — SODIUM CHLORIDE: 9 INJECTION, SOLUTION INTRAVENOUS at 13:36

## 2020-02-04 RX ADMIN — HEPARIN SODIUM 4 ML/HR: 5000 INJECTION, SOLUTION INTRAVENOUS at 18:55

## 2020-02-04 RX ADMIN — SODIUM CHLORIDE, POTASSIUM CHLORIDE, SODIUM LACTATE AND CALCIUM CHLORIDE: 600; 310; 30; 20 INJECTION, SOLUTION INTRAVENOUS at 12:56

## 2020-02-04 RX ADMIN — PHENYLEPHRINE HYDROCHLORIDE 40 MCG: 10 INJECTION INTRAVENOUS at 14:27

## 2020-02-04 RX ADMIN — PROPOFOL 40 MCG/KG/MIN: 10 INJECTION, EMULSION INTRAVENOUS at 22:11

## 2020-02-04 RX ADMIN — SODIUM CHLORIDE 50 ML/HR: 900 INJECTION, SOLUTION INTRAVENOUS at 17:18

## 2020-02-04 RX ADMIN — ACETAMINOPHEN 650 MG: 325 TABLET ORAL at 07:35

## 2020-02-04 RX ADMIN — PROPOFOL 25 MCG/KG/MIN: 10 INJECTION, EMULSION INTRAVENOUS at 18:35

## 2020-02-04 RX ADMIN — MIDAZOLAM HYDROCHLORIDE 2 MG: 1 INJECTION INTRAMUSCULAR; INTRAVENOUS at 09:41

## 2020-02-04 NOTE — PROGRESS NOTES
2/4/2020    INTENSIVIST PROGRESS NOTE:     Patient seen and evaluated, chart reviewed   67 yo male with copd, severe CAD, CKD scheduled for cabg today  But pt instead underwent impella catheter placement  Now pt in CCU sedated, intubated    ROS: unable to obtain    Visit Vitals  /74   Pulse 74   Temp 98.1 °F (36.7 °C)   Resp 14   Ht 5' 9\" (1.753 m)   Wt 105.9 kg (233 lb 7.5 oz)   SpO2 99%   BMI 34.48 kg/m²       General: sedated, intubated  Eyes: anicteric  HEENT: ETT in place  Neck: FROM  CV: RRR  Lungs: clear  Abd: soft  : no flank pain  Ext: no  edema  Skin: no rash  Musculoskeletal: normal inspection  Neuro: sedated    CXR: pending    Labs reviewed    A/P:  - post procedure vent management: keep intubated overnight and try to extubate tomorrow am after cardiac cath and removal of impella  - severe CAD: for PCI tomorrow  - COPD: no exacerbation  - CKD: monitor creatinine  - glycemic control  - sedation   - Will assist on disposition planning when stable for transfer  Geraldo Tirado MD

## 2020-02-04 NOTE — PROGRESS NOTES
0700: Bedside shift change report given to Inga Ballard RN (oncoming nurse) by Frida Del Rio RN (offgoing nurse). Report included the following information SBAR, Kardex, Intake/Output and MAR.     0700: Patient in recliner, resting quietly. Call bell in reach, will monitor. 6820: TRANSFER - OUT REPORT:    Verbal report given to Candace Donis RN(name) on Yousif Tenorio  being transferred to OR(unit) for routine progression of care       Report consisted of patients Situation, Background, Assessment and   Recommendations(SBAR). Information from the following report(s) SBAR, Kardex, Intake/Output and MAR was reviewed with the receiving nurse. Lines:   Peripheral IV 01/30/20 Anterior;Distal;Right Forearm (Active)   Site Assessment Clean, dry, & intact 2/4/2020  3:42 AM   Phlebitis Assessment 0 2/4/2020  3:42 AM   Infiltration Assessment 0 2/4/2020  3:42 AM   Dressing Status Clean, dry, & intact 2/4/2020  3:42 AM   Dressing Type Tape;Transparent 2/4/2020  3:42 AM   Hub Color/Line Status Blue; Infusing;Flushed 2/4/2020  3:42 AM   Action Taken Open ports on tubing capped 2/4/2020  3:42 AM       Peripheral IV 02/02/20 Anterior;Right Forearm (Active)   Site Assessment Clean, dry, & intact 2/4/2020  3:42 AM   Phlebitis Assessment 0 2/4/2020  3:42 AM   Infiltration Assessment 0 2/4/2020  3:42 AM   Dressing Status Clean, dry, & intact 2/4/2020  3:42 AM   Dressing Type Tape;Transparent 2/4/2020  3:42 AM   Hub Color/Line Status Pink; Infusing;Flushed 2/4/2020  3:42 AM   Action Taken Open ports on tubing capped 2/4/2020  3:42 AM        Opportunity for questions and clarification was provided. Patient transported with:   Registered Nurse     5810: I transferred patient with transport via stretcher to OR pre-op holding area. Family in room packing up patient's belongings. They are waiting in the CCU waiting room.

## 2020-02-04 NOTE — ANESTHESIA PROCEDURE NOTES
Arterial Line Placement    Performed by: Xiomara Johnson DO  Authorized by: Xiomara Johnson DO     Pre-Procedure  Indications:  Arterial pressure monitoring and blood sampling  Preanesthetic Checklist: patient identified, risks and benefits discussed, anesthesia consent, site marked, patient being monitored, timeout performed and patient being monitored      Procedure:   Prep:  ChloraPrep  Seldinger Technique?: Yes    Orientation:  Left  Location:  Radial artery  Catheter size:  20 G  Number of attempts:  3    Assessment:   Post-procedure:  Line secured and sterile dressing applied  Patient Tolerance:  Patient tolerated the procedure well with no immediate complications  Comment:   Collateral perfusion verified

## 2020-02-04 NOTE — BRIEF OP NOTE
BRIEF OP NOTE  Pre-Op Diagnosis: CAD/ HEART FAILURE    Post-Op Diagnosis: CAD / HEART FAILURE    Procedure: RIGHT AXILLARY IMPELLA INSERTION ( 5.0 )            LEFT FEMORAL ARTERIAL LINE INSERTION    Surgeon: Jerson Rm MD    Assistant(s): Chilango Wilson PA-C    Anesthesia: General     Infusions: Propofol, precedex     Estimated Blood Loss:  50cc    Cell Saver: 0cc    Specimens: * No specimens in log *    Complications:  none    Findings:  CAD/ HEART FAILURE    Implants:   Implant Name Type Inv.  Item Serial No.  Lot No. LRB No. Used Action   Hemashield platinum vascular graft [id number]   [id number]  [id number] Right 1 Implanted   BioGlue   N/A  [id number] Right 1 Implanted

## 2020-02-04 NOTE — PROGRESS NOTES
Problem: Falls - Risk of  Goal: *Absence of Falls  Description  Document Tani Rader Fall Risk and appropriate interventions in the flowsheet.   Outcome: Progressing Towards Goal  Note: Fall Risk Interventions:            Medication Interventions: Evaluate medications/consider consulting pharmacy, Patient to call before getting OOB, Teach patient to arise slowly         History of Falls Interventions: Door open when patient unattended

## 2020-02-04 NOTE — PERIOP NOTES
TRANSFER - OUT REPORT:    Verbal report given to ELIE Quinones RN on [facility]  being transferred to CCU for routine post - op       Report consisted of patients Situation, Background, Assessment and   Recommendations(SBAR). Information from the following report(s) SBAR, OR Summary, Procedure Summary and MAR was reviewed with the receiving nurse. Lines:   Adis Cardenas Triple Right Neck (Active)       Quad Lumen 02/04/20 Right (Active)       Peripheral IV 02/02/20 Anterior;Right Forearm (Active)   Site Assessment Clean, dry, & intact 2/4/2020  9:26 AM   Phlebitis Assessment 0 2/4/2020  9:26 AM   Infiltration Assessment 0 2/4/2020  9:26 AM   Dressing Status Clean, dry, & intact 2/4/2020  9:26 AM   Dressing Type Tape;Transparent 2/4/2020  9:26 AM   Hub Color/Line Status Pink; Infusing;Flushed 2/4/2020  9:26 AM   Action Taken Open ports on tubing capped 2/4/2020  3:42 AM       Arterial Line 2 02/04/20 Left Femoral artery (Active)        Opportunity for questions and clarification was provided.       Patient transported with:   Monitor  O2 @ 10 liters  Registered Nurse   Perfusion  PA  CRNA

## 2020-02-04 NOTE — ANESTHESIA POSTPROCEDURE EVALUATION
Procedure(s):  RIGHT AXILLARY IMPELLA INSERTION ( 5.0 ). general    Anesthesia Post Evaluation        Patient location during evaluation: ICU  Note status: Sedated. Post-procedure mental status: Sedated. Pain management: adequate  Airway patency: patent  Anesthetic complications: no  Cardiovascular status: acceptable  Respiratory status: acceptable  Hydration status: acceptable  Comments: Planned postoperative ventilation in ICU for cardiac cath tomorrow and possible impella removal  Post anesthesia nausea and vomiting:  none      Vitals Value Taken Time   /74 2/4/2020  4:16 PM   Temp     Pulse 50 2/4/2020  4:22 PM   Resp 9 2/4/2020  4:22 PM   SpO2 100 % 2/4/2020  4:22 PM   Vitals shown include unvalidated device data.

## 2020-02-04 NOTE — PROGRESS NOTES
RAPID RESPONSE TEAM-Recent CCU Transfer    Rounded on patient due to recent transfer out of CCU on 2/3. Discussed with primary RN, Karina Alejandra. No acute concerns. No patient complaints. Vitals stable. MEWS 1. No RRT interventions indicated at this time. RN encouraged to call with any questions or concerns.     [facility], RN, BSN, CCRN  Rapid Response   Ext. [phone]

## 2020-02-04 NOTE — PROGRESS NOTES
Bedside shift change report given to Baldev Tena (oncoming nurse) by Milana Ward (offgoing nurse). Report included the following information SBAR, Kardex, Intake/Output, MAR, Recent Results and Cardiac Rhythm NSR.    2100 PTT drawn and sent to lab. Patient states he received CHG bath at approx 1800 and refuses an evening CHG bath. Patient will be given CHG bath in am.    2240  Patient c/o ongoing insomnia during hospitalization. Patient given scheduled melatonin, prn benadryl, and prn ambien. 0015  Heparin gtt stopped per order. NS infusion started at 75 ml/hr. 0515  Preop am CHG bath given. Bedside shift change report given to Milana Ward (oncoming nurse) by Baldev Tena (offgoing nurse). Report included the following information SBAR, Kardex, Intake/Output, MAR, Recent Results and Cardiac Rhythm NSR.

## 2020-02-04 NOTE — PROGRESS NOTES
Cardiac Surgery Care Coordinator- Met with the family of Kenneth Delvin Bonds. Reviewed plan of care and day of surgery expectations. Provided family with an update from OR. Encouraged family to verbalize and emotional support given. Will continue to update throughout the day.  Shahram Giles RN

## 2020-02-04 NOTE — ANESTHESIA PROCEDURE NOTES
Central Line Placement    Performed by: Flora Mclean DO  Authorized by: Flora Mclean DO     Indications: vascular access, central pressure monitoring and need for vasopressors  Preanesthetic Checklist: patient identified, risks and benefits discussed, anesthesia consent, site marked, patient being monitored and timeout performed      Pre-procedure: All elements of maximal sterile barrier technique followed?  Yes    2% Chlorhexidine for cutaneous antisepsis, Hand hygiene performed prior to catheter insertion and Ultrasound guidance    Sterile Ultrasound Technique followed?: Yes            Procedure:   Prep:  Chlorhexidine    Orientation:  Right  Patient position:  Trendelenburg  Catheter type:  Quad lumen  Catheter size:  9 Fr  Catheter length:  12 cm  Number of attempts:  1  Successful placement: Yes      Assessment:   Post-procedure:  Catheter secured, sterile dressing applied and sterile dressing with CHG applied  Assessment:  Blood return through all ports, guidewire removal verified and free fluid flow  Insertion:  Uncomplicated  Patient tolerance:  Patient tolerated the procedure well with no immediate complications

## 2020-02-04 NOTE — PROGRESS NOTES
Yony Rivera         NAME:Marlon Garcia  BWW:[id number]   :1945                       Labs reviewed  Patient is off the floor for procedure. No CABG - D/W CTS      Mel Monahan MD  [facility]  [facility]  [address], [address]  [place], [address] [place]  Phone - [phone]         Fax - [phone] [facility] Office  [address] [place] [address]  Phone - [phone]        Fax - [phone]     [email or web address]
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
## 2020-02-04 NOTE — PERIOP NOTES
TRANSFER - IN REPORT:    Verbal report received from Kushal Schmid RN(name) on [facility]   being received from PCU Room 2269(unit) for ordered procedure      Report consisted of patients Situation, Background, Assessment and   Recommendations(SBAR). Information from the following report(s) SBAR, Kardex, Intake/Output, MAR, Recent Results, Med Rec Status, Cardiac Rhythm NSR to Sinus Arva Makos, Pre Procedure Checklist and Procedure Verification was reviewed with the receiving nurse. Opportunity for questions and clarification was provided. Assessment completed upon patients arrival to unit and care assumed.

## 2020-02-04 NOTE — PROGRESS NOTES
Cardiology Progress Note  2/4/2020     Admit Date: 1/26/2020  Admit Diagnosis: CAD (coronary artery disease) [I25.10]  CC: none currently  Cardiac Assessment/Plan:   1) CAD:  Stent of unknown vessel at [facility] 2006. * followed by Dr. Colletta Kindler in 2014:  MPI reportedly showed inferior ischemia & cath recommended but not done. *Seen by Dr Idalia Llamas in 2017: MPI rec but not done. Normal echo. *CP/TEIXEIRA 12/2019: Abnl PET (inf ischemia): pt delayed cath plan until 1/27/20. *Echo 1/10/20: EF 55%; AoScl only. Mild pulm HTN. *Cath 1/26/20: Severe LM w/ occluded LAD & RCA. CKp ; Trop . 2) HTN  3) dyslipidemia  4) open AAA repair 4/2017 (8 cm; Dr. Kaitlynn Morales). 5) chronic BONNY occlusion. 6) right BG stroke on MRI 1/2014.  7) heavy tobacco use, 2-3 pack per day; no significant COPD on PFTs 4/2017  8) CKD:  Stage III (Cr 1.4-1.5/GFR 51 7/2019; Dr. Doyle Spicer). Cr 1.5/GFR 45 1/21/20.  9) chronic anemia; hemoglobin 9.3: Chronic anemia related to CKD. 10) heme+ 2019:  Negative EGD 7/2019; internal hemorrhoids/diverticular disease on colonoscopy 9/2019. Retired ; from [employer]. 2 ppd & knows he should quit. No recent ethanol; heavy 50 years ago. 2 caffeinated elie/day. No added salt. He reports no CP in the last month until day of admit. hold ACEi/ARB for now with CKD/recent contrast.     For other plans, see orders. Echo 1/27: Normal cavity size. Mild LVH; EF 40 - 45%. Mid-distal Ant-sept AK; Mild TR. Abd U/S 1/27: \"Mid abdominal aorta measures 4.0 cm in diameter, although the distal abdominal aorta, where the largest aneurysm was seen on prior CT, is obscured by overlying bowel gas\"    Carotid U/S 1/27: Chronic BONNY occlusion; There is mild stenosis in the left ICA (<50%). Bilateral vertebrals are antegrade. NICHOLAS 1/27: normal.    CTA 1/28: Chronically occluded right internal carotid artery.  Bilateral common and left internal carotid arteries remain patent without significant stenosis. Bilateral vertebral arteries are patent with mild atherosclerosis. The left vertebral artery arises from the aortic arch.     Relatively stable juxtarenal abdominal aortic aneurysmal dilatation. Sequelae of graft repair of infrarenal abdominal aortic aneurysm. There is minimal surrounding fat stranding without evidence for fluid collection or gas.     Increased size of the right common iliac artery aneurysm with mild hemorrhage in the right paracolic gutter and right hemipelvis. These findings were communicated to the nursing staff by the on-call radiologist.     Mild nonspecific right middle lobe and posterior right lower lobe airspace disease. Mild nonspecific colitis involving the ascending colon    1/28: Stable BP/HR; NSR; Cr unchanged at 1.45; CKp was 700s (down since then); trop from 45 to 20. No CP/dyspnea;   Remains on IV hep gtt; toprol XL 50 qday; NTG paste; lipitor; Add ACEi or ARB after surgery/prior to d/c.  Sputum c/w chronic bronchitis. CABG timing per Dr Mariann Varma. Extensive CTA orders noted; increased risk of FRANSISCO. 1/29: VSSAF; NSR; Cr 1.5 from 1.45; Hg 10.8 from 12.4 from 14.2. No CP/dyspnea; less cough/sputum. IV hep d/cd after CTA above; Remains on asa, toprol XL 50, NTG paste, lipitor; Add ACEi or ARB prior to d/c. CABG timing per Dr Mariann Varma; Vascular/retroperitoneal issues per Dr Maeve Moe. Benign cath sites. 1/30: VSSAF; NSR; Cr 1.58; Hg 10. No CP/dyspnea per se; cont cough/poor sleep: mucinex/ambien. Cardiac meds as above. Dr Margaret Horan et al to see pt. Appreciate Dr Ajit Hampton input on CT findings: no further Rx needed @ this time/prior to CABG. 1/31: VSSAF; NSR; Less cough/sputum. No new cardiac plans; awaiting CABG. Anemia related to CKD and dilution; some blood loss with cath. 2/1: VSSAF; NSR; Cont cough/less sputum; Cr 1.7. still getting ABx. No new cardiac plans; awaiting CABG.     2/2: Angina this am: improving with NTG SL but not gone.  No acute ecg changes. Starting IV NTG and hep gtt; If doesn't become pain-free, will need CABG. Labs pending. He reports intolerance to \"3 statins\": changed to zetia. He felt \"out of it\" with robitussin DM: back to just mucinex. 2/3: Remains angina-free since yesterday am; Remains on IV hep gtt and IV NTG gtt. Hg 10.1; Cr 1.6; PTT 33.    2/4: Change in plan as noted yesterday: Impella today then PCI tomorrow; No CP/dyspnea; Cr 1.78. High complexity decision making was performed  X Yes   High-risk of decompensation with multiple organ involvement X Yes     ________________________________________________________________  @ NP OV 12/5/19:   Mr Mendel Diaz has a h/o:  1) CAD:  Stent of unknown vessel at [facility] 2006. * followed by Dr. Olga Lidia Nj in 2014:  MPI reportedly showed inferior ischemia & cath recommended but not done. *Seen by Dr Rosetta Rush in 2017: MPI rec but not done. Normal echo. *CP/TEIXEIRA 12/2019:  2) HTN  3) dyslipidemia  4) open AAA repair 4/2017 (8 cm; Dr. Roswell Bumpers). 5) chronic BONNY occlusion. 6) right BG stroke on MRI 1/2014.  7) heavy tobacco use, 2-3 pack per day; no significant COPD on PFTs 4/2017  8) CKD:  Stage III (Cr 1.4-1.5/GFR 51 7/2019; Dr. Tanya Gr). 9) chronic anemia; hemoglobin 9.3  10) heme+ 2019:  Negative EGD 7/2019; internal hemorrhoids/diverticular disease on colonoscopy 9/2019. Retired ; from [place]. 2 ppd & knows he should quit. No recent ethanol; heavy 50 years ago. 2 caffeinated elie/day. No added salt. 12/2019:  New patient presenting with intermittent chest pain (6 months of worsening, 0-1 X/month;  15 minutes duration; central dull ache with radiation to both arms. Can occur with or without exertion. Increased dyspnea associated with discomfort). Chronic TEIXEIRA which is unchanged in years. No palpitations. No falling or bleeding. IMPRESSION AND PLAN  01.  Atherosclerotic heart disease of native coronary artery with other forms of angina pectoris (I25.118):  Remote stent of unknown vessel; reportedly abnormal MPI 2014 without further evaluation. He needs a stress test but cannot walk on a treadmill. Therefore cardiac PET. We discussed the signs and symptoms of unstable angina, myocardial infarction and malignant arrhythmia. The patient knows to seek immediate medical attention should they occur. ECG done PET Cardiac test to be done. first available   02. Hyp hrt & chr kdny dis w/o hrt fail, w stg 1-4/unsp chr kdny (I13.10): This condition is stable. I have made no changes to the present regimen. 03. Mixed hyperlipidemia (E78.2): He should be on a statin unless previously intolerant; apparently had been on Lipitor in the past.   04. Occlusion and stenosis of bilateral carotid arteries (V03.87):  Chronic BONNY occlusion; he reports no recent follow-up. U/s ordered. Carotid Duplex to be done first available. 05. Abdominal aortic aneurysm, without rupture (I71.4):  Surgically treated in 2017; follow-up per Dr. No Bajwa. 06. Shortness of breath (R06.02):  ?  Cardiac; also has some element of underlying pulmonary disease & ongoing tobacco abuse. Echocardiogram warranted. 2-D w/CFD Echo to be done first available. 07. Chronic kidney disease, stage 3 (moderate) (N18.3):  Managed by: Renal   08. Cerebral infarction, unspecified (I63.9):  Managed by: Other Physician   09. Nicotine dependence, cigarettes, uncomplicated (D14.661): The patient was instructed on smoking cessation. 10. Body mass index (BMI) 33.0-33.9, adult (D27.83): The patient was instructed on AHA diet and regular exercise. ORDERS:  1 Tobacco cessation counseling   2 Dietary management education, guidance, and counseling   3 ECG done   4 PET Cardiac test to be done   5 Carotid Duplex   6 2-D w/ CFD Echocardiogram   7 Return office visit with Raymon Mcmullen MD in 3 Months. 8 The patient was instructed on AHA diet and regular exercise.        12/5/19 MEDICATION LIST  Medication Sig Desc   amlodipine 5 mg tablet take 1 tablet by oral route  every day   Aspirin Low Dose 81 mg tablet,delayed release take 1 tablet by oral route  every day   metoprolol succinate ER 25 mg tablet,extended release 24 hr take 1 tablet by oral route  every day   pantoprazole 40 mg tablet,delayed release take 1 tablet by oral route  every day   tamsulosin 0.4 mg capsule take 1 capsule by oral route  every day 1/2 hour following the same meal each day     _______________________________________________________________________  CARDIAC HISTORY  RISK FACTORS:  1 Hypertension   2 Tobacco Use: No/never       CARDIOVASCULAR PROCEDURES  Procedure Date Results   Carotid Duplex 05/02/2018 100% Right ICA, Less than 50% Left ICA, Vertebral: Bilateral Antegrade Flow, Unchanged versus 2017; at [facility]. Echo 04/03/2017 EF 55-60%; normal RV; no valve disease; normal PAp; by RCA at [facility]. EKG 12/05/2019 Sinus Rhythm, Borderline PRWP, otherwise unremarkable. PFTs 04/03/2017 No obstructive disease; moderately decreased DLCO; at [facility]. ACTIVE ALLERGIES:  Ingredient Reaction Comment   FENOFIBRATE Hives    ALBUTEROL SULFATE Unknown    ATORVASTATIN Leg cramp Atorvastatin         PAST MEDICAL/SURGICAL HISTORY  (Detailed)    Disease/disorder Onset Date Surgical History Date Comments   GERD       Hypertension         Family History  (Detailed)    SOCIAL HISTORY  (Detailed)  Tobacco use reviewed. Preferred language is [place]. Smoking status: Heavy tobacco smoker. SMOKING STATUS  Use Status Type Smoking Status Usage Per Day Years Used Total Pack Years   yes Cigarette Heavy tobacco smoker 2 Packs       TOBACCO CESSATION INFORMATION  Date Counseled By Order Status Description Code Tobacco Cessation Information   12/05/2019 Jonathan Browne Tobacco cessation counseling completed   Counseling about tobacco use (procedure)       Hospital problem list   Active Hospital Problems    Diagnosis Date Noted    STEMI (ST elevation myocardial infarction) ([facility] [address] 2020     Priority: 1 - One    CAD (coronary artery disease)      2006 - s/p stent           Subjective: Marlon Leonardo reports less cough/sputum   Chest pain X none  consistent with:  Non-cardiac CP         Atypical CP     None now  On going  Anginal CP     Dyspnea X none  at rest  with exertion         improved  unchanged  worse              PND X none  overnight       Orthopnea X none  improved  unchanged  worse   Presyncope X none  improved  unchanged  worse     Ambulated in hallway without symptoms   Yes   Ambulated in room without symptoms  Yes   Objective:    Physical Exam:  Overall VSSAF;    Visit Vitals  /61 (BP 1 Location: Left arm, BP Patient Position: At rest)   Pulse 63   Temp 98.3 °F (36.8 °C)   Resp 18   Ht 5' 9\" (1.753 m)   Wt 105.9 kg (233 lb 7.5 oz)   SpO2 95%   BMI 34.48 kg/m²     Temp (24hrs), Av.3 °F (36.8 °C), Min:97.8 °F (36.6 °C), Max:98.9 °F (37.2 °C)    Patient Vitals for the past 8 hrs:   Pulse   20 0710 63   20 0342 60     Patient Vitals for the past 8 hrs:   Resp   20 0710 18   20 0342 16     Patient Vitals for the past 8 hrs:   BP   20 0710 132/61   20 0342 124/61       Intake/Output Summary (Last 24 hours) at 2020 0855  Last data filed at 2020 0630  Gross per 24 hour   Intake 1671.37 ml   Output 1050 ml   Net 621.37 ml     General Appearance: Well developed, obsese, no acute distress. Ears/Nose/Mouth/Throat:   Normal MM; anicteric. JVP: WNL   Resp:   clear to auscultation bilaterally; Nl resp effort. Cardiovascular:  RRR, S1, S2 normal, no new murmur. No gallop or rub. Abdomen:   Soft, non-tender, bowel sounds are present. Extremities: No edema bilaterally. Skin:  Neuro: Warm and dry.   A/O x3, grossly nonfocal   cath sites intact w/o hematoma or new bruit; distal pulse unchanged x Yes   Data Review:     Telemetry independently reviewed x sinus  chronic afib  parox afib NSVT     ECG independently reviewed  NSR  afib  No change vs baseline  NSST-Tw chgs   x no new ECG provided for review   Lab results reviewed as noted below. Current medications reviewed as noted below. No results for input(s): PH, PCO2, PO2 in the last 72 hours. No results for input(s): CPK, CKMB, CKNDX, TROIQ in the last 72 hours. Recent Labs     02/04/20  0440 02/03/20  0203 02/03/20  0201 02/02/20  0908     --  138 139   K 4.1  --  4.4 4.1   *  --  111* 110*   CO2 21  --  20* 21   BUN 22*  --  23* 24*   CREA 1.78*  --  1.64* 1.82*   GFRAA 45*  --  50* 44*   *  --  114* 196*   CA 8.5  --  8.7 8.5   WBC  --  6.9  --  6.6   HGB  --  10.1*  --  9.9*   HCT  --  31.3*  --  30.4*   PLT  --  210  --  210     Lab Results   Component Value Date/Time    Cholesterol, total 148 01/28/2020 12:58 AM    HDL Cholesterol 27 01/28/2020 12:58 AM    LDL, calculated 86.4 01/28/2020 12:58 AM    Triglyceride 173 (H) 01/28/2020 12:58 AM    CHOL/HDL Ratio 5.5 (H) 01/28/2020 12:58 AM     No results for input(s): SGOT, GPT, AP, TBIL, TP, ALB, GLOB, GGT, AML, LPSE in the last 72 hours.     No lab exists for component: AMYP, HLPSE  Recent Labs     02/03/20  2115 02/03/20  1547 02/03/20  0848   APTT 55.0* 44.4* 44.3*      No components found for: GLPOC    Current Facility-Administered Medications   Medication Dose Route Frequency    lactated Ringers infusion  75 mL/hr IntraVENous CONTINUOUS    0.9% sodium chloride infusion  50 mL/hr IntraVENous CONTINUOUS    sodium chloride (NS) flush 5-40 mL  5-40 mL IntraVENous Q8H    sodium chloride (NS) flush 5-40 mL  5-40 mL IntraVENous PRN    lidocaine (PF) (XYLOCAINE) 10 mg/mL (1 %) injection 0.1 mL  0.1 mL SubCUTAneous PRN    fentaNYL citrate (PF) injection 50 mcg  50 mcg IntraVENous PRN    midazolam (VERSED) injection 1 mg  1 mg IntraVENous PRN    midazolam (VERSED) injection 1 mg  1 mg IntraVENous PRN    sodium chloride (NS) flush 5-40 mL  5-40 mL IntraVENous PRN    sodium phosphate (FLEET'S) enema 1 Enema  1 Enema Rectal PRN    mupirocin (BACTROBAN) 2 % ointment 1 g  1 g Both Nostrils BID    chlorhexidine (PERIDEX) 0.12 % mouthwash 15 mL  15 mL Oral Q12H    0.9% sodium chloride infusion  75 mL/hr IntraVENous CONTINUOUS    DOBUTamine (DOBUTREX) 500 mg/250 mL (2,000 mcg/mL) infusion  0-10 mcg/kg/min IntraVENous TITRATE    DOPamine (INTROPIN) 800 mg in dextrose 5% 250 mL infusion  5-20 mcg/kg/min IntraVENous TITRATE    magnesium sulfate 2 g/50 ml IVPB (premix or compounded)  2 g IntraVENous ONCE    nitroglycerin (Tridil) 200 mcg/ml infusion  0-200 mcg/min IntraVENous TITRATE    potassium chloride 20 mEq in 50 ml IVPB  20 mEq IntraVENous ONCE    amiodarone (CORDARONE) 900 mg/250 ml D5W infusion  0.5-1 mg/min IntraVENous TITRATE    dexmedeTOMidine (PRECEDEX) 400 mcg in 0.9% sodium chloride 100 mL infusion  0.2-0.7 mcg/kg/hr IntraVENous TITRATE    insulin regular (NOVOLIN R, HUMULIN R) 100 Units in 0.9% sodium chloride 100 mL infusion  1-10 Units/hr IntraVENous TITRATE    PHENYLephrine (JONATHAN-SYNEPHRINE) 10 mg in 0.9% sodium chloride 250 mL infusion   mcg/min IntraVENous TITRATE    PHENYLephrine (JONATHAN-SYNEPHRINE) 20 mg in 0.9% sodium chloride 250 mL infusion   mcg/min IntraVENous TITRATE    PHENYLephrine (JONATHAN-SYNEPHRINE) 30 mg in 0.9% sodium chloride 250 mL infusion   mcg/min IntraVENous TITRATE    niCARdipine (CARDENE) 25 mg in 0.9% sodium chloride 250 mL infusion  0-15 mg/hr IntraVENous TITRATE    heparin (porcine) 30,000 Units in 0.9% sodium chloride 1,000 mL perfusion irrigation 1000 mL  1,000 mL Perfusion ONCE    amiodarone (CORDARONE) 150 mg in dextrose 5% 100 mL bolus infusion  150 mg IntraVENous ONCE    EPINEPHrine (ADRENALIN) 2 mg in 0.9% sodium chloride 250 mL infusion  1-10 mcg/min IntraVENous TITRATE    protamine injection 250 mg  250 mg IntraVENous NOW    desmopressin (DDAVP) 4 mcg/mL injection 2 mcg  2 mcg SubCUTAneous ONCE    cardioplegia infusion   IntraVENous ONCE    aminocaproic acid (AMICAR) 15 g in 0.9% sodium chloride 150 mL infusion  1 g/hr IntraVENous CONTINUOUS    NOREPINephrine (LEVOPHED) 8,000 mcg in dextrose 5% 250 mL infusion  2-16 mcg/min IntraVENous TITRATE    nitroglycerin (Tridil) 200 mcg/ml infusion  0-200 mcg/min IntraVENous TITRATE    ezetimibe (ZETIA) tablet 10 mg  10 mg Oral QPM    guaiFENesin ER (MUCINEX) tablet 600 mg  600 mg Oral Q12H    heparin (porcine) injection 4,000 Units  4,000 Units IntraVENous PRN    heparin (porcine) injection 2,000 Units  2,000 Units IntraVENous PRN    guaiFENesin-dextromethorphan (ROBITUSSIN DM) 100-10 mg/5 mL syrup 10 mL  10 mL Oral Q6H PRN    albuterol (PROVENTIL VENTOLIN) nebulizer solution 1.25 mg  1.25 mg Nebulization TID PRN    melatonin tablet 9 mg  9 mg Oral QHS    zolpidem (AMBIEN) tablet 5 mg  5 mg Oral QHS PRN    sodium bicarbonate tablet 650 mg  650 mg Oral BID    cefTRIAXone (ROCEPHIN) 1 g in 0.9% sodium chloride (MBP/ADV) 50 mL  1 g IntraVENous Q24H    diphenhydrAMINE (BENADRYL) capsule 50 mg  50 mg Oral QHS PRN    nicotine (NICODERM CQ) 7 mg/24 hr patch 1 Patch  1 Patch TransDERmal DAILY    metoprolol succinate (TOPROL-XL) XL tablet 50 mg  50 mg Oral DAILY    nitroglycerin (NITROBID) 2 % ointment 1 Inch  1 Inch Topical Q6H PRN    amiodarone (CORDARONE) tablet 200 mg  200 mg Oral BID    benzonatate (TESSALON) capsule 100 mg  100 mg Oral TID PRN    ondansetron (ZOFRAN) injection 4 mg  4 mg IntraVENous Q4H PRN    tamsulosin (FLOMAX) capsule 0.4 mg  0.4 mg Oral QPM    aspirin delayed-release tablet 81 mg  81 mg Oral DAILY    sodium chloride (NS) flush 5-40 mL  5-40 mL IntraVENous Q8H    acetaminophen (TYLENOL) tablet 650 mg  650 mg Oral Q4H PRN    nitroglycerin (NITROSTAT) tablet 0.4 mg  0.4 mg SubLINGual PRN        Jovanni Talavera MD

## 2020-02-05 ENCOUNTER — APPOINTMENT (OUTPATIENT)
Dept: GENERAL RADIOLOGY | Age: 75
DRG: 215 | End: 2020-02-05
Attending: PHYSICIAN ASSISTANT
Payer: MEDICARE

## 2020-02-05 LAB
ACT BLD: 103 SECS (ref 79–138)
ACT BLD: 120 SECS (ref 79–138)
ACT BLD: 120 SECS (ref 79–138)
ACT BLD: 125 SECS (ref 79–138)
ACT BLD: 136 SECS (ref 79–138)
ACT BLD: 164 SECS (ref 79–138)
ACT BLD: 230 SECS (ref 79–138)
ACT BLD: 98 SECS (ref 79–138)
ANION GAP SERPL CALC-SCNC: 6 MMOL/L (ref 5–15)
APTT PPP: 26.8 SEC (ref 22.1–32)
ARTERIAL PATENCY WRIST A: ABNORMAL
ARTERIAL PATENCY WRIST A: ABNORMAL
ATRIAL RATE: 39 BPM
BASE DEFICIT BLD-SCNC: 5 MMOL/L
BASE DEFICIT BLD-SCNC: 6 MMOL/L
BDY SITE: ABNORMAL
BDY SITE: ABNORMAL
BUN SERPL-MCNC: 22 MG/DL (ref 6–20)
BUN/CREAT SERPL: 12 (ref 12–20)
CA-I BLD-SCNC: 1.18 MMOL/L (ref 1.12–1.32)
CA-I BLD-SCNC: 1.27 MMOL/L (ref 1.12–1.32)
CALCIUM SERPL-MCNC: 8.5 MG/DL (ref 8.5–10.1)
CALCULATED P AXIS, ECG09: 9 DEGREES
CALCULATED R AXIS, ECG10: 6 DEGREES
CALCULATED T AXIS, ECG11: 34 DEGREES
CHLORIDE SERPL-SCNC: 114 MMOL/L (ref 97–108)
CO2 SERPL-SCNC: 19 MMOL/L (ref 21–32)
CREAT SERPL-MCNC: 1.87 MG/DL (ref 0.7–1.3)
DIAGNOSIS, 93000: NORMAL
ERYTHROCYTE [DISTWIDTH] IN BLOOD BY AUTOMATED COUNT: 16.7 % (ref 11.5–14.5)
GAS FLOW.O2 O2 DELIVERY SYS: ABNORMAL L/MIN
GAS FLOW.O2 O2 DELIVERY SYS: ABNORMAL L/MIN
GAS FLOW.O2 SETTING OXYMISER: 14 BPM
GLUCOSE SERPL-MCNC: 107 MG/DL (ref 65–100)
HCO3 BLD-SCNC: 18.6 MMOL/L (ref 22–26)
HCO3 BLD-SCNC: 20.4 MMOL/L (ref 22–26)
HCT VFR BLD AUTO: 27.6 % (ref 36.6–50.3)
HGB BLD-MCNC: 8.5 G/DL (ref 12.1–17)
INR PPP: 1.1 (ref 0.9–1.1)
MAGNESIUM SERPL-MCNC: 2.4 MG/DL (ref 1.6–2.4)
MCH RBC QN AUTO: 29 PG (ref 26–34)
MCHC RBC AUTO-ENTMCNC: 30.8 G/DL (ref 30–36.5)
MCV RBC AUTO: 94.2 FL (ref 80–99)
NRBC # BLD: 0 K/UL (ref 0–0.01)
NRBC BLD-RTO: 0 PER 100 WBC
O2/TOTAL GAS SETTING VFR VENT: 50 %
O2/TOTAL GAS SETTING VFR VENT: 50 %
P-R INTERVAL, ECG05: 258 MS
PCO2 BLD: 30.6 MMHG (ref 35–45)
PCO2 BLD: 34.6 MMHG (ref 35–45)
PEEP RESPIRATORY: 6 CMH2O
PEEP RESPIRATORY: 6 CMH2O
PH BLD: 7.38 [PH] (ref 7.35–7.45)
PH BLD: 7.39 [PH] (ref 7.35–7.45)
PLATELET # BLD AUTO: 171 K/UL (ref 150–400)
PMV BLD AUTO: 10.1 FL (ref 8.9–12.9)
PO2 BLD: 104 MMHG (ref 80–100)
PO2 BLD: 142 MMHG (ref 80–100)
POTASSIUM SERPL-SCNC: 4.6 MMOL/L (ref 3.5–5.1)
PRESSURE SUPPORT SETTING VENT: 6 CMH2O
PROTHROMBIN TIME: 11.4 SEC (ref 9–11.1)
Q-T INTERVAL, ECG07: 456 MS
QRS DURATION, ECG06: 92 MS
QTC CALCULATION (BEZET), ECG08: 367 MS
RBC # BLD AUTO: 2.93 M/UL (ref 4.1–5.7)
SAO2 % BLD: 98 % (ref 92–97)
SAO2 % BLD: 99 % (ref 92–97)
SODIUM SERPL-SCNC: 139 MMOL/L (ref 136–145)
SPECIMEN TYPE: ABNORMAL
SPECIMEN TYPE: ABNORMAL
THERAPEUTIC RANGE,PTTT: NORMAL SECS (ref 58–77)
VENTILATION MODE VENT: ABNORMAL
VENTILATION MODE VENT: ABNORMAL
VENTRICULAR RATE, ECG03: 39 BPM
VT SETTING VENT: 550 ML
WBC # BLD AUTO: 5.2 K/UL (ref 4.1–11.1)

## 2020-02-05 PROCEDURE — 71045 X-RAY EXAM CHEST 1 VIEW: CPT

## 2020-02-05 PROCEDURE — 94660 CPAP INITIATION&MGMT: CPT

## 2020-02-05 PROCEDURE — 74011250636 HC RX REV CODE- 250/636: Performed by: INTERNAL MEDICINE

## 2020-02-05 PROCEDURE — 85347 COAGULATION TIME ACTIVATED: CPT

## 2020-02-05 PROCEDURE — 76060000032 HC ANESTHESIA 0.5 TO 1 HR: Performed by: THORACIC SURGERY (CARDIOTHORACIC VASCULAR SURGERY)

## 2020-02-05 PROCEDURE — 77030019697 HC SYR ANGI INFL MRTM -B: Performed by: INTERNAL MEDICINE

## 2020-02-05 PROCEDURE — 92933 PRQ TRLML C ATHRC ST ANGIOP1: CPT | Performed by: INTERNAL MEDICINE

## 2020-02-05 PROCEDURE — 77030027876 HC STPLR ENDOSC FLX PWR J&J -G1: Performed by: THORACIC SURGERY (CARDIOTHORACIC VASCULAR SURGERY)

## 2020-02-05 PROCEDURE — 82803 BLOOD GASES ANY COMBINATION: CPT

## 2020-02-05 PROCEDURE — C1769 GUIDE WIRE: HCPCS | Performed by: INTERNAL MEDICINE

## 2020-02-05 PROCEDURE — 77030008462 HC STPLR SKN PROX J&J -A: Performed by: THORACIC SURGERY (CARDIOTHORACIC VASCULAR SURGERY)

## 2020-02-05 PROCEDURE — 74011250636 HC RX REV CODE- 250/636: Performed by: THORACIC SURGERY (CARDIOTHORACIC VASCULAR SURGERY)

## 2020-02-05 PROCEDURE — 80048 BASIC METABOLIC PNL TOTAL CA: CPT

## 2020-02-05 PROCEDURE — 74011636320 HC RX REV CODE- 636/320: Performed by: INTERNAL MEDICINE

## 2020-02-05 PROCEDURE — 85730 THROMBOPLASTIN TIME PARTIAL: CPT

## 2020-02-05 PROCEDURE — 99153 MOD SED SAME PHYS/QHP EA: CPT | Performed by: INTERNAL MEDICINE

## 2020-02-05 PROCEDURE — 93454 CORONARY ARTERY ANGIO S&I: CPT | Performed by: INTERNAL MEDICINE

## 2020-02-05 PROCEDURE — 77030008027: Performed by: THORACIC SURGERY (CARDIOTHORACIC VASCULAR SURGERY)

## 2020-02-05 PROCEDURE — 77030038079 HC RELD STPLR ENDOSC J&J -G: Performed by: THORACIC SURGERY (CARDIOTHORACIC VASCULAR SURGERY)

## 2020-02-05 PROCEDURE — 74011250637 HC RX REV CODE- 250/637: Performed by: PHYSICIAN ASSISTANT

## 2020-02-05 PROCEDURE — C1725 CATH, TRANSLUMIN NON-LASER: HCPCS | Performed by: INTERNAL MEDICINE

## 2020-02-05 PROCEDURE — C1874 STENT, COATED/COV W/DEL SYS: HCPCS | Performed by: INTERNAL MEDICINE

## 2020-02-05 PROCEDURE — 74011000250 HC RX REV CODE- 250: Performed by: PHYSICIAN ASSISTANT

## 2020-02-05 PROCEDURE — 74011000258 HC RX REV CODE- 258: Performed by: PHYSICIAN ASSISTANT

## 2020-02-05 PROCEDURE — 77030004549 HC CATH ANGI DX PRF MRTM -A: Performed by: INTERNAL MEDICINE

## 2020-02-05 PROCEDURE — 36415 COLL VENOUS BLD VENIPUNCTURE: CPT

## 2020-02-05 PROCEDURE — 94003 VENT MGMT INPAT SUBQ DAY: CPT

## 2020-02-05 PROCEDURE — 77030018836 HC SOL IRR NACL ICUM -A: Performed by: THORACIC SURGERY (CARDIOTHORACIC VASCULAR SURGERY)

## 2020-02-05 PROCEDURE — 5A09457 ASSISTANCE WITH RESPIRATORY VENTILATION, 24-96 CONSECUTIVE HOURS, CONTINUOUS POSITIVE AIRWAY PRESSURE: ICD-10-PCS | Performed by: THORACIC SURGERY (CARDIOTHORACIC VASCULAR SURGERY)

## 2020-02-05 PROCEDURE — 4A023N7 MEASUREMENT OF CARDIAC SAMPLING AND PRESSURE, LEFT HEART, PERCUTANEOUS APPROACH: ICD-10-PCS | Performed by: INTERNAL MEDICINE

## 2020-02-05 PROCEDURE — B2111ZZ FLUOROSCOPY OF MULTIPLE CORONARY ARTERIES USING LOW OSMOLAR CONTRAST: ICD-10-PCS | Performed by: INTERNAL MEDICINE

## 2020-02-05 PROCEDURE — 77030041076 HC DRSG AG OPTICELL MDII -A: Performed by: THORACIC SURGERY (CARDIOTHORACIC VASCULAR SURGERY)

## 2020-02-05 PROCEDURE — 74011250637 HC RX REV CODE- 250/637: Performed by: INTERNAL MEDICINE

## 2020-02-05 PROCEDURE — 74011000250 HC RX REV CODE- 250: Performed by: THORACIC SURGERY (CARDIOTHORACIC VASCULAR SURGERY)

## 2020-02-05 PROCEDURE — 74011250636 HC RX REV CODE- 250/636: Performed by: PHYSICIAN ASSISTANT

## 2020-02-05 PROCEDURE — 77030003029 HC SUT VCRL J&J -B: Performed by: THORACIC SURGERY (CARDIOTHORACIC VASCULAR SURGERY)

## 2020-02-05 PROCEDURE — C1887 CATHETER, GUIDING: HCPCS | Performed by: INTERNAL MEDICINE

## 2020-02-05 PROCEDURE — C1757 CATH, THROMBECTOMY/EMBOLECT: HCPCS | Performed by: THORACIC SURGERY (CARDIOTHORACIC VASCULAR SURGERY)

## 2020-02-05 PROCEDURE — 85027 COMPLETE CBC AUTOMATED: CPT

## 2020-02-05 PROCEDURE — 93005 ELECTROCARDIOGRAM TRACING: CPT

## 2020-02-05 PROCEDURE — 74011250636 HC RX REV CODE- 250/636: Performed by: ANESTHESIOLOGY

## 2020-02-05 PROCEDURE — 027135Z DILATION OF CORONARY ARTERY, TWO ARTERIES WITH TWO DRUG-ELUTING INTRALUMINAL DEVICES, PERCUTANEOUS APPROACH: ICD-10-PCS | Performed by: INTERNAL MEDICINE

## 2020-02-05 PROCEDURE — C1894 INTRO/SHEATH, NON-LASER: HCPCS | Performed by: INTERNAL MEDICINE

## 2020-02-05 PROCEDURE — 83735 ASSAY OF MAGNESIUM: CPT

## 2020-02-05 PROCEDURE — 02PA3RZ REMOVAL OF SHORT-TERM EXTERNAL HEART ASSIST SYSTEM FROM HEART, PERCUTANEOUS APPROACH: ICD-10-PCS | Performed by: THORACIC SURGERY (CARDIOTHORACIC VASCULAR SURGERY)

## 2020-02-05 PROCEDURE — 76010000112 HC CV SURG 0.5 TO 1 HR: Performed by: THORACIC SURGERY (CARDIOTHORACIC VASCULAR SURGERY)

## 2020-02-05 PROCEDURE — 65620000000 HC RM CCU GENERAL

## 2020-02-05 PROCEDURE — 74011000250 HC RX REV CODE- 250: Performed by: INTERNAL MEDICINE

## 2020-02-05 PROCEDURE — 77030028837 HC SYR ANGI PWR INJ COEU -A: Performed by: INTERNAL MEDICINE

## 2020-02-05 PROCEDURE — 85610 PROTHROMBIN TIME: CPT

## 2020-02-05 PROCEDURE — 99152 MOD SED SAME PHYS/QHP 5/>YRS: CPT | Performed by: INTERNAL MEDICINE

## 2020-02-05 DEVICE — STENT RONYX30018UX RESOLUTE ONYX 3.00X18
Type: IMPLANTABLE DEVICE | Status: FUNCTIONAL
Brand: RESOLUTE ONYX™

## 2020-02-05 DEVICE — STENT RONYX25012UX RESOLUTE ONYX 2.50X12
Type: IMPLANTABLE DEVICE | Status: FUNCTIONAL
Brand: RESOLUTE ONYX™

## 2020-02-05 RX ORDER — BUPIVACAINE HYDROCHLORIDE 5 MG/ML
INJECTION, SOLUTION EPIDURAL; INTRACAUDAL AS NEEDED
Status: DISCONTINUED | OUTPATIENT
Start: 2020-02-05 | End: 2020-02-06

## 2020-02-05 RX ORDER — SODIUM CHLORIDE 0.9 % (FLUSH) 0.9 %
5-40 SYRINGE (ML) INJECTION EVERY 8 HOURS
Status: DISCONTINUED | OUTPATIENT
Start: 2020-02-05 | End: 2020-02-06

## 2020-02-05 RX ORDER — LIDOCAINE HYDROCHLORIDE 10 MG/ML
INJECTION, SOLUTION EPIDURAL; INFILTRATION; INTRACAUDAL; PERINEURAL AS NEEDED
Status: DISCONTINUED | OUTPATIENT
Start: 2020-02-05 | End: 2020-02-05 | Stop reason: HOSPADM

## 2020-02-05 RX ORDER — HEPARIN SODIUM 200 [USP'U]/100ML
INJECTION, SOLUTION INTRAVENOUS
Status: COMPLETED | OUTPATIENT
Start: 2020-02-05 | End: 2020-02-05

## 2020-02-05 RX ORDER — ZOLPIDEM TARTRATE 5 MG/1
5 TABLET ORAL
Status: DISCONTINUED | OUTPATIENT
Start: 2020-02-05 | End: 2020-02-08 | Stop reason: HOSPADM

## 2020-02-05 RX ORDER — IODIXANOL 320 MG/ML
INJECTION, SOLUTION INTRAVASCULAR AS NEEDED
Status: DISCONTINUED | OUTPATIENT
Start: 2020-02-05 | End: 2020-02-05 | Stop reason: HOSPADM

## 2020-02-05 RX ORDER — SODIUM CHLORIDE 0.9 % (FLUSH) 0.9 %
5-40 SYRINGE (ML) INJECTION AS NEEDED
Status: DISCONTINUED | OUTPATIENT
Start: 2020-02-05 | End: 2020-02-06

## 2020-02-05 RX ORDER — CALCIUM CHLORIDE INJECTION 100 MG/ML
1 INJECTION, SOLUTION INTRAVENOUS ONCE
Status: COMPLETED | OUTPATIENT
Start: 2020-02-05 | End: 2020-02-05

## 2020-02-05 RX ORDER — HEPARIN SODIUM 1000 [USP'U]/ML
INJECTION, SOLUTION INTRAVENOUS; SUBCUTANEOUS AS NEEDED
Status: DISCONTINUED | OUTPATIENT
Start: 2020-02-05 | End: 2020-02-05 | Stop reason: HOSPADM

## 2020-02-05 RX ORDER — SODIUM BICARBONATE 84 MG/ML
50 INJECTION, SOLUTION INTRAVENOUS ONCE
Status: COMPLETED | OUTPATIENT
Start: 2020-02-05 | End: 2020-02-05

## 2020-02-05 RX ADMIN — MUPIROCIN 1 G: 20 OINTMENT TOPICAL at 19:36

## 2020-02-05 RX ADMIN — Medication 10 ML: at 05:19

## 2020-02-05 RX ADMIN — CHLORHEXIDINE GLUCONATE 0.12% ORAL RINSE 15 ML: 1.2 LIQUID ORAL at 08:30

## 2020-02-05 RX ADMIN — Medication 10 ML: at 22:08

## 2020-02-05 RX ADMIN — SODIUM BICARBONATE 650 MG: 650 TABLET ORAL at 20:48

## 2020-02-05 RX ADMIN — SODIUM BICARBONATE 650 MG: 650 TABLET ORAL at 12:52

## 2020-02-05 RX ADMIN — MORPHINE SULFATE 2 MG: 10 INJECTION INTRAVENOUS at 20:49

## 2020-02-05 RX ADMIN — Medication 10 ML: at 15:37

## 2020-02-05 RX ADMIN — MAGNESIUM SULFATE HEPTAHYDRATE 1 G: 1 INJECTION, SOLUTION INTRAVENOUS at 12:53

## 2020-02-05 RX ADMIN — WATER 2 G: 1 INJECTION INTRAMUSCULAR; INTRAVENOUS; SUBCUTANEOUS at 15:41

## 2020-02-05 RX ADMIN — PROPOFOL 40 MCG/KG/MIN: 10 INJECTION, EMULSION INTRAVENOUS at 05:36

## 2020-02-05 RX ADMIN — DEXMEDETOMIDINE HYDROCHLORIDE 0.4 MCG/KG/HR: 100 INJECTION, SOLUTION, CONCENTRATE INTRAVENOUS at 02:09

## 2020-02-05 RX ADMIN — EZETIMIBE 10 MG: 10 TABLET ORAL at 20:48

## 2020-02-05 RX ADMIN — CALCIUM CHLORIDE 1 G: 100 INJECTION INTRAVENOUS; INTRAVENTRICULAR at 17:24

## 2020-02-05 RX ADMIN — SODIUM BICARBONATE 50 MEQ: 84 INJECTION, SOLUTION INTRAVENOUS at 17:01

## 2020-02-05 RX ADMIN — WATER 2 G: 1 INJECTION INTRAMUSCULAR; INTRAVENOUS; SUBCUTANEOUS at 03:13

## 2020-02-05 RX ADMIN — CALCIUM CHLORIDE 1 G: 100 INJECTION INTRAVENOUS; INTRAVENTRICULAR at 17:07

## 2020-02-05 RX ADMIN — WATER 2 G: 1 INJECTION INTRAMUSCULAR; INTRAVENOUS; SUBCUTANEOUS at 20:45

## 2020-02-05 RX ADMIN — DEXMEDETOMIDINE HYDROCHLORIDE 0.4 MCG/KG/HR: 100 INJECTION, SOLUTION, CONCENTRATE INTRAVENOUS at 11:27

## 2020-02-05 RX ADMIN — ASPIRIN 81 MG 81 MG: 81 TABLET ORAL at 12:52

## 2020-02-05 RX ADMIN — CHLORHEXIDINE GLUCONATE 0.12% ORAL RINSE 15 ML: 1.2 LIQUID ORAL at 20:46

## 2020-02-05 RX ADMIN — TICAGRELOR 90 MG: 90 TABLET ORAL at 05:18

## 2020-02-05 RX ADMIN — GUAIFENESIN 600 MG: 600 TABLET, EXTENDED RELEASE ORAL at 20:48

## 2020-02-05 RX ADMIN — TICAGRELOR 90 MG: 90 TABLET ORAL at 20:48

## 2020-02-05 RX ADMIN — ACETAMINOPHEN 1000 MG: 10 INJECTION, SOLUTION INTRAVENOUS at 12:45

## 2020-02-05 RX ADMIN — DEXMEDETOMIDINE HYDROCHLORIDE 0.6 MCG/KG/HR: 100 INJECTION, SOLUTION, CONCENTRATE INTRAVENOUS at 21:13

## 2020-02-05 RX ADMIN — TAMSULOSIN HYDROCHLORIDE 0.4 MG: 0.4 CAPSULE ORAL at 20:48

## 2020-02-05 RX ADMIN — PROPOFOL 50 MCG/KG/MIN: 10 INJECTION, EMULSION INTRAVENOUS at 15:00

## 2020-02-05 RX ADMIN — MAGNESIUM OXIDE 400 MG (241.3 MG MAGNESIUM) TABLET 400 MG: TABLET at 20:48

## 2020-02-05 RX ADMIN — PROPOFOL 50 MCG/KG/MIN: 10 INJECTION, EMULSION INTRAVENOUS at 12:42

## 2020-02-05 RX ADMIN — MELATONIN TAB 3 MG 9 MG: 3 TAB at 22:07

## 2020-02-05 RX ADMIN — ACETAMINOPHEN 1000 MG: 10 INJECTION, SOLUTION INTRAVENOUS at 05:18

## 2020-02-05 RX ADMIN — PROPOFOL 50 MCG/KG/MIN: 10 INJECTION, EMULSION INTRAVENOUS at 09:09

## 2020-02-05 NOTE — PROGRESS NOTES
Cardiac Surgery ICU Progress Note    Admit Date: 1/26/2020    POD: 1 Day Post-Op      Problems:  Active Problems:    STEMI (ST elevation myocardial infarction) ([address] (1/26/2020)      CAD (coronary artery disease) ()      Overview: 2006 - s/p stent         Procedure:  Procedure(s):  RIGHT AXILLARY IMPELLA INSERTION ( 5.0 )      Summary:     Stablized hemodynamics in SB 40s without ectopy on Dobutamine 1  CI 2.3 SVO2 P-8 4.4 RPM    Intubated 50%   7.38/34/142/ 20/99% =5    CXR: RLL atel/eff   130/12 hrs serous    Cr 1.87 BUN 22 UOP 2500 +73     Plan/Recommendations/Medical Decision Making:     Impella assisted PCI today  Central lines and finley  CARDS: B-Blocker held for bradycardia, ACE held for hypotension   PULMONARY: Wean vent off after PCI  RENAL: Baseline Cr 1.6 Fluid support  HEME: Anticipated acute blood loss anemia  GI:Stimulate bowel movement    Disposition: ICU/Stepdown/Home  Patient seen and reviewed with  Dr. Fredo Mckeon MD       Objective:     CXR Results  (Last 48 hours)               02/05/20 0556  XR CHEST PORT Final result    Impression:  IMPRESSION: Stable small pleural effusions and bibasilar opacities. Narrative:  EXAM:  CR chest portable       INDICATION: Heart surgery. Small pleural effusions and bibasilar atelectasis. COMPARISON: 2/4/2020 at 1649 hours. TECHNIQUE: Portable AP semierect upright chest view at 0518 hours       FINDINGS: The support devices are stable. Cardiac monitoring wires overlie the   thorax. The cardiomediastinal contours are stable. There are stable small   pleural effusions and bibasilar opacities. There is no pneumothorax. The bones   and upper abdomen are stable. 02/04/20 1655  XR CHEST PORT Final result    Impression:  IMPRESSION:   1. Small pleural effusions with mild bibasilar atelectasis.        Narrative:  EXAM: XR CHEST PORT       INDICATION: Postop, upon arrival       COMPARISON: 1/21/2020       FINDINGS: A portable AP radiograph of the chest was obtained at 1649 hours. The   patient is on a cardiac monitor. The ET tube is in satisfactory position. NG   tube courses into the stomach. The right subclavian cardiac assist device is in satisfactory position. Right IJ   [place] catheter has its tip in the distal main pulmonary artery. The lungs demonstrate blunting of the costophrenic angles consistent small   pleural effusions. There is mild bibasilar atelectasis. No pneumothorax no   pulmonary edema. Labs:   Recent Labs     20  0525 20  0422  20  1024   WBC  --  5.2   < >  --    HGB  --  8.5*   < >  --    HCT  --  27.6*   < >  --    PLT  --  171   < >  --    NA  --  139   < >  --    K  --  4.6   < >  --    BUN  --  22*   < >  --    CREA  --  1.87*   < >  --    GLU  --  107*   < >  --    GLUCPOC  --   --   --  101*   INR 1.1  --    < >  --     < > = values in this interval not displayed.        Vitals:    Visit Vitals  /68   Pulse (!) 46   Temp 99.2 °F (37.3 °C)   Resp 16   Ht 5' 9\" (1.753 m)   Wt 233 lb 7.5 oz (105.9 kg)   SpO2 100%   BMI 34.48 kg/m²       Temp (24hrs), Av.7 °F (37.1 °C), Min:98.1 °F (36.7 °C), Max:99.2 °F (37.3 °C)      Last 3 Recorded Weights in this Encounter    20 0646 20 0625 20 0902   Weight: 233 lb 0.4 oz (105.7 kg) 233 lb 7.5 oz (105.9 kg) 233 lb 7.5 oz (105.9 kg)       Hemodynamics:   CO: CO (l/min): 4.5 l/min   CI: CI (l/min/m2): 2 l/min/m2   CVP: CVP (mmHg): 13 mmHg (20 0700)   SVR: SVR (dyne*sec)/cm5: 1166 (dyne*sec)/cm5 (20 1052)   PAP Systolic: PAP Systolic: 39 ( 8263)   PAP Diastolic: PAP Diastolic: 15 ( 4388)   PVR:     SV02: SVO2 (%): 71 % (20 075)   SCV02:      Ventilator:  Ventilator Volumes  Vt Set (ml): 550 ml (20 075)  Vt Exhaled (Machine Breath) (ml): 586 ml (20)  Ve Observed (l/min): 10.5 l/min (20 075)    Oxygen Therapy:  Oxygen Therapy  O2 Sat (%): 100 % (20 6275)  Pulse via Oximetry: 41 beats per minute (02/05/20 0700)  O2 Device: Endotracheal tube;Ventilator (02/05/20 0400)  O2 Flow Rate (L/min): 5 l/min (02/04/20 1231)  FIO2 (%): 50 % (02/05/20 0752)    Admission Weight: Last Weight   Weight: 238 lb 1.6 oz (108 kg) Weight: 233 lb 7.5 oz (105.9 kg)     Intake / Output / Drain:  Current Shift: 02/05 0701 - 02/05 1900  In: -   Out: 70 [Urine:70]  Last 24 hrs.:     Intake/Output Summary (Last 24 hours) at 2/5/2020 0755  Last data filed at 2/5/2020 0753  Gross per 24 hour   Intake 2493.68 ml   Output 2490 ml   Net 3.68 ml         EXAM:      General: intubated, sedated          Incisions: dry and intact    Lungs:    Rhonchi bilaterally. Heart:  SB, Regular rate and rhythm, S1, S2 normal, no murmur, no click,      Abdomen:   Soft, non-tender. Bowel sounds present. Extremities:       Neurologic:  Gross motor and sensory apparatus intact.        Signed By: GEE Granger

## 2020-02-05 NOTE — PROGRESS NOTES
[id number] Bedside and Verbal shift change report given to Maria Antonia Blankenship, RN (oncoming nurse) by Regis Euceda RN (offgoing nurse). Report included the following information SBAR, Kardex, ED Summary, Procedure Summary, Intake/Output, MAR, Recent Results and Cardiac Rhythm sinus ward. Assessment completed, pt sedated and intubated, responds to stimuli, pupils reactive, sinus ward 38-40 on monitor, pulses present upper and lower extremities, impella site intact, dressing dry, vent setting AC 14, , 50%, peep 6, restraints in place. 2000- labs sent, ACT obtained. 02/04/20 2000 02/04/20 2005   Hemodynamic Data   PAP Systolic 39  --    PAP Diastolic 14  --    PAULETTE (mmHg) 22  --    CO (l/min)  --  3.9 l/min   CI (l/min/m2)  --  1.7 l/min/m2   SVO2 (%)  --  67 %         [id number] Dobutamine started for CI 1.6-1.8.     [id number] Paged and updated Dr. Yvonne Lilly on current ACT results, continue with just Heparin purge fluid. One gram of magnesium sulfate administered for a mag of 1.9.     0000- Reassessment completed, no new changes. Impella remains intact, no issues, pulses present. 02/05/20 0000   Hemodynamic Data   PAP Systolic 38   PAP Diastolic 12   PAULETTE (mmHg) 21   CO (l/min) 4.6 l/min   CI (l/min/m2) 2 l/min/m2   SVO2 (%) 72 %       [id number] Reassessment completed, no new changes to report. 02/05/20 0400   Hemodynamic Data   PAP Systolic 39   PAP Diastolic 15   PAULETTE (mmHg) 23   CO (l/min) 4.6 l/min   CI (l/min/m2) 2.1 l/min/m2   SVO2 (%) 72 %     0500- 0600 Chlorhexidine bath provided.         02/05/20 0600   Hemodynamic Data   PAP Systolic 35   PAP Diastolic 14   PAULETTE (mmHg) 21   CO (l/min) 4.3 l/min   CI (l/min/m2) 1.9 l/min/m2   SVR (dyne*sec)/cm5 1166 (dyne*sec)/cm5   SVO2 (%) 70 %

## 2020-02-05 NOTE — PROCEDURES
PCI of Ramus Intermediate    PCI of Left Main into Circumflex. Patient had Impella inserted yesterday in the OR via R subclavian. impella 5.0 . Patient is on a ventilator . L femoral art line replaced over a wire to a 7F 25 cm sheath. PCI of Ramus. 80 % stenosis . 2.5 Vladimir Stent to 0% . EGRARD 3 flow. PCI of Left Main into Circumflex. Distal LM ulcerated plaque, with severe 90 % stenosis at ostium of Cx.    3.0 Vladimir SHIVA deployed, and proximal segment dilated to 4.0 mm  With non compliant balloon. 0% residual .   GERARD 3. Attempted to wire the occluded LAD , but unable to cross the obstruction with a wire and no intervention on that artery . L femoral sheath sutured in place. To use as art line . Wean and D/C Impella in OR as tolerated. No complication . Pt to continue on ASA and Brilinta per NG until he can take PO .

## 2020-02-05 NOTE — PROGRESS NOTES
1600- TRANSFER - IN REPORT:    Verbal report received from GEE AUGUSTINE; CRNA(name) on Richard Duarte  being received from [employer]  room(unit) for routine post - op      Report consisted of patients Situation, Background, Assessment and   Recommendations(SBAR). Information from the following report(s) SBAR, Kardex, Intake/Output, Recent Results, Cardiac Rhythm Sinus Edith Shutter and Alarm Parameters  was reviewed with the receiving nurse. Opportunity for questions and clarification was provided. Assessment completed upon patients arrival to unit and care assumed. Primary Nurse Estela Corona RN and BAIRON Dodd RN performed a dual skin assessment on this patient No impairment noted  Aj score is 12    Admission assessment completed; see flow sheet for details. Pt minimally responsive; withdrawals from noxious stimuli; PERRLA; strong cough/gag to suction. Currently in Sinus Nikolai; BP stable at this time; peripheral pulses palpable. Lungs are clear; diminished in the bases; remains intubated; FiO2~50%. ABD is semi-soft; BS hypoactive; OGT to suction. Her catheter in place; marginal UOP. Skin is warm and dry. Right axilla Impella site is CDI. No indication of pain/discomfort at this time. Repositioned. 1715- Pt bathed; temporary pacer pads in place; pt remains bradycardic     1825- Heparin started via the purge; continue with ACT monitoring Q1hr.     1925- Bedside and Verbal shift change [facility] by Tatyana Akins (offgoing nurse). Report included the following information SBAR, Kardex, Intake/Output, Recent Results, Cardiac Rhythm Sinus Edith Shutter and Alarm Parameters .

## 2020-02-05 NOTE — PROGRESS NOTES
Cardiology Progress Note  2/5/2020    Admit Date: 1/26/2020  Admit Diagnosis: STEMI (ST elevation myocardial infarction) ([facility] [address] [I21.3]  CAD (coronary artery disease) [I25.10]  CAD (coronary artery disease) [I25.10]  CC:  None currently  Cardiac Assessment/Plan:   1) CAD:  Stent of unknown vessel at [facility] 2006. * followed by Dr. Jeramy Smith in 2014:  MPI reportedly showed inferior ischemia & cath recommended but not done. *Seen by Dr Krunal Fay in 2017: MPI rec but not done. Normal echo. *CP/TEIXEIRA 12/2019: Abnl PET (inf ischemia): pt delayed cath plan until 1/27/20. *Echo 1/10/20: EF 55%; AoScl only. Mild pulm HTN. *Cath 1/26/20: Severe LM w/ occluded LAD & RCA. CKp ; Trop . 2) HTN  3) dyslipidemia  4) open AAA repair 4/2017 (8 cm; Dr. Ramila Weller). 5) chronic BONNY occlusion. 6) right BG stroke on MRI 1/2014.  7) heavy tobacco use, 2-3 pack per day; no significant COPD on PFTs 4/2017  8) CKD:  Stage III (Cr 1.4-1.5/GFR 51 7/2019; Dr. Lazarus Harris). Cr 1.5/GFR 45 1/21/20.  9) chronic anemia; hemoglobin 9.3: Chronic anemia related to CKD. 10) heme+ 2019:  Negative EGD 7/2019; internal hemorrhoids/diverticular disease on colonoscopy 9/2019. Retired ; from [address] [place] [address]. 2 ppd & knows he should quit. No recent ethanol; heavy 50 years ago. 2 caffeinated elie/day. No added salt. He reports no CP in the last month until day of admit. hold ACEi/ARB for now with CKD/recent contrast.     For other plans, see orders. Echo 1/27: Normal cavity size. Mild LVH; EF 40 - 45%. Mid-distal Ant-sept AK; Mild TR. Abd U/S 1/27: \"Mid abdominal aorta measures 4.0 cm in diameter, although the distal abdominal aorta, where the largest aneurysm was seen on prior CT, is obscured by overlying bowel gas\"    Carotid U/S 1/27: Chronic BONNY occlusion; There is mild stenosis in the left ICA (<50%). Bilateral vertebrals are antegrade.     NICHOLAS 1/27: normal.    CTA 1/28: Chronically occluded right internal carotid artery. Bilateral common and left internal carotid arteries remain patent without significant stenosis. Bilateral vertebral arteries are patent with mild atherosclerosis. The left vertebral artery arises from the aortic arch.     Relatively stable juxtarenal abdominal aortic aneurysmal dilatation. Sequelae of graft repair of infrarenal abdominal aortic aneurysm. There is minimal surrounding fat stranding without evidence for fluid collection or gas.     Increased size of the right common iliac artery aneurysm with mild hemorrhage in the right paracolic gutter and right hemipelvis. These findings were communicated to the nursing staff by the on-call radiologist.     Mild nonspecific right middle lobe and posterior right lower lobe airspace disease. Mild nonspecific colitis involving the ascending colon    1/28: Stable BP/HR; NSR; Cr unchanged at 1.45; CKp was 700s (down since then); trop from 45 to 20. No CP/dyspnea;   Remains on IV hep gtt; toprol XL 50 qday; NTG paste; lipitor; Add ACEi or ARB after surgery/prior to d/c.  Sputum c/w chronic bronchitis. CABG timing per Dr Caron Antunez. Extensive CTA orders noted; increased risk of FRANSISCO. 1/29: VSSAF; NSR; Cr 1.5 from 1.45; Hg 10.8 from 12.4 from 14.2. No CP/dyspnea; less cough/sputum. IV hep d/cd after CTA above; Remains on asa, toprol XL 50, NTG paste, lipitor; Add ACEi or ARB prior to d/c. CABG timing per Dr Caron Antunez; Vascular/retroperitoneal issues per Dr Christina Pino. Benign cath sites. 1/30: VSSAF; NSR; Cr 1.58; Hg 10. No CP/dyspnea per se; cont cough/poor sleep: mucinex/ambien. Cardiac meds as above. Dr Suzie Yu et al to see pt. Appreciate Dr Adia Baker input on CT findings: no further Rx needed @ this time/prior to CABG. 1/31: VSSAF; NSR; Less cough/sputum. No new cardiac plans; awaiting CABG. Anemia related to CKD and dilution; some blood loss with cath. 2/1: VSSAF; NSR; Cont cough/less sputum; Cr 1.7. still getting ABx.   No new cardiac plans; awaiting CABG.     2/2: Angina this am: improving with NTG SL but not gone. No acute ecg changes. Starting IV NTG and hep gtt; If doesn't become pain-free, will need CABG. Labs pending. He reports intolerance to \"3 statins\": changed to zetia. He felt \"out of it\" with robitussin DM: back to just mucinex. 2/3: Remains angina-free since yesterday am; Remains on IV hep gtt and IV NTG gtt. Hg 10.1; Cr 1.6; PTT 33.    2/4: Change in plan as noted yesterday: Impella today then PCI tomorrow; No CP/dyspnea; Cr 1.78.    2/5: Impella @ P8; Dobut @ 1; CI 2.3; Hg down to 8.5. Cr 1.87. ACT . Good UOP. Marked sinus ward to 30s, 40 currently. Remains intubated/sedated. Will d/c amio and toprol XL for now. For PCI later this am. Impella removal per Dr Rebeca Parra. High complexity decision making was performed  X Yes   High-risk of decompensation with multiple organ involvement X Yes     ________________________________________________________________  @ NP OV 12/5/19:   Mr Rosanne Salas has a h/o:  1) CAD:  Stent of unknown vessel at [facility] 2006. * followed by Dr. Sharon Richards in 2014:  MPI reportedly showed inferior ischemia & cath recommended but not done. *Seen by Dr Vikki Estrada in 2017: MPI rec but not done. Normal echo. *CP/TEIXEIRA 12/2019:  2) HTN  3) dyslipidemia  4) open AAA repair 4/2017 (8 cm; Dr. Silverio Sam). 5) chronic BONNY occlusion. 6) right BG stroke on MRI 1/2014.  7) heavy tobacco use, 2-3 pack per day; no significant COPD on PFTs 4/2017  8) CKD:  Stage III (Cr 1.4-1.5/GFR 51 7/2019; Dr[facility]). 9) chronic anemia; hemoglobin 9.3  10) heme+ 2019:  Negative EGD 7/2019; internal hemorrhoids/diverticular disease on colonoscopy 9/2019. Retired ; from [place]. 2 ppd & knows he should quit. No recent ethanol; heavy 50 years ago. 2 caffeinated elie/day. No added salt.     12/2019:  New patient presenting with intermittent chest pain (6 months of worsening, 0-1 X/month;  15 minutes duration; central dull ache with radiation to both arms. Can occur with or without exertion. Increased dyspnea associated with discomfort). Chronic TEIXEIRA which is unchanged in years. No palpitations. No falling or bleeding. IMPRESSION AND PLAN  01. Atherosclerotic heart disease of native coronary artery with other forms of angina pectoris (I25.118):  Remote stent of unknown vessel; reportedly abnormal MPI 2014 without further evaluation. He needs a stress test but cannot walk on a treadmill. Therefore cardiac PET. We discussed the signs and symptoms of unstable angina, myocardial infarction and malignant arrhythmia. The patient knows to seek immediate medical attention should they occur. ECG done PET Cardiac test to be done. first available   02. Hyp hrt & chr kdny dis w/o hrt fail, w stg 1-4/unsp chr kdny (I13.10): This condition is stable. I have made no changes to the present regimen. 03. Mixed hyperlipidemia (E78.2): He should be on a statin unless previously intolerant; apparently had been on Lipitor in the past.   04. Occlusion and stenosis of bilateral carotid arteries (B41.10):  Chronic BONNY occlusion; he reports no recent follow-up. U/s ordered. Carotid Duplex to be done first available. 05. Abdominal aortic aneurysm, without rupture (I71.4):  Surgically treated in 2017; follow-up per Dr. Priyanka Meyer. 06. Shortness of breath (R06.02):  ?  Cardiac; also has some element of underlying pulmonary disease & ongoing tobacco abuse. Echocardiogram warranted. 2-D w/CFD Echo to be done first available. 07. Chronic kidney disease, stage 3 (moderate) (N18.3):  Managed by: Renal   08. Cerebral infarction, unspecified (I63.9):  Managed by: Other Physician   09. Nicotine dependence, cigarettes, uncomplicated (D49.101): The patient was instructed on smoking cessation. 10. Body mass index (BMI) 33.0-33.9, adult (I39.31): The patient was instructed on AHA diet and regular exercise.        ORDERS:  1 Tobacco cessation counseling   2 Dietary management education, guidance, and counseling   3 ECG done   4 PET Cardiac test to be done   5 Carotid Duplex   6 2-D w/ CFD Echocardiogram   7 Return office visit with Caryle Dire. Caven MD in 3 Months. 8 The patient was instructed on AHA diet and regular exercise. 12/5/19 MEDICATION LIST  Medication Sig Desc   amlodipine 5 mg tablet take 1 tablet by oral route  every day   Aspirin Low Dose 81 mg tablet,delayed release take 1 tablet by oral route  every day   metoprolol succinate ER 25 mg tablet,extended release 24 hr take 1 tablet by oral route  every day   pantoprazole 40 mg tablet,delayed release take 1 tablet by oral route  every day   tamsulosin 0.4 mg capsule take 1 capsule by oral route  every day 1/2 hour following the same meal each day     _______________________________________________________________________  CARDIAC HISTORY  RISK FACTORS:  1 Hypertension   2 Tobacco Use: No/never       CARDIOVASCULAR PROCEDURES  Procedure Date Results   Carotid Duplex 05/02/2018 100% Right ICA, Less than 50% Left ICA, Vertebral: Bilateral Antegrade Flow, Unchanged versus 2017; at [address] Echo 04/03/2017 EF 55-60%; normal RV; no valve disease; normal PAp; by RCA at [address] EKG 12/05/2019 Sinus Rhythm, Borderline PRWP, otherwise unremarkable. PFTs 04/03/2017 No obstructive disease; moderately decreased DLCO; at [address] ACTIVE ALLERGIES:  Ingredient Reaction Comment   FENOFIBRATE Hives    ALBUTEROL SULFATE Unknown    ATORVASTATIN Leg cramp Atorvastatin         PAST MEDICAL/SURGICAL HISTORY  (Detailed)    Disease/disorder Onset Date Surgical History Date Comments   GERD       Hypertension         Family History  (Detailed)    SOCIAL HISTORY  (Detailed)  Tobacco use reviewed. Preferred language is [place]. Smoking status: Heavy tobacco smoker.       SMOKING STATUS  Use Status Type Smoking Status Usage Per Day Years Used Total Pack Years   yes Cigarette Heavy tobacco smoker 2 Packs       TOBACCO CESSATION INFORMATION  Date Counseled By Order Status Description Code Tobacco Cessation Information   2019 Sanjuanita Browne Tobacco cessation counseling completed   Counseling about tobacco use (procedure)     For other plans, see orders. High complexity decision making was performed  X Yes   High-risk of decompensation with multiple organ involvement X Yes   Hospital problem list   Active Hospital Problems    Diagnosis Date Noted    STEMI (ST elevation myocardial infarction) ([facility] [address] 2020     Priority: 1 - One    CAD (coronary artery disease)       - s/p stent                                                            Subjective:  Patient reports  [x]   nothing; unable to communicate    [x]   intubated   Chest pain  none  consistent with:  Non-cardiac CP         Atypical CP     None now  On going  Anginal CP     Dyspnea  none  at rest  with exertion         improved  unchanged  worse              PND  none  overnight       Orthopnea  none  improved  unchanged  worse   Presyncope  none  improved  unchanged  worse   Ambulated in hallway without symptoms   Yes   Ambulated in room without symptoms  Yes     ROS Hematuria:  Yes X No Dysuria:  Yes X No                (2+  other systems) Cough: Yes X No Sputum: Yes X No                 BRBPR:  Yes X No Melena: Yes X No   No change in family and social history from H&P/Consult note.   Objective:    Physical Exam:  24 hr VS reviewed, overall VSSAF  Temp (24hrs), Av.6 °F (37 °C), Min:98.1 °F (36.7 °C), Max:99.1 °F (37.3 °C)    Patient Vitals for the past 8 hrs:   Pulse   20 0700 (!) 42   20 0600 (!) 35   20 0400 (!) 38   20 0322 (!) 38   20 0300 (!) 39   20 0200 (!) 40   20 0100 (!) 39   20 0009 (!) 40   20 0000 (!) 40     Patient Vitals for the past 8 hrs:   Resp   20 0700 16   20 0600 19   20 0400 15   20 0322 15   20 0300 16   20 0200 16 02/05/20 0100 17   02/05/20 0009 16   02/05/20 0000 16     Patient Vitals for the past 8 hrs:   BP   02/05/20 0700 107/68   02/05/20 0600 (!) 85/58   02/05/20 0400 102/63   02/05/20 0300 111/63   02/05/20 0200 106/68   02/05/20 0100 103/67   02/05/20 0009 103/88         Intake/Output Summary (Last 24 hours) at 2/5/2020 0727  Last data filed at 2/5/2020 0700  Gross per 24 hour   Intake 2493.68 ml   Output 2420 ml   Net 73.68 ml     General: x WD,WN  Elderly  Cachetic x NAD     Agitated  Lethargic  Arousable  Obtunded     Sedated  On Bipap  Intubated                ENT/Palate: WNL  Dry MM x anicteric x ETT in place              Respiratory: X CTA ant lat  Nl resp effort  Increased effort  No significant change     rhonchi  rales  improved  worse              Cardiovasc: X RRR  IRRR X Nl S1, S2 x No rub     No murmur X No new murmur  Murmur c/w: x No gallop    x No edema  BLE edema:+  RLE edema:+  LLE edema:+     Edema less  Edema more  Edema same  Edema worse    X Nl JVP  Elevated JVP  JVP same  JVP worse    X Carotid wnl x abd aorta not palpated X no peripheral emboli noted                GI: X abd soft x nondistended X BS distant X No organo- megaly noted              Skin:  Warm, dry x Cool extremites                  Neuro:  A/O  Grossly non- focal  Obtunded x Sedated     Lethargic  Arousable x intubated       cath site intact w/o hematoma or new bruit; distal pulse unchanged  Yes   Data Review:     Telemetry independently reviewed x sinus  chronic afib  parox afib  NSVT     ECG independently reviewed  NSR  afib  no significant changes  NSST-Tw chgs   x no new ECG provided for review   Lab results reviewed as noted below. Current medications reviewed as noted below. No results for input(s): PH, PCO2, PO2 in the last 72 hours. No results for input(s): CPK, CKMB, CKNDX, TROIQ in the last 72 hours.   Recent Labs     02/05/20  0422 02/04/20 2000 02/04/20  1707  02/03/20  0203    138 140   < >  --    K 4.6 4.7 4.5   < >  --    * 113* 114*   < >  --    CO2 19* 20* 22   < >  --    BUN 22* 21* 23*   < >  --    CREA 1.87* 1.75* 1.84*   < >  --    GFRAA 43* 46* 44*   < >  --    * 107* 110*   < >  --    CA 8.5 9.0 8.3*   < >  --    ALB  --   --  2.4*  --   --    WBC 5.2  --  5.2  --  6.9   HGB 8.5* 8.8* 8.8*  --  10.1*   HCT 27.6* 27.8* 27.9*  --  31.3*     --  205  --  210    < > = values in this interval not displayed. Recent Labs     02/04/20 1707   SGOT 17   AP 84   TBILI 0.6   TP 6.4   ALB 2.4*   GLOB 4.0     Recent Labs     02/05/20  0525 02/04/20  1707 02/03/20 2115 02/03/20  1547   INR 1.1 1.1  --   --    PTP 11.4* 11.6*  --   --    APTT  --  29.4 55.0* 44.4*      No results for input(s): FE, TIBC, PSAT, FERR in the last 72 hours.    Lab Results   Component Value Date/Time    Glucose (POC) 101 (H) 02/04/2020 10:24 AM    Glucose (POC) 182 (H) 04/07/2017 12:45 PM    Glucose (POC) 129 (H) 04/07/2017 08:52 AM       Current Facility-Administered Medications   Medication Dose Route Frequency    sodium chloride (NS) flush 5-40 mL  5-40 mL IntraVENous Q8H    sodium chloride (NS) flush 5-40 mL  5-40 mL IntraVENous PRN    lactated Ringers infusion  75 mL/hr IntraVENous CONTINUOUS    0.9% sodium chloride infusion  50 mL/hr IntraVENous CONTINUOUS    sodium chloride (NS) flush 5-40 mL  5-40 mL IntraVENous Q8H    sodium chloride (NS) flush 5-40 mL  5-40 mL IntraVENous PRN    oxyCODONE-acetaminophen (PERCOCET) 5-325 mg per tablet 1 Tab  1 Tab Oral PRN    fentaNYL citrate (PF) injection 25 mcg  25 mcg IntraVENous Multiple    morphine 10 mg/ml injection 2 mg  2 mg IntraVENous Multiple    HYDROmorphone (PF) (DILAUDID) injection 0.2 mg  0.2 mg IntraVENous Multiple    ondansetron (ZOFRAN) injection 4 mg  4 mg IntraVENous PRN    midazolam (VERSED) injection 0.5 mg  0.5 mg IntraVENous Q5MIN PRN    meperidine (DEMEROL) injection 12.5 mg  12.5 mg IntraVENous PRN    sodium bicarbonate tablet 650 mg 650 mg Oral BID    heparin 25,000 units in D5W 250 ml infusion  500-2,000 Units/hr IntraVENous TITRATE    sodium chloride (NS) flush 5-40 mL  5-40 mL IntraVENous Q8H    sodium chloride (NS) flush 5-40 mL  5-40 mL IntraVENous PRN    albumin human 5% (BUMINATE) solution 12.5 g  12.5 g IntraVENous Q1H PRN    0.45% sodium chloride infusion  10 mL/hr IntraVENous CONTINUOUS    0.9% sodium chloride infusion  9 mL/hr IntraVENous CONTINUOUS    oxyCODONE IR (ROXICODONE) tablet 5 mg  5 mg Oral Q4H PRN    morphine injection 1-2 mg  1-2 mg IntraVENous Q2H PRN    naloxone (NARCAN) injection 0.4 mg  0.4 mg IntraVENous PRN    ceFAZolin (ANCEF) 2 g in sterile water (preservative free) 20 mL IV syringe  2 g IntraVENous Q6H    ondansetron (ZOFRAN) injection 4 mg  4 mg IntraVENous Q4H PRN    albuterol (PROVENTIL VENTOLIN) nebulizer solution 2.5 mg  2.5 mg Nebulization Q4H PRN    DOBUTamine (DOBUTREX) 500 mg/250 mL (2,000 mcg/mL) infusion  2.5-10 mcg/kg/min IntraVENous TITRATE    EPINEPHrine (ADRENALIN) 5 mg in 0.9% sodium chloride 250 mL infusion  0-10 mcg/min IntraVENous TITRATE    aspirin chewable tablet 81 mg  81 mg Oral DAILY    chlorhexidine (PERIDEX) 0.12 % mouthwash 10 mL  10 mL Oral BID    magnesium oxide (MAG-OX) tablet 400 mg  400 mg Oral BID    polyethylene glycol (MIRALAX) packet 34 g  34 g Oral DAILY    senna-docusate (PERICOLACE) 8.6-50 mg per tablet 1 Tab  1 Tab Oral DAILY    ELECTROLYTE REPLACEMENT NOTE: Nurse to review Serum Potassium and Magnesuim levels and Initiate Electrolyte Replacement Protocol as needed  1 Each Other PRN    magnesium sulfate 1 g/100 ml IVPB (premix or compounded)  1 g IntraVENous DAILY    PHENYLephrine (PF)(JONATHAN-SYNEPHRINE) 30 mg in 0.9% sodium chloride 250 mL infusion   mcg/min IntraVENous TITRATE    acetaminophen (OFIRMEV) infusion 1,000 mg  1,000 mg IntraVENous Q6H    calcium chloride injection 1 g  1 g IntraVENous PRN    pantoprazole (PROTONIX) tablet 40 mg  40 mg Oral ACB    ticagrelor (BRILINTA) tablet 90 mg  90 mg Per NG tube Q12H    ceFAZolin (ANCEF) injection    PRN    acetaminophen (TYLENOL) tablet 650 mg  650 mg Oral Q4H PRN    heparin 25,000 units in dextrose 500 mL infusion  4-20 mL/hr Other TITRATE    propofol (DIPRIVAN) 10 mg/mL infusion  0-50 mcg/kg/min IntraVENous TITRATE    sterile water (preservative free) injection        sterile water (preservative free) injection        mupirocin (BACTROBAN) 2 % ointment 1 g  1 g Both Nostrils BID    chlorhexidine (PERIDEX) 0.12 % mouthwash 15 mL  15 mL Oral Q12H    nitroglycerin (Tridil) 200 mcg/ml infusion  0-200 mcg/min IntraVENous TITRATE    amiodarone (CORDARONE) 900 mg/250 ml D5W infusion  0.5-1 mg/min IntraVENous TITRATE    dexmedeTOMidine (PRECEDEX) 400 mcg in 0.9% sodium chloride 100 mL infusion  0.2-0.7 mcg/kg/hr IntraVENous TITRATE    PHENYLephrine (JONAHTAN-SYNEPHRINE) 30 mg in 0.9% sodium chloride 250 mL infusion   mcg/min IntraVENous TITRATE    niCARdipine (CARDENE) 25 mg in 0.9% sodium chloride 250 mL infusion  0-15 mg/hr IntraVENous TITRATE    ezetimibe (ZETIA) tablet 10 mg  10 mg Oral QPM    guaiFENesin ER (MUCINEX) tablet 600 mg  600 mg Oral Q12H    heparin (porcine) injection 4,000 Units  4,000 Units IntraVENous PRN    heparin (porcine) injection 2,000 Units  2,000 Units IntraVENous PRN    guaiFENesin-dextromethorphan (ROBITUSSIN DM) 100-10 mg/5 mL syrup 10 mL  10 mL Oral Q6H PRN    melatonin tablet 9 mg  9 mg Oral QHS    benzonatate (TESSALON) capsule 100 mg  100 mg Oral TID PRN    tamsulosin (FLOMAX) capsule 0.4 mg  0.4 mg Oral QPM    aspirin delayed-release tablet 81 mg  81 mg Oral DAILY    nitroglycerin (NITROSTAT) tablet 0.4 mg  0.4 mg SubLINGual PRN         Melissa Michelle MD

## 2020-02-05 NOTE — PROGRESS NOTES
2/5/2020    INTENSIVIST PROGRESS NOTE:     Patient seen and evaluated, chart reviewed   65 yo male with copd, severe CAD, CKD scheduled for cabg but not done  But pt instead underwent impella catheter placement  No acute events overnight  Now pt in CCU sedated, intubated    ROS: unable to obtain    Visit Vitals  /68   Pulse (!) 42   Temp 99.1 °F (37.3 °C)   Resp 16   Ht 5' 9\" (1.753 m)   Wt 105.9 kg (233 lb 7.5 oz)   SpO2 100%   BMI 34.48 kg/m²       General: sedated, intubated  Eyes: anicteric  HEENT: ETT in place  Neck: FROM  CV: RRR  Lungs: clear  Abd: soft  : no flank pain  Ext: no  edema  Skin: no rash  Musculoskeletal: normal inspection  Neuro: sedated    CXR: bilateral effusions, atx    Labs reviewed    A/P:  - post procedure vent management: keep intubated and try to extubate after cardiac cath and removal of impella  - severe CAD: for PCI today  - COPD: no exacerbation  - CKD: monitor creatinine  - glycemic control  - sedation   - Will assist on disposition planning when stable for transfer  Richa Umanzor MD

## 2020-02-05 NOTE — PROCEDURES
[address]  [facility]    Name:  Kadie Montejo  MR#:  [id number]  :  1945  ACCOUNT #:  [de-identified]  DATE OF SERVICE:  2020    PROCEDURE:  Transesophageal echocardiographic report. SURGEON: Flora Morales MD    INDICATION:  The transesophageal echocardiographic exam was requested by the surgeon in order to evaluate real-time cardiac and valvular form and function in a patient with known cardiomyopathy and coronary artery disease, scheduled for an Impella insertion. The transesophageal echocardiographic probe was easily and atraumatically inserted into the patient's esophagus while the patient was sedated inside the operating room under general anesthesia. Modalities incorporated included 2-D, 3-D, color-flow mode, pulsed-wave Doppler and continuous wave Doppler. AORTA:    Ascending aorta. The patient's ascending aorta measured 2.9 cm. There was no evidence of dissection. There was mild and non-mobile plaque. Aortic arch. The aortic arch measured 2.4 cm in diameter. There was no evidence of dissection. There was moderate and non-mobile plaque. Descending aorta. The descending aorta measured 3.6 cm in diameter. There was no evidence of dissection. There was severe and non-mobile plaque. VALVES:    Aortic valve. The aortic valve annulus measured 1.9 cm. There was no aortic valve stenosis. There was no aortic insufficiency. The aortic leaflet morphology was that of a sclerotic, but not stenotic valve with normal leaflet motion. The maximal velocity through the valve was 152 cm/sec. The peak gradient was 9 mmHg. The mean gradient was 4 mmHg. Mitral valve. Mitral valve annulus measured 3.7 cm. There was no mitral valve stenosis. There was trace mitral regurgitation. The mitral valve leaflet morphology and motion were both normal.    Tricuspid valve. The tricuspid valve annulus measured 3.7 cm. There was no tricuspid valve stenosis.   There was trace tricuspid regurgitation. The tricuspid leaflet morphology and motion were both normal.    Pulmonic valve. The pulmonic valve annulus measured 2.4 cm. There was trace-to-mild pulmonic insufficiency. There was grossly normal pulmonic morphology and motion. ATRIUM:    The right atrial size was upper normal.  There was no spontaneous echo contrast.  There was no right atrial thrombus or tumor. A pulmonary artery catheter was visualized. Left atrium:  The left atrial size was enlarged. There was no spontaneous echo contrast.  There was no left atrial thrombus or tumor. No device was visualized. Left atrial appendage: There was no thrombus visualized within the left atrial appendage. Pulsed-wave Doppler within the appendage measured 50 cm/sec. Interatrial septum. The interatrial septum morphology was normal.  There was no patent foramen ovale with color-flow mode. VENTRICLES:    Right ventricle. The right ventricular cavity size was upper normal.  There was mild right ventricular hypertrophy. There was no right ventricular thrombus and the right ventricular ejection fraction was normal.    Left Ventricle: The left ventricular cavity size was mildly dilated. The left ventricular major axis was 8.2 cm. There was concentric left ventricular hypertrophy with an inferior wall thickness of 1.4 cm. There was no left ventricular thrombus and the left ventricular ejection fraction was 35%. Interventricular septum. The interventricular septum morphology was normal.    Regional function. There was basal and mid severe hypokinesis of the septal and anteroseptal and inferoseptal walls. There was near akinesis of the apical segments.     PERICARDIUM:  The pericardium was normal.    PLEURAE:  The pleurae were normal.    POST-INTERVENTION FOLLOWUP STUDY:  After placement of an Impella through the aortic valve, the left ventricular function exhibited mild improvement in the inferior, inferoseptal, and anteroseptal walls. There was residual near akinesis of the apex. The right ventricular motion was preserved. There was no aortic regurgitation. There was residual trace mitral and tricuspid insufficiency. There were no effusions. The patient's ascending aorta was intact. These results were discussed and viewed with the cardiac surgeon. The transesophageal echocardiographic probe was easily and atraumatically removed from the patient's esophagus. The patient tolerated the procedure without event.       Merlinda Gage, DO      [id number]  D:  02/04/2020 16:04  T:  02/05/2020 1:39  JOB #:  [id number]

## 2020-02-05 NOTE — PROGRESS NOTES
Attempted to see pt for OT service. Pt is intubated, has femoral line is on two sedatives and HR is in the 40s. Noted impella insertion yesterday and pt will go for PCI today most likely. Pt is not medically appropriate for OT services at this time. Will follow up tomorrow.

## 2020-02-05 NOTE — PROGRESS NOTES
Nephrology Progress Note  [facility]  [email or web address]                  Phone - [phone]       Patient: Niki Signs   Date- 2/5/2020        Admit Date: 1/26/2020  YOB: 1945             CC: Follow up for  FRANSISCO ON CKD      Subjective: Interval History:   -  Cr. 1.8  S/P IMPELLA   FOR cardiac cath today  He is intubated on vent at present  Good urine out put    ROS:- Can't access due to patient's current condition      Assessment & Plan: FRANSISCO   CKD stage III.  Baseline creat ~1.6.     s/p cardiac cath and CT angio.    RESP. FAILURE ON VENT  Renal artery stenosis noted in 4/17.  Angio CT on 2/18:   H/o hyperkalemia   Metabolic acidosis,    Edema   Acute STMI.   An emergent cath was done on 1/27 - s/p rigt IMPELLA on 2-4--2020     Severe COPD and continued tobacco abuse     Hypertension.  Doretha Bajwa. s/p an AAA repair in 4/17. PVD.       PLAN-  - lasix PRN  - continue ivf as he is going for cardiac cath today  - continue po bicarb  - follow BMP in am  - avoid acei or arb       Physical exam:   GEN: intubated on vent  NECK-no mass, ET TUBE  RESP: Clear b/l, no wheezing  CVS: RRR,S1,S2    ABDO: soft , non tender,  EXT: ++ Edema   NEURO:Can't access due to patient's current condition    : Her +      Care Plan discussed with: nurse  Objective:   Visit Vitals  /64   Pulse (!) 42   Temp 99.4 °F (37.4 °C)   Resp 19   Ht 5' 9\" (1.753 m)   Wt 105.9 kg (233 lb 7.5 oz)   SpO2 100%   BMI 34.48 kg/m²     Last 3 Recorded Weights in this Encounter    02/02/20 0646 02/03/20 0625 02/03/20 0902   Weight: 105.7 kg (233 lb 0.4 oz) 105.9 kg (233 lb 7.5 oz) 105.9 kg (233 lb 7.5 oz)     02/03 1901 - 02/05 0700  In: 3565.1 [I.V.:3475.1]  Out: 2820 [Urine:2620]    Intake/Output Summary (Last 24 hours) at 2/5/2020 1044  Last data filed at 2/5/2020 0905  Gross per 24 hour   Intake 2582.88 ml   Output 2710 ml   Net -127.12 ml      Chart reviewed. Pertinent Notes reviewed.        Medication list  reviewed   Current Facility-Administered Medications   Medication    heparinized saline 2 units/mL infusion    heparinized saline 2 units/mL infusion    heparinized saline 2 units/mL infusion    heparin (porcine) 1,000 unit/mL injection    lidocaine (PF) (XYLOCAINE) 10 mg/mL (1 %) injection    iodixanoL (VISIPAQUE) 320 mg iodine/mL contrast injection    sodium chloride (NS) flush 5-40 mL    sodium chloride (NS) flush 5-40 mL    lactated Ringers infusion    0.9% sodium chloride infusion    sodium chloride (NS) flush 5-40 mL    sodium chloride (NS) flush 5-40 mL    oxyCODONE-acetaminophen (PERCOCET) 5-325 mg per tablet 1 Tab    fentaNYL citrate (PF) injection 25 mcg    morphine 10 mg/ml injection 2 mg    HYDROmorphone (PF) (DILAUDID) injection 0.2 mg    ondansetron (ZOFRAN) injection 4 mg    midazolam (VERSED) injection 0.5 mg    meperidine (DEMEROL) injection 12.5 mg    sodium bicarbonate tablet 650 mg    heparin 25,000 units in D5W 250 ml infusion    sodium chloride (NS) flush 5-40 mL    sodium chloride (NS) flush 5-40 mL    albumin human 5% (BUMINATE) solution 12.5 g    0.45% sodium chloride infusion    0.9% sodium chloride infusion    oxyCODONE IR (ROXICODONE) tablet 5 mg    morphine injection 1-2 mg    naloxone (NARCAN) injection 0.4 mg    ceFAZolin (ANCEF) 2 g in sterile water (preservative free) 20 mL IV syringe    ondansetron (ZOFRAN) injection 4 mg    albuterol (PROVENTIL VENTOLIN) nebulizer solution 2.5 mg    DOBUTamine (DOBUTREX) 500 mg/250 mL (2,000 mcg/mL) infusion    EPINEPHrine (ADRENALIN) 5 mg in 0.9% sodium chloride 250 mL infusion    aspirin chewable tablet 81 mg    chlorhexidine (PERIDEX) 0.12 % mouthwash 10 mL    magnesium oxide (MAG-OX) tablet 400 mg    polyethylene glycol (MIRALAX) packet 34 g    senna-docusate (PERICOLACE) 8.6-50 mg per tablet 1 Tab    ELECTROLYTE REPLACEMENT NOTE: Nurse to review Serum Potassium and Magnesuim levels and Initiate Electrolyte Replacement Protocol as needed    magnesium sulfate 1 g/100 ml IVPB (premix or compounded)    PHENYLephrine (PF)(JONATHAN-SYNEPHRINE) 30 mg in 0.9% sodium chloride 250 mL infusion    acetaminophen (OFIRMEV) infusion 1,000 mg    calcium chloride injection 1 g    pantoprazole (PROTONIX) tablet 40 mg    ticagrelor (BRILINTA) tablet 90 mg    ceFAZolin (ANCEF) injection    acetaminophen (TYLENOL) tablet 650 mg    heparin 25,000 units in dextrose 500 mL infusion    propofol (DIPRIVAN) 10 mg/mL infusion    mupirocin (BACTROBAN) 2 % ointment 1 g    chlorhexidine (PERIDEX) 0.12 % mouthwash 15 mL    nitroglycerin (Tridil) 200 mcg/ml infusion    amiodarone (CORDARONE) 900 mg/250 ml D5W infusion    dexmedeTOMidine (PRECEDEX) 400 mcg in 0.9% sodium chloride 100 mL infusion    PHENYLephrine (JONATHAN-SYNEPHRINE) 30 mg in 0.9% sodium chloride 250 mL infusion    niCARdipine (CARDENE) 25 mg in 0.9% sodium chloride 250 mL infusion    ezetimibe (ZETIA) tablet 10 mg    guaiFENesin ER (MUCINEX) tablet 600 mg    heparin (porcine) injection 4,000 Units    heparin (porcine) injection 2,000 Units    guaiFENesin-dextromethorphan (ROBITUSSIN DM) 100-10 mg/5 mL syrup 10 mL    melatonin tablet 9 mg    benzonatate (TESSALON) capsule 100 mg    tamsulosin (FLOMAX) capsule 0.4 mg    aspirin delayed-release tablet 81 mg    nitroglycerin (NITROSTAT) tablet 0.4 mg           Data Review :  Recent Labs     02/05/20 0422 02/04/20 2000 02/04/20  1707    138 140   K 4.6 4.7 4.5   * 113* 114*   CO2 19* 20* 22   BUN 22* 21* 23*   CREA 1.87* 1.75* 1.84*   * 107* 110*   CA 8.5 9.0 8.3*   MG 2.4 1.9 2.0     Recent Labs     02/05/20 0422 02/04/20 2000 02/04/20  1707 02/03/20  0203   WBC 5.2  --  5.2 6.9   HGB 8.5* 8.8* 8.8* 10.1*   HCT 27.6* 27.8* 27.9* 31.3*     --  205 210     No results for input(s): FE, TIBC, PSAT, FERR in the last 72 hours.    No results for input(s): CPK, CKNDX, TROIQ in the last 72 hours. No lab exists for component: CPKMB  Lab Results   Component Value Date/Time    Color YELLOW/STRAW 01/31/2020 11:45 AM    Appearance TURBID (A) 01/31/2020 11:45 AM    Specific gravity 1.023 01/31/2020 11:45 AM    pH (UA) 6.0 01/31/2020 11:45 AM    Protein 100 (A) 01/31/2020 11:45 AM    Glucose NEGATIVE  01/31/2020 11:45 AM    Ketone NEGATIVE  01/31/2020 11:45 AM    Bilirubin NEGATIVE  01/31/2020 11:45 AM    Urobilinogen 1.0 01/31/2020 11:45 AM    Nitrites NEGATIVE  01/31/2020 11:45 AM    Leukocyte Esterase NEGATIVE  01/31/2020 11:45 AM    Epithelial cells FEW 01/31/2020 11:45 AM    Bacteria NEGATIVE  01/31/2020 11:45 AM    WBC 0-4 01/31/2020 11:45 AM    RBC 0-5 01/31/2020 11:45 AM     Lab Results   Component Value Date/Time    Culture result: MODERATE NORMAL RESPIRATORY XUAN 01/27/2020 09:20 AM     No results found for: SDES  Lab Results   Component Value Date/Time    Sodium,urine random 33 04/02/2017 09:38 AM    Creatinine, urine 129.00 04/02/2017 09:38 AM     Results from Hospital Encounter encounter on 01/26/20   XR CHEST PORT    Narrative EXAM:  CR chest portable    INDICATION: Heart surgery. Small pleural effusions and bibasilar atelectasis. COMPARISON: 2/4/2020 at 1649 hours. TECHNIQUE: Portable AP semierect upright chest view at 0518 hours    FINDINGS: The support devices are stable. Cardiac monitoring wires overlie the  thorax. The cardiomediastinal contours are stable. There are stable small  pleural effusions and bibasilar opacities. There is no pneumothorax. The bones  and upper abdomen are stable. Impression IMPRESSION: Stable small pleural effusions and bibasilar opacities. Samir Wilson MD  [facility]   [email or web address]  [facility]  [address], [address]  [place], [address]  Phone - [phone]         Fax - [phone]

## 2020-02-05 NOTE — PROGRESS NOTES
02/05/20 0622   ABCDEF Bundle   SBT Safety Screen Passed No   SBT Screen Reason for Failure   (going to OR this morning for stents)

## 2020-02-05 NOTE — PROGRESS NOTES
[phone]  Bedside report received from Aracely Grant and assessment complete. Called pt's wife and received telephone consent for procedure. 0909  Pt coughing and gagging on ETT frequently and appears uncomfortable. Propofol increased to 50mcg. 0930  Pt taken to cath lab for procedure. 1150  Report received from cath lab  [phone]  Pt returned from cath lab on vent . All drips at previous rates. Impella set at P8. Dr. Antonio Pacheco at bedside. Discussed weaning impella prior to planned removal.  Order received to begin weaning and he will speak with Dr. Beba Cabral to discuss further plans. BP via a-line 140's/60's. CI 2.5. Impella decreased to P7, then to P6. VSS. Pt's wife and daughter at bedside and updated them on plan of care. 0  Dr. Beba Cabral at bedside. Impella decreased to P4.  BP and CI stable  1345  OR team at bedside to set up for impella removal.  Impella decreased to P2.  1500  Impella removed  1510  Dr. Beba Cabral requested propofol stopped and pt wakened, weaned and extubated. Propofol stopped  1540  Pt awake, following commands and can lift head off of pillow. Pt placed on CPAP.  1620  Dr. Oriana Rajan at bedside. Plan of care discussed with him. Narciso Laser for pt to sit up to extubate with long sheath in groin  1630  ABG drawn  1730  Pt extubated to BIPAP per Dr. José Miguel Gutiérrez recommendation. 1810  BIPAP removed due to pt request.  Pt tolerating 2LPM NC  1834  Dobutamine stopped due to CI 4.0.   Dr. Beba Cabral notified  1900  Report given to Aracely Grant and Dr. Beba Cabral updated on pt condition

## 2020-02-05 NOTE — PROGRESS NOTES
02/05/20 0000   Hemodynamic Data   PAP Systolic 38   PAP Diastolic 12   PAULETTE (mmHg) 21   CO (l/min) 4.6 l/min   CI (l/min/m2) 2 l/min/m2   SVO2 (%) 72 %

## 2020-02-05 NOTE — PROGRESS NOTES
Physical Therapy    Attempted to see pt for PT service. Pt is intubated, has femoral line is on two sedatives and HR is in the 40s. Noted impella insertion yesterday and pt will go for PCI today most likely. Pt is not medically appropriate for PT services at this time. Will follow up tomorrow.

## 2020-02-05 NOTE — OP NOTES
[address]  OPERATIVE REPORT    Name:  Kadie Montejo  MR#:  [id number]  :  1945  ACCOUNT #:  [de-identified]  DATE OF SERVICE:  2020    PREOPERATIVE DIAGNOSES:  1. Severe multivessel coronary artery disease. 2.  Unstable angina with a peak troponin of 40.  3.  Chronic kidney disease, stage III; peak creatinine of 1.82.  4.  Respiratory insufficiency with an FEV1 of 54% predicted and DLCO of 46% predicted. 5.  Severe aortic atherosclerosis with a history of abdominal aortic aneurysm repair and severe atheromatous disease. 6.  Severe cerebrovascular disease with a totally occluded right internal carotid artery. 7.  Current daily smoker. 8.  Left ventricular systolic dysfunction (ejection fraction of 40%). 9.  Left ventricular diastolic dysfunction. POSTOPERATIVE DIAGNOSES:  1. Severe multivessel coronary artery disease. 2.  Unstable angina with a peak troponin of 40.  3.  Chronic kidney disease, stage III; peak creatinine of 1.82.  4.  Respiratory insufficiency with an FEV1 of 54% predicted and DLCO of 46% predicted. 5.  Severe aortic atherosclerosis with a history of abdominal aortic aneurysm repair and severe atheromatous disease. 6.  Severe cerebrovascular disease with a totally occluded right internal carotid artery. 7.  Current daily smoker. 8.  Left ventricular systolic dysfunction (ejection fraction of 40%). 9.  Left ventricular diastolic dysfunction. PROCEDURES PERFORMED:  1. Right axillary cutdown with open exposure of right axillary artery. 2.  Construction of anastomosis to axillary artery with #10 Hemashield. 3.  Insertion of arterial support with Impella 5.0.  4. KHOA. SURGEON:  Flora Morales MD    ASSISTANT:  Hyacinth Fox PA-C. The assistance of a PA was critical to the high risk of the patient and critical nature of the procedure performed. ANESTHESIA:  General endotracheal.    COMPLICATIONS:  None.     SPECIMENS REMOVED: None.    IMPLANTS:  Impella 5.0.    ESTIMATED BLOOD LOSS:  10 mL. DETAILS:  The patient is a 77-year-old gentleman who approximately 10 days ago had an acute coronary syndrome. He was found to have a chronically occluded LAD and RCA as well as a peak troponin of 40. He also has multiple severe comorbidities. Given the inability to revascularize his LAD or his right coronary artery, the plan became to optimize the perfusion he does have down his ramus and circumflex. In order to best do this in the cath lab, he would require hemodynamic support. We therefore opted to place an Impella 5.0 in his right axillary artery in preparation of PCI for tomorrow. PROCEDURE:  The patient was prepped and draped in sterile fashion. A time-out was performed. An incision was made over the lateral third of the clavicle towards the deltopectoral groove. We dissected down and we were able to palpate the axillary artery. We encircled this with 2 blue vessel loops. We then administered 5000 of heparin. We then placed angled vascular clamps to isolate the artery. We then opened the artery with an 11-blade and extended this with Griffith scissors. We then performed an anastomosis with a 5-0 Prolene using a size 10 Hemashield graft. We had no repair stitches and no issues with the anastomosis. We then inserted the introducer into the graft. Through this, we advanced the wire and catheter across the aortic valve and into the LV using fluoroscopic guidance and transesophageal echocardiographic guidance. We then withdrew the guidewire and replaced the Impella wire through the catheter. We ensured that we had an adequate amount of Impella wire into the LV cavity. We then withdrew this catheter and advanced the Impella device over the wire into the left ventricle. We then withdrew the wire. When we were satisfied that we had the Impella in position across the valve with adequate length, we turned the Impella on.   We were happy with the waveform and the position on both fluoro and also echo. We then advanced the sheath into the graft and secured this, and then we advanced the sheath and the graft into the axillary artery. We monitored this movement at all times on echocardiography. We remained happy with the position of the Impella. We then secured the Impella to the skin in multiple places. We then closed the soft tissue in 3 layers. We infused the wound with lidocaine. We elected to leave the patient intubated due to his high A-a gradient on 100% oxygen. The patient was then safely transported to the CCU.       MD JEOVANNY [id number]  D:  02/04/2020 15:55  T:  02/05/2020 2:49  JOB #:  [id number]

## 2020-02-05 NOTE — PROGRESS NOTES
Interdisciplinary team rounds were held  2/5/2020 with the following team members: Care Management, Diabetes Treatment Specialist, Nursing, Nutrition and Physician. Plan of care discussed. Goal: See MD orders and progress notes for further  interventions and desired outcomes.

## 2020-02-06 ENCOUNTER — APPOINTMENT (OUTPATIENT)
Dept: GENERAL RADIOLOGY | Age: 75
DRG: 215 | End: 2020-02-06
Attending: PHYSICIAN ASSISTANT
Payer: MEDICARE

## 2020-02-06 LAB
ACT BLD: 235 SECS (ref 79–138)
ACT BLD: 252 SECS (ref 79–138)
ACT BLD: 268 SECS (ref 79–138)
ANION GAP SERPL CALC-SCNC: 5 MMOL/L (ref 5–15)
BASOPHILS # BLD: 0 K/UL (ref 0–0.1)
BASOPHILS NFR BLD: 0 % (ref 0–1)
BUN SERPL-MCNC: 24 MG/DL (ref 6–20)
BUN/CREAT SERPL: 14 (ref 12–20)
CALCIUM SERPL-MCNC: 8.8 MG/DL (ref 8.5–10.1)
CHLORIDE SERPL-SCNC: 114 MMOL/L (ref 97–108)
CO2 SERPL-SCNC: 22 MMOL/L (ref 21–32)
CREAT SERPL-MCNC: 1.7 MG/DL (ref 0.7–1.3)
DIFFERENTIAL METHOD BLD: ABNORMAL
EOSINOPHIL # BLD: 0.2 K/UL (ref 0–0.4)
EOSINOPHIL NFR BLD: 3 % (ref 0–7)
ERYTHROCYTE [DISTWIDTH] IN BLOOD BY AUTOMATED COUNT: 16.8 % (ref 11.5–14.5)
GLUCOSE SERPL-MCNC: 89 MG/DL (ref 65–100)
HCT VFR BLD AUTO: 27.2 % (ref 36.6–50.3)
HGB BLD-MCNC: 8.6 G/DL (ref 12.1–17)
IMM GRANULOCYTES # BLD AUTO: 0.1 K/UL (ref 0–0.04)
IMM GRANULOCYTES NFR BLD AUTO: 1 % (ref 0–0.5)
LYMPHOCYTES # BLD: 0.6 K/UL (ref 0.8–3.5)
LYMPHOCYTES NFR BLD: 10 % (ref 12–49)
MAGNESIUM SERPL-MCNC: 2.4 MG/DL (ref 1.6–2.4)
MCH RBC QN AUTO: 29.5 PG (ref 26–34)
MCHC RBC AUTO-ENTMCNC: 31.6 G/DL (ref 30–36.5)
MCV RBC AUTO: 93.2 FL (ref 80–99)
MONOCYTES # BLD: 0.5 K/UL (ref 0–1)
MONOCYTES NFR BLD: 8 % (ref 5–13)
NEUTS SEG # BLD: 4.9 K/UL (ref 1.8–8)
NEUTS SEG NFR BLD: 78 % (ref 32–75)
NRBC # BLD: 0 K/UL (ref 0–0.01)
NRBC BLD-RTO: 0 PER 100 WBC
PLATELET # BLD AUTO: 158 K/UL (ref 150–400)
PMV BLD AUTO: 10.2 FL (ref 8.9–12.9)
POTASSIUM SERPL-SCNC: 4.3 MMOL/L (ref 3.5–5.1)
RBC # BLD AUTO: 2.92 M/UL (ref 4.1–5.7)
RBC MORPH BLD: ABNORMAL
SODIUM SERPL-SCNC: 141 MMOL/L (ref 136–145)
WBC # BLD AUTO: 6.3 K/UL (ref 4.1–11.1)

## 2020-02-06 PROCEDURE — 74011250636 HC RX REV CODE- 250/636: Performed by: ANESTHESIOLOGY

## 2020-02-06 PROCEDURE — 74011250636 HC RX REV CODE- 250/636: Performed by: PHYSICIAN ASSISTANT

## 2020-02-06 PROCEDURE — 83735 ASSAY OF MAGNESIUM: CPT

## 2020-02-06 PROCEDURE — 97165 OT EVAL LOW COMPLEX 30 MIN: CPT | Performed by: OCCUPATIONAL THERAPIST

## 2020-02-06 PROCEDURE — 77010033678 HC OXYGEN DAILY

## 2020-02-06 PROCEDURE — 74011250637 HC RX REV CODE- 250/637: Performed by: PHYSICIAN ASSISTANT

## 2020-02-06 PROCEDURE — 71045 X-RAY EXAM CHEST 1 VIEW: CPT

## 2020-02-06 PROCEDURE — 74011250637 HC RX REV CODE- 250/637: Performed by: INTERNAL MEDICINE

## 2020-02-06 PROCEDURE — 85025 COMPLETE CBC W/AUTO DIFF WBC: CPT

## 2020-02-06 PROCEDURE — 65660000000 HC RM CCU STEPDOWN

## 2020-02-06 PROCEDURE — 74011000250 HC RX REV CODE- 250: Performed by: PHYSICIAN ASSISTANT

## 2020-02-06 PROCEDURE — 80048 BASIC METABOLIC PNL TOTAL CA: CPT

## 2020-02-06 PROCEDURE — 94760 N-INVAS EAR/PLS OXIMETRY 1: CPT

## 2020-02-06 PROCEDURE — 97535 SELF CARE MNGMENT TRAINING: CPT | Performed by: OCCUPATIONAL THERAPIST

## 2020-02-06 PROCEDURE — 36415 COLL VENOUS BLD VENIPUNCTURE: CPT

## 2020-02-06 RX ORDER — METOPROLOL TARTRATE 25 MG/1
12.5 TABLET, FILM COATED ORAL EVERY 12 HOURS
Status: DISCONTINUED | OUTPATIENT
Start: 2020-02-06 | End: 2020-02-06

## 2020-02-06 RX ORDER — METOPROLOL SUCCINATE 25 MG/1
25 TABLET, EXTENDED RELEASE ORAL DAILY
Status: DISCONTINUED | OUTPATIENT
Start: 2020-02-07 | End: 2020-02-07

## 2020-02-06 RX ADMIN — Medication 10 ML: at 13:43

## 2020-02-06 RX ADMIN — METOPROLOL TARTRATE 12.5 MG: 25 TABLET ORAL at 09:23

## 2020-02-06 RX ADMIN — TICAGRELOR 90 MG: 90 TABLET ORAL at 17:37

## 2020-02-06 RX ADMIN — MAGNESIUM OXIDE 400 MG (241.3 MG MAGNESIUM) TABLET 400 MG: TABLET at 08:10

## 2020-02-06 RX ADMIN — EZETIMIBE 10 MG: 10 TABLET ORAL at 17:37

## 2020-02-06 RX ADMIN — TAMSULOSIN HYDROCHLORIDE 0.4 MG: 0.4 CAPSULE ORAL at 17:37

## 2020-02-06 RX ADMIN — SODIUM BICARBONATE 650 MG: 650 TABLET ORAL at 17:37

## 2020-02-06 RX ADMIN — TICAGRELOR 90 MG: 90 TABLET ORAL at 05:12

## 2020-02-06 RX ADMIN — Medication 10 ML: at 05:11

## 2020-02-06 RX ADMIN — WATER 2 G: 1 INJECTION INTRAMUSCULAR; INTRAVENOUS; SUBCUTANEOUS at 02:33

## 2020-02-06 RX ADMIN — ASPIRIN 81 MG 81 MG: 81 TABLET ORAL at 08:10

## 2020-02-06 RX ADMIN — Medication 10 ML: at 21:23

## 2020-02-06 RX ADMIN — GUAIFENESIN 600 MG: 600 TABLET, EXTENDED RELEASE ORAL at 21:23

## 2020-02-06 RX ADMIN — MUPIROCIN 1 G: 20 OINTMENT TOPICAL at 08:15

## 2020-02-06 RX ADMIN — PANTOPRAZOLE SODIUM 40 MG: 40 TABLET, DELAYED RELEASE ORAL at 08:10

## 2020-02-06 RX ADMIN — SODIUM BICARBONATE 650 MG: 650 TABLET ORAL at 08:10

## 2020-02-06 RX ADMIN — POLYETHYLENE GLYCOL 3350 34 G: 17 POWDER, FOR SOLUTION ORAL at 08:10

## 2020-02-06 RX ADMIN — Medication 10 ML: at 05:12

## 2020-02-06 RX ADMIN — SENNOSIDES AND DOCUSATE SODIUM 1 TABLET: 8.6; 5 TABLET ORAL at 08:10

## 2020-02-06 RX ADMIN — GUAIFENESIN 600 MG: 600 TABLET, EXTENDED RELEASE ORAL at 08:10

## 2020-02-06 RX ADMIN — SODIUM CHLORIDE 50 ML/HR: 900 INJECTION, SOLUTION INTRAVENOUS at 05:12

## 2020-02-06 RX ADMIN — CHLORHEXIDINE GLUCONATE 0.12% ORAL RINSE 10 ML: 1.2 LIQUID ORAL at 08:11

## 2020-02-06 RX ADMIN — MELATONIN TAB 3 MG 9 MG: 3 TAB at 21:23

## 2020-02-06 NOTE — CARDIO/PULMONARY
Cardiac Rehab Note: chart review Met with pt and family member Dx NSTEMI  And S/P PCI/stenting with impella 2/5/2020 Consult has been acknowledged MI education folder, heart attack, heart heathy diet, warning signs, heart facts, catheterization brochure, and out patient cardiac rehab program to Gwendolyn Clemens on 2/6/2020 Educated using teach back method. Reviewed MI diagnosis definition and purpose of intervention. Discussed risk factors for CAD to include the following: family history, elevated BMI, hyperlipidemia, hypertension, diabetes, and stress. Discussed Heart Healthy/Low Sodium (less than 2000 mg) diet. Reviewed the importance of medication compliance(Brilinta), follow up appointments with cardiologist, signs and symptoms of angina, and what to report to physician after discharge. Emphasized the value of cardiac rehab. Discussed Cardiac Rehab Program format, benefits, and encouraged enrollment to assist with risk modification and management. Declined OP Cardiac Rehab stating he does not want to drive from [place] to here. Suggested he walk 3-5 x week for 30-45 min-stated it hurts his knees and back. Suggested joining [facility] or gym-declined. Suggested he talk with physician regarding what exercises he should do -states he guesses he should stop smoking and is willing to ask for a patch. Marlon Leonardo verbalized understanding with questions answered.   Kenyatta Ramachandran RN

## 2020-02-06 NOTE — PROGRESS NOTES
Cardiology Progress Note  2/6/2020    Admit Date: 1/26/2020  Admit Diagnosis: STEMI (ST elevation myocardial infarction) ([facility] [address] [I21.3]  CAD (coronary artery disease) [I25.10]  CAD (coronary artery disease) [I25.10]  CC:  None currently  Cardiac Assessment/Plan:   1) CAD:  Stent of unknown vessel at [facility] 2006. * followed by Dr. Sid Rose in 2014:  MPI reportedly showed inferior ischemia & cath recommended but not done. *Seen by Dr Rancho Martin in 2017: MPI rec but not done. Normal echo. *CP/TEIXEIRA 12/2019: Abnl PET (inf ischemia): pt delayed cath plan until 1/27/20. *Echo 1/10/20: EF 55%; AoScl only. Mild pulm HTN. *Cath 1/26/20: Severe LM w/ occluded LAD & RCA. CKp ; Trop . 2) HTN  3) dyslipidemia  4) open AAA repair 4/2017 (8 cm; Dr. Rogers Spence). 5) chronic BONNY occlusion. 6) right BG stroke on MRI 1/2014.  7) heavy tobacco use, 2-3 pack per day; no significant COPD on PFTs 4/2017  8) CKD:  Stage III (Cr 1.4-1.5/GFR 51 7/2019; Dr. Francisca Pace). Cr 1.5/GFR 45 1/21/20.  9) chronic anemia; hemoglobin 9.3: Chronic anemia related to CKD. 10) heme+ 2019:  Negative EGD 7/2019; internal hemorrhoids/diverticular disease on colonoscopy 9/2019. Retired ; from [place]. 2 ppd & knows he should quit. No recent ethanol; heavy 50 years ago. 2 caffeinated elie/day. No added salt. He reports no CP in the last month until day of admit. hold ACEi/ARB for now with CKD/recent contrast.     For other plans, see orders. Echo 1/27: Normal cavity size. Mild LVH; EF 40 - 45%. Mid-distal Ant-sept AK; Mild TR. Abd U/S 1/27: \"Mid abdominal aorta measures 4.0 cm in diameter, although the distal abdominal aorta, where the largest aneurysm was seen on prior CT, is obscured by overlying bowel gas\"    Carotid U/S 1/27: Chronic BONNY occlusion; There is mild stenosis in the left ICA (<50%). Bilateral vertebrals are antegrade.     NICHOLAS 1/27: normal.    CTA 1/28: Chronically occluded right internal carotid artery. Bilateral common and left internal carotid arteries remain patent without significant stenosis. Bilateral vertebral arteries are patent with mild atherosclerosis. The left vertebral artery arises from the aortic arch.     Relatively stable juxtarenal abdominal aortic aneurysmal dilatation. Sequelae of graft repair of infrarenal abdominal aortic aneurysm. There is minimal surrounding fat stranding without evidence for fluid collection or gas.     Increased size of the right common iliac artery aneurysm with mild hemorrhage in the right paracolic gutter and right hemipelvis. These findings were communicated to the nursing staff by the on-call radiologist.     Mild nonspecific right middle lobe and posterior right lower lobe airspace disease. Mild nonspecific colitis involving the ascending colon    2/5 PCIs: PCI of Ramus. 80 % stenosis . 2.5 Vladimir Stent to 0% . GERARD 3 flow.      PCI of Left Main into Circumflex. Distal LM ulcerated plaque, with severe 90 % stenosis at ostium of Cx.    3.0 Vladimir SHIVA deployed, and proximal segment dilated to 4.0 mm  With non compliant balloon. 0% residual .   GERARD 3. Attempted to wire the occluded LAD , but unable to cross the obstruction with a wire and no intervention on that artery . _____________________________________________________________________________________    1/28: Stable BP/HR; NSR; Cr unchanged at 1.45; CKp was 700s (down since then); trop from 45 to 20. No CP/dyspnea;   Remains on IV hep gtt; toprol XL 50 qday; NTG paste; lipitor; Add ACEi or ARB after surgery/prior to d/c.  Sputum c/w chronic bronchitis. CABG timing per Dr Jerson Rm. Extensive CTA orders noted; increased risk of FRANSISCO. 1/29: VSSAF; NSR; Cr 1.5 from 1.45; Hg 10.8 from 12.4 from 14.2. No CP/dyspnea; less cough/sputum. IV hep d/cd after CTA above; Remains on asa, toprol XL 50, NTG paste, lipitor; Add ACEi or ARB prior to d/c.   CABG timing per Dr Jerson Rm; Vascular/retroperitoneal issues per Dr Keegan Garcia. Benign cath sites. 1/30: VSSAF; NSR; Cr 1.58; Hg 10. No CP/dyspnea per se; cont cough/poor sleep: mucinex/ambien. Cardiac meds as above. Dr Warren Carpenter et al to see pt. Appreciate Dr Parker Reis input on CT findings: no further Rx needed @ this time/prior to CABG. 1/31: VSSAF; NSR; Less cough/sputum. No new cardiac plans; awaiting CABG. Anemia related to CKD and dilution; some blood loss with cath. 2/1: VSSAF; NSR; Cont cough/less sputum; Cr 1.7. still getting ABx. No new cardiac plans; awaiting CABG.     2/2: Angina this am: improving with NTG SL but not gone. No acute ecg changes. Starting IV NTG and hep gtt; If doesn't become pain-free, will need CABG. Labs pending. He reports intolerance to \"3 statins\": changed to zetia. He felt \"out of it\" with robitussin DM: back to just mucinex. 2/3: Remains angina-free since yesterday am; Remains on IV hep gtt and IV NTG gtt. Hg 10.1; Cr 1.6; PTT 33. Change in plan d/w Virginia Tyler and pt/wife (\"unlikely to be able to bypass RCA & LAD would be difficult; concern @ cross-clamping aorta: therefore, only off-pump DUKE-LAD (or ramus) offered. \" This approach is no better than PCI.     He is agreeable to proceed with PCI as able; He and wife understand this will be an incomplete revascularization. 2/4: Change in plan as noted yesterday: Impella today then PCI tomorrow; No CP/dyspnea; Cr 1.78.    2/5: Impella @ P8; Dobut @ 1; CI 2.3; Hg down to 8.5. Cr 1.87. ACT 160s. Good UOP. Marked sinus ward to 30s, 40 currently. Remains intubated/sedated. Will d/c amio and toprol XL for now. For PCI later this am. Impella removal per Dr Ilir Morales. 2/6: PCI then impella out; off vent/ Art line out. No CP/dyspnea. NSR: bradycardia resolved off presidex/amio. Back on low dose metoprolol; Future ARB vs hydralazine/imdur dep on Cr/Renal rec.   CXR 2/6: \"Decreased small pleural effusions with stable bibasilar atelectasis. \"     Cr 1.7; nl K. Remains on asa/brilinta; zetia as \"cant tolerate statins. \"    Transfer to tele; PT eval; wean O2 as able. Mobilize: dispo pending PT eval.        High complexity decision making was performed  X Yes   High-risk of decompensation with multiple organ involvement X Yes     ________________________________________________________________  @ NP OV 12/5/19:   Mr Wilian Heard has a h/o:  1) CAD:  Stent of unknown vessel at [facility] 2006. * followed by Dr. Jai Betts in 2014:  MPI reportedly showed inferior ischemia & cath recommended but not done. *Seen by Dr Nava Crow in 2017: MPI rec but not done. Normal echo. *CP/TEIXEIRA 12/2019:  2) HTN  3) dyslipidemia  4) open AAA repair 4/2017 (8 cm; Dr. Ernestine Cook). 5) chronic BONNY occlusion. 6) right BG stroke on MRI 1/2014.  7) heavy tobacco use, 2-3 pack per day; no significant COPD on PFTs 4/2017  8) CKD:  Stage III (Cr 1.4-1.5/GFR 51 7/2019; Dr. Colt Bonilla). 9) chronic anemia; hemoglobin 9.3  10) heme+ 2019:  Negative EGD 7/2019; internal hemorrhoids/diverticular disease on colonoscopy 9/2019. Retired ; from [place]. 2 ppd & knows he should quit. No recent ethanol; heavy 50 years ago. 2 caffeinated elie/day. No added salt. 12/2019:  New patient presenting with intermittent chest pain (6 months of worsening, 0-1 X/month;  15 minutes duration; central dull ache with radiation to both arms. Can occur with or without exertion. Increased dyspnea associated with discomfort). Chronic TEIXEIRA which is unchanged in years. No palpitations. No falling or bleeding. IMPRESSION AND PLAN  01. Atherosclerotic heart disease of native coronary artery with other forms of angina pectoris (I25.118):  Remote stent of unknown vessel; reportedly abnormal MPI 2014 without further evaluation. He needs a stress test but cannot walk on a treadmill. Therefore cardiac PET.     We discussed the signs and symptoms of unstable angina, myocardial infarction and malignant arrhythmia. The patient knows to seek immediate medical attention should they occur. ECG done PET Cardiac test to be done. first available   02. Hyp hrt & chr kdny dis w/o hrt fail, w stg 1-4/unsp chr kdny (I13.10): This condition is stable. I have made no changes to the present regimen. 03. Mixed hyperlipidemia (E78.2): He should be on a statin unless previously intolerant; apparently had been on Lipitor in the past.   04. Occlusion and stenosis of bilateral carotid arteries (D97.01):  Chronic BONNY occlusion; he reports no recent follow-up. U/s ordered. Carotid Duplex to be done first available. 05. Abdominal aortic aneurysm, without rupture (I71.4):  Surgically treated in 2017; follow-up per Dr. Randee Webb. 06. Shortness of breath (R06.02):  ?  Cardiac; also has some element of underlying pulmonary disease & ongoing tobacco abuse. Echocardiogram warranted. 2-D w/CFD Echo to be done first available. 07. Chronic kidney disease, stage 3 (moderate) (N18.3):  Managed by: Renal   08. Cerebral infarction, unspecified (I63.9):  Managed by: Other Physician   09. Nicotine dependence, cigarettes, uncomplicated (G44.497): The patient was instructed on smoking cessation. 10. Body mass index (BMI) 33.0-33.9, adult (S65.99): The patient was instructed on AHA diet and regular exercise. ORDERS:  1 Tobacco cessation counseling   2 Dietary management education, guidance, and counseling   3 ECG done   4 PET Cardiac test to be done   5 Carotid Duplex   6 2-D w/ CFD Echocardiogram   7 Return office visit with Elda Mcmullen MD in 3 Months. 8 The patient was instructed on AHA diet and regular exercise.        12/5/19 MEDICATION LIST  Medication Sig Desc   amlodipine 5 mg tablet take 1 tablet by oral route  every day   Aspirin Low Dose 81 mg tablet,delayed release take 1 tablet by oral route  every day   metoprolol succinate ER 25 mg tablet,extended release 24 hr take 1 tablet by oral route  every day pantoprazole 40 mg tablet,delayed release take 1 tablet by oral route  every day   tamsulosin 0.4 mg capsule take 1 capsule by oral route  every day 1/2 hour following the same meal each day     _______________________________________________________________________  CARDIAC HISTORY  RISK FACTORS:  1 Hypertension   2 Tobacco Use: No/never       CARDIOVASCULAR PROCEDURES  Procedure Date Results   Carotid Duplex 05/02/2018 100% Right ICA, Less than 50% Left ICA, Vertebral: Bilateral Antegrade Flow, Unchanged versus 2017; at [facility]. Echo 04/03/2017 EF 55-60%; normal RV; no valve disease; normal PAp; by RCA at [facility]. EKG 12/05/2019 Sinus Rhythm, Borderline PRWP, otherwise unremarkable. PFTs 04/03/2017 No obstructive disease; moderately decreased DLCO; at [facility]. ACTIVE ALLERGIES:  Ingredient Reaction Comment   FENOFIBRATE Hives    ALBUTEROL SULFATE Unknown    ATORVASTATIN Leg cramp Atorvastatin         PAST MEDICAL/SURGICAL HISTORY  (Detailed)    Disease/disorder Onset Date Surgical History Date Comments   GERD       Hypertension         Family History  (Detailed)    SOCIAL HISTORY  (Detailed)  Tobacco use reviewed. Preferred language is [place]. Smoking status: Heavy tobacco smoker. SMOKING STATUS  Use Status Type Smoking Status Usage Per Day Years Used Total Pack Years   yes Cigarette Heavy tobacco smoker 2 Packs       TOBACCO CESSATION INFORMATION  Date Counseled By Order Status Description Code Tobacco Cessation Information   12/05/2019 Derrell Browne Tobacco cessation counseling completed   Counseling about tobacco use (procedure)     For other plans, see orders.   High complexity decision making was performed  X Yes   High-risk of decompensation with multiple organ involvement X Yes   Hospital problem list   Active Hospital Problems    Diagnosis Date Noted    STEMI (ST elevation myocardial infarction) ([facility] [address] 01/26/2020     Priority: 1 - One    CAD (coronary artery disease)      2006 - s/p stent Subjective:  Patient reports  []   nothing; unable to communicate    []   intubated   Chest pain x none  consistent with:  Non-cardiac CP         Atypical CP     None now  On going  Anginal CP     Dyspnea x none  at rest  with exertion         improved  unchanged  worse              PND x none  overnight       Orthopnea x none  improved  unchanged  worse   Presyncope x none  improved  unchanged  worse   Ambulated in hallway without symptoms   Yes   Ambulated in room without symptoms  Yes     ROS Hematuria:  Yes X No Dysuria:  Yes X No                (2+  other systems) Cough: Yes X No Sputum: Yes X No                 BRBPR:  Yes X No Melena: Yes X No   No change in family and social history from H&P/Consult note.   Objective:    Physical Exam:  24 hr VS reviewed, overall VSSAF  Temp (24hrs), Av.1 °F (37.3 °C), Min:98 °F (36.7 °C), Max:99.6 °F (37.6 °C)    Patient Vitals for the past 8 hrs:   Pulse   20 1000 78   20 0900 83   20 0814 83   20 0800 73   20 0700 73   20 0600 63   20 0500 (!) 58   20 0400 (!) 54   20 0300 (!) 54     Patient Vitals for the past 8 hrs:   Resp   20 1000 26   20 0900 21   20 0814 (!) 7   20 0800 23   20 0700 28   20 0600 25   20 0500 25   20 0400 22   20 0300 22     Patient Vitals for the past 8 hrs:   BP   20 1000 127/56   20 0900 131/51   20 0814 127/48   20 0800 114/47   20 0700 125/50   20 0600 110/51   20 0500 109/51   20 0400 98/52   20 0300 106/45         Intake/Output Summary (Last 24 hours) at 2020 1002  Last data filed at 2020 0913  Gross per 24 hour   Intake 2588.49 ml   Output 2925 ml   Net -336.51 ml     General: x WD,WN  Elderly  Cachetic x NAD     Agitated  Lethargic  Arousable  Obtunded     Sedated  On Bipap  Intubated                ENT/Palate: WNL x Dry MM x anicteric  ETT in place              Respiratory: X CTA   Nl resp effort  Increased effort  No significant change     rhonchi  rales  improved  worse              Cardiovasc: X RRR  IRRR X Nl S1, S2 x No rub     No murmur X No new murmur  Murmur c/w: x No gallop    x trace edema  BLE edema:+  RLE edema:+  LLE edema:+     Edema less  Edema more  Edema same  Edema worse    X Nl JVP  Elevated JVP  JVP same  JVP worse    X Carotid wnl x abd aorta not palpated X no peripheral emboli noted                GI: X abd soft x nondistended X BS distant X No organo- megaly noted              Skin: x Warm, dry x Cool extremites                  Neuro: x A/O x Grossly non- focal  Obtunded  Sedated     Lethargic  Arousable  intubated       cath site intact w/o hematoma or new bruit; distal pulse unchanged  Yes   Data Review:     Telemetry independently reviewed x sinus  chronic afib  parox afib  NSVT     ECG independently reviewed  NSR  afib  no significant changes  NSST-Tw chgs   x no new ECG provided for review   Lab results reviewed as noted below. Current medications reviewed as noted below. No results for input(s): PH, PCO2, PO2 in the last 72 hours. No results for input(s): CPK, CKMB, CKNDX, TROIQ in the last 72 hours.   Recent Labs     02/06/20  0239 02/05/20  0422 02/04/20 2000 02/04/20  1707    139 138 140   K 4.3 4.6 4.7 4.5   * 114* 113* 114*   CO2 22 19* 20* 22   BUN 24* 22* 21* 23*   CREA 1.70* 1.87* 1.75* 1.84*   GFRAA 48* 43* 46* 44*   GLU 89 107* 107* 110*   CA 8.8 8.5 9.0 8.3*   ALB  --   --   --  2.4*   WBC 6.3 5.2  --  5.2   HGB 8.6* 8.5* 8.8* 8.8*   HCT 27.2* 27.6* 27.8* 27.9*    171  --  205     Recent Labs     02/04/20  1707   SGOT 17   AP 84   TBILI 0.6   TP 6.4   ALB 2.4*   GLOB 4.0     Recent Labs     02/05/20  0525 02/04/20  1707 02/03/20  2115   INR 1.1 1.1  --    PTP 11.4* 11.6*  --    APTT 26.8 29.4 55.0*      No results for input(s): FE, TIBC, PSAT, FERR in the last 72 hours.    Lab Results   Component Value Date/Time    Glucose (POC) 101 (H) 02/04/2020 10:24 AM    Glucose (POC) 182 (H) 04/07/2017 12:45 PM    Glucose (POC) 129 (H) 04/07/2017 08:52 AM       Current Facility-Administered Medications   Medication Dose Route Frequency    [START ON 2/7/2020] metoprolol succinate (TOPROL-XL) XL tablet 25 mg  25 mg Oral DAILY    ticagrelor (BRILINTA) tablet 90 mg  90 mg Oral Q12H    zolpidem (AMBIEN) tablet 5 mg  5 mg Oral QHS PRN    sodium chloride (NS) flush 5-40 mL  5-40 mL IntraVENous Q8H    sodium bicarbonate tablet 650 mg  650 mg Oral BID    sodium chloride (NS) flush 5-40 mL  5-40 mL IntraVENous PRN    ondansetron (ZOFRAN) injection 4 mg  4 mg IntraVENous Q4H PRN    albuterol (PROVENTIL VENTOLIN) nebulizer solution 2.5 mg  2.5 mg Nebulization Q4H PRN    aspirin chewable tablet 81 mg  81 mg Oral DAILY    polyethylene glycol (MIRALAX) packet 34 g  34 g Oral DAILY    pantoprazole (PROTONIX) tablet 40 mg  40 mg Oral ACB    acetaminophen (TYLENOL) tablet 650 mg  650 mg Oral Q4H PRN    ezetimibe (ZETIA) tablet 10 mg  10 mg Oral QPM    guaiFENesin ER (MUCINEX) tablet 600 mg  600 mg Oral Q12H    melatonin tablet 9 mg  9 mg Oral QHS    benzonatate (TESSALON) capsule 100 mg  100 mg Oral TID PRN    tamsulosin (FLOMAX) capsule 0.4 mg  0.4 mg Oral QPM    nitroglycerin (NITROSTAT) tablet 0.4 mg  0.4 mg SubLINGual PRN         Karie Leon MD

## 2020-02-06 NOTE — PROGRESS NOTES
[id number] Bedside and Verbal shift change report given to Mary Gonzales (oncoming nurse) by Marya Russell RN (offgoing nurse). Report included the following information SBAR, Kardex, OR Summary, Procedure Summary, Intake/Output, MAR, Recent Results and Cardiac Rhythm sinus ward. Assessment completed, pt alert and oriented, pupils reactive, follow commands,   Sinus ward/ sinus rhythm on monitor, b/p stable, CI >2, PA pressures 40's/ teens, lungs diminished/coarse, 2 LNC for O2 support, pulses present and palpable all extremities. [id number] Sheath removed from left groin, site soft, no bleeding, no hematoma. Patient restless, agitated, demanding but cooperative. Patient repositioned and provided mouth care. [id number] Reassessment completed, no new changes, remains off pressors, vitals stable, attempts made to make pt comfortable throughout the night. Groin site remains unchanged, pulses present, CI > 2.0.

## 2020-02-06 NOTE — PROGRESS NOTES
Care Management:    GIGI:   Home with family assist.  [facility] for [facility] placed on chart, patient refused SNF. Follow up with MD.    Patient is post Impella and PCI  And recovering in the ICU. He is up in chair and DTR is at bedside. He refuses SNF for discharge but is in agreement with home health. When given choice he chose [facility] and [facility] placed on chart. Referral sent via [facility] and orders pending. Kassie Paget RN ACM [id number]    Addendum: [facility] does not cover the [facility] area, will look for another agency. Addendum: [facility] has accepted for [facility]. We need to let them know when patient discharges.

## 2020-02-06 NOTE — INTERDISCIPLINARY ROUNDS
Critical care interdisciplinary rounds held on 02/06/2020. Following members present, Pharmacy, Diabetes Treatment, Case Management, Respiratory Therapy, Physical Therapy and Nutrition. Led by Harsha Simmons RN and Dr. Jaswant Hankins. Plan of care discussed. See clinical pathway for plan of care and interventions and desired outcomes. Awaiting transfer orders.

## 2020-02-06 NOTE — PROGRESS NOTES
Nutrition Assessment:    RECOMMENDATIONS:   Continue Cardiac diet  RD to add Ensure BID    ASSESSMENT:   Chart reviewed, case discussed during CCU rounds. Pt sitting up in the chair awake and alert. He reports a fair appetite 'because the food sucks'. Pt dislikes chicken, and feels like that is all he is getting. Provided pt with a menu and explanation of room service. Encouraged him to order meals. He is open to trying ensure enlive to help meet est needs as he does not like many of our protein choices. Noted Cardiac Rehab RN just reviewed heart healthy diet education with the pt, unsure if she conducted teach back with him. Dietitians Intervention(s)/Plan(s): Continue diet, add supplements, monitor intake  SUBJECTIVE/OBJECTIVE:   \"This food sucks\"   Diet Order: Cardiac  % Eaten:    Patient Vitals for the past 72 hrs:   % Diet Eaten   02/06/20 1222 100 %   02/06/20 0913 100 %   02/04/20 0845 0 %   02/03/20 1802 100 %       Pertinent Medications:protonix, miralax      Chemistries:  Lab Results   Component Value Date/Time    Sodium 141 02/06/2020 02:39 AM    Potassium 4.3 02/06/2020 02:39 AM    Chloride 114 (H) 02/06/2020 02:39 AM    CO2 22 02/06/2020 02:39 AM    Anion gap 5 02/06/2020 02:39 AM    Glucose 89 02/06/2020 02:39 AM    BUN 24 (H) 02/06/2020 02:39 AM    Creatinine 1.70 (H) 02/06/2020 02:39 AM    BUN/Creatinine ratio 14 02/06/2020 02:39 AM    GFR est AA 48 (L) 02/06/2020 02:39 AM    GFR est non-AA 39 (L) 02/06/2020 02:39 AM    Calcium 8.8 02/06/2020 02:39 AM    Albumin 2.4 (L) 02/04/2020 05:07 PM      Anthropometrics: Height: 5' 9\" (175.3 cm) Weight: 105.9 kg (233 lb 7.5 oz)  [] standing scale    []bed scale    []stated   []unknown     IBW (%IBW):   ( ) UBW (%UBW):   (  %)    BMI: Body mass index is 34.48 kg/m².     This BMI is indicative of:  []Underweight   []Normal   []Overweight   [x] Obesity   [] Extreme Obesity (BMI>40)  Estimated Nutrition Needs (Based on): 1925 Kcals/day(BMR: 1750 x 1.1) , 80 g(0.8 g/kg) Protein  Carbohydrate: At Least 130 g/day  Fluids: 1900 mL/day    Last BM: 2/6   []Active     []Hyperactive  []Hypoactive       [] Absent   BS  Skin:    [] Intact   [x] Incision  [] Breakdown   [] DTI   [] Tears/Excoriation/Abrasion  [x]Edema(trace-BLE) [] Other: Wt Readings from Last 30 Encounters:   02/03/20 105.9 kg (233 lb 7.5 oz)   09/16/19 102.7 kg (226 lb 5 oz)   07/08/19 103.1 kg (227 lb 4.7 oz)   06/30/17 102.2 kg (225 lb 4.8 oz)   04/02/17 111.7 kg (246 lb 4.1 oz)   01/30/14 108.4 kg (239 lb)   01/24/14 108.1 kg (238 lb 6.4 oz)   12/26/13 107.5 kg (237 lb)   12/09/10 112.5 kg (248 lb)   11/18/10 112.5 kg (248 lb)        Dx of Malnutrition since admission: no    NUTRITION DIAGNOSES:   Problem:  Undesirable food choices      Etiology: related to heart healthy diet     Signs/Symptoms: as evidenced by EMR documents pt getting fast food delivered to hospital    Previous dx re: undesirable food choices continues. NUTRITION INTERVENTIONS:  Meals/Snacks: General/healthful diet   Supplements: Commercial supplement              GOAL:   Pt will consume >50% of meals/supplements in 3-5 days.      NUTRITION MONITORING AND EVALUATION   Previous Goal: teach back heart healthy diet in 3-5 days  Previous Goal Met: N/A (did not review as cardiac rehab just finishes educating him on a heart healthy diet)  Previous Recommendations Implemented: Yes   Cultural, Cheondoism, or Ethnic Dietary Needs: None   LEARNING NEEDS (Diet, Food/Nutrient-Drug Interaction):    [] None Identified   [x] Identified and Education Provided/Documented   [] Identified and Pt declined/was not appropriate      [x] Interdisciplinary Care Plan Reviewed/Documented    [x] Participated in Discharge Planning: Cardiac diet    [x] Interdisciplinary Rounds     NUTRITION RISK:    [] High              [] Moderate           [x]  Low  []  Minimal/Uncompromised      Antoinette Denise RD, [address]  Pager [phone]  Weekend Pager [phone]

## 2020-02-06 NOTE — PROGRESS NOTES
Problem: Self Care Deficits Care Plan (Adult)  Goal: *Acute Goals and Plan of Care (Insert Text)  Description  FUNCTIONAL STATUS PRIOR TO ADMISSION: ambulated with SPC due to chronic hip pain, performed all ADLS and IADLS without assist and driving    HOME [address]: The patient lived with wife and granddaughter but did not require assist.    Occupational Therapy Goals:  Initiated 2/6/2020  1. Patient will perform grooming standing with independence within 7 days. 2. Patient will perform upper body dressing and lower body dressing with modified independence within 7 days. 3. Patient will perform toileting with independence within 7 days. 4. Patient will transfer from toilet with independence within 7 days. Outcome: Progressing Towards Goal   OCCUPATIONAL THERAPY EVALUATION  Patient: Alisa Noriega (83 y.o. male)  Date: 2/6/2020  Primary Diagnosis: STEMI (ST elevation myocardial infarction) ([facility] [address] [I21.3]  CAD (coronary artery disease) [I25.10]  CAD (coronary artery disease) [I25.10]  Procedure(s) (LRB):  PERCUTANEOUS CORONARY INTERVENTION (N/A)  CORONARY ANGIOGRAPHY (N/A)  Insert Stent Kenan Coronary (N/A) 1 Day Post-Op   Precautions: fall       ASSESSMENT  Based on the objective data described below, the patient presents with report of chronic hip pain and is generally weak. Upon arrival pt was attempting to mobilize to toilet with nursing. Vitals were stable today. Pt reaches out for objects or therapist with mobility due to decreased balance, pain in hip and weakness. Pt did not report impella removal site pain. Unable to reach for BM clean up today. Able to stand with CGA and needed rolling cardiac cart for support.       Current Level of Function Impacting Discharge (ADLs/self-care): CGA to min assist for ADL mobility  Feeding: Independent    Oral Facial Hygiene/Grooming: Contact guard assistance(standing)    Bathing: Minimum assistance    Upper Body Dressing: Minimum assistance    Lower Body Dressing: Moderate assistance    Toileting: Moderate assistance(difficulty with BM clean up decreased reach)    Functional Outcome Measure: The patient scored 65/100 on the barthel outcome measure which is indicative of minimal deficits with ADLs and mobility. Other factors to consider for discharge: none     Patient will benefit from skilled therapy intervention to address the above noted impairments. PLAN :  Recommendations and Planned Interventions: self care training, functional mobility training, balance training, therapeutic activities, patient education, home safety training and family training/education    Frequency/Duration: Patient will be followed by occupational therapy 5 times a week to address goals. Recommendation for discharge: (in order for the patient to meet his/her long term goals)  Occupational therapy at least 2 days/week in the home AND ensure assist and/or supervision for safety with ADLs and mobility    This discharge recommendation:  A follow-up discussion with the attending provider and/or case management is planned    IF patient discharges home will need the following DME: toileting tongs? SUBJECTIVE:   Patient stated I am so sorry. I have to get to that bathroom.     OBJECTIVE DATA SUMMARY:   HISTORY:   Past Medical History:   Diagnosis Date    Aneurysm ([address]     BPH (benign prostatic hyperplasia)     CAD (coronary artery disease)     2006 - s/p stent     Essential hypertension 12-12-13    Hematuria     sees urologist    Hyperglycemia     Kidney failure      Past Surgical History:   Procedure Laterality Date    CARDIAC SURG PROCEDURE UNLIST      stent 2006    COLONOSCOPY N/A 9/16/2019    COLON[facility] Erasmo Wilson [facility]    COLONOSCOPY,DIAGNOSTIC  9/16/2019         UPPER GI ENDOSCOPY,BIOPSY  7/9/2019         VASCULAR SURGERY PROCEDURE UNLIST  2016    Aotric aneurysm       Expanded or extensive additional review of patient history:     Home Situation  Home Environment: Private residence  # Steps to Enter: 4  Rails to Enter: Yes  Hand Rails : Bilateral(close together)  One/Two Story Residence: One story  Living Alone: No  Support Systems: Spouse/Significant Other/Partner(granddaughter in her 25s)  Patient Expects to be Discharged to[de-identified] Private residence  Current DME Used/Available at Home: Cane, straight, Grab bars  Tub or Shower Type: Tub/Shower combination    Hand dominance: Right    EXAMINATION OF PERFORMANCE DEFICITS:  Cognitive/Behavioral Status:  Neurologic State: Alert  Orientation Level: Oriented X4  Cognition: Follows commands  Perception: Appears intact  Perseveration: No perseveration noted  Safety/Judgement: Awareness of environment; Fall prevention;Home safety; Insight into deficits    Hearing: Auditory  Auditory Impairment: None    Vision/Perceptual:                                Corrective Lenses: Glasses    Range of Motion:    AROM: Generally decreased, functional  PROM: Within functional limits                      Strength:    Strength: Generally decreased, functional                Coordination:  Coordination: Within functional limits  Fine Motor Skills-Upper: Left Intact; Right Intact    Gross Motor Skills-Upper: Left Intact; Right Intact    Tone & Sensation:    Tone: Normal  Sensation: Intact                      Balance:  Sitting: Intact  Standing: Impaired  Standing - Static: Good  Standing - Dynamic : Fair    Functional Mobility and Transfers for ADLs:  Bed Mobility:  Rolling: (seated at bedside chair upon arrival)  Scooting: Contact guard assistance    Transfers:  Sit to Stand: Stand-by assistance  Stand to Sit: Stand-by assistance(verbal safety cues)  Bed to Chair: Minimum assistance(without assist devices, CGA with rolling cardiac cart)  Bathroom Mobility: Minimum assistance(hand held assist or CGA with rolling cardiac cart)  Toilet Transfer : Contact guard assistance(left grab bar)    ADL Assessment:  Feeding: Independent    Oral Facial Hygiene/Grooming: Contact guard assistance(standing)    Bathing: Minimum assistance    Upper Body Dressing: Minimum assistance    Lower Body Dressing: Moderate assistance    Toileting: Moderate assistance(difficulty with BM clean up decreased reach)                ADL Intervention and task modifications:     Min assist mobility hand held assist to commode. CGA sit to stand and stand to sit with left grab bar. Managed gown in standing without assist.  Unable to reach for BM clean up. Stood at sink to wash hands with CGA  Cognitive Retraining  Safety/Judgement: Awareness of environment; Fall prevention;Home safety; Insight into deficits      Functional Measure:  Barthel Index:    Bathin  Bladder: 10  Bowels: 10  Groomin  Dressin  Feeding: 10  Mobility: 10  Stairs: 0  Toilet Use: 5  Transfer (Bed to Chair and Back): 10  Total: 65/100        The Barthel ADL Index: Guidelines  1. The index should be used as a record of what a patient does, not as a record of what a patient could do. 2. The main aim is to establish degree of independence from any help, physical or verbal, however minor and for whatever reason. 3. The need for supervision renders the patient not independent. 4. A patient's performance should be established using the best available evidence. Asking the patient, friends/relatives and nurses are the usual sources, but direct observation and common sense are also important. However direct testing is not needed. 5. Usually the patient's performance over the preceding 24-48 hours is important, but occasionally longer periods will be relevant. 6. Middle categories imply that the patient supplies over 50 per cent of the effort. 7. Use of aids to be independent is allowed. Niko Wilkinson., Barthel, D.W. ([id number]). Functional evaluation: the Barthel Index. [address] [facility] (14)2.   [place] se LAKSHMI Castillo, Aileen Yadav., [facility]., Enedelia Stubbs. (1999). Measuring the change indisability after inpatient rehabilitation; comparison of the responsiveness of the Barthel Index and Functional Cascade Measure. Journal of Neurology, Neurosurgery, and Psychiatry, 66(4), 348-676. ERIC Rosario, CORNELIO Cervantes, & Tia South M.A. (2004.) Assessment of post-stroke quality of life in cost-effectiveness studies: The usefulness of the Barthel Index and the EuroQoL-5D. Quality of Life Research, 15, 206-88         Occupational Therapy Evaluation Charge Determination   History Examination Decision-Making   MEDIUM Complexity : Expanded review of history including physical, cognitive and psychosocial  history  MEDIUM Complexity : 3-5 performance deficits relating to physical, cognitive , or psychosocial skils that result in activity limitations and / or participation restrictions MEDIUM Complexity : Patient may present with comorbidities that affect occupational performnce. Miniml to moderate modification of tasks or assistance (eg, physical or verbal ) with assesment(s) is necessary to enable patient to complete evaluation       Based on the above components, the patient evaluation is determined to be of the following complexity level: MEDIUM  Pain Rating:  Hip pain moderate    Activity Tolerance:   Fair and requires rest breaks  Please refer to the flowsheet for vital signs taken during this treatment. After treatment patient left in no apparent distress:    Sitting in chair, Call bell within reach and Caregiver / family present    COMMUNICATION/EDUCATION:   The patients plan of care was discussed with: Physical Therapist, Registered Nurse and patient and his daughter. Home safety education was provided and the patient/caregiver indicated understanding., Patient/family have participated as able in goal setting and plan of care. and Patient/family agree to work toward stated goals and plan of care.     This patients plan of care is appropriate for delegation to PRACHI.     Thank you for this referral.  Collette Heimlich, OTR/L  Time Calculation: 22 mins

## 2020-02-06 NOTE — PROGRESS NOTES
7fr 25cm sheath removed from the left femoral artery, manual pressure and quickclot held for 17 min. Upon release no oozing or hematoma noted.  Groin inspected and care taken over by PAMELA Figueroa

## 2020-02-06 NOTE — PROGRESS NOTES
Nephrology Progress Note  [facility]  [email or web address] [email or web address]                  Phone - [phone]       Patient: Debbra Dakin   Date- 2/6/2020        Admit Date: 1/26/2020  YOB: 1945             CC: Follow up for FRANSISCO ON CKD      Subjective: Interval History:   - CR. IMPROVED  bp stable. Off vasopressors  Extubated  No c/o sob,   No c/o chest pain,   No c/o nausea or vomiting  No c/o  fever. ROS:- as above     Assessment & Plan:     Acute kidney injury  CKD stage III.  Baseline creat ~1.6.     s/p cardiac cath and CT angio.    RESP. FAILURE ON VENT  Renal artery stenosis noted in 4/17.  Angio CT on 2/18:   H/o hyperkalemia   Metabolic acidosis,    Edema   Acute STMI.   An emergent cath was done on 1/27 - s/p debbyt JESSICA on 2-4--2020     Severe COPD and continued tobacco abuse     Hypertension.  Torie Hartmann. s/p an AAA repair in 4/17. PVD.       PLAN-  - stop ivf  - continue po bicarb  - check bmp in am  - avoid acei or arb       Physical exam:   GEN:  NAD  NECK:  Supple, no thyromegaly  RESP: CTA  b/l, no  wheezing,   CVS: RRR,S1,S2   ABDO:  soft , non tender, No mass  NEURO: non focal, normal speech  EXT: Edema +nt           Care Plan discussed with: nurse, patient  Objective:   Visit Vitals  /48   Pulse 83   Temp 98 °F (36.7 °C)   Resp (!) 7   Ht 5' 9\" (1.753 m)   Wt 105.9 kg (233 lb 7.5 oz)   SpO2 92%   BMI 34.48 kg/m²     Last 3 Recorded Weights in this Encounter    02/02/20 0646 02/03/20 0625 02/03/20 0902   Weight: 105.7 kg (233 lb 0.4 oz) 105.9 kg (233 lb 7.5 oz) 105.9 kg (233 lb 7.5 oz)     02/04 1901 - 02/06 0700  In: 3212.5 [P.O.:330; I.V.:2822.5]  Out: 4790 [Urine:4190]    Intake/Output Summary (Last 24 hours) at 2/6/2020 0903  Last data filed at 2/6/2020 0815  Gross per 24 hour   Intake 2099.32 ml   Output 3185 ml   Net -1085.68 ml      Chart reviewed. Pertinent Notes reviewed.        Medication list  reviewed Current Facility-Administered Medications   Medication    metoprolol tartrate (LOPRESSOR) tablet 12.5 mg    zolpidem (AMBIEN) tablet 5 mg    sodium chloride (NS) flush 5-40 mL    oxyCODONE-acetaminophen (PERCOCET) 5-325 mg per tablet 1 Tab    fentaNYL citrate (PF) injection 25 mcg    morphine 10 mg/ml injection 2 mg    HYDROmorphone (PF) (DILAUDID) injection 0.2 mg    ondansetron (ZOFRAN) injection 4 mg    midazolam (VERSED) injection 0.5 mg    sodium bicarbonate tablet 650 mg    sodium chloride (NS) flush 5-40 mL    albumin human 5% (BUMINATE) solution 12.5 g    oxyCODONE IR (ROXICODONE) tablet 5 mg    morphine injection 1-2 mg    naloxone (NARCAN) injection 0.4 mg    ondansetron (ZOFRAN) injection 4 mg    albuterol (PROVENTIL VENTOLIN) nebulizer solution 2.5 mg    aspirin chewable tablet 81 mg    chlorhexidine (PERIDEX) 0.12 % mouthwash 10 mL    magnesium oxide (MAG-OX) tablet 400 mg    polyethylene glycol (MIRALAX) packet 34 g    senna-docusate (PERICOLACE) 8.6-50 mg per tablet 1 Tab    ELECTROLYTE REPLACEMENT NOTE: Nurse to review Serum Potassium and Magnesuim levels and Initiate Electrolyte Replacement Protocol as needed    calcium chloride injection 1 g    pantoprazole (PROTONIX) tablet 40 mg    ticagrelor (BRILINTA) tablet 90 mg    acetaminophen (TYLENOL) tablet 650 mg    mupirocin (BACTROBAN) 2 % ointment 1 g    ezetimibe (ZETIA) tablet 10 mg    guaiFENesin ER (MUCINEX) tablet 600 mg    guaiFENesin-dextromethorphan (ROBITUSSIN DM) 100-10 mg/5 mL syrup 10 mL    melatonin tablet 9 mg    benzonatate (TESSALON) capsule 100 mg    tamsulosin (FLOMAX) capsule 0.4 mg    nitroglycerin (NITROSTAT) tablet 0.4 mg           Data Review :  Recent Labs     02/06/20  0239 02/05/20  0422 02/04/20 2000    139 138   K 4.3 4.6 4.7   * 114* 113*   CO2 22 19* 20*   BUN 24* 22* 21*   CREA 1.70* 1.87* 1.75*   GLU 89 107* 107*   CA 8.8 8.5 9.0   MG 2.4 2.4 1.9     Recent Labs 02/06/20  0239 02/05/20  0422 02/04/20 2000 02/04/20  1707   WBC 6.3 5.2  --  5.2   HGB 8.6* 8.5* 8.8* 8.8*   HCT 27.2* 27.6* 27.8* 27.9*    171  --  205     No results for input(s): FE, TIBC, PSAT, FERR in the last 72 hours. No results for input(s): CPK, CKNDX, TROIQ in the last 72 hours. No lab exists for component: CPKMB  Lab Results   Component Value Date/Time    Color YELLOW/STRAW 01/31/2020 11:45 AM    Appearance TURBID (A) 01/31/2020 11:45 AM    Specific gravity 1.023 01/31/2020 11:45 AM    pH (UA) 6.0 01/31/2020 11:45 AM    Protein 100 (A) 01/31/2020 11:45 AM    Glucose NEGATIVE  01/31/2020 11:45 AM    Ketone NEGATIVE  01/31/2020 11:45 AM    Bilirubin NEGATIVE  01/31/2020 11:45 AM    Urobilinogen 1.0 01/31/2020 11:45 AM    Nitrites NEGATIVE  01/31/2020 11:45 AM    Leukocyte Esterase NEGATIVE  01/31/2020 11:45 AM    Epithelial cells FEW 01/31/2020 11:45 AM    Bacteria NEGATIVE  01/31/2020 11:45 AM    WBC 0-4 01/31/2020 11:45 AM    RBC 0-5 01/31/2020 11:45 AM     Lab Results   Component Value Date/Time    Culture result: MODERATE NORMAL RESPIRATORY XUAN 01/27/2020 09:20 AM     No results found for: SDES  Lab Results   Component Value Date/Time    Sodium,urine random 33 04/02/2017 09:38 AM    Creatinine, urine 129.00 04/02/2017 09:38 AM     Results from Hospital Encounter encounter on 01/26/20   XR CHEST PORT    Narrative EXAM:  CR chest portable    INDICATION: Heart surgery. Small pleural effusions and bibasilar opacities. COMPARISON: 2/5/2020 at 0518 hours. TECHNIQUE: Portable AP semiupright chest view at 0503 hours    FINDINGS: The endotracheal tube, enteric tube, right IJ pulmonary artery  catheter, and Impella catheter have been removed. Cardiac monitoring wires  overlie the thorax. The cardiomediastinal contours are stable. There are  decreased small pleural effusions with stable bibasilar atelectasis. There is no  pneumothorax. The bones and upper abdomen are stable.       Impression IMPRESSION: Decreased small pleural effusions with stable bibasilar atelectasis. Mel Monahan MD  [facility]   [email or web address]  [facility]  [address], [address], [address]  Phone - [phone]         Fax - [phone]

## 2020-02-06 NOTE — PROGRESS NOTES
2/6/2020    INTENSIVIST PROGRESS NOTE:     Patient seen and evaluated, chart reviewed   67 yo male with copd, severe CAD, CKD scheduled for cabg but not done  But pt instead underwent impella catheter placement  Remained intubated until PCI 2/5  Extubated post procedure, impella removed  No acute events overnight  Now pt in CCU in no distress  No acute complaints    ROS: no sob, no cp    Visit Vitals  /51   Pulse 63   Temp 99.5 °F (37.5 °C)   Resp 25   Ht 5' 9\" (1.753 m)   Wt 105.9 kg (233 lb 7.5 oz)   SpO2 98%   BMI 34.48 kg/m²       General: no distress  Eyes: anicteric  HEENT: no thrush  Neck: FROM  CV: RRR  Lungs: clear  Abd: soft  : no flank pain  Ext: no  edema  Skin: no rash  Musculoskeletal: normal inspection  Neuro: non focal    CXR: bilateral effusions, atx improved    Labs reviewed    A/P:  - wean O2  - severe CAD: s/p PCI   - COPD: no exacerbation  - CKD: monitor creatinine, better today  - glycemic control  - advance eiet  - mobilize   - Will assist on disposition planning, need to be transferred to tele today  Kaylen Marcos MD

## 2020-02-06 NOTE — PROGRESS NOTES
Cardiac Surgery ICU Progress Note    Admit Date: 1/26/2020    POD: 1 Day Post-Op      Problems:  Active Problems:    STEMI (ST elevation myocardial infarction) ([address] (1/26/2020)      CAD (coronary artery disease) ()      Overview: 2006 - s/p stent         Procedure:  Procedure(s):  IMPELLA REMOVAL AT BEDSIDE      Summary:     Stable hemodynamics in sinus rhythm without ectopy on no support. WBC 6.3 HH 8/27   XR: Atelectasis  NCO2 2    Cr 1.7 BUN 24 UOP 2700 -1300  Finley in place     Plan/Recommendations/Medical Decision Making:     Impella dressing dry  DC central lines and finley  CARDS: Brilenta. Add BB  PULMONARY: Increase I/S effort and frequency  RENAL: Baseline Cr 1.7  HEME: Anticipated acute blood loss anemia  GI:Stimulate bowel movement  PT/OT:Increase activity,  Sternal precautions  Disposition: Stepdown/Home vs SNF  Patient seen and reviewed with  Dr. Lety Jean Baptiste MD       Objective:     CXR Results  (Last 48 hours)               02/06/20 0541  XR CHEST PORT Final result    Impression:  IMPRESSION: Decreased small pleural effusions with stable bibasilar atelectasis. Narrative:  EXAM:  CR chest portable       INDICATION: Heart surgery. Small pleural effusions and bibasilar opacities. COMPARISON: 2/5/2020 at 0518 hours. TECHNIQUE: Portable AP semiupright chest view at 0503 hours       FINDINGS: The endotracheal tube, enteric tube, right IJ pulmonary artery   catheter, and Impella catheter have been removed. Cardiac monitoring wires   overlie the thorax. The cardiomediastinal contours are stable. There are   decreased small pleural effusions with stable bibasilar atelectasis. There is no   pneumothorax. The bones and upper abdomen are stable. 02/05/20 0556  XR CHEST PORT Final result    Impression:  IMPRESSION: Stable small pleural effusions and bibasilar opacities. Narrative:  EXAM:  CR chest portable       INDICATION: Heart surgery.  Small pleural effusions and bibasilar atelectasis. COMPARISON: 2020 at 1649 hours. TECHNIQUE: Portable AP semierect upright chest view at 0518 hours       FINDINGS: The support devices are stable. Cardiac monitoring wires overlie the   thorax. The cardiomediastinal contours are stable. There are stable small   pleural effusions and bibasilar opacities. There is no pneumothorax. The bones   and upper abdomen are stable. 20 1655  XR CHEST PORT Final result    Impression:  IMPRESSION:   1. Small pleural effusions with mild bibasilar atelectasis. Narrative:  EXAM: XR CHEST PORT       INDICATION: Postop, upon arrival       COMPARISON: 2020       FINDINGS: A portable AP radiograph of the chest was obtained at 1649 hours. The   patient is on a cardiac monitor. The ET tube is in satisfactory position. NG   tube courses into the stomach. The right subclavian cardiac assist device is in satisfactory position. Right IJ   [place] catheter has its tip in the distal main pulmonary artery. The lungs demonstrate blunting of the costophrenic angles consistent small   pleural effusions. There is mild bibasilar atelectasis. No pneumothorax no   pulmonary edema. Labs:   Recent Labs     20  0239 20  0525  20  1024   WBC 6.3  --    < >  --    HGB 8.6*  --    < >  --    HCT 27.2*  --    < >  --      --    < >  --      --    < >  --    K 4.3  --    < >  --    BUN 24*  --    < >  --    CREA 1.70*  --    < >  --    GLU 89  --    < >  --    GLUCPOC  --   --   --  101*   INR  --  1.1   < >  --     < > = values in this interval not displayed.        Vitals:    Visit Vitals  /48   Pulse 83   Temp 98 °F (36.7 °C)   Resp (!) 7   Ht 5' 9\" (1.753 m)   Wt 233 lb 7.5 oz (105.9 kg)   SpO2 92%   BMI 34.48 kg/m²       Temp (24hrs), Av.2 °F (37.3 °C), Min:98 °F (36.7 °C), Max:99.6 °F (37.6 °C)      Last 3 Recorded Weights in this Encounter    20 9753 02/03/20 0625 02/03/20 0902   Weight: 233 lb 0.4 oz (105.7 kg) 233 lb 7.5 oz (105.9 kg) 233 lb 7.5 oz (105.9 kg)       Oxygen Therapy:  Oxygen Therapy  O2 Sat (%): 92 % (02/06/20 0814)  Pulse via Oximetry: 81 beats per minute (02/06/20 0814)  O2 Device: Nasal cannula (02/06/20 0725)  O2 Flow Rate (L/min): 2.5 l/min (02/06/20 0725)  FIO2 (%): 35 % (02/05/20 1740)    Admission Weight: Last Weight   Weight: 238 lb 1.6 oz (108 kg) Weight: 233 lb 7.5 oz (105.9 kg)     Intake / Output / Drain:  Current Shift: 02/06 0701 - 02/06 1900  In: 112.5 [I.V.:112.5]  Out: 100 [Urine:100]  Last 24 hrs.:     Intake/Output Summary (Last 24 hours) at 2/6/2020 0819  Last data filed at 2/6/2020 0815  Gross per 24 hour   Intake 2099.32 ml   Output 3185 ml   Net -1085.68 ml         EXAM:      General: OOB to chair      Incisions: dry and intact    Lungs:    Rhonchi bilaterally. Heart:  Regular rate and rhythm, S1, S2 normal, no murmur, no click,      Abdomen:   Soft, non-tender. Bowel sounds present. Extremities:  mild edema     Neurologic:  Gross motor and sensory apparatus intact.        Signed By: GEE Montes

## 2020-02-06 NOTE — OP NOTES
[address]  OPERATIVE REPORT    Name:  Jazmin Burkett  MR#:  [id number]  :  1945  ACCOUNT #:  [de-identified]  DATE OF SERVICE:  2020    PREOPERATIVE DIAGNOSES:  1. Severe multivessel coronary artery disease, status post percutaneous coronary intervention. 2.  Left ventricular systolic dysfunction, status post Impella 5.0 insertion. POSTOPERATIVE DIAGNOSES:  1. Severe multivessel coronary artery disease, status post percutaneous coronary intervention. 2.  Left ventricular systolic dysfunction, status post Impella 5.0 insertion. PROCEDURE PERFORMED:  1. Removal of Impella 5.0.  2.  Caesar of right upper extremity. SURGEON:  Amy Del Rio MD    ASSISTANT:  Sage Em PA-C. The assistance of a PA was required due to the critical nature of the procedure and necessary exposure of relevant anatomy. ANESTHESIOLOGIST:  Amalia Ortega DO    ANESTHESIA:  General endotracheal.    COMPLICATIONS:  None. SPECIMENS REMOVED:  None. IMPLANTS:  None. ESTIMATED BLOOD LOSS:  10 mL. DETAILS:  The patient is a 64-year-old gentleman with severe multivessel coronary artery disease who had a recent coronary event. An axillary Impella 5.0 was placed electively yesterday in preparation for an Impella supported PCI, which occurred today. He has been hemodynamically stable following this procedure. We therefore elected to remove the Impella 5.0 at the bedside today. PROCEDURE:  The patient was prepped and draped in a sterile fashion. A time-out was performed. The lateral edge of the axillary incision was opened. The #10 Dacron graft was withdrawn from the wound. The Impella pump was turned off and withdrawn from the graft. The graft was then allowed to back bleed and a Caesar catheter was inserted in the graft, which was directed down the right upper extremity in order to withdraw any potential embolus. The Caesar catheter withdrew no clot.   The patient had a palpable right radial pulse. The base of the graft was then stapled with the vascular stapler. This stump of the graft was then retracted into the wound. The edge of the wound was then closed in several layers. The incision was then redressed sterilely. The patient tolerated the procedure with no technical difficulty.         MD JEOVANNY Brownlee/[email or web address]  D:  02/05/2020 15:13  T:  02/05/2020 21:00  JOB #:  [id number]

## 2020-02-06 NOTE — PROGRESS NOTES
Problem: Mobility Impaired (Adult and Pediatric)  Goal: *Acute Goals and Plan of Care (Insert Text)  Description  FUNCTIONAL STATUS PRIOR TO ADMISSION: Patient was modified independent using a single point cane for functional mobility. HOME SUPPORT PRIOR TO ADMISSION: The patient lived with wife and granddaughter and didn't require assistance. Physical Therapy Goals  Initiated 2/6/2020  1. Patient will move from supine to sit and sit to supine , scoot up and down and roll side to side in bed with modified independence within 7 day(s). 2.  Patient will transfer from bed to chair and chair to bed with modified independence using the least restrictive device within 7 day(s). 3.  Patient will perform sit to stand with modified independence within 7 day(s). 4.  Patient will ambulate with modified independence for 300 feet with the least restrictive device within 7 day(s). 5.  Patient will ascend/descend 4 stairs with B handrail(s) with modified independence within 7 day(s). Outcome: Progressing Towards Goal   PHYSICAL THERAPY EVALUATION  Patient: Leidy Richard (74 y.o. male)  Date: 2/6/2020  Primary Diagnosis: STEMI (ST elevation myocardial infarction) ([facility] [I21.3]  CAD (coronary artery disease) [I25.10]  CAD (coronary artery disease) [I25.10]  Procedure(s) (LRB):  PERCUTANEOUS CORONARY INTERVENTION (N/A)  CORONARY ANGIOGRAPHY (N/A)  Insert Stent Kenan Coronary (N/A) 1 Day Post-Op   Precautions:          ASSESSMENT  Based on the objective data described below, the patient presents with R hip pain(chronic), generalized weakness, and decreased activity tolerance. Pt received finishing up in the bathroom with OT. Pt demonstrating mobility with cga this visit. Pt able to ambulate in the lee and required one standing rest break. VSS t/o session. Pt should progress well. Current Level of Function Impacting Discharge (mobility/balance): cga    Functional Outcome Measure:   The patient scored  on the 65/100 outcome measure which is indicative of 35% impaired function/adls. Other factors to consider for discharge: pt moving well, supportive wife and granddaughter at home     Patient will benefit from skilled therapy intervention to address the above noted impairments. PLAN :  Recommendations and Planned Interventions: bed mobility training, transfer training, gait training, therapeutic exercises, patient and family training/education and therapeutic activities      Frequency/Duration: Patient will be followed by physical therapy:  daily to address goals. Recommendation for discharge: (in order for the patient to meet his/her long term goals)  Physical therapy at least 2 days/week in the home     This discharge recommendation:  A follow-up discussion with the attending provider and/or case management is planned    IF patient discharges home will need the following DME: to be determined (TBD)         SUBJECTIVE:   Patient stated My hip hurts more than anything.     OBJECTIVE DATA SUMMARY:   HISTORY:    Past Medical History:   Diagnosis Date    Aneurysm ([address]     BPH (benign prostatic hyperplasia)     CAD (coronary artery disease)     2006 - s/p stent     Essential hypertension 12-12-13    Hematuria     sees urologist    Hyperglycemia     Kidney failure      Past Surgical History:   Procedure Laterality Date    CARDIAC SURG PROCEDURE UNLIST      stent 2006    COLONOSCOPY N/A 9/16/2019    COL[facility] Baptiste [facility] END[facility]COPY    COLONOSCOPY,DIAGNOSTIC  9/16/2019         UPPER GI ENDOSCOPY,BIOPSY  7/9/2019         VASCULAR SURGERY PROCEDURE UNLIST  2016    Aotric aneurysm       Personal factors and/or comorbidities impacting plan of care: none    Home Situation  Home Environment: Private residence  # Steps to Enter: 4  Rails to Enter: Yes  Hand Rails : Bilateral(close together)  One/Two Story Residence: One story  Living Alone: No  Support Systems: Spouse/Significant Other/Partner(granddaughter in her 25s)  Patient Expects to be Discharged to[de-identified] Private residence  Current DME Used/Available at Home: Cane, straight, Grab bars  Tub or Shower Type: Tub/Shower combination    EXAMINATION/PRESENTATION/DECISION MAKING:   Critical Behavior:  Neurologic State: Alert  Orientation Level: Oriented X4  Cognition: Follows commands  Safety/Judgement: Awareness of environment, Fall prevention, Home safety, Insight into deficits  Hearing: Auditory  Auditory Impairment: None  Range Of Motion:  AROM: Generally decreased, functional           PROM: Within functional limits           Strength:    Strength: Generally decreased, functional                    Tone & Sensation:   Tone: Normal                              Coordination:  Coordination: Within functional limits  Functional Mobility:  Bed Mobility:  Rolling: (seated at bedside chair upon arrival)        Scooting: Contact guard assistance  Transfers:  Sit to Stand: Stand-by assistance  Stand to Sit: Stand-by assistance(verbal safety cues)            Balance:   Sitting: Intact  Standing: Impaired  Standing - Static: Good  Standing - Dynamic : Fair  Ambulation/Gait Training:  Distance (ft): 110 Feet (ft)  Assistive Device: Gait belt(cardiac cart)  Ambulation - Level of Assistance: Contact guard assistance        Gait Abnormalities: Decreased step clearance(forward trunk)        Base of Support: Widened     Speed/Sally: Pace decreased (<100 feet/min)  Step Length: Left shortened;Right shortened            Functional Measure:  Barthel Index:    Bathin  Bladder: 10  Bowels: 10  Groomin  Dressin  Feeding: 10  Mobility: 10  Stairs: 0  Toilet Use: 5  Transfer (Bed to Chair and Back): 10  Total: 65/100       The Barthel ADL Index: Guidelines  1. The index should be used as a record of what a patient does, not as a record of what a patient could do.   2. The main aim is to establish degree of independence from any help, physical or verbal, however minor and for whatever reason. 3. The need for supervision renders the patient not independent. 4. A patient's performance should be established using the best available evidence. Asking the patient, friends/relatives and nurses are the usual sources, but direct observation and common sense are also important. However direct testing is not needed. 5. Usually the patient's performance over the preceding 24-48 hours is important, but occasionally longer periods will be relevant. 6. Middle categories imply that the patient supplies over 50 per cent of the effort. 7. Use of aids to be independent is allowed. Alma Smiley., Barthel, D.W. (5454). Functional evaluation: the Barthel Index. [address] [facility] (14)2. Alana Tang se LAKSHMI Castillo, Regina Pulliam, Franklin Gonzalez., [place], [address] [place] [address] (). Measuring the change indisability after inpatient rehabilitation; comparison of the responsiveness of the Barthel Index and Functional Deerton Measure. Journal of Neurology, Neurosurgery, and Psychiatry, 66(4), 367-789. ERIC Kc, CORNELIO Cervantes, & Yenny Hendrix MMichaelA. (2004.) Assessment of post-stroke quality of life in cost-effectiveness studies: The usefulness of the Barthel Index and the EuroQoL-5D.  Quality of Life Research, 15, 559-68          Physical Therapy Evaluation Charge Determination   History Examination Presentation Decision-Making   MEDIUM  Complexity : 1-2 comorbidities / personal factors will impact the outcome/ POC  LOW Complexity : 1-2 Standardized tests and measures addressing body structure, function, activity limitation and / or participation in recreation  LOW Complexity : Stable, uncomplicated  LOW Complexity : FOTO score of       Based on the above components, the patient evaluation is determined to be of the following complexity level: LOW     Pain Ratin-5/10 surgical site    Activity Tolerance:   Good and requires rest breaks  Please refer to the flowsheet for vital signs taken during this treatment. After treatment patient left in no apparent distress:   Sitting in chair, Call bell within reach and Caregiver / family present    COMMUNICATION/EDUCATION:   The patients plan of care was discussed with: Occupational Therapist and Registered Nurse. Fall prevention education was provided and the patient/caregiver indicated understanding., Patient/family have participated as able in goal setting and plan of care. and Patient/family agree to work toward stated goals and plan of care.     Thank you for this referral.  Elyse Chavis, PT

## 2020-02-06 NOTE — PROGRESS NOTES
Report received from [address] [place] [address] Road  0800: Patient moved from bed to chair with two assist. Patient uses cane at baseline for \" Hip\"   [phone]: Spoke with PA, Will D/C finley. Acknowledged orders for metoprolol. Spoke with PA, will hold for HR less than 60. Will continue to monitor. 2391: Finley catheter removed. 0901: Renal at bedside, will stop IV fluids. Will continue to monitor. 5791: Spoke with team in rounds. Will continue to monitor and transfer out of the ICU per Cardiology . 1000 :Dr. Rashmi Porter at bedside. Will transfer patient to [facility] 2 or 3 on tele. Will continue to monitor. 1059: Patient working with PT/OT. Walked the halls with treatment team. Patient voided and had a BM.  Will continue to monitor patient

## 2020-02-07 LAB
ABO + RH BLD: NORMAL
ANION GAP SERPL CALC-SCNC: 7 MMOL/L (ref 5–15)
BASOPHILS # BLD: 0 K/UL (ref 0–0.1)
BASOPHILS NFR BLD: 0 % (ref 0–1)
BLD PROD TYP BPU: NORMAL
BLD PROD TYP BPU: NORMAL
BLOOD GROUP ANTIBODIES SERPL: NORMAL
BPU ID: NORMAL
BPU ID: NORMAL
BUN SERPL-MCNC: 28 MG/DL (ref 6–20)
BUN/CREAT SERPL: 15 (ref 12–20)
CALCIUM SERPL-MCNC: 8.4 MG/DL (ref 8.5–10.1)
CHLORIDE SERPL-SCNC: 111 MMOL/L (ref 97–108)
CO2 SERPL-SCNC: 19 MMOL/L (ref 21–32)
CREAT SERPL-MCNC: 1.89 MG/DL (ref 0.7–1.3)
CROSSMATCH RESULT,%XM: NORMAL
CROSSMATCH RESULT,%XM: NORMAL
DIFFERENTIAL METHOD BLD: ABNORMAL
EOSINOPHIL # BLD: 0.2 K/UL (ref 0–0.4)
EOSINOPHIL NFR BLD: 3 % (ref 0–7)
ERYTHROCYTE [DISTWIDTH] IN BLOOD BY AUTOMATED COUNT: 16.8 % (ref 11.5–14.5)
GLUCOSE SERPL-MCNC: 110 MG/DL (ref 65–100)
HCT VFR BLD AUTO: 28.1 % (ref 36.6–50.3)
HGB BLD-MCNC: 8.8 G/DL (ref 12.1–17)
IMM GRANULOCYTES # BLD AUTO: 0.1 K/UL (ref 0–0.04)
IMM GRANULOCYTES NFR BLD AUTO: 1 % (ref 0–0.5)
LYMPHOCYTES # BLD: 0.7 K/UL (ref 0.8–3.5)
LYMPHOCYTES NFR BLD: 9 % (ref 12–49)
MAGNESIUM SERPL-MCNC: 2.2 MG/DL (ref 1.6–2.4)
MCH RBC QN AUTO: 29.1 PG (ref 26–34)
MCHC RBC AUTO-ENTMCNC: 31.3 G/DL (ref 30–36.5)
MCV RBC AUTO: 93 FL (ref 80–99)
MONOCYTES # BLD: 0.5 K/UL (ref 0–1)
MONOCYTES NFR BLD: 7 % (ref 5–13)
NEUTS SEG # BLD: 5.8 K/UL (ref 1.8–8)
NEUTS SEG NFR BLD: 80 % (ref 32–75)
NRBC # BLD: 0 K/UL (ref 0–0.01)
NRBC BLD-RTO: 0 PER 100 WBC
PLATELET # BLD AUTO: 167 K/UL (ref 150–400)
PMV BLD AUTO: 10 FL (ref 8.9–12.9)
POTASSIUM SERPL-SCNC: 4.5 MMOL/L (ref 3.5–5.1)
RBC # BLD AUTO: 3.02 M/UL (ref 4.1–5.7)
SODIUM SERPL-SCNC: 137 MMOL/L (ref 136–145)
SPECIMEN EXP DATE BLD: NORMAL
STATUS OF UNIT,%ST: NORMAL
STATUS OF UNIT,%ST: NORMAL
UNIT DIVISION, %UDIV: 0
UNIT DIVISION, %UDIV: 0
WBC # BLD AUTO: 7.3 K/UL (ref 4.1–11.1)

## 2020-02-07 PROCEDURE — 74011250637 HC RX REV CODE- 250/637: Performed by: INTERNAL MEDICINE

## 2020-02-07 PROCEDURE — 97535 SELF CARE MNGMENT TRAINING: CPT | Performed by: OCCUPATIONAL THERAPIST

## 2020-02-07 PROCEDURE — 94760 N-INVAS EAR/PLS OXIMETRY 1: CPT

## 2020-02-07 PROCEDURE — 83735 ASSAY OF MAGNESIUM: CPT

## 2020-02-07 PROCEDURE — 65660000000 HC RM CCU STEPDOWN

## 2020-02-07 PROCEDURE — 97530 THERAPEUTIC ACTIVITIES: CPT

## 2020-02-07 PROCEDURE — 74011250637 HC RX REV CODE- 250/637: Performed by: PHYSICIAN ASSISTANT

## 2020-02-07 PROCEDURE — 74011250637 HC RX REV CODE- 250/637: Performed by: THORACIC SURGERY (CARDIOTHORACIC VASCULAR SURGERY)

## 2020-02-07 PROCEDURE — 74011250636 HC RX REV CODE- 250/636: Performed by: PHYSICIAN ASSISTANT

## 2020-02-07 PROCEDURE — 77010033678 HC OXYGEN DAILY

## 2020-02-07 PROCEDURE — 36415 COLL VENOUS BLD VENIPUNCTURE: CPT

## 2020-02-07 PROCEDURE — 97162 PT EVAL MOD COMPLEX 30 MIN: CPT

## 2020-02-07 PROCEDURE — 80048 BASIC METABOLIC PNL TOTAL CA: CPT

## 2020-02-07 PROCEDURE — 97116 GAIT TRAINING THERAPY: CPT

## 2020-02-07 PROCEDURE — 85025 COMPLETE CBC W/AUTO DIFF WBC: CPT

## 2020-02-07 RX ORDER — BENZONATATE 100 MG/1
100 CAPSULE ORAL
Qty: 14 CAP | Refills: 0 | Status: SHIPPED | OUTPATIENT
Start: 2020-02-07 | End: 2020-02-07

## 2020-02-07 RX ORDER — ISOSORBIDE MONONITRATE 30 MG/1
30 TABLET, EXTENDED RELEASE ORAL DAILY
Qty: 30 TAB | Refills: 0 | Status: SHIPPED | OUTPATIENT
Start: 2020-02-08 | End: 2020-02-07

## 2020-02-07 RX ORDER — FUROSEMIDE 40 MG/1
40 TABLET ORAL DAILY
Qty: 14 TAB | Refills: 0 | Status: SHIPPED | OUTPATIENT
Start: 2020-02-07 | End: 2020-02-07

## 2020-02-07 RX ORDER — EZETIMIBE 10 MG/1
10 TABLET ORAL EVERY EVENING
Qty: 30 TAB | Refills: 0 | Status: SHIPPED | OUTPATIENT
Start: 2020-02-07 | End: 2020-02-07

## 2020-02-07 RX ORDER — METOPROLOL SUCCINATE 25 MG/1
25 TABLET, EXTENDED RELEASE ORAL DAILY
Qty: 30 TAB | Refills: 12 | Status: SHIPPED | OUTPATIENT
Start: 2020-02-07

## 2020-02-07 RX ORDER — METOPROLOL SUCCINATE 25 MG/1
12.5 TABLET, EXTENDED RELEASE ORAL DAILY
Qty: 15 TAB | Refills: 1 | Status: SHIPPED | OUTPATIENT
Start: 2020-02-08 | End: 2020-02-07

## 2020-02-07 RX ORDER — METOPROLOL SUCCINATE 25 MG/1
25 TABLET, EXTENDED RELEASE ORAL DAILY
Status: DISCONTINUED | OUTPATIENT
Start: 2020-02-08 | End: 2020-02-08 | Stop reason: HOSPADM

## 2020-02-07 RX ORDER — GUAIFENESIN 600 MG/1
600 TABLET, EXTENDED RELEASE ORAL EVERY 12 HOURS
Qty: 28 TAB | Refills: 0 | Status: SHIPPED | OUTPATIENT
Start: 2020-02-07 | End: 2020-02-07

## 2020-02-07 RX ORDER — GUAIFENESIN 100 MG/5ML
81 LIQUID (ML) ORAL DAILY
Qty: 30 TAB | Refills: 11 | Status: SHIPPED
Start: 2020-02-08 | End: 2020-02-07

## 2020-02-07 RX ORDER — ISOSORBIDE MONONITRATE 30 MG/1
15 TABLET, EXTENDED RELEASE ORAL DAILY
Qty: 30 TAB | Refills: 12 | Status: SHIPPED | OUTPATIENT
Start: 2020-02-08

## 2020-02-07 RX ORDER — EZETIMIBE 10 MG/1
10 TABLET ORAL EVERY EVENING
Qty: 30 TAB | Refills: 12 | Status: SHIPPED | OUTPATIENT
Start: 2020-02-07

## 2020-02-07 RX ORDER — LANOLIN ALCOHOL/MO/W.PET/CERES
9 CREAM (GRAM) TOPICAL
Qty: 60 TAB | Refills: 0 | Status: SHIPPED | OUTPATIENT
Start: 2020-02-07 | End: 2020-02-27

## 2020-02-07 RX ORDER — METOPROLOL SUCCINATE 25 MG/1
12.5 TABLET, EXTENDED RELEASE ORAL DAILY
Status: DISCONTINUED | OUTPATIENT
Start: 2020-02-07 | End: 2020-02-07

## 2020-02-07 RX ORDER — ISOSORBIDE MONONITRATE 30 MG/1
15 TABLET, EXTENDED RELEASE ORAL DAILY
Status: DISCONTINUED | OUTPATIENT
Start: 2020-02-07 | End: 2020-02-08 | Stop reason: HOSPADM

## 2020-02-07 RX ORDER — TAMSULOSIN HYDROCHLORIDE 0.4 MG/1
0.4 CAPSULE ORAL EVERY EVENING
Qty: 30 CAP | Refills: 0 | Status: SHIPPED | OUTPATIENT
Start: 2020-02-07

## 2020-02-07 RX ORDER — NICOTINE 7MG/24HR
1 PATCH, TRANSDERMAL 24 HOURS TRANSDERMAL DAILY
Status: DISCONTINUED | OUTPATIENT
Start: 2020-02-07 | End: 2020-02-08 | Stop reason: HOSPADM

## 2020-02-07 RX ORDER — NICOTINE 7MG/24HR
1 PATCH, TRANSDERMAL 24 HOURS TRANSDERMAL DAILY
Qty: 30 PATCH | Refills: 0 | Status: SHIPPED | OUTPATIENT
Start: 2020-02-08 | End: 2020-03-09

## 2020-02-07 RX ORDER — FUROSEMIDE 40 MG/1
40 TABLET ORAL DAILY
Status: DISCONTINUED | OUTPATIENT
Start: 2020-02-07 | End: 2020-02-08 | Stop reason: HOSPADM

## 2020-02-07 RX ADMIN — EZETIMIBE 10 MG: 10 TABLET ORAL at 17:01

## 2020-02-07 RX ADMIN — ONDANSETRON 4 MG: 2 INJECTION INTRAMUSCULAR; INTRAVENOUS at 08:46

## 2020-02-07 RX ADMIN — ACETAMINOPHEN 650 MG: 325 TABLET ORAL at 09:19

## 2020-02-07 RX ADMIN — ISOSORBIDE MONONITRATE 15 MG: 30 TABLET, EXTENDED RELEASE ORAL at 09:19

## 2020-02-07 RX ADMIN — GUAIFENESIN 600 MG: 600 TABLET, EXTENDED RELEASE ORAL at 21:05

## 2020-02-07 RX ADMIN — ACETAMINOPHEN 650 MG: 325 TABLET ORAL at 13:30

## 2020-02-07 RX ADMIN — ASPIRIN 81 MG 81 MG: 81 TABLET ORAL at 09:19

## 2020-02-07 RX ADMIN — ACETAMINOPHEN 650 MG: 325 TABLET ORAL at 23:01

## 2020-02-07 RX ADMIN — ACETAMINOPHEN 650 MG: 325 TABLET ORAL at 18:46

## 2020-02-07 RX ADMIN — GUAIFENESIN 600 MG: 600 TABLET, EXTENDED RELEASE ORAL at 09:20

## 2020-02-07 RX ADMIN — TICAGRELOR 90 MG: 90 TABLET ORAL at 17:01

## 2020-02-07 RX ADMIN — Medication 10 ML: at 21:06

## 2020-02-07 RX ADMIN — Medication 10 ML: at 05:53

## 2020-02-07 RX ADMIN — MELATONIN TAB 3 MG 9 MG: 3 TAB at 21:05

## 2020-02-07 RX ADMIN — Medication 10 ML: at 14:00

## 2020-02-07 RX ADMIN — FUROSEMIDE 40 MG: 40 TABLET ORAL at 13:30

## 2020-02-07 RX ADMIN — PANTOPRAZOLE SODIUM 40 MG: 40 TABLET, DELAYED RELEASE ORAL at 09:19

## 2020-02-07 RX ADMIN — ACETAMINOPHEN 650 MG: 325 TABLET ORAL at 03:59

## 2020-02-07 RX ADMIN — SODIUM BICARBONATE 650 MG: 650 TABLET ORAL at 09:19

## 2020-02-07 RX ADMIN — TAMSULOSIN HYDROCHLORIDE 0.4 MG: 0.4 CAPSULE ORAL at 17:01

## 2020-02-07 RX ADMIN — METOPROLOL SUCCINATE 12.5 MG: 25 TABLET, EXTENDED RELEASE ORAL at 09:19

## 2020-02-07 RX ADMIN — TICAGRELOR 90 MG: 90 TABLET ORAL at 05:53

## 2020-02-07 RX ADMIN — SODIUM BICARBONATE 650 MG: 650 TABLET ORAL at 17:00

## 2020-02-07 NOTE — DISCHARGE SUMMARY
Cardiology Discharge Summary     Patient ID: Niki Grewal  [id number]  66 y.o.  1945    Admit Date: 1/26/2020  Discharge Date:  2/8/20  Admitting Physician: Dr Peace Rush   Discharge Physician:     Admission Diagnoses: STEMI    Discharge Diagnoses:   ACS  1) CAD:  Stent of unknown vessel at [facility]. * followed by Dr. Beatrice Falcon in 2014:  MPI reportedly showed inferior ischemia & cath recommended but not done. *Seen by Dr Malu Gutierrez in 2017: MPI rec but not done. Normal echo. *CP/TEIXEIRA 12/2019: Abnl PET (inf ischemia): pt delayed cath plan until 1/27/20. *Echo 1/10/20: EF 55%; AoScl only. Mild pulm HTN. *Cath 1/26/20: Severe LM w/ occluded LAD & RCA. CKp ; Trop 39.  2) HTN  3) dyslipidemia  4) open AAA repair 4/2017 (8 cm; Dr. Aliyah Freeman). 5) chronic BONNY occlusion. 6) right BG stroke on MRI 1/2014.  7) heavy tobacco use, 2-3 pack per day; no significant COPD on PFTs 4/2017  8) CKD:  Stage III (Cr 1.4-1.5/GFR 51 7/2019; Dr. Bryan Back). Cr 1.5/GFR 45 1/21/20.  9) chronic anemia; hemoglobin 9.3: Chronic anemia related to CKD. 10) heme+ 2019:  Negative EGD 7/2019; internal hemorrhoids/diverticular disease on colonoscopy 9/2019.  11) Chronic bronchitis. Procedures this Admission:      Cath 1/26/20: PROCEDURE:  After informed consent, the patient was taken to the cardiac catheterization lab. Procedures were dictated by Dr. Peace Rush to the patient in detail. The patient was agreeable to proceed with the same. Both the groins were prepped and draped in the usual sterile fashion. Anesthesia was obtained with 1% lidocaine and access was obtained with a micropuncture kit. Then, micropuncture sheath was replaced with a 6-Libyan sheath, but Runthrough wire could not cross into the aorta due to extreme tortuosity in the iliac vessel. Sheath was the wire was removed and  angiogram was performed.   It seemed that there was a dissection noted in the external iliac artery distal to the bifurcation of common iliac artery. At this point in time,  access was obtained from the left common femoral artery. Again, the left common femoral artery access was very tortuous and then, with difficulty, the wire was crossed up into the abdominal aorta. At this point, OmniFlush catheter was advanced into the distal abdominal aorta and distal abdominal aortic angiogram  were done and then OmniFlush catheter was crossed over the bifurcation and was positioned into the right common iliac artery and then right common iliac artery angiogram was  done.     At this point in time, the angiographic  coronary angiogram was delayed. No perforation was noted and no flow-limiting dissection was noted in the right external iliac artery on the antegrade injection. At this point, then we proceeded with the coronary angiogram.  A 7-Ukrainian 45 cm Terumo sheath was advanced through the tortuous left common iliac artery into the distal abdominal aorta, and into the thoracic aorta. Then, using the JR4 and JL4 diagnostic catheters, right and left coronary were selectively engaged, angiographic views were obtained. Using the [address] catheter, LV was entered and  were done and a pullback was done across the aortic valve to see the aortic gradient.     The findings were as follows:  1. On the peripheral angiogram, distal abdominal aorta has aneurysmal dilatation and is very tortuous in course. However, no significant stenosis was noted. Then it bifurcates into distal common iliac artery, right common iliac artery is tortuous and has an aneurysm noted just before the bifurcation into the internal and external iliac artery, which is large in size. Then, no antegrade flow-limiting dissection was noted in the right external iliac artery. 2.  Left common iliac artery is patent and there is significant peripheral arterial disease of 50%-60% stenosis noted just below the bifurcation and then bifurcated into the external and internal iliac arteries.   The external iliac artery is patent. 3.  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     ANGIOGRAPHIC FINDINGS WERE AS FOLLOWS:  Left main:  Left main coronary artery is 4-4.5 mm in diameter. There appears to be an ulcerated plaque in the distal left main and just before the bifurcation into the LAD and the left circumflex branches. There is stenosis in the distal left main of around 60%-70%. Then it bifurcates into the LAD and the left circumflex branch. 3.  LAD:  LAD is 2.5 mm in diameter in the proximal portion and gives rise to a very large diagonal branch, it could be called as ramus intermedius, which is widely patent. The size of the ramus intermedius is around 3-3.5 mm in diameter. The LAD just beyond the first septal  has a stent noted that appears to be chronically occluded and distal LAD shows the collateral coming from the left system. 4.  Left circumflex:  Left circumflex coronary artery appears to be 5 mm in diameter, gives rise to one large OM branch, which further bifurcates into two branches. There are collaterals noted from the left circumflex going into the RCA which fills the PDA and PL branches. 5.  RCA:  RCA is totally occluded  distal RCA fills the left system, especially the PDA appears to be of reasonable size.     SUMMARY:  Severe triple vessel disease involving the distal left main of 60%-70% with ulcerated plaque, total occlusion of left anterior descending artery, a stent in the proximal portion. Patent left circumflex and patent first large diagonal or the ramus intermedius branch. Totally occluded RCA with collateralization coming from the left system. PLAN:  At this point in time, we will refer the patient for the CT surgery. Removed both the sheaths with manual pressure on the table.       Echo 1/27: Normal cavity size. Mild LVH; EF 40 - 45%.  Mid-distal Ant-sept AK; Mild TR.     Abd U/S 1/27: \"Mid abdominal aorta measures 4.0 cm in diameter, although the distal abdominal aorta, where the largest aneurysm was seen on prior CT, is obscured by overlying bowel gas\"     Carotid U/S 1/27: Chronic BONNY occlusion; There is mild stenosis in the left ICA (<50%). Bilateral vertebrals are antegrade.     NICHOLAS 1/27: normal.     CTA 1/28: Chronically occluded right internal carotid artery. Bilateral common and left internal carotid arteries remain patent without significant stenosis. Bilateral vertebral arteries are patent with mild atherosclerosis. The left vertebral artery arises from the aortic arch. Relatively stable juxtarenal abdominal aortic aneurysmal dilatation. Sequelae of graft repair of infrarenal abdominal aortic aneurysm. There is minimal surrounding fat stranding without evidence for fluid collection or gas. Increased size of the right common iliac artery aneurysm with mild hemorrhage in the right paracolic gutter and right hemipelvis. These findings were communicated to the nursing staff by the on-call radiologist.  Mild nonspecific right middle lobe and posterior right lower lobe airspace disease. Mild nonspecific colitis involving the ascending colon     2/5 PCIs: PCI of Ramus.     80 % stenosis .   2.5 Vladimir Stent to 0% .  GERARD 3 flow. PCI of Left Main into Circumflex.   Distal LM ulcerated plaque, with severe 90 % stenosis at ostium of Cx.    3.0 Vladimir SHIVA deployed, and proximal segment dilated to 4.0 mm  With non compliant balloon.  0% residual .   GERARD 3. Attempted to wire the occluded LAD , but unable to cross the obstruction with a wire and no intervention on that artery . Hospital Course:    CAD: initial plan was CABG; After 1 week, there was a change in plan per Dr Castaneda Arms and d/w Dr Mendoza/pt/wife (\"unlikely to be able to bypass RCA & LAD would be difficult; concern @ cross-clamping aorta: therefore, only off-pump DUKE-LAD (or ramus) offered. \" This approach is no better than PCI.   Did well with impella support; impella removed by  Tonya Arms. CM: No ACEi nor ARB per renal MD due to CKD; BPs too low for hydralazine with imdur. Dyslipidemia: on zetia; unable to tolerate pravachol (myalgias) & 2 other statins in the past per pt (believed to be lipitor, crestor). CKD III: Cr 1.4- 1.9; GFR 33. Chronic bronchitis: improved prior to d/c. Anemia was related primarily to CKD and dilution; some blood loss with cath; did not require transfusion.       Recent Labs     02/07/20  0259 02/06/20  0239    141   K 4.5 4.3   BUN 28* 24*   CREA 1.89* 1.70*   WBC 7.3 6.3   HGB 8.8* 8.6*   HCT 28.1* 27.2*    158     Lab Results   Component Value Date/Time    Cholesterol, total 148 01/28/2020 12:58 AM    HDL Cholesterol 27 01/28/2020 12:58 AM    LDL, calculated 86.4 01/28/2020 12:58 AM    Triglyceride 173 (H) 01/28/2020 12:58 AM     No results for input(s): SGOT, GPT, ALT, AP in the last 72 hours. Recent Labs     02/05/20  0525   INR 1.1   PTP 11.4*   APTT 26.8      Patient was ambulating without symptoms prior to d/c. Consults: Pulmonary/Intensive care and Cardiac Surgery  Disposition: home  Discharge Condition: Stable  Patient Instructions:   Current Discharge Medication List      START taking these medications    Details   nicotine (NICODERM CQ) 7 mg/24 hr 1 Patch by TransDERmal route daily for 30 days. Qty: 30 Patch, Refills: 0      melatonin 3 mg tablet Take 3 Tabs by mouth nightly for 20 days. Qty: 60 Tab, Refills: 0      ezetimibe (ZETIA) 10 mg tablet Take 1 Tab by mouth every evening. Qty: 30 Tab, Refills: 12      isosorbide mononitrate ER (IMDUR) 30 mg tablet Take 0.5 Tabs by mouth daily. Qty: 30 Tab, Refills: 12      ticagrelor (BRILINTA) 90 mg tablet Take 1 Tab by mouth every twelve (12) hours every twelve (12) hours. Qty: 60 Tab, Refills: 12         CONTINUE these medications which have CHANGED    Details   tamsulosin (FLOMAX) 0.4 mg capsule Take 1 Cap by mouth every evening.   Qty: 30 Cap, Refills: 0      metoprolol succinate (TOPROL-XL) 25 mg XL tablet Take 1 Tab by mouth daily. Qty: 30 Tab, Refills: 12    Associated Diagnoses: S/P PTCA (percutaneous transluminal coronary angioplasty); Coronary artery disease involving native coronary artery of native heart without angina pectoris; Dyslipidemia; Carotid stenosis, right; Tobacco use disorder; PAD (peripheral artery disease) ([facility] Abdominal aortic aneurysm (AAA) without rupture ([facility]         CONTINUE these medications which have NOT CHANGED    Details   aspirin delayed-release 81 mg tablet Take 81 mg by mouth daily. sodium bicarbonate 650 mg tablet Take 650 mg by mouth two (2) times a day. diphenhydrAMINE-acetaminophen (TYLENOL PM EXTRA STRENGTH)  mg tab Take 2 Tabs by mouth nightly. Associated Diagnoses: S/P PTCA (percutaneous transluminal coronary angioplasty); Coronary artery disease involving native coronary artery of native heart without angina pectoris; Dyslipidemia; Carotid stenosis, right; Tobacco use disorder; PAD (peripheral artery disease) ([facility] Abdominal aortic aneurysm (AAA) without rupture (HCC)         STOP taking these medications       clopidogrel (PLAVIX) 75 mg tablet Comments:   Reason for Stopping:               Referenced discharge instructions provided by nursing for diet and activity. Follow-up:   Follow-up Information     Follow up With Specialties Details Why Contact Info    Timoteo Johnson NP Nurse Practitioner Go on 2/13/2020 For hospital follow up appointment at 8:15AM [address]  Jaylin Randhawa MD Cardiology In 2 weeks  [address] [phone]          Also F/U with Dr Lorena Bhatia week after d/c for f/u Cr.       Signed:  Stefani Mathew MD  2/7/2020  5:46 PM

## 2020-02-07 NOTE — PROGRESS NOTES
Cardiology Progress Note  2/7/2020     Admit Date: 1/26/2020  Admit Diagnosis: STEMI (ST elevation myocardial infarction) ([facility] [address] [I21.3]  CAD (coronary artery disease) [I25.10]  CAD (coronary artery disease) [I25.10]  CC: none currently  Cardiac Assessment/Plan:   1) CAD:  Stent of unknown vessel at [facility] 2006. * followed by Dr. Leland Mccabe in 2014:  MPI reportedly showed inferior ischemia & cath recommended but not done. *Seen by Dr Deandre Sanchez in 2017: MPI rec but not done. Normal echo. *CP/TEIXEIRA 12/2019: Abnl PET (inf ischemia): pt delayed cath plan until 1/27/20. *Echo 1/10/20: EF 55%; AoScl only. Mild pulm HTN. *Cath 1/26/20: Severe LM w/ occluded LAD & RCA. CKp ; Trop . 2) HTN  3) dyslipidemia  4) open AAA repair 4/2017 (8 cm; Dr. Priyanka Meyer). 5) chronic BONNY occlusion. 6) right BG stroke on MRI 1/2014.  7) heavy tobacco use, 2-3 pack per day; no significant COPD on PFTs 4/2017  8) CKD:  Stage III (Cr 1.4-1.5/GFR 51 7/2019; Dr. Barbara Agarwal). Cr 1.5/GFR 45 1/21/20.  9) chronic anemia; hemoglobin 9.3: Chronic anemia related to CKD. 10) heme+ 2019:  Negative EGD 7/2019; internal hemorrhoids/diverticular disease on colonoscopy 9/2019. Retired ; from [place]. 2 ppd & knows he should quit. No recent ethanol; heavy 50 years ago. 2 caffeinated elie/day. No added salt.     He reports no CP in the last month until day of admit. hold ACEi/ARB for now with CKD/recent contrast.      For other plans, see orders.     Echo 1/27: Normal cavity size. Mild LVH; EF 40 - 45%. Mid-distal Ant-sept AK; Mild TR.     Abd U/S 1/27: \"Mid abdominal aorta measures 4.0 cm in diameter, although the distal abdominal aorta, where the largest aneurysm was seen on prior CT, is obscured by overlying bowel gas\"     Carotid U/S 1/27: Chronic BONNY occlusion; There is mild stenosis in the left ICA (<50%).  Bilateral vertebrals are antegrade.     NICHOLAS 1/27: normal.     CTA 1/28: Chronically occluded right internal carotid artery. Bilateral common and left internal carotid arteries remain patent without significant stenosis. Bilateral vertebral arteries are patent with mild atherosclerosis. The left vertebral artery arises from the aortic arch.     Relatively stable juxtarenal abdominal aortic aneurysmal dilatation. Sequelae of graft repair of infrarenal abdominal aortic aneurysm. There is minimal surrounding fat stranding without evidence for fluid collection or gas.     Increased size of the right common iliac artery aneurysm with mild hemorrhage in the right paracolic gutter and right hemipelvis. These findings were communicated to the nursing staff by the on-call radiologist.     Mild nonspecific right middle lobe and posterior right lower lobe airspace disease. Mild nonspecific colitis involving the ascending colon     2/5 PCIs: PCI of Ramus.     80 % stenosis .   2.5 Vladimir Stent to 0% .  GERARD 3 flow.      PCI of Left Main into Circumflex.   Distal LM ulcerated plaque, with severe 90 % stenosis at ostium of Cx.    3.0 Vladimir SHIVA deployed, and proximal segment dilated to 4.0 mm  With non compliant balloon.  0% residual .   GERARD 3. Attempted to wire the occluded LAD , but unable to cross the obstruction with a wire and no intervention on that artery .      _____________________________________________________________________________________     1/28: Stable BP/HR; NSR; Cr unchanged at 1.45; CKp was 700s (down since then); trop from 45 to 20. No CP/dyspnea;   Remains on IV hep gtt; toprol XL 50 qday; NTG paste; lipitor; Add ACEi or ARB after surgery/prior to d/c.  Sputum c/w chronic bronchitis. CABG timing per Dr Napoleon Tapia. Extensive CTA orders noted; increased risk of FRANSISCO.     1/29: VSSAF; NSR; Cr 1.5 from 1.45; Hg 10.8 from 12.4 from 14.2. No CP/dyspnea; less cough/sputum. IV hep d/cd after CTA above; Remains on asa, toprol XL 50, NTG paste, lipitor; Add ACEi or ARB prior to d/c.   CABG timing per Dr Napoleon Tapia; Vascular/retroperitoneal issues per Dr Jaquan Bah. Benign cath sites.     1/30: VSSAF; NSR; Cr 1.58; Hg 10. No CP/dyspnea per se; cont cough/poor sleep: mucinex/ambien. Cardiac meds as above. Dr Radha Garcia et al to see pt. Appreciate Dr Charly Tran input on CT findings: no further Rx needed @ this time/prior to CABG.     1/31: VSSAF; NSR; Less cough/sputum. No new cardiac plans; awaiting CABG. Anemia related to CKD and dilution; some blood loss with cath.     2/1: VSSAF; NSR; Cont cough/less sputum; Cr 1.7. still getting ABx. No new cardiac plans; awaiting CABG.      2/2: Angina this am: improving with NTG SL but not gone. No acute ecg changes. Starting IV NTG and hep gtt; If doesn't become pain-free, will need CABG. Labs pending. He reports intolerance to \"3 statins\": changed to zetia. He felt \"out of it\" with robitussin DM: back to just mucinex.     2/3: Remains angina-free since yesterday am; Remains on IV hep gtt and IV NTG gtt. Hg 10.1; Cr 1.6; PTT 33.     Change in plan d/w Virginia Gonzalez and pt/wife (\"unlikely to be able to bypass RCA & LAD would be difficult; concern @ cross-clamping aorta: therefore, only off-pump DUKE-LAD (or ramus) offered. \" This approach is no better than PCI.     He is agreeable to proceed with PCI as able; He and wife understand this will be an incomplete revascularization.     2/4: Change in plan as noted yesterday: Impella today then PCI tomorrow; No CP/dyspnea; Cr 1.78.     2/5: Impella @ P8; Dobut @ 1; CI 2.3; Hg down to 8.5. Cr 1.87. ACT 160s. Good UOP. Marked sinus ward to 30s, 40 currently. Remains intubated/sedated. Will d/c amio and toprol XL for now. For PCI later this am. Impella removal per Dr Randy Wolf.     2/6: PCI then impella out; off vent/ Art line out. No CP/dyspnea. NSR: bradycardia resolved off presidex/amio. Back on low dose metoprolol; Future ARB vs hydralazine/imdur dep on Cr/Renal rec.   CXR 2/6: \"Decreased small pleural effusions with stable bibasilar atelectasis. \"     Cr 1.7; nl K. Remains on asa/brilinta; zetia as \"cant tolerate statins. \"     Transfer to tele; PT eval; wean O2 as able. Mobilize: dispo pending PT eval.      2/7: Marginal BPs yesterday; ok today; NSR. Cr 1.87; stable Hg. No CP/dyspnea; ambulation yesterday limited by hip. Remains on asa/brilinta/zetia; Decrease toprol XL to 12.5 to add imdur 15; Future hydralazine if BP allows OR ARB when OK with renal.  Holding on diuretics for now: may need low dose; avoid aldactone with CKD. Cont to mobilize; Probable d/c over-weekend.   ________________________________________________________________  @ NP OV 12/5/19:   Mr Severiano Kida has a h/o:  1) CAD:  Stent of unknown vessel at [facility] 2006. * followed by Dr. Amy Heck in 2014:  MPI reportedly showed inferior ischemia & cath recommended but not done. *Seen by Dr Catherine Reese in 2017: MPI rec but not done. Normal echo. *CP/TEIXEIRA 12/2019:  2) HTN  3) dyslipidemia  4) open AAA repair 4/2017 (8 cm; Dr. Orestes Hartley). 5) chronic BONNY occlusion. 6) right BG stroke on MRI 1/2014.  7) heavy tobacco use, 2-3 pack per day; no significant COPD on PFTs 4/2017  8) CKD:  Stage III (Cr 1.4-1.5/GFR 51 7/2019; Dr. Tonia Ross). 9) chronic anemia; hemoglobin 9.3  10) heme+ 2019:  Negative EGD 7/2019; internal hemorrhoids/diverticular disease on colonoscopy 9/2019. Retired ; from [place]. 2 ppd & knows he should quit. No recent ethanol; heavy 50 years ago. 2 caffeinated elie/day. No added salt.     12/2019:  New patient presenting with intermittent chest pain (6 months of worsening, 0-1 X/month;  15 minutes duration; central dull ache with radiation to both arms. Can occur with or without exertion. Increased dyspnea associated with discomfort). Chronic TEIXEIRA which is unchanged in years. No palpitations. No falling or bleeding.     IMPRESSION AND PLAN  01.  Atherosclerotic heart disease of native coronary artery with other forms of angina pectoris (I25.118):  Remote stent of unknown vessel; reportedly abnormal MPI 2014 without further evaluation. He needs a stress test but cannot walk on a treadmill. Therefore cardiac PET.     We discussed the signs and symptoms of unstable angina, myocardial infarction and malignant arrhythmia. The patient knows to seek immediate medical attention should they occur. ECG done PET Cardiac test to be done. first available   02. Hyp hrt & chr kdny dis w/o hrt fail, w stg 1-4/unsp chr kdny (I13.10): This condition is stable. I have made no changes to the present regimen. 03. Mixed hyperlipidemia (E78.2): He should be on a statin unless previously intolerant; apparently had been on Lipitor in the past.   04. Occlusion and stenosis of bilateral carotid arteries (B13.53):  Chronic BONNY occlusion; he reports no recent follow-up. U/s ordered. Carotid Duplex to be done first available. 05. Abdominal aortic aneurysm, without rupture (I71.4):  Surgically treated in 2017; follow-up per Dr. Deion Vitale. 06. Shortness of breath (R06.02):  ?  Cardiac; also has some element of underlying pulmonary disease & ongoing tobacco abuse. Echocardiogram warranted. 2-D w/CFD Echo to be done first available. 07. Chronic kidney disease, stage 3 (moderate) (N18.3):  Managed by: Renal   08. Cerebral infarction, unspecified (I63.9):  Managed by: Other Physician   09. Nicotine dependence, cigarettes, uncomplicated (G34.653): The patient was instructed on smoking cessation. 10. Body mass index (BMI) 33.0-33.9, adult (C03.40): The patient was instructed on AHA diet and regular exercise.         ORDERS:  1 Tobacco cessation counseling   2 Dietary management education, guidance, and counseling   3 ECG done   4 PET Cardiac test to be done   5 Carotid Duplex   6 2-D w/ CFD Echocardiogram   7 Return office visit with Oscar Cummings. Benedict ANDREWS in 3 Months.    8 The patient was instructed on AHA diet and regular exercise.         12/5/19 MEDICATION LIST  Medication Sig Desc   amlodipine 5 mg tablet take 1 tablet by oral route  every day   Aspirin Low Dose 81 mg tablet,delayed release take 1 tablet by oral route  every day   metoprolol succinate ER 25 mg tablet,extended release 24 hr take 1 tablet by oral route  every day   pantoprazole 40 mg tablet,delayed release take 1 tablet by oral route  every day   tamsulosin 0.4 mg capsule take 1 capsule by oral route  every day 1/2 hour following the same meal each day      _______________________________________________________________________  CARDIAC HISTORY  RISK FACTORS:  1 Hypertension   2 Tobacco Use: No/never         CARDIOVASCULAR PROCEDURES  Procedure Date Results   Carotid Duplex 05/02/2018 100% Right ICA, Less than 50% Left ICA, Vertebral: Bilateral Antegrade Flow, Unchanged versus 2017; at [address] Echo 04/03/2017 EF 55-60%; normal RV; no valve disease; normal PAp; by RCA at [address] EKG 12/05/2019 Sinus Rhythm, Borderline PRWP, otherwise unremarkable. PFTs 04/03/2017 No obstructive disease; moderately decreased DLCO; at [address]        For other plans, see orders. Hospital problem list   Active Hospital Problems    Diagnosis Date Noted    STEMI (ST elevation myocardial infarction) ([facility] [address] 01/26/2020     Priority: 1 - One    CAD (coronary artery disease)      2006 - s/p stent           Subjective:  Marlon Leonardo reports   Chest pain X none  consistent with:  Non-cardiac CP         Atypical CP     None now  On going  Anginal CP     Dyspnea X none  at rest  with exertion         improved  unchanged  worse              PND X none  overnight       Orthopnea X none  improved  unchanged  worse   Presyncope X none  improved  unchanged  worse     Ambulated in hallway without symptoms   Yes   Ambulated in room without symptoms  Yes   Objective:    Physical Exam:  Overall VSSAF;    Visit Vitals  /63 (BP 1 Location: Left arm, BP Patient Position: Sitting)   Pulse 80   Temp 98.4 °F (36.9 °C)   Resp 22   Ht 5' 9\" (1.753 m)   Wt 108.9 kg (240 lb)   SpO2 96%   BMI 35.44 kg/m²     Temp (24hrs), Av.6 °F (37 °C), Min:98.3 °F (36.8 °C), Max:98.9 °F (37.2 °C)    Patient Vitals for the past 8 hrs:   Pulse   20 0736 80   20 0356 84     Patient Vitals for the past 8 hrs:   Resp   2036 22   206 25     Patient Vitals for the past 8 hrs:   BP   2036 135/63   206 133/63       Intake/Output Summary (Last 24 hours) at 2020  Last data filed at 2020  Gross per 24 hour   Intake 1432.17 ml   Output 570 ml   Net 862.17 ml     General Appearance: Well developed, well nourished, no acute distress. Ears/Nose/Mouth/Throat:   Normal MM; anicteric. JVP: WNL   Resp:   clear to auscultation bilaterally. Nl resp effort. Cardiovascular:  RRR, S1, S2 normal, no new murmur. No gallop or rub. Abdomen:   Soft, non-tender, bowel sounds are present. Extremities: minimal edema bilaterally. Skin:  Neuro: Warm and dry. A/O x3, grossly nonfocal   cath site intact w/o hematoma or new bruit; distal pulse unchanged  Yes   Data Review:     Telemetry independently reviewed x sinus  chronic afib  parox afib  NSVT     ECG independently reviewed  NSR  afib  no significant changes  NSST-Tw chgs   x no new ECG provided for review   Lab results reviewed as noted below. Current medications reviewed as noted below. No results for input(s): PH, PCO2, PO2 in the last 72 hours. No results for input(s): CPK, CKMB, CKNDX, TROIQ in the last 72 hours.   Recent Labs     20  0259 20  0239 20  0422  20  1707    141 139   < > 140   K 4.5 4.3 4.6   < > 4.5   * 114* 114*   < > 114*   CO2 19* 22 19*   < > 22   BUN 28* 24* 22*   < > 23*   CREA 1.89* 1.70* 1.87*   < > 1.84*   GFRAA 42* 48* 43*   < > 44*   * 89 107*   < > 110*   CA 8.4* 8.8 8.5   < > 8.3*   ALB  --   --   --   --  2.4*   WBC 7.3 6.3 5.2  --  5.2   HGB 8.8* 8.6* 8.5*   < > 8.8*   HCT 28.1* 27.2* 27.6*   < > 27.9*    158 171  --  205    < > = values in this interval not displayed.      Lab Results   Component Value Date/Time    Cholesterol, total 148 01/28/2020 12:58 AM    HDL Cholesterol 27 01/28/2020 12:58 AM    LDL, calculated 86.4 01/28/2020 12:58 AM    Triglyceride 173 (H) 01/28/2020 12:58 AM    CHOL/HDL Ratio 5.5 (H) 01/28/2020 12:58 AM     Recent Labs     02/04/20  1707   SGOT 17   AP 84   TP 6.4   ALB 2.4*   GLOB 4.0     Recent Labs     02/05/20  0525 02/04/20  1707   INR 1.1 1.1   PTP 11.4* 11.6*   APTT 26.8 29.4      No components found for: GLPOC    Current Facility-Administered Medications   Medication Dose Route Frequency    metoprolol succinate (TOPROL-XL) XL tablet 12.5 mg  12.5 mg Oral DAILY    isosorbide mononitrate ER (IMDUR) tablet 15 mg  15 mg Oral DAILY    nicotine (NICODERM CQ) 7 mg/24 hr patch 1 Patch  1 Patch TransDERmal DAILY    ticagrelor (BRILINTA) tablet 90 mg  90 mg Oral Q12H    zolpidem (AMBIEN) tablet 5 mg  5 mg Oral QHS PRN    sodium chloride (NS) flush 5-40 mL  5-40 mL IntraVENous Q8H    sodium bicarbonate tablet 650 mg  650 mg Oral BID    sodium chloride (NS) flush 5-40 mL  5-40 mL IntraVENous PRN    ondansetron (ZOFRAN) injection 4 mg  4 mg IntraVENous Q4H PRN    albuterol (PROVENTIL VENTOLIN) nebulizer solution 2.5 mg  2.5 mg Nebulization Q4H PRN    aspirin chewable tablet 81 mg  81 mg Oral DAILY    polyethylene glycol (MIRALAX) packet 34 g  34 g Oral DAILY    pantoprazole (PROTONIX) tablet 40 mg  40 mg Oral ACB    acetaminophen (TYLENOL) tablet 650 mg  650 mg Oral Q4H PRN    ezetimibe (ZETIA) tablet 10 mg  10 mg Oral QPM    guaiFENesin ER (MUCINEX) tablet 600 mg  600 mg Oral Q12H    melatonin tablet 9 mg  9 mg Oral QHS    benzonatate (TESSALON) capsule 100 mg  100 mg Oral TID PRN    tamsulosin (FLOMAX) capsule 0.4 mg  0.4 mg Oral QPM    nitroglycerin (NITROSTAT) tablet 0.4 mg  0.4 mg SubLINGual PRN        Molly Bowie MD

## 2020-02-07 NOTE — PROGRESS NOTES
Cardiac Surgery  Progress Note    Admit Date: 1/26/2020    POD: 2 Day Post-Op      Problems:  Active Problems:    STEMI (ST elevation myocardial infarction) ([address] (1/26/2020)      CAD (coronary artery disease) ()      Overview: 2006 - s/p stent         Procedure:  Procedure(s):  IMPELLA REMOVAL AT BEDSIDE      Summary:     Stable hemodynamics in sinus rhythm without ectopy on no support. WBC 7.3 HH 9/28   XR: Atelectasis  Room air this morning    Cr 1.89 BUN 28  +875    Dr. James Lorenzo plans noted     Plan/Recommendations/Medical Decision Making:     Impella dressing dry  Renal following  CARDS: Winda Schlatter. Add BB  PULMONARY: Increase I/S effort and frequency  RENAL: Baseline Cr 1.7  HEME: Anticipated acute blood loss anemia  GI:Stimulate bowel movement  PT/OT:Increase activity,    Disposition: Home soon  Patient seen and reviewed with  Dr. Yung Jenkins MD       Objective:     CXR Results  (Last 48 hours)               02/06/20 0541  XR CHEST PORT Final result    Impression:  IMPRESSION: Decreased small pleural effusions with stable bibasilar atelectasis. Narrative:  EXAM:  CR chest portable       INDICATION: Heart surgery. Small pleural effusions and bibasilar opacities. COMPARISON: 2/5/2020 at 0518 hours. TECHNIQUE: Portable AP semiupright chest view at 0503 hours       FINDINGS: The endotracheal tube, enteric tube, right IJ pulmonary artery   catheter, and Impella catheter have been removed. Cardiac monitoring wires   overlie the thorax. The cardiomediastinal contours are stable. There are   decreased small pleural effusions with stable bibasilar atelectasis. There is no   pneumothorax. The bones and upper abdomen are stable.                  Labs:   Recent Labs     02/07/20  0259  02/05/20  0525  02/04/20  1024   WBC 7.3   < >  --    < >  --    HGB 8.8*   < >  --    < >  --    HCT 28.1*   < >  --    < >  --       < >  --    < >  --       < >  --    < >  --    K 4.5 < >  --    < >  --    BUN 28*   < >  --    < >  --    CREA 1.89*   < >  --    < >  --    *   < >  --    < >  --    GLUCPOC  --   --   --   --  101*   INR  --   --  1.1   < >  --     < > = values in this interval not displayed. Vitals:    Visit Vitals  /63 (BP 1 Location: Left arm, BP Patient Position: Sitting)   Pulse 80   Temp 98.4 °F (36.9 °C)   Resp 22   Ht 5' 9\" (1.753 m)   Wt 240 lb (108.9 kg)   SpO2 96%   BMI 35.44 kg/m²       Temp (24hrs), Av.6 °F (37 °C), Min:98.3 °F (36.8 °C), Max:98.9 °F (37.2 °C)      Last 3 Recorded Weights in this Encounter    20 0625 20 0902 20 0600   Weight: 233 lb 7.5 oz (105.9 kg) 233 lb 7.5 oz (105.9 kg) 240 lb (108.9 kg)       Oxygen Therapy:  Oxygen Therapy  O2 Sat (%): 96 % (20 0736)  Pulse via Oximetry: 83 beats per minute (20 1831)  O2 Device: Nasal cannula (20 035)  O2 Flow Rate (L/min): 2 l/min (20 0356)  FIO2 (%): 35 % (20 1740)    Admission Weight: Last Weight   Weight: 238 lb 1.6 oz (108 kg) Weight: 240 lb (108.9 kg)     Intake / Output / Drain:  Current Shift: No intake/output data recorded. Last 24 hrs.:     Intake/Output Summary (Last 24 hours) at 2020  Last data filed at 2020  Gross per 24 hour   Intake 943 ml   Output 570 ml   Net 373 ml         EXAM:      General: OOB to chair      Incisions: dry and intact    Lungs:    Rhonchi bilaterally. Heart:  Regular rate and rhythm, S1, S2 normal, no murmur, no click,      Abdomen:   Soft, non-tender. Bowel sounds present. Extremities:  mild edema     Neurologic:  Gross motor and sensory apparatus intact.        Signed By: GEE William

## 2020-02-07 NOTE — PROGRESS NOTES
0700:   Bedside shift change report GIVEN TO Hanley Litten, RN. Report included the following information SBAR, Kardex, Procedure Summary, Intake/Output, MAR, Recent Results and Cardiac Rhythm NSR.         SIGNIFICANT CHANGES DURING SHIFT:  N/a      CONCERNS TO ADDRESS WITH MD:  N/a          [address] NURSING NOTE   Admission Date 1/26/2020   Admission Diagnosis STEMI (ST elevation myocardial infarction) ([facility] [address] [I21.3]  CAD (coronary artery disease) [I25.10]  CAD (coronary artery disease) [I25.10]   Consults IP CONSULT TO CARDIAC SURGERY  IP CONSULT TO PULMONOLOGY  IP CONSULT TO NEPHROLOGY      Cardiac Monitoring [x] Yes [] No      Purposeful Hourly Rounding [x] Yes    Malena Score Total Score: 2   Malena score 3 or > [x] Bed Alarm [] Avasys [] 1:1 sitter [] Patient refused (Signed refusal form in chart)   Aj Score Aj Score: 17   Aj score 14 or < [] PMT consult [] Wound Care consult    []  Specialty bed  [] Nutrition consult      Influenza Vaccine Received Flu Vaccine for Current Season (usually Sept-March): Yes(Oct 2019)           Oxygen needs? [] Room air Oxygen @  []1L    [x]2L    []3L   []4L    []5L   []6L via  NC   Chronic home O2 use?  [] Yes [x] No  Perform O2 challenge test and document in progress note using smartphrase (.Homeoxygen)      Last bowel movement Last Bowel Movement Date: 02/06/20      Urinary Catheter [REMOVED] Urinary Catheter 02/04/20 Her - Temperature-Indications for Use: Accurate measurement of urinary output     [REMOVED] Urinary Catheter 02/04/20 Her - Temperature-Urine Output (mL): 100 ml     LDAs               Peripheral IV 02/02/20 Anterior;Right Forearm (Active)   Site Assessment Clean, dry, & intact 2/7/2020  3:56 AM   Phlebitis Assessment 0 2/7/2020  3:56 AM   Infiltration Assessment 0 2/7/2020  3:56 AM   Dressing Status Clean, dry, & intact 2/7/2020  3:56 AM   Dressing Type Transparent;Tape 2/7/2020  3:56 AM   Hub Color/Line Status Pink 2/7/2020  3:56 AM   Action Taken Open ports on tubing capped 2/6/2020  3:56 PM   Alcohol Cap Used Yes 2/6/2020 11:33 AM                         Readmission Risk Assessment Tool Score High Risk            21       Total Score        3 Has Seen PCP in Last 6 Months (Yes=3, No=0)    2 . Living with Significant Other. Assisted Living. LTAC. SNF. or   Rehab    3 Patient Length of Stay (>5 days = 3)    4 IP Visits Last 12 Months (1-3=4, 4=9, >4=11)    5 Pt.  Coverage (Medicare=5 , Medicaid, or Self-Pay=4)    4 Charlson Comorbidity Score (Age + Comorbid Conditions)       Expected Length of Stay 2d 14h   Actual Length of Stay 12

## 2020-02-07 NOTE — PROCEDURES
[address]  PULMONARY FUNCTION TEST    Name:  Rhea Montes  MR#:  [id number]  :  1945  ACCOUNT #:  [[id number]]  DATE OF SERVICE:  2020    REASON FOR THE TEST:  Coronary artery disease. Spirometry was performed and it reveals,  1. Moderate air flow obstruction. 2.  Moderate reduction in FVC. 3.  Severe reduction in DLCO. 4.  Flow volume loop is consistent with air flow obstruction.         Saskia Werner MD      [id number]  D:  2020 11:04  T:  2020 18:07  JOB #:  [id number]  CC:  Mandi Deleon MD

## 2020-02-07 NOTE — PROGRESS NOTES
Problem: Self Care Deficits Care Plan (Adult)  Goal: *Acute Goals and Plan of Care (Insert Text)  Description  FUNCTIONAL STATUS PRIOR TO ADMISSION: ambulated with SPC due to chronic hip pain, performed all ADLS and IADLS without assist and driving    HOME [address]: The patient lived with wife and granddaughter but did not require assist.    Occupational Therapy Goals:  Initiated 2/6/2020  1. Patient will perform grooming standing with independence within 7 days. 2. Patient will perform upper body dressing and lower body dressing with modified independence within 7 days. 3. Patient will perform toileting with independence within 7 days. 4. Patient will transfer from toilet with independence within 7 days. Outcome: Progressing Towards Goal    OCCUPATIONAL THERAPY TREATMENT  Patient: Skyla Burkett (12 y.o. male)  Date: 2/7/2020  Diagnosis: STEMI (ST elevation myocardial infarction) ([facility] [address] [I21.3]  CAD (coronary artery disease) [I25.10]  CAD (coronary artery disease) [I25.10]   <principal problem not specified>  Procedure(s) (LRB):  PERCUTANEOUS CORONARY INTERVENTION (N/A)  CORONARY ANGIOGRAPHY (N/A)  Insert Stent Kenan Coronary (N/A) 2 Days Post-Op  Precautions:    Chart, occupational therapy assessment, plan of care, and goals were reviewed. ASSESSMENT  Patient is slowly progressing, demonstrating improving activity tolerance, strength and balance for the performance of functional activity. He is slightly below his independent to mod I baseline, now performing ADLs at an independent to supervision level and is supervision to mod I for functional mobility. VSS t/o session, with patient's activity tolerance being mainly limited by his chronic hip pain. Patient continues to benefit from acute OT and will need HH therapies with the assistance of his wife after discharge. PLAN :  Patient continues to benefit from skilled intervention to address the above impairments.   Continue treatment per established plan of care. to address goals. Recommendation for discharge: (in order for the patient to meet his/her long term goals)  Occupational therapy at least 2 days/week in the home AND ensure assist and/or supervision for safety with ADLs and functional mobility      Equipment recommendations for successful discharge (if) home:None         OBJECTIVE DATA SUMMARY:   Cognitive/Behavioral Status:  Neurologic State: Alert  Orientation Level: Oriented X4  Cognition: Follows commands  Perception: Appears intact     Safety/Judgement: Awareness of environment; Insight into deficits    Functional Mobility and Transfers for ADLs:  Bed Mobility:  Rolling: (up in chair when PT arrived)  Scooting: Independent    Transfers:  Sit to Stand: Modified independent  Functional Transfers  Bathroom Mobility: Supervision/set up(ambulating without an AD)  Toilet Transfer : Modified independent(using grab bar)  Bed to Chair: Supervision    Balance:  Sitting: Intact  Standing: Impaired  Standing - Static: Good  Standing - Dynamic : Fair    ADL Intervention:  Grooming  Washing Hands: Supervision(standing at sink)    Lower Body Dressing Assistance  Socks: Set-up; Supervision  Leg Crossed Method Used: Yes  Position Performed: Seated in chair  Cues: Verbal cues provided    Toileting  Toileting Assistance: Supervision  Bladder Hygiene: Independent  Clothing Management: Supervision    Cognitive Retraining  Safety/Judgement: Awareness of environment; Insight into deficits      Activity Tolerance:   Fair. Please refer to the flowsheet for vital signs taken during this treatment.     After treatment patient left in no apparent distress:   Sitting in chair and Call bell within reach    COMMUNICATION/COLLABORATION:   The patients plan of care was discussed with: Physical Therapist    Bree Pro OTR/L  Time Calculation: 15 mins

## 2020-02-07 NOTE — DISCHARGE INSTRUCTIONS
[address], [address]   [phone]  [place], [address] [place] [address]    [email or web address]    Patient Discharge Instructions    Samuel Leon / [id number] : 1945    Admitted 2020 Discharged 2020       · It is important that you take the medication exactly as they are prescribed. · Keep your medication in the bottles provided by the pharmacist and keep a list of the medication names, dosages, and times to be taken in your wallet. · Do not take other medications without consulting your doctor. BRING ALL OF YOUR MEDICINES  or a list of medicines with dosages TO YOUR OFFICE VISIT. Follow-up:   Follow-up Information     Follow up With Specialties Details Why Contact Info    Lolis Porter NP Nurse Practitioner Go on 2020 For hospital follow up appointment at 8:15AM Corinna Perez [address]  Nallely Honeycutt MD Cardiology In 2 weeks  [address]  [id number]  [address] [address] [phone]              Heart Attack: After Your Hospitalization     Your Care Instructions     A heart attack (myocardial infarction, or MI) occurs when one or more of the coronary arteries, which supply the heart with oxygen-rich blood, is blocked. A blockage usually occurs when plaque inside the artery breaks open and a blood clot forms in the artery. After a heart attack, you may be worried about your future. Over the next several weeks, your heart will start to heal. Although it is sometimes hard to break old habits, you can prevent another heart attack by making some lifestyle changes and by taking medicines. You may use the following information for ideas about what to do at home to speed your recovery. Follow-up care is a key part of your treatment and safety. Be sure to make and go to all appointments, and call your doctor if you are having problems.  It is also a good idea to keep a list of the medicines you take, including dose.    How can you care for yourself at home? Activity  Increase your activities slowly. Take short rest breaks when you get tired. As you are able, get more exercise. Walking is a good choice. Bit by bit, increase the amount you walk every day. Try for at least 30 minutes on most days of the week. You also may want to swim, bike, or do other activities. Cardiac rehabilitation (rehab) program is strongly recommended. Cardiac rehab includes supervised exercise, help with diet and lifestyle changes, and emotional support. It may reduce your risk of future heart problems. Do not drive until your doctor says you can. You can have sex when you are strong enough. This usually means when you can easily walk around or climb stairs. Talk with your doctor if you have any concerns. Do not take sildenafil citrate (Viagra), tadalafil (Cialis), or vardenafil (Levitra) if you are taking nitroglycerin. Lifestyle changes  Do not smoke. Smoking increases your risk of a heart attack. If you need help quitting, talk to your doctor about stop-smoking programs and medicines. These can increase your chances of quitting for good. Eat a heart-healthy diet that is low in cholesterol, saturated fat, and salt, and is full of fruits, vegetables and whole-grains. Eat at least two servings of fish each week. You may get more details about how to eat healthy, but these tips can help you get started. Our website has more information:  [email or web address]. Recognia  Avoid colds and flu. Get a pneumococcal vaccine shot. If you have had one before, ask your primary care doctor whether you need a second dose. Get a flu shot every fall. If you must be around people with colds or flu, wash your hands often. Medicines  Take your medicines exactly as prescribed. Call your doctor if you think you are having a problem with your medicine. You may need several medicines. All medicines can cause side effects.  So it is important to understand the pros and cons of any medicine you take. It is also important to take your medicines exactly as your doctor tells you to. · Aspirin. It helps prevent heart attack and stroke. · ACE inhibitors. These are a type of blood pressure medicine. They can reduce the risk of heart attacks and strokes. · Statin medicines. These lower cholesterol. They can also reduce the risk of heart disease and strokes. · Beta-blocker medicines. These are a type of blood pressure and heart medicine. They can reduce the chance of early death if you have had a heart attack. If Plavix, or Brilinta, or Effient is prescribed with your aspirin, you must take them to prevent your stent from clotting. You will likely need them for at least 1 to 2 years. If your doctor has given you nitroglycerin, keep it with you at all times. If you have chest pain, sit down and rest, and take the first dose of nitroglycerin if the discomfort does not resolve. If chest pain gets worse or is not getting better within 5 minutes, call 911 immediately. Stay on the phone with the emergency ; he or she will give you further instructions. Be sure to tell your doctor about any chest pain you have had, even if it went away. Do not take any over-the-counter medicines, vitamins, or herbal products without talking to your doctor first.    Aspirin  Taking an aspirin every day can lower your risk for a heart attack. A heart attack occurs when a blood vessel in the heart gets blocked. When this happens, oxygen can't get to the heart muscle, and part of the heart dies. Aspirin can help prevent blood clots that can block the blood vessels. Check with your doctor before you start to take aspirin every day. He or she may recommend that you take one low-dose aspirin (81 mg) tablet each day, with a meal and a full glass of water. You may not be able to use aspirin if you:  · Have asthma. · Have an ulcer or other stomach problem.   · Take blood thinners such as warfarin (Coumadin). · Are allergic to aspirin. Before having a surgery or procedure, tell your doctor or dentist that you take aspirin. He or she will tell you if you should stop taking aspirin beforehand. Make sure that you understand exactly what your doctor wants you to do. Aspirin can cause side effects. Call your doctor right away if you have:  · Unusual bleeding or bruising. · Nausea, vomiting, or heartburn. · Black or bloody stools. ACE inhibitors  ACE (angiotensin-converting enzyme) inhibitors are used to lower blood pressure, protect the kidneys, and prevent heart attacks and strokes. Examples include benazepril (Lotensin), lisinopril (Prinivil, Zestril), and ramipril (Altace). Before you start taking an ACE inhibitor, make sure your doctor knows if:  · You are taking a water pill (diuretic). · You are taking potassium pills or using salt substitutes. · You are pregnant or breast-feeding. · You have had a kidney transplant or other kidney problems. ACE inhibitors can cause side effects. Call your doctor right away if you have:  · Trouble breathing. · Swelling in your face, head, neck, or tongue. · Dizziness or lightheadedness. · A dry cough. Statins  Statins lower cholesterol. Examples include atorvastatin (Lipitor), lovastatin (Mevacor), pravastatin (Pravachol), and simvastatin (Zocor). Before you start taking a statin, make sure your doctor knows if:  · You have had a kidney transplant or other kidney problems. · You have liver disease. · You take any other prescription medicine, over-the-counter medicine, vitamins, supplements, or herbal remedies. · You are pregnant or breast-feeding. Statins can cause side effects. Call your doctor right away if you have:  · New, severe muscle aches. · Brown urine. Beta-blockers  Beta-blockers are used to lower blood pressure, reduce chest pains (angina), and reduce the chances of a second heart attack.  They include metoprolol (Lopressor), atenolol (Tenormin), and labetalol (Trandate). Before you start taking a beta-blocker, make sure your doctor knows if you have:  · Severe asthma or frequent asthma attacks. · A very slow pulse (less than 55 beats a minute). Beta-blockers can cause side effects. Call your doctor right away if you have:  · Wheezing or trouble breathing. · Dizziness or lightheadedness. · Asthma that gets worse  ·   Mental health  Talk to your family, friends, or a counselor about your feelings. It is normal to feel frightened, angry, hopeless, helpless, and even guilty. Talking openly about bad feelings can help you cope. If the blues last, talk to your primary care doctor. If you are smoking, please stop. Smoking Cessation Program is a free, phone/text/email based, smoking cessation program. The program is individualized to meet each patient's needs. To enroll use this link https://[employer].Mirantis/ra/survey/1580      When should you call for help? Call 911 anytime you think you may need emergency care. For example, call if:  You have signs of a heart attack. These may include:  Chest pain or pressure. Sweating. Shortness of breath. Nausea or vomiting. Pain that spreads from the chest to the neck, jaw, or one or both shoulders or arms. Dizziness or lightheadedness. A fast or irregular pulse. After calling 911, chew 1 adult-strength aspirin. Wait for an ambulance. Do not try to drive yourself. You passed out (lost consciousness). You feel like you are having another heart attack. Call your doctor now or seek immediate medical care if:  You have had any chest pain, even if it has gone away. You have new or increased shortness of breath. You are dizzy or lightheaded, or you feel like you may faint. Watch closely for changes in your health, and be sure to contact your doctor if you have any problems, including gaining 5 pounds or more.       Cardiac Catheterization  Discharge Instructions     You may take a shower. Be sure to get the dressing wet and then remove it; gently wash the area with warm soapy water. Pat dry and leave open to air. To help prevent infections, be sure to keep the cath site clean and dry. No lotions, creams, powders, ointments, etc. in the cath site for approximately 1 week.  Do not take a tub bath, get in a hot tub or swimming pool for approximately 5 days or until the cath site is completely healed.  No strenuous activity or heavy lifting over 10 lbs. for 7 days.  After your cath, some bruising or discomfort is common during the healing process. Tylenol, 1-2 tablets every 6 hours as needed, is recommended if you experience any discomfort. If you experience any signs or symptoms of infection such as fever, chills, or poorly healing incision, persistent tenderness or swelling in the groin, redness and/or warmth to the touch, numbness, significant tingling or pain at the groin site or affected extremity, rash, drainage from the cath site, or if the leg feels tight or swollen, call your physician right away.  If bleeding at the cath site occurs, take a clean gauze pad and apply direct pressure to the groin just above the puncture site. Call 911 immediately, and continue to apply direct pressure until an ambulance gets to your location. Heart Failure: After Your Visit  Your Care Instructions  Heart failure occurs when your heart does not pump as much blood as the body needs. Failure does not mean that the heart has stopped pumping but rather that it is not pumping as well as it should. Over time, this causes fluid buildup in your lungs and other parts of your body. Fluid buildup can cause shortness of breath, fatigue, swollen ankles, and other problems. By taking medicines regularly, reducing sodium (salt) in your diet, checking your weight every day, and making lifestyle changes, you can feel better and live longer.   Follow-up care is a key part of your treatment and safety. Be sure to make and go to all appointments, and call your doctor if you are having problems. You need to keep a list of the medicines you take and the medicine dosages. How can you care for yourself at home? Diet  · Limit sodium (salt) in your diet to less than 1800 mg per day. People get most of their sodium from table salt that is added to food and from processed foods. Fast food and restaurant meals also tend to be very high in sodium. Do not add salt to your food. · Limit your liquids to  1.5 quarts per day. Medicines  · Take your medicines exactly as prescribed. Call your doctor if you think you are having a problem with your medicine. · Do not take any vitamins, over-the-counter medicine, or herbal products without talking to your doctor first. Claretha Em not take ibuprofen (Advil or Motrin) and naproxen (Aleve) without talking to your doctor first. They could make your heart failure worse. · You may be taking some of the following medicine. ¨ Beta-blockers can slow heart rate, decrease blood pressure, and improve your condition. Taking a beta-blocker may lower your chance of needing to be hospitalized again. ¨ Angiotensin-converting enzyme inhibitors (ACEIs) reduce the heart's workload, lower blood pressure, and reduce swelling. Taking an ACEI may lower your chance of needing to be hospitalized again. ¨ Angiotensin II receptor blockers (ARBs) work like ACEIs. Your doctor may prescribe them instead of or in addition to ACEIs. ¨ Diuretics, also called water pills, reduce swelling. ¨ Spironolactone is a diuretic that also keeps heart failure from getting worse. ¨ Digoxin reduces symptoms for some people with heart failure. ¨ Aspirin and other blood thinners prevent blood clots, which can cause a stroke or heart attack. ¨ Potassium supplements replace this important mineral, which is sometimes lost with diuretics. When should you call for help?   Call 911 if you have symptoms of sudden heart failure such as:  · You have severe trouble breathing. · You cough up pink, foamy mucus. · You have a new irregular or rapid heartbeat. Call your doctor now or seek immediate medical care if:  · You have new or increased shortness of breath. · You are dizzy or lightheaded, or you feel like you may faint. · You have sudden weight gain, such as 3 pounds or more in 2 to 3 days. · You have increased swelling in your legs, ankles, or feet. · You are suddenly so tired or weak that you cannot do your usual activities. Watch closely for changes in your health, and be sure to contact your doctor if:  · You develop new symptoms  If you are smoking, please stop. Smoking Cessation Program is a free, phone/text/email based, smoking cessation program. The program is individualized to meet each patient's needs. To enroll use this link https://[employer].Maimai/ra/survey/2190          Information obtained by :  I understand that if any problems occur once I am at home I am to contact my physician. I understand and acknowledge receipt of the instructions indicated above. R.N.'s Signature                                                                  Date/Time                                                                                                                                              Patient or Representative Amanda Denise MD      Where can you learn more? Go to [email or web address] com          [address]    [phone] [place] [id number]    [email or web address]

## 2020-02-07 NOTE — PROGRESS NOTES
Nephrology Progress Note  [facility]  [email or web address]                  Phone - [phone]       Patient: Lucho Heredia   Date- 2/7/2020        Admit Date: 1/26/2020  YOB: 1945             CC: Follow up for FRANSISCO  ON CKD      Subjective: Interval History:   - CR. SLIGHTLY INCREASED  No sob  C/o leg edema  bp stable. No c/o sob,   No c/o chest pain,   No c/o nausea or vomiting  No c/o  fever. ROS:- as above     Assessment & Plan:     ACUTE KIDNEY INJURY  CKD stage III.  Baseline creat ~1.6  Edema  .     s/p cardiac cath and CT angio.    Renal artery stenosis noted in 4/17.  Angio CT on 2/18:   H/o hyperkalemia   Metabolic acidosis,    Acute STMI.   An emergent cath was done on 1/27 - s/p rigt IMPELLA on 2-4--2020     Severe COPD and continued tobacco abuse     Hypertension.   Obesity. s/p an AAA repair in 4/17. PVD.       PLAN-  - start lasix 40 mg daily  - continue po bicarb  - avoid acei or arb  - okay to d/c - renal stand point  - he will f/u with dr. Mariangel Olivares in 2 weeks. Physical exam:   GENERAL ASSESSMENT: NAD  CHEST: CTA b/l, no wheezing  HEART: S1,S2,RRR  ABDOMEN: Soft,Non tender  NEURO: Non focal, normal speech  EXTREMITY: + EDEMA             Care Plan discussed with:  patient  Objective:   Visit Vitals  /63 (BP 1 Location: Left arm, BP Patient Position: Sitting)   Pulse 80   Temp 98.4 °F (36.9 °C)   Resp 22   Ht 5' 9\" (1.753 m)   Wt 108.9 kg (240 lb)   SpO2 96%   BMI 35.44 kg/m²     Last 3 Recorded Weights in this Encounter    02/03/20 0625 02/03/20 0902 02/07/20 0600   Weight: 105.9 kg (233 lb 7.5 oz) 105.9 kg (233 lb 7.5 oz) 108.9 kg (240 lb)     02/05 1901 - 02/07 0700  In: 2438.2 [P.O.:1723; I.V.:715.2]  Out: 0259 [Urine:1555]    Intake/Output Summary (Last 24 hours) at 2/7/2020 0942  Last data filed at 2/6/2020 2029  Gross per 24 hour   Intake 943 ml   Output 570 ml   Net 373 ml      Chart reviewed. Pertinent Notes reviewed. Medication list  reviewed   Current Facility-Administered Medications   Medication    metoprolol succinate (TOPROL-XL) XL tablet 12.5 mg    isosorbide mononitrate ER (IMDUR) tablet 15 mg    nicotine (NICODERM CQ) 7 mg/24 hr patch 1 Patch    ticagrelor (BRILINTA) tablet 90 mg    zolpidem (AMBIEN) tablet 5 mg    sodium chloride (NS) flush 5-40 mL    sodium bicarbonate tablet 650 mg    sodium chloride (NS) flush 5-40 mL    ondansetron (ZOFRAN) injection 4 mg    albuterol (PROVENTIL VENTOLIN) nebulizer solution 2.5 mg    aspirin chewable tablet 81 mg    polyethylene glycol (MIRALAX) packet 34 g    pantoprazole (PROTONIX) tablet 40 mg    acetaminophen (TYLENOL) tablet 650 mg    ezetimibe (ZETIA) tablet 10 mg    guaiFENesin ER (MUCINEX) tablet 600 mg    melatonin tablet 9 mg    benzonatate (TESSALON) capsule 100 mg    tamsulosin (FLOMAX) capsule 0.4 mg    nitroglycerin (NITROSTAT) tablet 0.4 mg           Data Review :  Recent Labs     02/07/20  0259 02/06/20 0239 02/05/20  0422    141 139   K 4.5 4.3 4.6   * 114* 114*   CO2 19* 22 19*   BUN 28* 24* 22*   CREA 1.89* 1.70* 1.87*   * 89 107*   CA 8.4* 8.8 8.5   MG 2.2 2.4 2.4     Recent Labs     02/07/20  0259 02/06/20 0239 02/05/20  0422   WBC 7.3 6.3 5.2   HGB 8.8* 8.6* 8.5*   HCT 28.1* 27.2* 27.6*    158 171     No results for input(s): FE, TIBC, PSAT, FERR in the last 72 hours. No results for input(s): CPK, CKNDX, TROIQ in the last 72 hours.     No lab exists for component: CPKMB  Lab Results   Component Value Date/Time    Color YELLOW/STRAW 01/31/2020 11:45 AM    Appearance TURBID (A) 01/31/2020 11:45 AM    Specific gravity 1.023 01/31/2020 11:45 AM    pH (UA) 6.0 01/31/2020 11:45 AM    Protein 100 (A) 01/31/2020 11:45 AM    Glucose NEGATIVE  01/31/2020 11:45 AM    Ketone NEGATIVE  01/31/2020 11:45 AM    Bilirubin NEGATIVE  01/31/2020 11:45 AM    Urobilinogen 1.0 01/31/2020 11:45 AM Nitrites NEGATIVE  01/31/2020 11:45 AM    Leukocyte Esterase NEGATIVE  01/31/2020 11:45 AM    Epithelial cells FEW 01/31/2020 11:45 AM    Bacteria NEGATIVE  01/31/2020 11:45 AM    WBC 0-4 01/31/2020 11:45 AM    RBC 0-5 01/31/2020 11:45 AM     Lab Results   Component Value Date/Time    Culture result: MODERATE NORMAL RESPIRATORY XUAN 01/27/2020 09:20 AM     No results found for: SDES  Lab Results   Component Value Date/Time    Sodium,urine random 33 04/02/2017 09:38 AM    Creatinine, urine 129.00 04/02/2017 09:38 AM     Results from Hospital Encounter encounter on 01/26/20   XR CHEST PORT    Narrative EXAM:  CR chest portable    INDICATION: Heart surgery. Small pleural effusions and bibasilar opacities. COMPARISON: 2/5/2020 at 0518 hours. TECHNIQUE: Portable AP semiupright chest view at 0503 hours    FINDINGS: The endotracheal tube, enteric tube, right IJ pulmonary artery  catheter, and Impella catheter have been removed. Cardiac monitoring wires  overlie the thorax. The cardiomediastinal contours are stable. There are  decreased small pleural effusions with stable bibasilar atelectasis. There is no  pneumothorax. The bones and upper abdomen are stable. Impression IMPRESSION: Decreased small pleural effusions with stable bibasilar atelectasis. Faiza Blanco MD  [facility]   [email or web address]  [facility]  [address], [address]  [place], [address]  Phone - [phone]         Fax - [phone]

## 2020-02-07 NOTE — PROGRESS NOTES
RAPID RESPONSE TEAM- Follow Up    Rounded on patient due to recent transfer out of CCU. Discussed with primary RN, Hossein Waite. No acute concerns, VSS, MEWS 2. Patient Vitals for the past 12 hrs:   Temp Pulse Resp BP SpO2   02/06/20 1942 98.7 °F (37.1 °C) 84 24 138/52 97 %   02/06/20 1831  79 20 126/52 98 %   02/06/20 1556 98.4 °F (36.9 °C)       02/06/20 1500  73 25 126/52    02/06/20 1400  73 20 128/59 96 %   02/06/20 1300  71 27 118/54    02/06/20 1200  76 22     02/06/20 1156  79 21 95/58 96 %   02/06/20 1133 98.3 °F (36.8 °C)       02/06/20 1100    109/56    02/06/20 1053  80  109/56    02/06/20 1052  75  122/61 94 %   02/06/20 1000  78 26 127/56 95 %   02/06/20 0900  83 21 131/51 90 %       No RRT interventions indicated at this time. Please call with any questions or concerns.      Kurt Cardenas  Rapid Response RN  Samy Li

## 2020-02-07 NOTE — PROGRESS NOTES
Home Oxygen Test  Date of test: 2/7  Time of test: 1:57 PM      Sa02 93 % on room air AT REST. Sa02 92 % on room air DURING AMBULATION. Sa02 94 % on RA AT REST/AFTER AMBULATION.

## 2020-02-07 NOTE — PROGRESS NOTES
Problem: Mobility Impaired (Adult and Pediatric)  Goal: *Acute Goals and Plan of Care (Insert Text)  Description  FUNCTIONAL STATUS PRIOR TO ADMISSION: Patient was modified independent using a single point cane for functional mobility. HOME SUPPORT PRIOR TO ADMISSION: The patient lived with wife and granddaughter and didn't require assistance. Physical Therapy Goals  Initiated 2/6/2020  1. Patient will move from supine to sit and sit to supine , scoot up and down and roll side to side in bed with modified independence within 7 day(s). 2.  Patient will transfer from bed to chair and chair to bed with modified independence using the least restrictive device within 7 day(s). 3.  Patient will perform sit to stand with modified independence within 7 day(s). 4.  Patient will ambulate with modified independence for 300 feet with the least restrictive device within 7 day(s). 5.  Patient will ascend/descend 4 stairs with B handrail(s) with modified independence within 7 day(s). Outcome: Progressing Towards Goal   PHYSICAL THERAPY TREATMENT  Patient: Thais Saint (30 y.o. male)  Date: 2/7/2020  Diagnosis: STEMI (ST elevation myocardial infarction) ([facility] [I21.3]  CAD (coronary artery disease) [I25.10]  CAD (coronary artery disease) [I25.10]   <principal problem not specified>  Procedure(s) (LRB):  PERCUTANEOUS CORONARY INTERVENTION (N/A)  CORONARY ANGIOGRAPHY (N/A)  Insert Stent Kenan Coronary (N/A) 2 Days Post-Op  Precautions:    Chart, physical therapy assessment, plan of care and goals were reviewed. ASSESSMENT  Patient continues with skilled PT services and is progressing towards goals. He progressed gait to 150 feet with 2 sitting rests needed to complet this using a RW. The RW was needed mainly due to R hip pain from djd - normally he uses a cane. O2 sats were stable on RA. He tolerated going up/down 4 steps with rail and sba. Transfers improved to modified independent.   [facility]. Current Level of Function Impacting Discharge (mobility/balance): supervision to independent    Other factors to consider for discharge: independent PLOF; good home support         PLAN :  Patient continues to benefit from skilled intervention to address the above impairments. Continue treatment per established plan of care. to address goals. Recommendation for discharge: (in order for the patient to meet his/her long term goals)  Physical therapy at least 2 days/week in the home     This discharge recommendation:  Has been made in collaboration with the attending provider and/or case management    IF patient discharges home will need the following DME: rollator to allow patient to sit as needed when fatigued due to PAD and CAD. SUBJECTIVE:   Patient stated  what are you here for?     OBJECTIVE DATA SUMMARY:   Critical Behavior:  Neurologic State: Alert  Orientation Level: Oriented X4  Cognition: Appropriate decision making, Appropriate for age attention/concentration, Appropriate safety awareness, Follows commands  Safety/Judgement: Awareness of environment, Insight into deficits  Functional Mobility Training:  Bed Mobility:  Rolling: (up in chair when PT arrived)                 Transfers:  Sit to Stand: Modified independent  Stand to Sit: Modified independent        Bed to Chair: Supervision                    Balance:  Sitting: Intact  Standing: Impaired  Standing - Static: Good  Standing - Dynamic : Fair  Ambulation/Gait Training:                    Right Side Weight Bearing: Full  Left Side Weight Bearing: Full                          Pain Rating:  R hip 0/10 sitting and 4/10 walking. Activity Tolerance:   Fair, SpO2 stable on RA, and requires rest breaks  Please refer to the flowsheet for vital signs taken during this treatment.     After treatment patient left in no apparent distress:   Sitting in chair and Call bell within reach    COMMUNICATION/COLLABORATION:   The patients plan of care was discussed with: Occupational Therapist, Registered Nurse, and     Loy Bassett, PT   Time Calculation: 24 mins

## 2020-02-08 VITALS
WEIGHT: 236.77 LBS | TEMPERATURE: 97.8 F | OXYGEN SATURATION: 94 % | HEIGHT: 69 IN | HEART RATE: 68 BPM | DIASTOLIC BLOOD PRESSURE: 60 MMHG | SYSTOLIC BLOOD PRESSURE: 153 MMHG | RESPIRATION RATE: 18 BRPM | BODY MASS INDEX: 35.07 KG/M2

## 2020-02-08 LAB
ANION GAP SERPL CALC-SCNC: 7 MMOL/L (ref 5–15)
BUN SERPL-MCNC: 32 MG/DL (ref 6–20)
BUN/CREAT SERPL: 16 (ref 12–20)
CALCIUM SERPL-MCNC: 8.8 MG/DL (ref 8.5–10.1)
CHLORIDE SERPL-SCNC: 108 MMOL/L (ref 97–108)
CO2 SERPL-SCNC: 23 MMOL/L (ref 21–32)
CREAT SERPL-MCNC: 1.97 MG/DL (ref 0.7–1.3)
GLUCOSE SERPL-MCNC: 111 MG/DL (ref 65–100)
POTASSIUM SERPL-SCNC: 4.1 MMOL/L (ref 3.5–5.1)
SODIUM SERPL-SCNC: 138 MMOL/L (ref 136–145)

## 2020-02-08 PROCEDURE — 74011250637 HC RX REV CODE- 250/637: Performed by: PHYSICIAN ASSISTANT

## 2020-02-08 PROCEDURE — 74011250637 HC RX REV CODE- 250/637: Performed by: THORACIC SURGERY (CARDIOTHORACIC VASCULAR SURGERY)

## 2020-02-08 PROCEDURE — 74011250637 HC RX REV CODE- 250/637: Performed by: INTERNAL MEDICINE

## 2020-02-08 PROCEDURE — 94760 N-INVAS EAR/PLS OXIMETRY 1: CPT

## 2020-02-08 PROCEDURE — 77010033678 HC OXYGEN DAILY

## 2020-02-08 PROCEDURE — 36415 COLL VENOUS BLD VENIPUNCTURE: CPT

## 2020-02-08 PROCEDURE — 80048 BASIC METABOLIC PNL TOTAL CA: CPT

## 2020-02-08 RX ORDER — FUROSEMIDE 40 MG/1
40 TABLET ORAL
Qty: 30 TAB | Refills: 3 | Status: SHIPPED | OUTPATIENT
Start: 2020-02-08

## 2020-02-08 RX ADMIN — PANTOPRAZOLE SODIUM 40 MG: 40 TABLET, DELAYED RELEASE ORAL at 07:24

## 2020-02-08 RX ADMIN — TICAGRELOR 90 MG: 90 TABLET ORAL at 07:24

## 2020-02-08 RX ADMIN — POLYETHYLENE GLYCOL 3350 34 G: 17 POWDER, FOR SOLUTION ORAL at 08:32

## 2020-02-08 RX ADMIN — SODIUM BICARBONATE 650 MG: 650 TABLET ORAL at 08:32

## 2020-02-08 RX ADMIN — ASPIRIN 81 MG 81 MG: 81 TABLET ORAL at 08:31

## 2020-02-08 RX ADMIN — GUAIFENESIN 600 MG: 600 TABLET, EXTENDED RELEASE ORAL at 08:31

## 2020-02-08 RX ADMIN — ISOSORBIDE MONONITRATE 15 MG: 30 TABLET, EXTENDED RELEASE ORAL at 08:32

## 2020-02-08 RX ADMIN — ACETAMINOPHEN 650 MG: 325 TABLET ORAL at 07:24

## 2020-02-08 RX ADMIN — METOPROLOL SUCCINATE 25 MG: 25 TABLET, EXTENDED RELEASE ORAL at 08:32

## 2020-02-08 RX ADMIN — Medication 10 ML: at 02:17

## 2020-02-08 NOTE — PROGRESS NOTES
GIGI: Home with Home Health ([facility]) and follow-up Appointments. 9:34am- CM met with pt at bedside to discuss d/c planning. Medicare pt has received, reviewed, and signed 2nd IM letter informing them of their right to appeal the discharge. Signed copy has been placed on pt bedside chart. Pt stated that his daughter will transport at d/c. Care Management Interventions  PCP Verified by CM: Yes  Palliative Care Criteria Met (RRAT>21 & CHF Dx)?: No  Mode of Transport at Discharge:  Other (see comment)(Pt's daughter will transport at d/c.)  Transition of Care Consult (CM Consult): Discharge Planning(based on reports from family patient would benefit from PTOT evaluation )  Discharge Durable Medical Equipment: No  Physical Therapy Consult: No  Occupational Therapy Consult: No  Speech Therapy Consult: No  Current Support Network: Lives with Spouse  Confirm Follow Up Transport: Family  The Plan for Transition of Care is Related to the Following Treatment Goals : Home Health  The Patient and/or Patient Representative was Provided with a Choice of Provider and Agrees with the Discharge Plan?: Yes  Name of the Patient Representative Who was Provided with a Choice of Provider and Agrees with the Discharge Plan: Self  Freedom of Choice List was Provided with Basic Dialogue that Supports the Patient's Individualized Plan of Care/Goals, Treatment Preferences and Shares the Quality Data Associated with the Providers?: Yes   Resource Information Provided?: No  Discharge Location  Discharge Placement: Home with home health([facility] ([address]) )       Sherman Lam [address] [place] [address] [facility]  [phone]

## 2020-02-08 NOTE — PROGRESS NOTES
NEPHROLOGY BRIEF NOTE:    LABS REVIEWED. CREAT SLIGHTLY HIGHER ON LASIX WE STARTED YESTERDAY. GOOD UO. LET'S REPEAT A RENAL PANEL AT NOON AND GO FROM THERE. PLEASE CALL  US WITH QUESTIONS.     DEE [facility], MD.  [facility]

## 2020-02-08 NOTE — PROGRESS NOTES
0700: Received bedside report from Raheem Singh off going nurse. Assumed care of patient. 0800: Pt adamant on leaving today, umesh w/ cardiology. Paged Nephrology regarding renal function panel ordered for noon. 65: Spoke w/ umesh [facility] for pt to d/c w/o drawing labs at noon. Pt will follow up w/  Dr. Pimentel [place] outpatient and have blood work done, added to AVS.    1000: R chest impella dressing removed, staples clean dry and intact. Bilateral groin sites PRACHI, clean dry intact. Pt verbalized understanding of site care/monitoring for infection. 1030: DISCHARGE SUMMARY FROM NURSE    The patient is stable for discharge. I have reviewed the discharge instructions with the patient/daughter. The patient/daughter verbalized understanding. All questions were fully answered. The  Patient/daughter denies any complaints. There were no personal belongings, valuables or home medications left at patients bedside,  or safe. Hard scripts and medication handouts were given and reviewed with the patient/daughter. Appropriate educational materials and medication side effects teaching were provided. Cardiac monitor and IV line(s) were removed. Pt provided w/ Brilinta card.

## 2020-02-08 NOTE — PROGRESS NOTES
Problem: Falls - Risk of  Goal: *Absence of Falls  Description  Document Benson Holder Fall Risk and appropriate interventions in the flowsheet.   Outcome: Progressing Towards Goal  Note: Fall Risk Interventions:  Mobility Interventions: Bed/chair exit alarm, Patient to call before getting OOB, PT Consult for mobility concerns    Mentation Interventions: Bed/chair exit alarm, Door open when patient unattended, Familiar objects from home, Increase mobility, More frequent rounding    Medication Interventions: Evaluate medications/consider consulting pharmacy, Patient to call before getting OOB, Teach patient to arise slowly    Elimination Interventions: Call light in reach, Elevated toilet seat, Stay With Me (per policy), Toileting schedule/hourly rounds    History of Falls Interventions: Consult care management for discharge planning, Bed/chair exit alarm, Utilize gait belt for transfer/ambulation, Assess for delayed presentation/identification of injury for 48 hrs (comment for end date)

## 2020-02-08 NOTE — PROGRESS NOTES
Received report from [facility], [address] [place] [address]. Assumed care of patient.

## 2020-02-08 NOTE — PROGRESS NOTES
Cardiology Progress Note  2/8/2020    Admit Date: 1/26/2020  Admit Diagnosis: STEMI (ST elevation myocardial infarction) ([facility] [address] [I21.3]  CAD (coronary artery disease) [I25.10]  CAD (coronary artery disease) [I25.10]  CC:  None currently  Cardiac Assessment/Plan:   1) CAD:  Stent of unknown vessel at [facility] 2006. * followed by Dr. Anai Toth in 2014:  MPI reportedly showed inferior ischemia & cath recommended but not done. *Seen by Dr Tiburcio Wise in 2017: MPI rec but not done. Normal echo. *CP/TEIXEIRA 12/2019: Abnl PET (inf ischemia): pt delayed cath plan until 1/27/20. *Echo 1/10/20: EF 55%; AoScl only. Mild pulm HTN. *Cath 1/26/20: Severe LM w/ occluded LAD & RCA. CKp ; Trop . 2) HTN  3) dyslipidemia  4) open AAA repair 4/2017 (8 cm; Dr. Filippo Laughlin). 5) chronic BONNY occlusion. 6) right BG stroke on MRI 1/2014.  7) heavy tobacco use, 2-3 pack per day; no significant COPD on PFTs 4/2017  8) CKD:  Stage III (Cr 1.4-1.5/GFR 51 7/2019; Dr. Jacinto Sanchez). Cr 1.5/GFR 45 1/21/20.  9) chronic anemia; hemoglobin 9.3: Chronic anemia related to CKD. 10) heme+ 2019:  Negative EGD 7/2019; internal hemorrhoids/diverticular disease on colonoscopy 9/2019. Retired ; from [address] [place] [address]. 2 ppd & knows he should quit. No recent ethanol; heavy 50 years ago. 2 caffeinated elie/day. No added salt. He reports no CP in the last month until day of admit. hold ACEi/ARB for now with CKD/recent contrast.     For other plans, see orders. Echo 1/27: Normal cavity size. Mild LVH; EF 40 - 45%. Mid-distal Ant-sept AK; Mild TR. Abd U/S 1/27: \"Mid abdominal aorta measures 4.0 cm in diameter, although the distal abdominal aorta, where the largest aneurysm was seen on prior CT, is obscured by overlying bowel gas\"    Carotid U/S 1/27: Chronic BONNY occlusion; There is mild stenosis in the left ICA (<50%). Bilateral vertebrals are antegrade.     NICHOLAS 1/27: normal.    CTA 1/28: Chronically occluded right internal carotid artery. Bilateral common and left internal carotid arteries remain patent without significant stenosis. Bilateral vertebral arteries are patent with mild atherosclerosis. The left vertebral artery arises from the aortic arch.     Relatively stable juxtarenal abdominal aortic aneurysmal dilatation. Sequelae of graft repair of infrarenal abdominal aortic aneurysm. There is minimal surrounding fat stranding without evidence for fluid collection or gas.     Increased size of the right common iliac artery aneurysm with mild hemorrhage in the right paracolic gutter and right hemipelvis. These findings were communicated to the nursing staff by the on-call radiologist.     Mild nonspecific right middle lobe and posterior right lower lobe airspace disease. Mild nonspecific colitis involving the ascending colon    2/5 PCIs: PCI of Ramus. 80 % stenosis . 2.5 Vladimir Stent to 0% . GERARD 3 flow.      PCI of Left Main into Circumflex. Distal LM ulcerated plaque, with severe 90 % stenosis at ostium of Cx.    3.0 Vladimir SHIVA deployed, and proximal segment dilated to 4.0 mm  With non compliant balloon. 0% residual .   GERARD 3. Attempted to wire the occluded LAD , but unable to cross the obstruction with a wire and no intervention on that artery . _____________________________________________________________________________________    1/28: Stable BP/HR; NSR; Cr unchanged at 1.45; CKp was 700s (down since then); trop from 45 to 20. No CP/dyspnea;   Remains on IV hep gtt; toprol XL 50 qday; NTG paste; lipitor; Add ACEi or ARB after surgery/prior to d/c.  Sputum c/w chronic bronchitis. CABG timing per Dr Ajay Burton. Extensive CTA orders noted; increased risk of FRANSISCO. 1/29: VSSAF; NSR; Cr 1.5 from 1.45; Hg 10.8 from 12.4 from 14.2. No CP/dyspnea; less cough/sputum. IV hep d/cd after CTA above; Remains on asa, toprol XL 50, NTG paste, lipitor; Add ACEi or ARB prior to d/c.   CABG timing per Dr Ajay Burton; Vascular/retroperitoneal issues per Dr Christie Kruger. Benign cath sites. 1/30: VSSAF; NSR; Cr 1.58; Hg 10. No CP/dyspnea per se; cont cough/poor sleep: mucinex/ambien. Cardiac meds as above. Dr Lorna Heredia et al to see pt. Appreciate Dr Zee Cancer input on CT findings: no further Rx needed @ this time/prior to CABG. 1/31: VSSAF; NSR; Less cough/sputum. No new cardiac plans; awaiting CABG. Anemia related to CKD and dilution; some blood loss with cath. 2/1: VSSAF; NSR; Cont cough/less sputum; Cr 1.7. still getting ABx. No new cardiac plans; awaiting CABG.     2/2: Angina this am: improving with NTG SL but not gone. No acute ecg changes. Starting IV NTG and hep gtt; If doesn't become pain-free, will need CABG. Labs pending. He reports intolerance to \"3 statins\": changed to zetia. He felt \"out of it\" with robitussin DM: back to just mucinex. 2/3: Remains angina-free since yesterday am; Remains on IV hep gtt and IV NTG gtt. Hg 10.1; Cr 1.6; PTT 33. Change in plan d/w Drs Severa Dines and pt/wife (\"unlikely to be able to bypass RCA & LAD would be difficult; concern @ cross-clamping aorta: therefore, only off-pump DUKE-LAD (or ramus) offered. \" This approach is no better than PCI.     He is agreeable to proceed with PCI as able; He and wife understand this will be an incomplete revascularization. 2/4: Change in plan as noted yesterday: Impella today then PCI tomorrow; No CP/dyspnea; Cr 1.78.    2/5: Impella @ P8; Dobut @ 1; CI 2.3; Hg down to 8.5. Cr 1.87. ACT 160s. Good UOP. Marked sinus ward to 30s, 40 currently. Remains intubated/sedated. Will d/c amio and toprol XL for now. For PCI later this am. Impella removal per Dr Neal Li. 2/6: PCI then impella out; off vent/ Art line out. No CP/dyspnea. NSR: bradycardia resolved off presidex/amio. Back on low dose metoprolol; Future ARB vs hydralazine/imdur dep on Cr/Renal rec.   CXR 2/6: \"Decreased small pleural effusions with stable bibasilar atelectasis. \"     Cr 1.7; nl K. Remains on asa/brilinta; zetia as \"cant tolerate statins. \"    Transfer to tele; PT eval; wean O2 as able. Mobilize: dispo pending PT eval.    2/8:  Feels well, stable dyspnea, ambulating, mild edema. Cr 1.9. Renal recomending follow up cr, he is adament he is no longer staying in hospital, he wishes to have labs drawn with Dr. Cole Velazquez this week. Discussed with RN and Dr. Sergey Mejia, to see Dr. Cole Velazquez this week for bmp. > 30min coordinating discharge. High complexity decision making was performed  X Yes   High-risk of decompensation with multiple organ involvement X Yes     ________________________________________________________________  @ NP OV 12/5/19:   Mr Lenore Medina has a h/o:  1) CAD:  Stent of unknown vessel at [facility] 2006. * followed by Dr. Leland Mccabe in 2014:  MPI reportedly showed inferior ischemia & cath recommended but not done. *Seen by Dr Deandre Sanchez in 2017: MPI rec but not done. Normal echo. *CP/TEIXEIRA 12/2019:  2) HTN  3) dyslipidemia  4) open AAA repair 4/2017 (8 cm; Dr. Priyanka Meyer). 5) chronic BONNY occlusion. 6) right BG stroke on MRI 1/2014.  7) heavy tobacco use, 2-3 pack per day; no significant COPD on PFTs 4/2017  8) CKD:  Stage III (Cr 1.4-1.5/GFR 51 7/2019; Dr. Barbara Agarwal). 9) chronic anemia; hemoglobin 9.3  10) heme+ 2019:  Negative EGD 7/2019; internal hemorrhoids/diverticular disease on colonoscopy 9/2019. Retired ; from [place]. 2 ppd & knows he should quit. No recent ethanol; heavy 50 years ago. 2 caffeinated elie/day. No added salt. 12/2019:  New patient presenting with intermittent chest pain (6 months of worsening, 0-1 X/month;  15 minutes duration; central dull ache with radiation to both arms. Can occur with or without exertion. Increased dyspnea associated with discomfort). Chronic TEIXEIRA which is unchanged in years. No palpitations. No falling or bleeding. IMPRESSION AND PLAN  01.  Atherosclerotic heart disease of native coronary artery with other forms of angina pectoris (I25.118):  Remote stent of unknown vessel; reportedly abnormal MPI 2014 without further evaluation. He needs a stress test but cannot walk on a treadmill. Therefore cardiac PET. We discussed the signs and symptoms of unstable angina, myocardial infarction and malignant arrhythmia. The patient knows to seek immediate medical attention should they occur. ECG done PET Cardiac test to be done. first available   02. Hyp hrt & chr kdny dis w/o hrt fail, w stg 1-4/unsp chr kdny (I13.10): This condition is stable. I have made no changes to the present regimen. 03. Mixed hyperlipidemia (E78.2): He should be on a statin unless previously intolerant; apparently had been on Lipitor in the past.   04. Occlusion and stenosis of bilateral carotid arteries (V31.73):  Chronic BONNY occlusion; he reports no recent follow-up. U/s ordered. Carotid Duplex to be done first available. 05. Abdominal aortic aneurysm, without rupture (I71.4):  Surgically treated in 2017; follow-up per Dr. Jaquan Bah. 06. Shortness of breath (R06.02):  ?  Cardiac; also has some element of underlying pulmonary disease & ongoing tobacco abuse. Echocardiogram warranted. 2-D w/CFD Echo to be done first available. 07. Chronic kidney disease, stage 3 (moderate) (N18.3):  Managed by: Renal   08. Cerebral infarction, unspecified (I63.9):  Managed by: Other Physician   09. Nicotine dependence, cigarettes, uncomplicated (K74.455): The patient was instructed on smoking cessation. 10. Body mass index (BMI) 33.0-33.9, adult (L86.89): The patient was instructed on AHA diet and regular exercise. ORDERS:  1 Tobacco cessation counseling   2 Dietary management education, guidance, and counseling   3 ECG done   4 PET Cardiac test to be done   5 Carotid Duplex   6 2-D w/ CFD Echocardiogram   7 Return office visit with Cortez Mcmullen MD in 3 Months.    8 The patient was instructed on AHA diet and regular exercise. 12/5/19 MEDICATION LIST  Medication Sig Desc   amlodipine 5 mg tablet take 1 tablet by oral route  every day   Aspirin Low Dose 81 mg tablet,delayed release take 1 tablet by oral route  every day   metoprolol succinate ER 25 mg tablet,extended release 24 hr take 1 tablet by oral route  every day   pantoprazole 40 mg tablet,delayed release take 1 tablet by oral route  every day   tamsulosin 0.4 mg capsule take 1 capsule by oral route  every day 1/2 hour following the same meal each day     _______________________________________________________________________  CARDIAC HISTORY  RISK FACTORS:  1 Hypertension   2 Tobacco Use: No/never       CARDIOVASCULAR PROCEDURES  Procedure Date Results   Carotid Duplex 05/02/2018 100% Right ICA, Less than 50% Left ICA, Vertebral: Bilateral Antegrade Flow, Unchanged versus 2017; at [address] Echo 04/03/2017 EF 55-60%; normal RV; no valve disease; normal PAp; by RCA at [address] EKG 12/05/2019 Sinus Rhythm, Borderline PRWP, otherwise unremarkable. PFTs 04/03/2017 No obstructive disease; moderately decreased DLCO; at [address] ACTIVE ALLERGIES:  Ingredient Reaction Comment   FENOFIBRATE Hives    ALBUTEROL SULFATE Unknown    ATORVASTATIN Leg cramp Atorvastatin         PAST MEDICAL/SURGICAL HISTORY  (Detailed)    Disease/disorder Onset Date Surgical History Date Comments   GERD       Hypertension         Family History  (Detailed)    SOCIAL HISTORY  (Detailed)  Tobacco use reviewed. Preferred language is [place]. Smoking status: Heavy tobacco smoker. SMOKING STATUS  Use Status Type Smoking Status Usage Per Day Years Used Total Pack Years   yes Cigarette Heavy tobacco smoker 2 Packs       TOBACCO CESSATION INFORMATION  Date Counseled By Order Status Description Code Tobacco Cessation Information   12/05/2019 Caitie Browne Tobacco cessation counseling completed   Counseling about tobacco use (procedure)     For other plans, see orders.   High complexity decision making was performed  X Yes   High-risk of decompensation with multiple organ involvement X Yes   Hospital problem list   Active Hospital Problems    Diagnosis Date Noted    STEMI (ST elevation myocardial infarction) ([facility] [address] 2020     Priority: 1 - One    CAD (coronary artery disease)      2006 - s/p stent                                                            Subjective:  Patient reports  []   nothing; unable to communicate    []   intubated   Chest pain x none  consistent with:  Non-cardiac CP         Atypical CP     None now  On going  Anginal CP     Dyspnea x none  at rest  with exertion         improved  unchanged  worse              PND x none  overnight       Orthopnea x none  improved  unchanged  worse   Presyncope x none  improved  unchanged  worse   Ambulated in hallway without symptoms   Yes   Ambulated in room without symptoms  Yes     ROS Hematuria:  Yes X No Dysuria:  Yes X No                (2+  other systems) Cough: Yes X No Sputum: Yes X No                 BRBPR:  Yes X No Melena: Yes X No   No change in family and social history from H&P/Consult note.   Objective:    Physical Exam:  24 hr VS reviewed, overall VSSAF  Temp (24hrs), Av.9 °F (36.6 °C), Min:97.7 °F (36.5 °C), Max:98.2 °F (36.8 °C)    Patient Vitals for the past 8 hrs:   Pulse   20 0725 68   20 0213 71     Patient Vitals for the past 8 hrs:   Resp   20 0725 18   20 0213 20     Patient Vitals for the past 8 hrs:   BP   20 0725 153/60   20 0213 128/55         Intake/Output Summary (Last 24 hours) at 2020 0806  Last data filed at 2020 0744  Gross per 24 hour   Intake 240 ml   Output 2050 ml   Net -1810 ml     General: x WD,WN  Elderly  Cachetic x NAD     Agitated  Lethargic  Arousable  Obtunded     Sedated  On Bipap  Intubated                ENT/Palate:  WNL x Dry MM x anicteric  ETT in place              Respiratory: X CTA   Nl resp effort  Increased effort  No significant change     rhonchi  rales  improved  worse              Cardiovasc: X RRR  IRRR X Nl S1, S2 x No rub     No murmur X No new murmur  Murmur c/w: x No gallop    x trace edema  BLE edema:+  RLE edema:+  LLE edema:+     Edema less  Edema more  Edema same  Edema worse    X Nl JVP  Elevated JVP  JVP same  JVP worse    X Carotid wnl x abd aorta not palpated X no peripheral emboli noted                GI: X abd soft x nondistended X BS distant X No organo- megaly noted              Skin: x Warm, dry x Cool extremites                  Neuro: x A/O x Grossly non- focal  Obtunded  Sedated     Lethargic  Arousable  intubated       cath site intact w/o hematoma or new bruit; distal pulse unchanged  Yes   Data Review:     Telemetry independently reviewed x sinus  chronic afib  parox afib  NSVT     ECG independently reviewed  NSR  afib  no significant changes  NSST-Tw chgs   x no new ECG provided for review   Lab results reviewed as noted below. Current medications reviewed as noted below. No results for input(s): PH, PCO2, PO2 in the last 72 hours. No results for input(s): CPK, CKMB, CKNDX, TROIQ in the last 72 hours. Recent Labs     02/08/20  0208 02/07/20  0259 02/06/20  0239    137 141   K 4.1 4.5 4.3    111* 114*   CO2 23 19* 22   BUN 32* 28* 24*   CREA 1.97* 1.89* 1.70*   GFRAA 40* 42* 48*   * 110* 89   CA 8.8 8.4* 8.8   WBC  --  7.3 6.3   HGB  --  8.8* 8.6*   HCT  --  28.1* 27.2*   PLT  --  167 158     No results for input(s): SGOT, GPT, AP, TBIL, TBILI, TP, ALB, GLOB, GGT, AML, LPSE in the last 72 hours. No lab exists for component: AMYP, HLPSE  No results for input(s): INR, PTP, APTT, INREXT, INREXT in the last 72 hours. No results for input(s): FE, TIBC, PSAT, FERR in the last 72 hours.    Lab Results   Component Value Date/Time    Glucose (POC) 101 (H) 02/04/2020 10:24 AM    Glucose (POC) 182 (H) 04/07/2017 12:45 PM    Glucose (POC) 129 (H) 04/07/2017 08:52 AM       Current Facility-Administered Medications   Medication Dose Route Frequency    isosorbide mononitrate ER (IMDUR) tablet 15 mg  15 mg Oral DAILY    nicotine (NICODERM CQ) 7 mg/24 hr patch 1 Patch  1 Patch TransDERmal DAILY    furosemide (LASIX) tablet 40 mg  40 mg Oral DAILY    metoprolol succinate (TOPROL-XL) XL tablet 25 mg  25 mg Oral DAILY    ticagrelor (BRILINTA) tablet 90 mg  90 mg Oral Q12H    zolpidem (AMBIEN) tablet 5 mg  5 mg Oral QHS PRN    sodium chloride (NS) flush 5-40 mL  5-40 mL IntraVENous Q8H    sodium bicarbonate tablet 650 mg  650 mg Oral BID    sodium chloride (NS) flush 5-40 mL  5-40 mL IntraVENous PRN    ondansetron (ZOFRAN) injection 4 mg  4 mg IntraVENous Q4H PRN    albuterol (PROVENTIL VENTOLIN) nebulizer solution 2.5 mg  2.5 mg Nebulization Q4H PRN    aspirin chewable tablet 81 mg  81 mg Oral DAILY    polyethylene glycol (MIRALAX) packet 34 g  34 g Oral DAILY    pantoprazole (PROTONIX) tablet 40 mg  40 mg Oral ACB    acetaminophen (TYLENOL) tablet 650 mg  650 mg Oral Q4H PRN    ezetimibe (ZETIA) tablet 10 mg  10 mg Oral QPM    guaiFENesin ER (MUCINEX) tablet 600 mg  600 mg Oral Q12H    melatonin tablet 9 mg  9 mg Oral QHS    benzonatate (TESSALON) capsule 100 mg  100 mg Oral TID PRN    tamsulosin (FLOMAX) capsule 0.4 mg  0.4 mg Oral QPM    nitroglycerin (NITROSTAT) tablet 0.4 mg  0.4 mg SubLINGual PRN         Yisel Mclean MD

## 2020-02-13 ENCOUNTER — HOSPITAL ENCOUNTER (EMERGENCY)
Age: 75
Discharge: HOME | End: 2020-02-13
Attending: EMERGENCY MEDICINE
Payer: MEDICARE

## 2020-02-13 ENCOUNTER — APPOINTMENT (OUTPATIENT)
Dept: GENERAL RADIOLOGY | Age: 75
End: 2020-02-13
Attending: EMERGENCY MEDICINE
Payer: MEDICARE

## 2020-02-13 ENCOUNTER — APPOINTMENT (OUTPATIENT)
Dept: CT IMAGING | Age: 75
End: 2020-02-13
Attending: EMERGENCY MEDICINE
Payer: MEDICARE

## 2020-02-13 VITALS
DIASTOLIC BLOOD PRESSURE: 76 MMHG | SYSTOLIC BLOOD PRESSURE: 149 MMHG | HEART RATE: 63 BPM | HEIGHT: 69 IN | RESPIRATION RATE: 19 BRPM | BODY MASS INDEX: 33.86 KG/M2 | TEMPERATURE: 97.5 F | OXYGEN SATURATION: 96 % | WEIGHT: 228.6 LBS

## 2020-02-13 DIAGNOSIS — R10.9 ACUTE ABDOMINAL PAIN: Primary | ICD-10-CM

## 2020-02-13 DIAGNOSIS — D63.8 ANEMIA OF CHRONIC DISEASE: ICD-10-CM

## 2020-02-13 DIAGNOSIS — R19.7 DIARRHEA, UNSPECIFIED TYPE: ICD-10-CM

## 2020-02-13 DIAGNOSIS — Z72.0 TOBACCO ABUSE: ICD-10-CM

## 2020-02-13 LAB
ALBUMIN SERPL-MCNC: 2.9 G/DL (ref 3.5–5)
ALBUMIN/GLOB SERPL: 0.6 {RATIO} (ref 1.1–2.2)
ALP SERPL-CCNC: 110 U/L (ref 45–117)
ALT SERPL-CCNC: 25 U/L (ref 12–78)
ANION GAP SERPL CALC-SCNC: 5 MMOL/L (ref 5–15)
APPEARANCE UR: CLEAR
AST SERPL-CCNC: 23 U/L (ref 15–37)
BACTERIA URNS QL MICRO: NEGATIVE /HPF
BASOPHILS # BLD: 0.1 K/UL (ref 0–0.1)
BASOPHILS NFR BLD: 1 % (ref 0–1)
BILIRUB SERPL-MCNC: 0.8 MG/DL (ref 0.2–1)
BILIRUB UR QL: NEGATIVE
BUN SERPL-MCNC: 17 MG/DL (ref 6–20)
BUN/CREAT SERPL: 9 (ref 12–20)
CALCIUM SERPL-MCNC: 8.7 MG/DL (ref 8.5–10.1)
CHLORIDE SERPL-SCNC: 110 MMOL/L (ref 97–108)
CK SERPL-CCNC: 34 U/L (ref 39–308)
CO2 SERPL-SCNC: 23 MMOL/L (ref 21–32)
COLOR UR: ABNORMAL
COMMENT, HOLDF: NORMAL
CREAT SERPL-MCNC: 1.9 MG/DL (ref 0.7–1.3)
DIFFERENTIAL METHOD BLD: ABNORMAL
EOSINOPHIL # BLD: 0.5 K/UL (ref 0–0.4)
EOSINOPHIL NFR BLD: 7 % (ref 0–7)
EPITH CASTS URNS QL MICRO: ABNORMAL /LPF
ERYTHROCYTE [DISTWIDTH] IN BLOOD BY AUTOMATED COUNT: 18.7 % (ref 11.5–14.5)
GLOBULIN SER CALC-MCNC: 4.5 G/DL (ref 2–4)
GLUCOSE SERPL-MCNC: 104 MG/DL (ref 65–100)
GLUCOSE UR STRIP.AUTO-MCNC: NEGATIVE MG/DL
HCT VFR BLD AUTO: 31.3 % (ref 36.6–50.3)
HGB BLD-MCNC: 9.7 G/DL (ref 12.1–17)
HGB UR QL STRIP: NEGATIVE
HYALINE CASTS URNS QL MICRO: ABNORMAL /LPF (ref 0–5)
IMM GRANULOCYTES # BLD AUTO: 0 K/UL (ref 0–0.04)
IMM GRANULOCYTES NFR BLD AUTO: 1 % (ref 0–0.5)
KETONES UR QL STRIP.AUTO: NEGATIVE MG/DL
LEUKOCYTE ESTERASE UR QL STRIP.AUTO: NEGATIVE
LIPASE SERPL-CCNC: 160 U/L (ref 73–393)
LYMPHOCYTES # BLD: 1.4 K/UL (ref 0.8–3.5)
LYMPHOCYTES NFR BLD: 19 % (ref 12–49)
MCH RBC QN AUTO: 29.3 PG (ref 26–34)
MCHC RBC AUTO-ENTMCNC: 31 G/DL (ref 30–36.5)
MCV RBC AUTO: 94.6 FL (ref 80–99)
MONOCYTES # BLD: 0.5 K/UL (ref 0–1)
MONOCYTES NFR BLD: 7 % (ref 5–13)
NEUTS SEG # BLD: 4.6 K/UL (ref 1.8–8)
NEUTS SEG NFR BLD: 65 % (ref 32–75)
NITRITE UR QL STRIP.AUTO: NEGATIVE
NRBC # BLD: 0 K/UL (ref 0–0.01)
NRBC BLD-RTO: 0 PER 100 WBC
PH UR STRIP: 6.5 [PH] (ref 5–8)
PLATELET # BLD AUTO: 341 K/UL (ref 150–400)
PMV BLD AUTO: 9.5 FL (ref 8.9–12.9)
POTASSIUM SERPL-SCNC: 4.7 MMOL/L (ref 3.5–5.1)
PROT SERPL-MCNC: 7.4 G/DL (ref 6.4–8.2)
PROT UR STRIP-MCNC: 100 MG/DL
RBC # BLD AUTO: 3.31 M/UL (ref 4.1–5.7)
RBC #/AREA URNS HPF: ABNORMAL /HPF (ref 0–5)
SAMPLES BEING HELD,HOLD: NORMAL
SODIUM SERPL-SCNC: 138 MMOL/L (ref 136–145)
SP GR UR REFRACTOMETRY: 1.02 (ref 1–1.03)
TROPONIN I SERPL-MCNC: 0.22 NG/ML
UA: UC IF INDICATED,UAUC: ABNORMAL
UROBILINOGEN UR QL STRIP.AUTO: 1 EU/DL (ref 0.2–1)
WBC # BLD AUTO: 7.1 K/UL (ref 4.1–11.1)
WBC URNS QL MICRO: ABNORMAL /HPF (ref 0–4)

## 2020-02-13 PROCEDURE — 71046 X-RAY EXAM CHEST 2 VIEWS: CPT

## 2020-02-13 PROCEDURE — 80053 COMPREHEN METABOLIC PANEL: CPT

## 2020-02-13 PROCEDURE — 83690 ASSAY OF LIPASE: CPT

## 2020-02-13 PROCEDURE — 93005 ELECTROCARDIOGRAM TRACING: CPT

## 2020-02-13 PROCEDURE — 74011250637 HC RX REV CODE- 250/637: Performed by: EMERGENCY MEDICINE

## 2020-02-13 PROCEDURE — 74176 CT ABD & PELVIS W/O CONTRAST: CPT

## 2020-02-13 PROCEDURE — 84484 ASSAY OF TROPONIN QUANT: CPT

## 2020-02-13 PROCEDURE — 81001 URINALYSIS AUTO W/SCOPE: CPT

## 2020-02-13 PROCEDURE — 99284 EMERGENCY DEPT VISIT MOD MDM: CPT

## 2020-02-13 PROCEDURE — 82550 ASSAY OF CK (CPK): CPT

## 2020-02-13 PROCEDURE — 36415 COLL VENOUS BLD VENIPUNCTURE: CPT

## 2020-02-13 PROCEDURE — 85025 COMPLETE CBC W/AUTO DIFF WBC: CPT

## 2020-02-13 RX ORDER — DICYCLOMINE HYDROCHLORIDE 10 MG/1
10 CAPSULE ORAL 4 TIMES DAILY
Qty: 20 CAP | Refills: 0 | Status: SHIPPED | OUTPATIENT
Start: 2020-02-13 | End: 2020-02-18

## 2020-02-13 RX ORDER — NICOTINE 7MG/24HR
1 PATCH, TRANSDERMAL 24 HOURS TRANSDERMAL ONCE
Status: DISCONTINUED | OUTPATIENT
Start: 2020-02-13 | End: 2020-02-14 | Stop reason: HOSPADM

## 2020-02-13 RX ORDER — LOPERAMIDE HYDROCHLORIDE 2 MG/1
2 CAPSULE ORAL
Qty: 20 CAP | Refills: 0 | Status: SHIPPED | OUTPATIENT
Start: 2020-02-13 | End: 2020-02-23

## 2020-02-13 NOTE — ED NOTES
Pt arrived from triage having diarrhea. Pt reported he was feeling SOB when he feel bloated in the stomach. Pt reported after having a BM he can breath easier. Pt on monitor x3. Dr. Brittney Weber at bedside.

## 2020-02-13 NOTE — ED PROVIDER NOTES
EMERGENCY DEPARTMENT HISTORY AND PHYSICAL EXAM      Please note that this dictation was completed with Branch[employer], the computer voice recognition software. Quite often unanticipated grammatical, syntax, homophones, and other interpretive errors are inadvertently transcribed by the computer software. Please disregard these errors and any errors that have escaped final proofreading. Thank you. Date: 2/13/2020  Patient Name: Binu Aparicio  Patient Age and Sex: 66 y.o. male    History of Presenting Illness     Chief Complaint   Patient presents with    Shortness of Breath     starting today    Abdominal Pain     for a few days, diarrhea, weight loss       History Provided By: Patient    HPI: Binu Aparicio, 66 y.o. male with past medical history as documented below presents to the ED with c/o of 2 to 3 days of watery stool with associated shortness of breath. Patient states that he was recently discharged from the hospital after a prolonged hospital stay for STEMI. Patient was admitted on January 26 discharged on February 8th. He was seen by Dr. Sharyle Shorter and  [facility] at that time. He does have a history of coronary artery disease. Patient underwent catheterization and plans was for a CABG however this was aborted and he was given a right Impella instead. Per chart review, his last echocardiogram was January 27 which showed a EF 40 to 45%. Patient states that he feels bloated when he is about to have a watery stool which causes him to be short of breath. He states that after having a bowel movement he subsequently can feel better. He denies any recent antibiotics. Denies any blood in his stool. He has not taken any antidiarrheal medications. Pt denies any other alleviating or exacerbating factors.  Additionally, pt specifically denies any recent fever, chills, headache, nausea, vomiting, abdominal pain, CP, lightheadedness, dizziness, numbness, weakness, BLE swelling, heart palpitations, urinary sxs, constipation, melena, hematochezia, cough, or congestion. There are no other complaints, changes or physical findings at this time. PCP: Skye Ford NP    Past History   Past Medical History:  Past Medical History:   Diagnosis Date    Aneurysm ([address]     BPH (benign prostatic hyperplasia)     CAD (coronary artery disease)     2006 - s/p stent     Essential hypertension 12-12-13    Hematuria     sees urologist    Hyperglycemia     Kidney failure        Past Surgical History:  Past Surgical History:   Procedure Laterality Date    CARDIAC SURG PROCEDURE UNLIST      stent 2006    COLONOSCOPY N/A 9/16/2019    COLONOSCOPY performed by Chichi Bustamante [facility] at [facility] ENDOSCOPY    COLONOSCOPY,DIAGNOSTIC  9/16/2019         UPPER GI ENDOSCOPY,BIOPSY  7/9/2019         VASCULAR SURGERY PROCEDURE UNLIST  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 Current Outpatient Medications on File Prior to Encounter   Medication Sig Dispense Refill    furosemide (LASIX) 40 mg tablet Take 1 Tab by mouth daily as needed (edema). 30 Tab 3    nicotine (NICODERM CQ) 7 mg/24 hr 1 Patch by TransDERmal route daily for 30 days. 30 Patch 0    tamsulosin (FLOMAX) 0.4 mg capsule Take 1 Cap by mouth every evening. 30 Cap 0    melatonin 3 mg tablet Take 3 Tabs by mouth nightly for 20 days. 60 Tab 0    metoprolol succinate (TOPROL-XL) 25 mg XL tablet Take 1 Tab by mouth daily.  30 Tab 12    ezetimibe (ZETIA) 10 mg tablet Take 1 Tab by mouth every evening. 30 Tab 12    isosorbide mononitrate ER (IMDUR) 30 mg tablet Take 0.5 Tabs by mouth daily. 30 Tab 12    ticagrelor (BRILINTA) 90 mg tablet Take 1 Tab by mouth every twelve (12) hours every twelve (12) hours. 60 Tab 12    aspirin delayed-release 81 mg tablet Take 81 mg by mouth daily.  sodium bicarbonate 650 mg tablet Take 650 mg by mouth two (2) times a day.  diphenhydrAMINE-acetaminophen (TYLENOL PM EXTRA STRENGTH)  mg tab Take 2 Tabs by mouth nightly. Review of Systems   Review of Systems   Constitutional: Negative. Negative for chills and fever. HENT: Negative. Negative for congestion, facial swelling, rhinorrhea, sore throat, trouble swallowing and voice change. Eyes: Negative. Respiratory: Positive for shortness of breath. Negative for apnea, cough, chest tightness and wheezing. Cardiovascular: Negative. Negative for chest pain, palpitations and leg swelling. Gastrointestinal: Positive for diarrhea. Negative for abdominal distention, abdominal pain, blood in stool, constipation, nausea and vomiting. Endocrine: Negative. Negative for cold intolerance, heat intolerance and polyuria. Genitourinary: Negative. Negative for difficulty urinating, dysuria, flank pain, frequency, hematuria and urgency. Musculoskeletal: Negative. Negative for arthralgias, back pain, myalgias, neck pain and neck stiffness. Skin: Negative. Negative for color change and rash. Neurological: Negative. Negative for dizziness, syncope, facial asymmetry, speech difficulty, weakness, light-headedness, numbness and headaches. Hematological: Negative. Does not bruise/bleed easily. Psychiatric/Behavioral: Negative. Negative for confusion and self-injury. The patient is not nervous/anxious. Physical Exam   Physical Exam  Vitals signs and nursing note reviewed. Constitutional:       Appearance: He is well-developed. He is not toxic-appearing.    HENT:      Head: Normocephalic and atraumatic. Mouth/Throat:      Pharynx: No posterior oropharyngeal erythema. Eyes:      Conjunctiva/sclera: Conjunctivae normal.      Pupils: Pupils are equal, round, and reactive to light. Neck:      Musculoskeletal: Normal range of motion. Cardiovascular:      Rate and Rhythm: Normal rate and regular rhythm. Heart sounds: Normal heart sounds. No murmur. No friction rub. No gallop. Pulmonary:      Effort: Pulmonary effort is normal. No respiratory distress. Breath sounds: Normal breath sounds. No wheezing or rales. Chest:      Chest wall: No tenderness. Abdominal:      General: Bowel sounds are normal. There is no distension. Palpations: Abdomen is soft. There is no mass. Tenderness: There is no abdominal tenderness. There is no guarding or rebound. Musculoskeletal: Normal range of motion. General: No tenderness or deformity. Skin:     General: Skin is warm. Findings: No rash. Neurological:      Mental Status: He is alert and oriented to person, place, and time. Cranial Nerves: No cranial nerve deficit. Motor: No abnormal muscle tone. Coordination: Coordination normal.      Deep Tendon Reflexes: Reflexes normal.   Psychiatric:         Behavior: Behavior is cooperative. Diagnostic Study Results     Labs -  Recent Results (from the past 24 hour(s))   EKG, 12 LEAD, INITIAL    Collection Time: 02/13/20  6:21 PM   Result Value Ref Range    Ventricular Rate 64 BPM    Atrial Rate 64 BPM    P-R Interval 206 ms    QRS Duration 100 ms    Q-T Interval 408 ms    QTC Calculation (Bezet) 420 ms    Calculated P Axis 56 degrees    Calculated R Axis 59 degrees    Calculated T Axis 72 degrees    Diagnosis       Normal sinus rhythm  Incomplete right bundle branch block  Septal infarct (cited on or before 26-JAN-2020)  When compared with ECG of 05-FEB-2020 04:41,  IN interval has decreased  Vent.  rate has increased BY  25 BPM  Criteria for Inferior infarct are no longer present         Radiologic Studies -   XR CHEST PA LAT   Final Result   Impression: No acute process or change compared to the prior exam.         CT ABD PELV WO CONT   Final Result   IMPRESSION:   No acute findings. Incidentals do appear stable. CT Results  (Last 48 hours)    None        CXR Results  (Last 48 hours)    None        INDICATION: abd pain, diarrhea, weight loss     COMPARISON: January 28, 2020     CONTRAST:  None.     TECHNIQUE:   Thin axial images were obtained through the abdomen and pelvis. Coronal and  sagittal reconstructions were generated. Oral contrast was not administered. CT  dose reduction was achieved through use of a standardized protocol tailored for  this examination and automatic exposure control for dose modulation.      The absence of intravenous contrast material reduces the sensitivity for  evaluation of the solid parenchymal organs of the abdomen.      FINDINGS:   LUNG BASES: Right lung base infiltrate with some consolidation. INCIDENTALLY IMAGED HEART AND MEDIASTINUM: Unremarkable. LIVER: No mass or biliary dilatation. GALLBLADDER: Unremarkable. SPLEEN: No mass. PANCREAS: No mass or ductal dilatation. ADRENALS: Unremarkable. KIDNEYS/URETERS: No mass, calculus, or hydronephrosis. STOMACH: Unremarkable. SMALL BOWEL: No dilatation or wall thickening. COLON: No dilatation or wall thickening. Prominent sigmoid diverticulosis  APPENDIX: Unremarkable. PERITONEUM: No ascites or pneumoperitoneum. Right psoas soft tissue thickening  unchanged possibly related to hemorrhage. RETROPERITONEUM: No lymphadenopathy or surgical abdominal aortic aneurysm. Enlarged prostate  URINARY BLADDER: No mass or calculus. BONES: No destructive bone lesion. ADDITIONAL COMMENTS: Fat-containing midline umbilical hernia.     IMPRESSION  IMPRESSION:  No acute findings. Incidentals do appear stable.   Medical Decision Making   I am the first provider for this patient. I reviewed the vital signs, available nursing notes, past medical history, past surgical history, family history and social history. Vital Signs-Reviewed the patient's vital signs. Patient Vitals for the past 24 hrs:   Temp Pulse Resp BP SpO2   02/13/20 1805 97.5 °F (36.4 °C) 65 20 140/62 98 %     Pulse Oximetry Analysis - 98% on RA    Cardiac Monitor:   Rate: 65 bpm  Rhythm: Normal Sinus Rhythm      ED EKG interpretation:  Rhythm: normal sinus rhythm; and regular . Rate (approx.): 64; Axis: normal; P wave: normal; QRS interval: normal ; ST/T wave: normal; Other findings: Incomplete right bundle branch block. This EKG was interpreted by Maynor Dickerson M.D. Records Reviewed: Nursing Notes, Old Medical Records, Previous electrocardiograms, Previous Radiology Studies and Previous Laboratory Studies    Provider Notes (Medical Decision Making):   Patient presents for diarrhea:  DDx: Gastroenteritis, diverticulitis, colitis, C dificile, bacterial infection. Will get labs and possibly CT abdomen to further evaluate. Patient also presents with acute dyspnea. DDx: asthma, copd, pna, pulmonary edema, acute bronchitis, ACS, ptx, pna. Will obtain EKG, labs, CXR, provide O2 as needed for hypoxia, treat symptomatically and reassess. Will continue to monitor closely in ED. ED Course:   Initial assessment performed. The patients presenting problems have been discussed, and they are in agreement with the care plan formulated and outlined with them. I have encouraged them to ask questions as they arise throughout their visit. TOBACCO COUNSELING:   Upon evaluation, pt expressed that they are a current tobacco user. For approximately 10 minutes, pt has been counseled on the dangers of smoking and was encouraged to quit as soon as possible in order to decrease further risks to their health. Pt has conveyed their understanding of the risks involved should they continue to use tobacco products.     HYPERTENSION COUNSELING   Education was provided to the patient today regarding their hypertension. Patient is made aware of their elevated blood pressure and is instructed to follow up this week with their Primary Care for a recheck. Patient is counseled regarding consequences of chronic, uncontrolled hypertension including kidney disease, heart disease, stroke or even death. Patient states their understanding and agrees to follow up this week. Additionally, during their visit, I discussed sodium restriction, maintaining ideal body weight and regular exercise program as physiologic means to achieve blood pressure control. The patient will strive towards this. I reviewed our electronic medical record system for any past medical records that were available that may contribute to the patient's current condition, the nursing notes and vital signs from today's visit. Breanna Jean-Baptiste MD    ED Orders Placed :  Orders Placed This Encounter    XR CHEST PA LAT    CT ABD PELV WO CONT    CBC WITH AUTOMATED DIFF    METABOLIC PANEL, COMPREHENSIVE    TROPONIN I    CK W/REFLEX CKMB    URINALYSIS W/ REFLEX CULTURE    LIPASE    SAMPLES BEING HELD    EKG 12 LEAD INITIAL    DISCONTD: nicotine (NICODERM CQ) 7 mg/24 hr patch 1 Patch    dicyclomine (BENTYL) 10 mg capsule    loperamide (IMODIUM) 2 mg capsule    guar gum (BENEFIBER) packet     Progress Note:  I have re-examined the patient. he feels much better and symptoms improved. Tolerating oral intake. Abdomen is soft and without guarding, rebound or other peritoneal signs. I have discussed with patient the importance of close f/u and to return to the ED if symptoms don't improve or worsen. Progress Note:  Patient has been reassessed and reports feeling better and symptoms have improved significantly after ED treatment. Patient feels comfortable going home with close follow-up.  Marlon Leonardo's final labs and imaging have been reviewed with him and available family and/or caregiver. They have been counseled regarding his diagnosis. He verbally conveys understanding and agreement of the signs, symptoms, diagnosis, treatment and prognosis and additionally agrees to follow up as recommended with Dr. Skye Ford, NP and/or specialist in 24 - 48 hours. He also agrees with the care-plan we created together and conveys that all of his questions have been answered. I have also put together some discharge instructions for him that include: 1) educational information regarding their diagnosis, 2) how to care for their diagnosis at home, as well a 3) list of reasons why they would want to return to the ED prior to their follow-up appointment should the patient's condition change or symptoms worsen. I have answered all questions to the patient's satisfaction. Strict return precautions given. He both understood and agreed with plan as discussed. Vital signs stable for discharge. Pt very appreciative of care today. Disposition: Discharge  The pt is ready for discharge. The pt's signs, symptoms, diagnosis, and discharge instructions have been discussed and pt has conveyed their understanding. The pt is to follow up as recommended or return to ER should their symptoms worsen. Plan has been discussed and pt is in full agreement. Plan:  1. Return precautions as discussed. 2.   Discharge Medication List as of 2/13/2020  9:29 PM      START taking these medications    Details   dicyclomine (BENTYL) 10 mg capsule Take 1 Cap by mouth four (4) times daily for 5 days. , Print, Disp-20 Cap, R-0      loperamide (IMODIUM) 2 mg capsule Take 1 Cap by mouth four (4) times daily as needed for Diarrhea for up to 10 days. , Print, Disp-20 Cap, R-0      guar gum (BENEFIBER) packet Take 1 Packet by mouth three (3) times daily (with meals) for 10 days. , Print, Disp-30 Packet, R-0         CONTINUE these medications which have NOT CHANGED    Details   furosemide (LASIX) 40 mg tablet Take 1 Tab by mouth daily as needed (edema). , Print, Disp-30 Tab, R-3      nicotine (NICODERM CQ) 7 mg/24 hr 1 Patch by TransDERmal route daily for 30 days. , Print, Disp-30 Patch, R-0      tamsulosin (FLOMAX) 0.4 mg capsule Take 1 Cap by mouth every evening., Print, Disp-30 Cap, R-0      melatonin 3 mg tablet Take 3 Tabs by mouth nightly for 20 days. , Print, Disp-60 Tab, R-0      metoprolol succinate (TOPROL-XL) 25 mg XL tablet Take 1 Tab by mouth daily. , Print, Disp-30 Tab, R-12      ezetimibe (ZETIA) 10 mg tablet Take 1 Tab by mouth every evening., Print, Disp-30 Tab, R-12      isosorbide mononitrate ER (IMDUR) 30 mg tablet Take 0.5 Tabs by mouth daily. , Print, Disp-30 Tab, R-12      ticagrelor (BRILINTA) 90 mg tablet Take 1 Tab by mouth every twelve (12) hours every twelve (12) hours. , Print, Disp-60 Tab, R-12      aspirin delayed-release 81 mg tablet Take 81 mg by mouth daily. , Historical Med      sodium bicarbonate 650 mg tablet Take 650 mg by mouth two (2) times a day., Historical Med      diphenhydrAMINE-acetaminophen (TYLENOL PM EXTRA STRENGTH)  mg tab Take 2 Tabs by mouth nightly., Historical Med           3. Follow-up Information     Follow up With Specialties Details Why Contact Info    Irwin Ulloa NP Nurse Practitioner   [address] [facility]  [address] [facility] [address] [address] [phone]      [facility] EMERGENCY DEPT Emergency Medicine  As needed, If symptoms worsen [address] [place] [address] Drive  [address] [facility]  [phone]          Instructed to return to ED if worse  Diagnosis     Clinical Impression:   1. Acute abdominal pain    2. Diarrhea, unspecified type    3. Anemia of chronic disease    4. Tobacco abuse      Attestation:  I personally performed the services described in this documentation on this date, 2/13/2020 for patient [address]. I have reviewed and verified that the information is accurate and complete. Leny Herrera MD      This note will not be viewable in [address]

## 2020-02-14 LAB
ATRIAL RATE: 64 BPM
CALCULATED P AXIS, ECG09: 56 DEGREES
CALCULATED R AXIS, ECG10: 59 DEGREES
CALCULATED T AXIS, ECG11: 72 DEGREES
DIAGNOSIS, 93000: NORMAL
P-R INTERVAL, ECG05: 206 MS
Q-T INTERVAL, ECG07: 408 MS
QRS DURATION, ECG06: 100 MS
QTC CALCULATION (BEZET), ECG08: 420 MS
VENTRICULAR RATE, ECG03: 64 BPM

## 2020-02-14 NOTE — DISCHARGE INSTRUCTIONS
Thank you for allowing us to take care of you today! We hope we addressed all of your concerns and needs. We strive to provide excellent quality care in the Emergency Department. You will receive a survey after your visit to evaluate the care you were provided. Should you receive a survey from us, we invite you to share your experience and tell us what made it excellent. It was a pleasure serving you, we invite you to share your experience with us, in our pursuit for excellence, should you be selected to receive a survey. The exam and treatment you received in the Emergency Department were for an urgent problem and are not intended as complete care. It is important that you follow up with a doctor, nurse practitioner, or physician assistant for ongoing care. If your symptoms become worse or you do not improve as expected and you are unable to reach your usual health care provider, you should return to the Emergency Department. We are available 24 hours a day. Please take your discharge instructions with you when you go to your follow-up appointment. If you have any problem arranging a follow-up appointment, contact the Emergency Department immediately. If a prescription has been provided, please have it filled as soon as possible to prevent a delay in treatment. Read the entire medication instruction sheet provided to you by the pharmacy. If you have any questions or reservations about taking the medication due to side effects or interactions with other medications, please call your primary care physician or contact the ER to speak with the charge nurse. Make an appointment with your family doctor or the physician you were referred to for follow-up of this visit as instructed on your discharge paperwork, as this is mandatory follow-up. Return to the ER if you are unable to be seen or if you are unable to be seen in a timely manner.     If you have any problem arranging the follow-up visit, contact the Emergency Department immediately. I hope you feel better and thank you again for allow us to provide you with excellent care today at [facility]! Warmest regards,    Navin Cobb MD  Emergency Medicine Physician  [facility]      _____________________________________________________________________________________________________________    Vitals:    02/13/20 1805   BP: 140/62   BP 1 Location: Left arm   BP Patient Position: At rest   Pulse: 65   Resp: 20   Temp: 97.5 °F (36.4 °C)   SpO2: 98%   Weight: 103.7 kg (228 lb 9.6 oz)   Height: 5' 9\" (1.753 m)       Recent Results (from the past 12 hour(s))   EKG, 12 LEAD, INITIAL    Collection Time: 02/13/20  6:21 PM   Result Value Ref Range    Ventricular Rate 64 BPM    Atrial Rate 64 BPM    P-R Interval 206 ms    QRS Duration 100 ms    Q-T Interval 408 ms    QTC Calculation (Bezet) 420 ms    Calculated P Axis 56 degrees    Calculated R Axis 59 degrees    Calculated T Axis 72 degrees    Diagnosis       Normal sinus rhythm  Incomplete right bundle branch block  Septal infarct (cited on or before 26-JAN-2020)  When compared with ECG of 05-FEB-2020 04:41,  GA interval has decreased  Vent.  rate has increased BY  25 BPM  Criteria for Inferior infarct are no longer present     CBC WITH AUTOMATED DIFF    Collection Time: 02/13/20  6:52 PM   Result Value Ref Range    WBC 7.1 4.1 - 11.1 K/uL    RBC 3.31 (L) 4.10 - 5.70 M/uL    HGB 9.7 (L) 12.1 - 17.0 g/dL    HCT 31.3 (L) 36.6 - 50.3 %    MCV 94.6 80.0 - 99.0 FL    MCH 29.3 26.0 - 34.0 PG    MCHC 31.0 30.0 - 36.5 g/dL    RDW 18.7 (H) 11.5 - 14.5 %    PLATELET 974 653 - 264 K/uL    MPV 9.5 8.9 - 12.9 FL    NRBC 0.0 0  WBC    ABSOLUTE NRBC 0.00 0.00 - 0.01 K/uL    NEUTROPHILS 65 32 - 75 %    LYMPHOCYTES 19 12 - 49 %    MONOCYTES 7 5 - 13 %    EOSINOPHILS 7 0 - 7 %    BASOPHILS 1 0 - 1 %    IMMATURE GRANULOCYTES 1 (H) 0.0 - 0.5 % ABS. NEUTROPHILS 4.6 1.8 - 8.0 K/UL    ABS. LYMPHOCYTES 1.4 0.8 - 3.5 K/UL    ABS. MONOCYTES 0.5 0.0 - 1.0 K/UL    ABS. EOSINOPHILS 0.5 (H) 0.0 - 0.4 K/UL    ABS. BASOPHILS 0.1 0.0 - 0.1 K/UL    ABS. IMM. GRANS. 0.0 0.00 - 0.04 K/UL    DF AUTOMATED     METABOLIC PANEL, COMPREHENSIVE    Collection Time: 02/13/20  6:52 PM   Result Value Ref Range    Sodium 138 136 - 145 mmol/L    Potassium 4.7 3.5 - 5.1 mmol/L    Chloride 110 (H) 97 - 108 mmol/L    CO2 23 21 - 32 mmol/L    Anion gap 5 5 - 15 mmol/L    Glucose 104 (H) 65 - 100 mg/dL    BUN 17 6 - 20 MG/DL    Creatinine 1.90 (H) 0.70 - 1.30 MG/DL    BUN/Creatinine ratio 9 (L) 12 - 20      GFR est AA 42 (L) >60 ml/min/1.73m2    GFR est non-AA 35 (L) >60 ml/min/1.73m2    Calcium 8.7 8.5 - 10.1 MG/DL    Bilirubin, total 0.8 0.2 - 1.0 MG/DL    ALT (SGPT) 25 12 - 78 U/L    AST (SGOT) 23 15 - 37 U/L    Alk. phosphatase 110 45 - 117 U/L    Protein, total 7.4 6.4 - 8.2 g/dL    Albumin 2.9 (L) 3.5 - 5.0 g/dL    Globulin 4.5 (H) 2.0 - 4.0 g/dL    A-G Ratio 0.6 (L) 1.1 - 2.2     TROPONIN I    Collection Time: 02/13/20  6:52 PM   Result Value Ref Range    Troponin-I, Qt. 0.22 (H) <0.05 ng/mL   CK W/ REFLX CKMB    Collection Time: 02/13/20  6:52 PM   Result Value Ref Range    CK 34 (L) 39 - 308 U/L   LIPASE    Collection Time: 02/13/20  6:52 PM   Result Value Ref Range    Lipase 160 73 - 393 U/L   SAMPLES BEING HELD    Collection Time: 02/13/20  7:28 PM   Result Value Ref Range    SAMPLES BEING HELD  1 LOPEZ     COMMENT        Add-on orders for these samples will be processed based on acceptable specimen integrity and analyte stability, which may vary by analyte.    URINALYSIS W/ REFLEX CULTURE    Collection Time: 02/13/20  7:59 PM   Result Value Ref Range    Color YELLOW/STRAW      Appearance CLEAR CLEAR      Specific gravity 1.018 1.003 - 1.030      pH (UA) 6.5 5.0 - 8.0      Protein 100 (A) NEG mg/dL    Glucose NEGATIVE  NEG mg/dL    Ketone NEGATIVE  NEG mg/dL    Bilirubin NEGATIVE NEG      Blood NEGATIVE  NEG      Urobilinogen 1.0 0.2 - 1.0 EU/dL    Nitrites NEGATIVE  NEG      Leukocyte Esterase NEGATIVE  NEG      WBC 0-4 0 - 4 /hpf    RBC 0-5 0 - 5 /hpf    Epithelial cells FEW FEW /lpf    Bacteria NEGATIVE  NEG /hpf    UA:UC IF INDICATED CULTURE NOT INDICATED BY UA RESULT CNI      Hyaline cast 0-2 0 - 5 /lpf       XR CHEST PA LAT   Final Result   Impression: No acute process or change compared to the prior exam.         CT ABD PELV WO CONT   Final Result   IMPRESSION:   No acute findings. Incidentals do appear stable. CT Results  (Last 48 hours)               02/13/20 1940  CT ABD PELV WO CONT Final result    Impression:  IMPRESSION:   No acute findings. Incidentals do appear stable. Narrative:  EXAM: CT ABD PELV WO CONT       INDICATION: abd pain, diarrhea, weight loss       COMPARISON: January 28, 2020       CONTRAST:  None. TECHNIQUE:    Thin axial images were obtained through the abdomen and pelvis. Coronal and   sagittal reconstructions were generated. Oral contrast was not administered. CT   dose reduction was achieved through use of a standardized protocol tailored for   this examination and automatic exposure control for dose modulation. The absence of intravenous contrast material reduces the sensitivity for   evaluation of the solid parenchymal organs of the abdomen. FINDINGS:    LUNG BASES: Right lung base infiltrate with some consolidation. INCIDENTALLY IMAGED HEART AND MEDIASTINUM: Unremarkable. LIVER: No mass or biliary dilatation. GALLBLADDER: Unremarkable. SPLEEN: No mass. PANCREAS: No mass or ductal dilatation. ADRENALS: Unremarkable. KIDNEYS/URETERS: No mass, calculus, or hydronephrosis. STOMACH: Unremarkable. SMALL BOWEL: No dilatation or wall thickening. COLON: No dilatation or wall thickening. Prominent sigmoid diverticulosis   APPENDIX: Unremarkable. PERITONEUM: No ascites or pneumoperitoneum.  Right psoas soft tissue thickening   unchanged possibly related to hemorrhage. RETROPERITONEUM: No lymphadenopathy or surgical abdominal aortic aneurysm. Enlarged prostate   URINARY BLADDER: No mass or calculus. BONES: No destructive bone lesion. ADDITIONAL COMMENTS: Fat-containing midline umbilical hernia.                  Local Primary Care Physicians   [facility] [phone]  MD Elvie Bedoya MD Dayna Humble, MD [facility] Community Doctors [phone]  Ricky Marte, [facility]  MD Tracey Marquez, MD Matilda Berrios  [phone]  MD Vel Villanueva [place] [address] [facility] [phone]  MD Elise Bernal, MD Arturo Valero, MD Wade Hodgkins, MD   [facility] [phone]  [facility] Center[facility] PATRICKUARY APARICIO, MD Marilee Duarte, MD Madhavi Huff, NP [address] [phone]  [facility] Katherine, MD Dariela Sarmiento, MD Hair Barba, MD Luretha Nyhan, MD Jorje Guthrie, MD Abdullahi Palmer, MD Selene Germain MD   [address] [address] [phone]  Dylon Honeycutt MD [facility] [phone]  Giana Rascon, MD Holly Dillon, NP  MD Nunu Osborne, MD Shorty Claudio, MD Emily Vasquez, MD Kory Tyson MD   [address] [facility] [phone]  Jackey Jeans, MD Sharmaine Drummer, P  Betito Orellana, NP  Lauralyn Daubs, MD Christinia Model, MD Juanda Kay, MD Leotis Brittle, MD [facility] [phone]  MD Mariposa Manning MD Stevens Banas, MD Irean Mention, MD Clemmie Han, MD   [address] [phone]  MD Issa Vizcaino MD [facility] [phone]  MD Dontrell Wilkerson MD Luanne Passey, [address] [facility] [phone]  Ophelia Estes, MD Rebekah Howell, MD Skipper Platts, MD Benigno Dakins, MD Jennie Caul, MD Asberry Pelt, ANGELICA Ramos MD [address]   [phone]  MD Kishan Morgan MD Cindie Ruder, MD     [address] [phone]  MD Luann Forbes, P  Leny He, JUVENTINO He, LESLIEP  JUVENTINO Chen MD Zelma Salters, ANGELICA William, DO   Miscellaneous:  Kenneth Gutierrez MD [facility] Departments   For adult and child immunizations, family planning, TB screening, STD testing and women's health services. [facility]: [place] [phone]     [address] 1822   [facility]   [address] [place] [address]: [facility] [address] [phone]     [address] [place] [address] Road        [address] [address]  For primary care services, woman and child wellness, and some clinics providing specialty care. [facility] -- [address] [place] [address] [phone]   [address] [place] [address] 200 [facility] 3614 [facility] [phone]   [address] [facility] [address] [place] [address] [phone]   [address] [place] [address] [address] [place] [address] 5850 [facility]  [phone]   [address] [place] [address] [address] [place] [phone]   [facility] [address] [facility] [phone]   [facility] [address] [place] [address] [phone]   [facility]: [facility] 4100 [facility] 820 [facility], [address]     [place] [address] [address] [facility]  7416  Community  [phone]   [employer]  [address] ([email or web address])         Sexual Health/Woman Wellness Clinics   For STD/HIV testing and treatment, pregnancy testing and services, men's health, birth control services, LGBT services, and hepatitis/HPV vaccine services.    [employer] for [facility] [employer] [address] [facility] [address] [facility] [address] [facility] 1579 [address] [phone]   [facility] [address] 5th floor [phone]   Pregnancy [address] [facility] [address] JAYY Tom[address] [phone]        [place] [address] [facility] [address]   [phone]   Stephan   [phone]   Women, Infant and Children's Services: [address] [phone]       [place] [facility] the [address]    [phone]   [address]   [phone]   [address]   [address]       Patient Education        Diarrhea: Care Instructions  Your Care Instructions    Diarrhea is loose, watery stools (bowel movements). The exact cause is often hard to find. Sometimes diarrhea is your body's way of getting rid of what caused an upset stomach. Viruses, food poisoning, and many medicines can cause diarrhea. Some people get diarrhea in response to emotional stress, anxiety, or certain foods. Almost everyone has diarrhea now and then. It usually isn't serious, and your stools will return to normal soon. The important thing to do is replace the fluids you have lost, so you can prevent dehydration. The doctor has checked you carefully, but problems can develop later. If you notice any problems or new symptoms, get medical treatment right away. Follow-up care is a key part of your treatment and safety. Be sure to make and go to all appointments, and call your doctor if you are having problems. It's also a good idea to know your test results and keep a list of the medicines you take. How can you care for yourself at home? · Watch for signs of dehydration, which means your body has lost too much water. Dehydration is a serious condition and should be treated right away. Signs of dehydration are:  ? Increasing thirst and dry eyes and mouth. ? Feeling faint or lightheaded. ? A smaller amount of urine than normal.  · To prevent dehydration, drink plenty of fluids. Choose water and other caffeine-free clear liquids until you feel better. If you have kidney, heart, or liver disease and have to limit fluids, talk with your doctor before you increase the amount of fluids you drink. · Begin eating small amounts of mild foods the next day, if you feel like it. ? Try yogurt that has live cultures of Lactobacillus. (Check the label.)  ? Avoid spicy foods, fruits, alcohol, and caffeine until 48 hours after all symptoms are gone. ? Avoid chewing gum that contains sorbitol. ? Avoid dairy products (except for yogurt with Lactobacillus) while you have diarrhea and for 3 days after symptoms are gone. · The doctor may recommend that you take over-the-counter medicine, such as loperamide (Imodium), if you still have diarrhea after 6 hours. Read and follow all instructions on the label. Do not use this medicine if you have bloody diarrhea, a high fever, or other signs of serious illness. Call your doctor if you think you are having a problem with your medicine. When should you call for help? Call 911 anytime you think you may need emergency care. For example, call if:    · You passed out (lost consciousness).     · Your stools are maroon or very bloody.    Call your doctor now or seek immediate medical care if:    · You are dizzy or lightheaded, or you feel like you may faint.     · Your stools are black and look like tar, or they have streaks of blood.     · You have new or worse belly pain.     · You have symptoms of dehydration, such as:  ? Dry eyes and a dry mouth. ? Passing only a little dark urine. ?  Feeling thirstier than usual.     · You have a new or higher fever.    Watch closely for changes in your health, and be sure to contact your doctor if:    · Your diarrhea is getting worse.     · You see pus in the diarrhea.     · You are not getting better after 2 days (48 hours). Where can you learn more? Go to http://mohsen-carolee.info/. Enter E575 in the search box to learn more about \"Diarrhea: Care Instructions. \"  Current as of: June 26, 2019  Content Version: 12.2  © 4987-0279 [email or web address]. Care instructions adapted under license by [employer] (which disclaims liability or warranty for this information). If you have questions about a medical condition or this instruction, always ask your healthcare professional. [address] any warranty or liability for your use of this information. Patient Education        Abdominal Pain: Care Instructions  Your Care Instructions    Abdominal pain has many possible causes. Some aren't serious and get better on their own in a few days. Others need more testing and treatment. If your pain continues or gets worse, you need to be rechecked and may need more tests to find out what is wrong. You may need surgery to correct the problem. Don't ignore new symptoms, such as fever, nausea and vomiting, urination problems, pain that gets worse, and dizziness. These may be signs of a more serious problem. Your doctor may have recommended a follow-up visit in the next 8 to 12 hours. If you are not getting better, you may need more tests or treatment. The doctor has checked you carefully, but problems can develop later. If you notice any problems or new symptoms, get medical treatment right away. Follow-up care is a key part of your treatment and safety. Be sure to make and go to all appointments, and call your doctor if you are having problems. It's also a good idea to know your test results and keep a list of the medicines you take. How can you care for yourself at home? · Rest until you feel better.   · To prevent dehydration, drink plenty of fluids, enough so that your urine is light yellow or clear like water. Choose water and other caffeine-free clear liquids until you feel better. If you have kidney, heart, or liver disease and have to limit fluids, talk with your doctor before you increase the amount of fluids you drink. · If your stomach is upset, eat mild foods, such as rice, dry toast or crackers, bananas, and applesauce. Try eating several small meals instead of two or three large ones. · Wait until 48 hours after all symptoms have gone away before you have spicy foods, alcohol, and drinks that contain caffeine. · Do not eat foods that are high in fat. · Avoid anti-inflammatory medicines such as aspirin, ibuprofen (Advil, Motrin), and naproxen (Aleve). These can cause stomach upset. Talk to your doctor if you take daily aspirin for another health problem. When should you call for help? Call 911 anytime you think you may need emergency care. For example, call if:    · You passed out (lost consciousness).     · You pass maroon or very bloody stools.     · You vomit blood or what looks like coffee grounds.     · You have new, severe belly pain.    Call your doctor now or seek immediate medical care if:    · Your pain gets worse, especially if it becomes focused in one area of your belly.     · You have a new or higher fever.     · Your stools are black and look like tar, or they have streaks of blood.     · You have unexpected vaginal bleeding.     · You have symptoms of a urinary tract infection. These may include:  ? Pain when you urinate. ? Urinating more often than usual.  ? Blood in your urine.     · You are dizzy or lightheaded, or you feel like you may faint.    Watch closely for changes in your health, and be sure to contact your doctor if:    · You are not getting better after 1 day (24 hours). Where can you learn more? Go to http://mohsen-carolee.info/.   Enter Q907 in the search box to learn more about \"Abdominal Pain: Care Instructions. \"  Current as of: June 26, 2019  Content Version: 12.2  © 7292-6480 [employer]. Care instructions adapted under license by [employer] (which disclaims liability or warranty for this information). If you have questions about a medical condition or this instruction, always ask your healthcare professional. [address] any warranty or liability for your use of this information.

## 2020-02-14 NOTE — ED NOTES
Patient discharged by Annie Correa MD. Patient provided with discharge instructions Rx and instructions on follow up care. Patient out of ED ambulatory accompanied by family.

## 2020-02-17 ENCOUNTER — OFFICE VISIT (OUTPATIENT)
Dept: CARDIOTHORACIC SURGERY | Age: 75
End: 2020-02-17

## 2020-02-17 DIAGNOSIS — I25.10 CORONARY ARTERY DISEASE INVOLVING NATIVE CORONARY ARTERY OF NATIVE HEART WITHOUT ANGINA PECTORIS: Primary | ICD-10-CM

## 2020-02-17 NOTE — PROGRESS NOTES
Patient: Ever Overcast   Age: 66 y.o. Patient Care Team:  Asim Boggs NP as PCP - General (Nurse Practitioner)  Stan Barron MD as Physician (Cardiology)    Diagnosis: The encounter diagnosis was Coronary artery disease involving native coronary artery of native heart without angina pectoris. Problem List:   Patient Active Problem List   Diagnosis Code    Hyperglycemia R73.9    CAD (coronary artery disease) I25.10    Dyslipidemia E78.5    Tobacco use disorder F17.200    Atherosclerosis of native arteries of the extremities with intermittent claudication I70.219    Cerebrovascular disease I67.9    AAA (abdominal aortic aneurysm) ([facility]) I71.4    PAD (peripheral artery disease) ([facility]) I73.9    Carotid stenosis, right I65.21    S/P PTCA (percutaneous transluminal coronary angioplasty) Z98.61    Dark stools R19.5    STEMI (ST elevation myocardial infarction) ([facility] [address] I21.3      Date of Surgery: S/p Impella removal    Surgery: 2-5-20    HPI: Patient is here for follow up to remove staples from impella site. Incision healing well. Having excessive diarrhea he thinks is from zetia. Current Medications:   Current Outpatient Medications   Medication Sig Dispense Refill    dicyclomine (BENTYL) 10 mg capsule Take 1 Cap by mouth four (4) times daily for 5 days. 20 Cap 0    loperamide (IMODIUM) 2 mg capsule Take 1 Cap by mouth four (4) times daily as needed for Diarrhea for up to 10 days. 20 Cap 0    guar gum (BENEFIBER) packet Take 1 Packet by mouth three (3) times daily (with meals) for 10 days. 30 Packet 0    furosemide (LASIX) 40 mg tablet Take 1 Tab by mouth daily as needed (edema). 30 Tab 3    nicotine (NICODERM CQ) 7 mg/24 hr 1 Patch by TransDERmal route daily for 30 days. 30 Patch 0    tamsulosin (FLOMAX) 0.4 mg capsule Take 1 Cap by mouth every evening. 30 Cap 0    melatonin 3 mg tablet Take 3 Tabs by mouth nightly for 20 days.  60 Tab 0    metoprolol succinate (TOPROL-XL) 25 mg XL tablet Take 1 Tab by mouth daily. 30 Tab 12    ezetimibe (ZETIA) 10 mg tablet Take 1 Tab by mouth every evening. 30 Tab 12    isosorbide mononitrate ER (IMDUR) 30 mg tablet Take 0.5 Tabs by mouth daily. 30 Tab 12    ticagrelor (BRILINTA) 90 mg tablet Take 1 Tab by mouth every twelve (12) hours every twelve (12) hours. 60 Tab 12    aspirin delayed-release 81 mg tablet Take 81 mg by mouth daily.  sodium bicarbonate 650 mg tablet Take 650 mg by mouth two (2) times a day.  diphenhydrAMINE-acetaminophen (TYLENOL PM EXTRA STRENGTH)  mg tab Take 2 Tabs by mouth nightly. Vitals: There were no vitals taken for this visit. Allergies: is allergic to lipitor [atorvastatin]. Physical Exam:  Wounds: clean, dry, no drainage    Lungs: clear to auscultation bilaterally    Heart: regular rate and rhythm, S1, S2 normal, no murmur, click, rub or gallop    Extremities: normal strength, tone, and muscle mass    Assessment/Plan:   1. S/P Impella removal  -Staples removed without issue  2.  Diarrhea  -Hold Zetia and call for cardiology appointment  [facility] for comfort and stay hydrated

## 2020-02-21 NOTE — OP NOTES
[address]  OPERATIVE REPORT    Name:  Lucho Solorio  MR#:  [id number]  :  1945  ACCOUNT #:  [de-identified]  DATE OF SERVICE:  2020    ADDENDUM to Job [id number]; adjustment of preoperative diagnosis    PREOPERATIVE DIAGNOSIS:  Cardiogenic shock, ICD-10-CM Code R57.0.         Sheila Beaulieu MD      [id number]  D:  2020 11:17  T:  2020 12:43  JOB #:  [id number]

## 2021-04-14 NOTE — PROGRESS NOTES
Case Management  Inpatient Rehabilitation Plan of Care and Discharge Plan Note    Rehabilitation Diagnosis:  [facility]  Date of Onset:  4/5/21    Medical Summary:  Patient is a 71-year-old male who was admitted to [facility] on April 5, 2021 with a right frontal intraparenchymal  hemorrhage with surrounding edema and brain compression.  Neurology was  consulted.  Diagnostic angiogram did not reveal any source of the bleeding.  No  aneurysm.  Plans for repeat MRI as an outpatient in 3 months.  Patient was  initially on a Cardene drip for hypertension.  Started on lisinopril which was  titrated up.  Also on a beta-blocker added as well.  He has had as needed  labetalol and hydralazine.  Overall goal systolic blood pressure less than 140.  He had prolonged QT that improved with IV magnesium.  Has had sundowning that  improved with low-dose Seroquel.  His wife is to stay with him the first night  on the rehab unit.  He is in a room close to the nurses station.  Emphysema was  noted on his chest x-ray.  Pulmonary recommended outpatient pulmonary function  test and also 6-minute walk test before discharge from rehab.  ?  The patient has shown improvement in his cognition.  He is oriented.  Does not  describe any significant headache.  Does not describe any focal vision  complaints or focal weakness or numbness.  He has had some impulsivity.  His  wife attributes some of that to being on the diuretic at the outside hospital.  Has had expressive aphasia that shown improvement.  He is ambulated  contact-guard min assist, unsteady at times.  Required assistance with lower  body activities of daily living.  Past Medical History: Past Medical History:  DiagnosisDate  ?Arthritis?  ?Asthma?  ?Elevated cholesterol?  ?Hypertension?  ?Stroke (CMS/HCC)?    ?  ?  Surgical HistoryExpand by Default  Past Surgical History:  ProcedureLateralityDate  ?ABDOMINAL SURGERY??  ?APPENDECTOMY??  ?COLONOSCOPY??  ?EYE  Problem: Pressure Injury - Risk of  Goal: *Prevention of pressure injury  Description  Document Aj Scale and appropriate interventions in the flowsheet.   Outcome: Progressing Towards Goal  Note: Pressure Injury Interventions:  Sensory Interventions: Assess changes in LOC    Moisture Interventions: Absorbent underpads    Activity Interventions: Increase time out of bed    Mobility Interventions: HOB 30 degrees or less, Pressure redistribution bed/mattress (bed type), PT/OT evaluation    Nutrition Interventions: Document food/fluid/supplement intake, Discuss nutritional consult with provider, Offer support with meals,snacks and hydration    Friction and Shear Interventions: HOB 30 degrees or less SURGERY??  ?FRACTURE SURGERY??  ?TONSILLECTOMY    Plan of Care  Updated Problems/Interventions      Expected Intensity:  Average of 3 hours of therapy 5 days/week.  Interdisciplinary Team:  Interdisciplinary Team: Medical Supervision and 24 Hour Rehabilitation Nursing.,  Physical Therapy:, Occupational Therapy:, Speech and Language Therapy:, Social  Work, Therapeutic Recreation., Psychology.  Physical Therapy Intensity/Duration: 1 hour/day, 5 days/week for approximately 8  days  Occupational Therapy Intensity/Duration: 1 hour/day, 5 days/week for  approximately 8 days  Speech Language Pathology  Intensity/Duration: 1 hour/day, 5 days/week for  approximately 8 days  Estimated Length of Stay/Anticipated Discharge Date: ELOS: 10 days  Anticipated Discharge Destination:  Anticipated discharge destination from inpatient rehabilitation is community  discharge with assistance. Home with spouse      Based on the patient's medical and functional status, their prognosis and  expected level of functional improvement is:  Goals are to achieve a level of  supervision with  mobility and self-care and improved balance.   Rehabilitation  prognosis fair.  Medical prognosis fair.    Signed by: Kt Villanueva RN

## 2021-12-16 NOTE — PROGRESS NOTES
Bedside shift change report given to Elder De Paz (oncoming nurse) by Lloyd Saravia (offgoing nurse). Report included the following information  ED summary, OR summary and MAR. Mohs Method Verbiage: An incision at a 65 degree angle following the standard Mohs approach was done and the specimen was harvested as a microscopic controlled layer.

## 2022-01-24 ENCOUNTER — TELEPHONE (OUTPATIENT)
Dept: PALLATIVE CARE | Age: 77
End: 2022-01-24

## 2022-01-24 NOTE — TELEPHONE ENCOUNTER
[facility]  Nursing Note  [phone]  Fax [phone]     Name:  Anand Coon  YOB: 1945     Received incoming call from patient's daughter Fay Samuels. She is requesting outpatient Palliative services for her father Joe Urena. She states that he has COPD and CHF. She says that her father's cardiologist, Dr. Prasanth Crowell at [place] [address], told him that Palliative care would be good idea. Ms. Jc Frazier says that it is taxing for her father to get out of the house and he lives in [address], a 35 minute drive to [facility] so  virtual visits would be preferred. She says they can sign paperwork via [employer]. Ms. Jc Frazier states that Dr. Paula Domínguez cared for her mother who  in 2021. Authorization to Release Health Information form emailed to Ms. Samuels at [email or web address]     She states her father will sign and return it so this nurse can request records from Dr. Analy Ferreira and from patient's PCP.     Azeb Link, LAVELLEN, RN  Palliative Medicine  [phone]

## 2022-01-27 ENCOUNTER — TELEPHONE (OUTPATIENT)
Dept: PALLATIVE CARE | Age: 77
End: 2022-01-27

## 2022-01-27 NOTE — TELEPHONE ENCOUNTER
[facility] Palliative Medicine Office  Nursing Note  [phone]  Fax [phone]     Name:  Brooks Shankar  YOB: 1945     Returned call to April Arvind. See this nurse's note from 1/24/22. Ms. De Prado called on 1/24/22 requesting outpatient Palliative services for her father Aileen Parker. She stated that he has COPD and CHF. She said that her father's cardiologist, Dr. Tyrone Omalley at [place] [address], told him that Palliative care would be good idea. Ms. Kc Speaks requests virtual visits due to patient's debility and him living 35 minutes from the [id number] [place] [address] palliative office.     Authorization to Release Health Information form was emailed to Ms. Samuels at [email or web address]    Patient signed it and she returned it on 1/24/22 via [employer]. This nurse faxed requests for records from Dr. Jj Winters and from patient's PCP at [facility]. After receiving and reviewing records this nurse will review appointment request with Dr. Wendy Gibson. As of today (1/27/22),  have not yet received records from either practice. Ms. Kc Speaks calls today stating that Mr. Demarco Horvath was having trouble breathing and went to the [facility] last night. She states that his oxygen sats were 82% on room air and they sent him home with oxygen. She now thinks that her father would qualify for hospice and she requests that this nurse help her with a hospice referral.    This nurse called Perico Epperson RN @ [facility]. They do not service patient's area in [place] [address]. She suggested [facility]. This nurse called [facility] [phone] and spoke with Arden. They do service patient's area. Their fax # is [phone]. This nurse called [facility] and left a message on the nurse line requesting that someone call back today regarding a request for a hospice order from patient's PCP. This nurse called Ms. Samuels and informed her of above. This nurse will call back and update her after speaking with PCP office about a hospice order.       LAVELLE MejiaN, RN  Palliative Medicine  [phone]

## 2022-01-28 ENCOUNTER — TELEPHONE (OUTPATIENT)
Dept: PALLATIVE CARE | Age: 77
End: 2022-01-28

## 2022-01-28 NOTE — TELEPHONE ENCOUNTER
[facility] Palliative Medicine Office  Nursing Note  [phone]  Fax [phone]     Name:  Pradip Maldonado  YOB: 1945     Returned call to Ga at [facility]. She states that they sent a hospice referral to [facility]. This nurse informed Ga that she spoke with Dasia Shelby at [facility] yesterday and they do not service [facility]'s geographic area. [facility] does. Their intake contact is Rochelle Barkley, phone # [phone] and their fax # is [phone]. Ga says she will fax the hospice order to [facility]. This nurse returned call to patient's daughter April Null and informed her of above.     LAVELLE IbarraN, RN  Palliative Medicine  [phone]

## 2022-01-28 NOTE — TELEPHONE ENCOUNTER
Kevin Edouard with [facility] in [facility] is calling to confirm with Steven Edmondson the order/referral was for Hospice and not anything else.   # [id number]

## 2023-04-11 NOTE — PROGRESS NOTES
Cardiac Surgery Preoperative Note    Admit Date: 1/26/2020      Plan/Recommendations/Medical Decision Making:     Pre-surgical work up in progress    Walked in room only: needs to walk in lee    No Plavix per wife for 2-3 months    CTA:  Right middle lobe and bilateral lower lobe atelectasis  versus pneumonia  Right vertebral stenosis: intact [place] of [place]? Rectoperineal bleeding, heparin infusion stopped  Infrarenal abdominal aortic aneurysm measures 5.1 cm  Right common iliac artery aneurysm measures 3.3 cm  Celiac artery: Patent with mild to moderate ostial stenosis  SMA: Patent with mild to moderate proximal atherosclerotic narrowing  Renal arteries: Bilateral renal arteries are patent with moderate right and  moderate severe left proximal atherosclerosis: Need Renal consult in conjunction with compensated respiratory acidosis? [facility]: Occluded. Compensated respiratory acidosis? Nicotine with unstable angina poses spasm risk: patient demands patch. Reviewed B-Blocker/aspirin/statin/ACE criteria    Stop anticoagulation pre-op    Patient seen and reviewed with Dr. Fay Leslie MD        Assessment:     Active Problems:    STEMI (ST elevation myocardial infarction) ([facility] [address] (1/26/2020)      CAD (coronary artery disease) ()      Overview: 2006 - s/p stent           Summary:     Stable hemodynamics in sinus rhythm without ectopy on no support. No chest pain or shortness of breath. Objective:     FEV1 1.7 FVC 2.23  ABG 7.40/ 30/70/19/-6 room air    Echo:1/28/20    · Normal cavity size. Mild concentric hypertrophy. Moderate systolic dysfunction. Estimated left ventricular ejection fraction is 40 - 45%. Visually measured ejection fraction. Abnormal left ventricular wall motion. · Trace mitral valve regurgitation is present. · Mild ricuspid valve regurgitation is present.       TSH 3.13    A1C 5.8    Troponin 11 (CKD)      Visit Vitals  /61 (BP 1 Location: Left arm, BP Patient Position: Sitting)   Pulse PATIENT INFORMATION      -- Fasting labwork has been ordered for you.  General patient information when having a lab test:  Fasting - No caloric intake (WATER IS OKAY) for a minimum of 8-10 hours before your blood draw:  Tests that commonly require fasting are:  Cholesterol (lipid panel)  Basic chemistry panel  Comprehensive chemistry panel  Glucose (blood sugar)   Medicine - You can take any prescribed or routine medicine during your fast.  Brushing Teeth - You can brush your teeth even if you are fasting.  Getting Your Results - Your doctor’s office will notify you of your results within 10 working days.      Follow-Up  -- Make an appointment with Luis Alfredo Crouch MD in  one year     Additional Educational Resources:  For additional resources regarding your symptoms, diagnosis, or further health information, please visit the Health Resources section on [email or web address] or the Online Health Resources section in [email or web address].     61   Temp 98.4 °F (36.9 °C)   Resp 18   Ht 5' 9\" (1.753 m)   Wt 230 lb 13.2 oz (104.7 kg)   SpO2 95%   BMI 34.09 kg/m²       Temp (24hrs), Av °F (36.7 °C), Min:97.7 °F (36.5 °C), Max:98.4 °F (36.9 °C)      Last 3 Recorded Weights in this Encounter    20 0559   Weight: 238 lb 1.6 oz (108 kg) 230 lb 13.2 oz (104.7 kg)       Lab Data Reviewed:   Recent Labs     20  0552   WBC 7.6   HGB 10.8*   HCT 33.4*      CREA 1.50*       CXR Results  (Last 48 hours)    None          Last 24hr Input/Output:    Intake/Output Summary (Last 24 hours) at 2020 7745  Last data filed at 2020 5001  Gross per 24 hour   Intake 1213.03 ml   Output 800 ml   Net 413.03 ml          Admission Weight: Last Weight   Weight: 238 lb 1.6 oz (108 kg) Weight: 230 lb 13.2 oz (104.7 kg)       EXAM:      Lungs:   Clear to auscultation bilaterally. Heart:  Regular rate and rhythm, S1, S2 normal, no murmur, no click, no rub      Abdomen:   Soft, non-tender. Bowel sounds present. No masses,  No organomegaly. Extremities:  No edema     Neurologic:  Gross motor and sensory apparatus intact.          Signed By: GEE Bennett

## 2024-03-17 NOTE — MR AVS SNAPSHOT
Family Medicine - Progress Note      SUBJECTIVE:     No acute overnight events reported by night team. Patient with increased pain 2/2 chronic processes, Norco increased. Patient resting comfortably on examination. Satting well on RA. States SOB and cough improved, BLE edema improved as well but still concerned about the amount of edema he still has. OBJECTIVE:     Vitals:   Vitals:    03/16/24 2152   BP:    Pulse:    Resp: 18   Temp:        I/O:   Intake/Output Summary (Last 24 hours) at 3/16/2024 2255  Last data filed at 3/16/2024 1822  Gross per 24 hour   Intake 1380 ml   Output 3 ml   Net 1377 ml         Physical Exam:   Vitals and nursing note reviewed. Constitutional:    HENT:      Head:      Comments: wound to L forehead from previous traumatic fall, scabbed over with no new bleeding or other discharge  Eyes:      Extraocular Movements: Extraocular movements intact. Conjunctiva/sclera: Conjunctivae normal.   Cardiovascular:      Rate and Rhythm: Normal rate and rhythm      Heart sounds: No murmur heard. Pulmonary:      Effort: mild tachypnea present, paty with exertion/movement     Breath sounds: Wheezing, expiratory, diffuse, improving from prior   Rhonchi improved from day prior   Abdominal:      General: There is no distension. Tenderness: There is no abdominal tenderness. Musculoskeletal:      Right lower leg: Edema (2+ up to ankle) present. Left lower leg: Edema (2+ up to ankle) present. Comments: R clavicle displaced   Skin:     Capillary Refill: Capillary refill takes less than 2 seconds. Neurological:      General: No focal deficit present. Mental Status: He is alert. Psychiatric:         Mood and Affect: Mood normal.         Behavior: Behavior normal.         Thought Content:  Thought content normal.    Labs:   Recent Results (from the past 24 hour(s))   Magnesium    Collection Time: 03/16/24  4:37 AM   Result Value Ref Range    Magnesium 1.6 (L) 1.7 - 2.4 mg/dL Visit Information Date & Time Provider Department Dept. Phone Encounter #  
 6/30/2017  3:00 PM Maehsh Landis, [address] [facility] [phone] [id number] Upcoming Health Maintenance Date Due DTaP/Tdap/Td series (1 - Tdap) 8/13/1964 ZOSTER VACCINE AGE 60> 8/13/2003 GLAUCOMA SCREENING Q2Y 8/13/2008 Pneumococcal 65+ High/Highest Risk (1 of 2 - PCV13) 8/13/2008 MEDICARE YEARLY EXAM 8/13/2008 INFLUENZA AGE 9 TO ADULT 8/1/2017 FOBT Q 1 YEAR AGE 50-75 4/3/2018 Allergies as of 6/30/2017  Review Complete On: 6/30/2017 By: Mahesh Landis MD  
  
 Severity Noted Reaction Type Reactions Lipitor [Atorvastatin]  04/01/2017    Other (comments) \"muscle pain\" Current Immunizations  Never Reviewed No immunizations on file. Not reviewed this visit You Were Diagnosed With   
  
 Codes Comments Coronary artery disease involving native coronary artery of native heart without angina pectoris    -  Primary ICD-10-CM: I25.10 ICD-9-CM: 414.01 Dyslipidemia     ICD-10-CM: E78.5 ICD-9-CM: 272.4 S/P PTCA (percutaneous transluminal coronary angioplasty)     ICD-10-CM: Z98.61 ICD-9-CM: V45.82 Myocardial ischemia     ICD-10-CM: I25.9 ICD-9-CM: 414.8 Carotid stenosis, right     ICD-10-CM: S39.06 ICD-9-CM: 433.10 Tobacco use disorder     ICD-10-CM: F17.200 ICD-9-CM: 305.1 PAD (peripheral artery disease) (HCC)     ICD-10-CM: I73.9 ICD-9-CM: 443. 9 Abdominal aortic aneurysm (AAA) without rupture (HCC)     ICD-10-CM: I71.4 ICD-9-CM: 307. 4 Vitals BP Pulse Resp Height(growth percentile) Weight(growth percentile) SpO2  
 150/80 (BP 1 Location: Right arm, BP Patient Position: Sitting) 70 20 5' 9\" (1.753 m) 225 lb 4.8 oz (102.2 kg) 97% BMI Smoking Status 33.27 kg/m2 Current Every Day Smoker Vitals History BMI and BSA Data  Body Mass Index Body Surface Area  
 33.27 kg/m 2 2.23 m 2  
  
 Comprehensive Metabolic Panel    Collection Time: 03/16/24  4:37 AM   Result Value Ref Range    Fasting Status      Sodium 137 135 - 145 mmol/L    Potassium 3.8 3.4 - 5.1 mmol/L    Chloride 94 (L) 97 - 110 mmol/L    Carbon Dioxide 38 (H) 21 - 32 mmol/L    Anion Gap 9 7 - 19 mmol/L    Glucose 101 (H) 70 - 99 mg/dL    BUN 29 (H) 6 - 20 mg/dL    Creatinine 0.77 0.67 - 1.17 mg/dL    Glomerular Filtration Rate >90 >=60    BUN/Cr 38 (H) 7 - 25    Calcium 8.3 (L) 8.4 - 10.2 mg/dL    Bilirubin, Total 0.8 0.2 - 1.0 mg/dL    GOT/AST 25 <=37 Units/L    GPT/ALT 36 <64 Units/L    Alkaline Phosphatase 78 45 - 117 Units/L    Albumin 2.7 (L) 3.6 - 5.1 g/dL    Protein, Total 6.8 6.4 - 8.2 g/dL    Globulin 4.1 (H) 2.0 - 4.0 g/dL    A/G Ratio 0.7 (L) 1.0 - 2.4   CBC with Automated Differential (performable only)    Collection Time: 03/16/24  4:37 AM   Result Value Ref Range    WBC 11.7 (H) 4.2 - 11.0 K/mcL    RBC 3.75 (L) 4.50 - 5.90 mil/mcL    HGB 8.0 (L) 13.0 - 17.0 g/dL    HCT 29.8 (L) 39.0 - 51.0 %    MCV 79.5 78.0 - 100.0 fl    MCH 21.3 (L) 26.0 - 34.0 pg    MCHC 26.8 (L) 32.0 - 36.5 g/dL    RDW-CV 28.8 (H) 11.0 - 15.0 %    RDW-SD 76.4 (H) 39.0 - 50.0 fL     (L) 140 - 450 K/mcL    NRBC 0 <=0 /100 WBC    Neutrophil, Percent 83 %    Lymphocytes, Percent 8 %    Mono, Percent 8 %    Eosinophils, Percent 0 %    Basophils, Percent 0 %    Immature Granulocytes 1 %    Absolute Neutrophils 9.7 (H) 1.8 - 7.7 K/mcL    Absolute Lymphocytes 0.9 (L) 1.0 - 4.0 K/mcL    Absolute Monocytes 1.0 (H) 0.3 - 0.9 K/mcL    Absolute Eosinophils  0.0 0.0 - 0.5 K/mcL    Absolute Basophils 0.0 0.0 - 0.3 K/mcL    Absolute Immature Granulocytes 0.1 0.0 - 0.2 K/mcL       Imaging:   US VASC LOWER EXTREMITY VENOUS DUPLEX BILATERAL   Final Result   No evidence for bilateral lower extremity DVT. Electronically Signed by: Chavez Adames M.D.     Signed on: 3/14/2024 1:08 PM    Workstation ID: [id number]      XR CHEST PA OR AP 1 VIEW   Final Preferred Pharmacy Pharmacy Name Phone [id number] [phone] Your Updated Medication List  
  
   
This list is accurate as of: 6/30/17  3:59 PM.  Always use your most recent med list.  
  
  
  
  
 aspirin delayed-release 81 mg tablet Take 2 Tabs by mouth daily. CRANBERRY PO Take  by mouth. 25,000 per day. FISH -1,000 mg Cap Generic drug:  fish oil-omega-3 fatty acids Take  by mouth. hydroCHLOROthiazide 25 mg tablet Commonly known as:  HYDRODIURIL Take 25 mg by mouth daily. HYDROcodone-acetaminophen 5-325 mg per tablet Commonly known as:  Maryfrances Grumbles Take 1 Tab by mouth every four (4) hours as needed for Pain. Max Daily Amount: 6 Tabs. Iron 325 mg (65 mg iron) tablet Generic drug:  ferrous sulfate Take  by mouth Daily (before breakfast). metoprolol succinate 25 mg XL tablet Commonly known as:  TOPROL-XL Take 25 mg by mouth daily. PLAVIX 75 mg Tab Generic drug:  clopidogrel Take  by mouth daily. PROTONIX 40 mg tablet Generic drug:  pantoprazole Take 40 mg by mouth daily. TYLENOL PM EXTRA STRENGTH  mg Tab Generic drug:  diphenhydrAMINE-acetaminophen Take  by mouth. 2 QHS  
  
 VITAMIN C 500 mg tablet Generic drug:  ascorbic acid (vitamin C) Take  by mouth. ZANTAC 150 mg tablet Generic drug:  raNITIdine Take 150 mg by mouth daily. zolpidem 5 mg tablet Commonly known as:  AMBIEN Take 1 Tab by mouth nightly as needed for Sleep. Max Daily Amount: 5 mg. We Performed the Following AMB POC EKG ROUTINE W/ 12 LEADS, INTER & REP [10436 CPT(R)] Introducing [facility]! Belinda Berman introduces [employer] patient portal. Now you can access parts of your medical record, email your doctor's office, and request medication refills online.    
 
1. In your internet browser, go to Result   FINDINGS/IMPRESSION:         Normal heart size. Shift of cardiac silhouette to the right unchanged. Elevation right hemidiaphragm slightly worsened. Right middle lobe atelectasis unchanged. Patchy ill-defined opacities right lower lobe appear improved but   evaluation of right lung base is slightly limited due to elevated right   hemidiaphragm. Correlate clinically. Continued follow-up recommended. Small right effusion relatively unchanged. Lucita Haney M.D., have reviewed the images and report and concur   with these findings interpreted by Dottie Frank MD.      Electronically Signed by: Tutu Richey M.D. Signed on: 3/14/2024 1:43 PM    Workstation ID: [id number]       VASC LOWER EXTREMITY ARTERIES DUPLEX BILATERAL    (Results Pending)        ASSESSMENT & PLAN:     Patient is a 77year old male with PMHx significant for PMHx significant for hypertension, atrial fibrillation currently on Xarelto, congestive heart failure, COPD, ascending aortic aneurysm status postrepair, abdominal aortic aneurysm status postrepair, CAD status post CABG presenting with complaints of dizziness, lower extremity swelling and generalized weakness. Pending further eval with TTE for HFrEF and cardiology recs for diuresis, treating for COPD exacerbation. Hgb decreased to 7.7, received repletion with IV venofer. Now improving with regular diuresis and monitoring O2 requirement, alternative factors contributing to LE swelling. Principal Problem:    Metabolic alkalosis  Active Problems:    Essential hypertension    Paroxysmal atrial fibrillation (CMD)    Other emphysema (CMD)    COPD (chronic obstructive pulmonary disease) (CMD)    CAD s/p 1v CABG with SVG to LCX.       #dizziness   -check orthostatic vital signs wnl on admit.  - had recent admission 2/2023 with EP consult and found to have likely orthostatic hypotension   -PT/OT   - suspect possible component of [email or web address] 2. Click on the First Time User? Click Here link in the Sign In box. You will see the New Member Sign Up page. 3. Enter your [employer] Access Code exactly as it appears below. You will not need to use this code after youve completed the sign-up process. If you do not sign up before the expiration date, you must request a new code. · [employer] Access Code: [email or web address] Expires: 6/30/2017  6:26 PM 
 
4. Enter the last four digits of your Social Security Number (xxxx) and Date of Birth (mm/dd/yyyy) as indicated and click Submit. You will be taken to the next sign-up page. 5. Create a [employer] ID. This will be your [employer] login ID and cannot be changed, so think of one that is secure and easy to remember. 6. Create a [employer] password. You can change your password at any time. 7. Enter your Password Reset Question and Answer. This can be used at a later time if you forget your password. 8. Enter your e-mail address. You will receive e-mail notification when new information is available in [address] 9. Click Sign Up. You can now view and download portions of your medical record. 10. Click the Download Summary menu link to download a portable copy of your medical information. If you have questions, please visit the Frequently Asked Questions section of the [employer] website. Remember, [employer] is NOT to be used for urgent needs. For medical emergencies, dial 911. Now available from your iPhone and Android! Please provide this summary of care documentation to your next provider. Your primary care clinician is listed as Genaro Sanches. If you have any questions after today's visit, please call [phone]. hypoxia/hypercarbia contributing as well, as patient reports much improved     #AHRF 2/2 COPD exacerbation vs HF exacerbation    #BLE edema   #HFpEF   #COPD   -possible HF exacerbation vs hypoalbuminemia contributing to lower extremity edema   -EF 60%, aortic regurg, aortic root dilated 4.4cm, ascending aorta 4.8cm, descending aorta 4.3cm, R atrial dilatation, RV dilation   -BNP 2744, Trop negative   -CXR with RLL opacities improved from prior CXR   -LE Duplex negative for DVT   Plan:   -continue torsemide 20 TID as well as home metoprolol dose 25mg BID   -cardiology consulted, appreciate recs   -repeat TTE showing HFpEF, with EF of 65%, increase in aorta size. Rec CTA followup  -scheduled duonebs vs just ipratropium  -continue Augmentin to complete course for CAP, 7d total through 3/19  -prednisone 40mg x5 days   -walk test prior to discharge   -continue home symbicort   -will perform US arterial doppler LE to assess for possible PAD contributing to persistent LE edema.    - home health nursing ordered to assess meds with fam, PT      #metabolic alkalosis   -CO2 43-44, now improved to 38   -most likely related to diuretic use   Plan:   -continue to monitor      #ST/T wave changes    #CAD s/p CABG   -new T wave inversions on EKG from ED   -no new chest pain or pressure, epigastric pain or other new sx   -pt with chronic CP/MSK pain   Plan:   -monitor on tele   -cardiology consulted, appreciate recs   -continue ASA, statin      #paroxysmal afib on Xarelto   -s/p ablation x2   -admission in 2/2023 due to trauma, discussions at that time if pt appropriate for watchman procedure due to high fall risk                S/p L atrial appendage surgical closure   -started on Digoxin, level therapeutic in the ED.    -continue Xarelto, and home dose Toprol   - no events on tele over admit thus far     #HTN   -Metoprolol recently reduced from 50mg to 25mg daily   -not tachycardic thus far during hospitalization     #hypokalemia #hypomagnesemia   -continue to monitor, replete prn      #abdominal and ascending aortic aneurysms s/p repair   -continue ASA and statin   -ECHO 3/16 showing interval increase in aorta size. Rec CTA followup     #microcytic iron deficiency anemia  -Hgb 8.1 on admission, now decreased to 7.7  -iron studies c/w iron deficiency anemia (iron     Fluids: SLIV  Electrolytes: Monitor; Replete PRN. Nutrition: Cardiac    DVT Prophylaxis: SCD's, Xarelto    Code Status: Full Resuscitation   Emergency Contact: Extended Emergency Contact Information  Primary Emergency Contact: [facility]  Address: [address] [place] [address]  Home Phone: [phone]  Mobile Phone: [phone]  Relation: Spouse  Primary Care Physician: Amarilys Leung MD    Case d/w attending.      Jeramie Michelle MD  FM PGY-2   Please contact via EXO5

## 2025-02-18 NOTE — Clinical Note
Left femoral artery. Accessed successfully. using fluoro guidance. Pt chart reviewed and discussed in Critical Care Interdisciplinary Rounds. LOS 3 days. Pt admitted with hypernatremia, septicemia, JALEN, and acute renal failure. Per MD, Fanny 156; SLP following; pt not taking oral medications; anticipated discharge in at least 48 hours. Pt not medically stable for discharge.     HCPOA paying privately to hold LTC bed.    DC/POC to return to LTC at [facility] via [facility] transport when medically stable.     CM team will continue to follow for potential discharge needs that may arise.     TERE Welch, LBSW  ICU   [facility]

## (undated) DEVICE — Device

## (undated) DEVICE — SOLIDIFIER MEDC 1200ML -- CONVERT TO 356117

## (undated) DEVICE — KIT ANGIOGRAPHY CUST MRMC

## (undated) DEVICE — GUIDEWIRE AMPLATZ SUPER STIFF J-TIP 0.035 X 260CM 3CM TIP

## (undated) DEVICE — BASIN EMESIS 500CC ROSE 250/CS 60/PLT: Brand: MEDEGEN MEDICAL PRODUCTS, LLC

## (undated) DEVICE — TOWEL,OR,DSP,ST,BLUE,STD,2/PK,40PK/CS: Brand: MEDLINE

## (undated) DEVICE — INFECTION CONTROL KIT SYS

## (undated) DEVICE — SUTURE PROL SZ 5-0 L30IN NONABSORBABLE BLU L13MM RB-2 1/2 8710H

## (undated) DEVICE — SUTURE PERMA-HAND 0 L18IN NONABSORBABLE BLK CT-1 L36MM 1/2 C021D

## (undated) DEVICE — TTL1LYR 16FR10ML 100%SIL TMPST TR: Brand: MEDLINE

## (undated) DEVICE — COVER,TABLE,HEAVY DUTY,60"X90",STRL: Brand: MEDLINE

## (undated) DEVICE — NEEDLE HYPO 18GA L1.5IN PNK S STL HUB POLYPR SHLD REG BVL

## (undated) DEVICE — COVER LT HNDL PLAS RIG 1 PER PK

## (undated) DEVICE — PINNACLE R/O II INTRODUCER SHEATH WITH RADIOPAQUE MARKER: Brand: PINNACLE

## (undated) DEVICE — ZINACTIVE USE 2641837 CLIP LIG M BLU TI HRT SHP WIRE HORZ 600 PER BX

## (undated) DEVICE — PACK PROCEDURE SURG HRT CATH

## (undated) DEVICE — Device: Brand: CONFIANZA PRO

## (undated) DEVICE — SET ADMIN 16ML TBNG L100IN 2 Y INJ SITE IV PIGGY BK DISP

## (undated) DEVICE — MINI TREK CORONARY DILATATION CATHETER 2.0 MM X 12 MM / RAPID-EXCHANGE: Brand: MINI TREK

## (undated) DEVICE — PLEDGET SURG W3/16XL0.25IN THK1.65MM PTFE OVL FELT FOR THE

## (undated) DEVICE — KENDALL RADIOLUCENT FOAM MONITORING ELECTRODE RECTANGULAR SHAPE: Brand: KENDALL

## (undated) DEVICE — NEONATAL-ADULT SPO2 SENSOR: Brand: NELLCOR

## (undated) DEVICE — 3M™ IOBAN™ 2 ANTIMICROBIAL INCISE DRAPE 6651EZ: Brand: IOBAN™ 2

## (undated) DEVICE — SYR 10ML LUER LOK 1/5ML GRAD --

## (undated) DEVICE — SUTURE PERMAHAND SZ 2-0 L30IN NONABSORBABLE BLK SILK W/O A305H

## (undated) DEVICE — GLOVE SURG SZ 7.5 L11.2IN THK9.8MIL STRW LTX POLYMER BEAD

## (undated) DEVICE — Z DISCONTINUED PER MEDLINE LINE GAS SAMPLING O2/CO2 LNG AD 13 FT NSL W/ TBNG FILTERLINE

## (undated) DEVICE — DRESSING SIL W4XL5IN ANTIBACT GELLING FBR CYTOFORM

## (undated) DEVICE — DRAPE FLD WRM W44XL66IN C6L FOR INTRATEMP SYS THERMABASIN

## (undated) DEVICE — RADIFOCUS GLIDEWIRE ADVANTAGE GUIDEWIRE: Brand: GLIDEWIRE ADVANTAGE

## (undated) DEVICE — TRAY CATHETER 16FR F INCLUDE LUB URIN M TEMP SENS 2 STATLOK STBL

## (undated) DEVICE — GOWN,SIRUS,NONRNF,SETINSLV,XL,20/CS: Brand: MEDLINE

## (undated) DEVICE — GUIDEWIRE VASC L145CM 0.035IN J TIP L3MM PTFE FIX COR NAMIC

## (undated) DEVICE — FLEXIBLE YANKAUER,MEDIUM TIP, NO VACUUM CONTROL: Brand: ARGYLE

## (undated) DEVICE — SOLUTION IV 500ML 0.9% SOD CHL FLX CONT

## (undated) DEVICE — FORCEPS BX L160CM DIA8MM GRSP DISECT CUP TIP NONLOCKING ROT

## (undated) DEVICE — GUIDEWIRE VASC L150CM DIA0.035IN FLX END L7CM J 3MM PTFE

## (undated) DEVICE — TORCON NB, ADVANTAGE CATHETER: Brand: TORCON NB

## (undated) DEVICE — PINNACLE INTRODUCER SHEATH: Brand: PINNACLE

## (undated) DEVICE — BASIN EMSIS 16OZ GRAPHITE PLAS KID SHP MOLD GRAD FOR ORAL

## (undated) DEVICE — LABEL MED CARD MRMC STRL

## (undated) DEVICE — CV INCISE SHEET: Brand: CONVERTORS

## (undated) DEVICE — BAG SPEC BIOHZRD 10 X 10 IN --

## (undated) DEVICE — SPONGE LAP 18X18IN STRL -- 5/PK

## (undated) DEVICE — 1200 GUARD II KIT W/5MM TUBE W/O VAC TUBE: Brand: GUARDIAN

## (undated) DEVICE — STERILE POLYISOPRENE POWDER-FREE SURGICAL GLOVES: Brand: PROTEXIS

## (undated) DEVICE — SPONGE GZ W4XL4IN COT 12 PLY TYP VII WVN C FLD DSGN

## (undated) DEVICE — Device: Brand: PROWATER

## (undated) DEVICE — SUTURE PROL SZ 1 L30IN NONABSORBABLE BLU L40MM CT 1/2 CIR 8435H

## (undated) DEVICE — CATH IV AUTOGRD BC PNK 20GA 25 -- INSYTE

## (undated) DEVICE — CATHETER ANGIO 5FR L70CM GWIRE 0.035IN 1CM SPC OMNI FLSH 21

## (undated) DEVICE — SYR 3ML LL TIP 1/10ML GRAD --

## (undated) DEVICE — DRESSING HEMOSTATIC SFT INTVENT W/O SLT DBL WRP QUIKCLOT LF

## (undated) DEVICE — CATHETER ETER CARD MULTIPAK MULTIPAK 5FR PERFORMA

## (undated) DEVICE — SURGICAL PROCEDURE PACK BASIN MAJ SET CUST NO CAUT

## (undated) DEVICE — TUBING, SUCTION, 1/4" X 10', STRAIGHT: Brand: MEDLINE

## (undated) DEVICE — REM POLYHESIVE ADULT PATIENT RETURN ELECTRODE: Brand: VALLEYLAB

## (undated) DEVICE — TOWEL SURG W17XL27IN STD BLU COT NONFENESTRATED PREWASHED

## (undated) DEVICE — MEDI-VAC YANK SUCT HNDL W/TPRD BULBOUS TIP: Brand: CARDINAL HEALTH

## (undated) DEVICE — BLADE ELECTRODE: Brand: EDGE

## (undated) DEVICE — ELECTROSURGICAL DEVICE HOLSTER;FOR USE WITH MAXIMUM PEAK VOLTAGE OF 4000 V: Brand: FORCE TRIVERSE

## (undated) DEVICE — VASCULAR-RICHMOND-LF: Brand: MEDLINE INDUSTRIES, INC.

## (undated) DEVICE — CLIP LIG L TI MTL LIG SYS 24 COUNT HORZ

## (undated) DEVICE — SUT PROL 3-0 36IN SH DA BLU --

## (undated) DEVICE — NC TREK CORONARY DILATATION CATHETER 4.0 MM X 8 MM / RAPID-EXCHANGE: Brand: NC TREK

## (undated) DEVICE — YANKAUER BULB TIP, NO VENT: Brand: ARGYLE

## (undated) DEVICE — CLIP INT SM WIDE RED TI TRNSVRS GRV CHEVRON SHP W PRECIS

## (undated) DEVICE — SUT SLK 2 60IN TIE MP BLK --

## (undated) DEVICE — FOGARTY - HYDRAGRIP SURGICAL - CLAMP INSERTS: Brand: FOGARTY SOFTJAW

## (undated) DEVICE — ROCKER SWITCH PENCIL BLADE ELECTRODE, HOLSTER: Brand: EDGE

## (undated) DEVICE — SUTURE PROL SZ 6-0 L30IN NONABSORBABLE BLU L13MM RB-2 1/2 8711H

## (undated) DEVICE — CATHETER VENT ASST L PERC IMPELLA 5.0

## (undated) DEVICE — SUTURE VCRL SZ 0 L36IN ABSRB VLT L36MM CT-1 1/2 CIR J346H

## (undated) DEVICE — (D)PREP SKN CHLRAPRP APPL 26ML -- CONVERT TO ITEM 371833

## (undated) DEVICE — CONTAINER SPEC 20 ML LID NEUT BUFF FORMALIN 10 % POLYPR STS

## (undated) DEVICE — TREK CORONARY DILATATION CATHETER 2.50 MM X 12 MM / RAPID-EXCHANGE: Brand: TREK

## (undated) DEVICE — TOWEL 4 PLY TISS 19X30 SUE WHT

## (undated) DEVICE — SOLUTION IV 1000ML 0.9% SOD CHL

## (undated) DEVICE — CATHETER ANGIO AL2 AD 5 FRX100 CM 5 CM PERFORMA

## (undated) DEVICE — STAPLER SKIN L320MM 35MM VASC TISS 12 FIRING B FRM PWR

## (undated) DEVICE — MINI TREK CORONARY DILATATION CATHETER 1.20 MM X 12 MM / RAPID-EXCHANGE: Brand: MINI TREK

## (undated) DEVICE — 3M™ TEGADERM™ TRANSPARENT FILM DRESSING FRAME STYLE, 1626W, 4 IN X 4-3/4 IN (10 CM X 12 CM), 50/CT 4CT/CASE: Brand: 3M™ TEGADERM™

## (undated) DEVICE — GUIDEWIRE VASC L260CM DIA0.035IN L7CM DIA3MM J TIP PTFE S

## (undated) DEVICE — STRAP,POSITIONING,KNEE/BODY,FOAM,4X60": Brand: MEDLINE

## (undated) DEVICE — VESSEL LOOPS,MAXI, BLUE: Brand: DEVON

## (undated) DEVICE — 3M™ IOBAN™ 2 ANTIMICROBIAL INCISE DRAPE 6648EZ: Brand: IOBAN™ 2

## (undated) DEVICE — SHEET, T, LAPAROTOMY, STERILE: Brand: MEDLINE

## (undated) DEVICE — SUTURE VCRL SZ 1 L36IN ABSRB VLT L36MM CT-1 1/2 CIR J347H

## (undated) DEVICE — HANDLE LT SNAP ON ULT DURABLE LENS FOR TRUMPF ALC DISPOSABLE

## (undated) DEVICE — FILTER CLP DISP FOR 5513E CLIPVAC

## (undated) DEVICE — AGENT HEMSTAT W4XL4IN OXIDIZED REGENERATED CELOS ABSRB SFT

## (undated) DEVICE — SOLUTION IRRIG 1000ML H2O STRL BLT

## (undated) DEVICE — BLADE ASSEMB CLP HAIR FINE --

## (undated) DEVICE — Z CONVERTED USE 2107985 COVER FLROSCP W36XL28IN 4 SIDE ADH

## (undated) DEVICE — ANGIOGRAPHY KIT CUST [K0910930B] [MERIT MEDICAL SYSTEMS INC]

## (undated) DEVICE — PACK,BASIC,SIRUS,V: Brand: MEDLINE

## (undated) DEVICE — BAG RED 3PLY 2MIL 30X40 IN

## (undated) DEVICE — INTENDED FOR TISSUE SEPARATION, AND OTHER PROCEDURES THAT REQUIRE A SHARP SURGICAL BLADE TO PUNCTURE OR CUT.: Brand: BARD-PARKER ® CARBON RIB-BACK BLADES

## (undated) DEVICE — COVER,TABLE,60X90,STERILE: Brand: MEDLINE

## (undated) DEVICE — Device: Brand: MEDEX

## (undated) DEVICE — DEVON™ KNEE AND BODY STRAP 60" X 3" (1.5 M X 7.6 CM): Brand: DEVON

## (undated) DEVICE — DRAPE,REIN 53X77,STERILE: Brand: MEDLINE

## (undated) DEVICE — DESTINATION RENAL GUIDING SHEATH: Brand: DESTINATION

## (undated) DEVICE — RADIFOCUS GLIDEWIRE: Brand: GLIDEWIRE

## (undated) DEVICE — SYR ART 700 CLEAR MARK 7 -- ARTERION

## (undated) DEVICE — BLOCK BITE ENDOSCP AD 21 MM W/ DIL BLU LF DISP

## (undated) DEVICE — STAPLER SKIN H3.9MM WIRE DIA0.58MM CRWN 6.9MM 35 STPL ROT

## (undated) DEVICE — CATH BLLN PTA .035 8X40X80 -- EVERCROSS OTW

## (undated) DEVICE — GLUE TISS 10ML PLAS STD SPRD TIP SYR PLUNG PREFIL SKIN CLSR 5PK

## (undated) DEVICE — SUT PROL 4-0 36IN RB1 DA BLU --

## (undated) DEVICE — CATHETER ANGIO JR4 0.054 INX6 FRX100 CM 1.9 CM PERFORMA

## (undated) DEVICE — CATHETER BLLN  SPRINTER 138CM 12MM DIA1.5MM CROSSING PROF

## (undated) DEVICE — RELOAD STPL H1-2.5XL35MM VASC THN TISS WHT B FRM NAT ARTC

## (undated) DEVICE — BIPOLAR ELECTROHEMOSTASIS CATHETER: Brand: GOLD PROBE

## (undated) DEVICE — DUAL LUMEN STOMACH TUBE MULTI-FUNCTIONAL PORT: Brand: SALEM SUMP

## (undated) DEVICE — SUTURE VCRL SZ 0 L18IN ABSRB VLT L40MM CT 1/2 CIR J752D

## (undated) DEVICE — MEDI-TRACE CADENCE ADULT, DEFIBRILLATION ELECTRODE -RTS  (10 PR/PK) - PHILIPS: Brand: MEDI-TRACE CADENCE

## (undated) DEVICE — TREK CORONARY DILATATION CATHETER 2.50 MM X 12 MM / OVER-THE-WIRE: Brand: TREK

## (undated) DEVICE — CUSTOM KT PTCA INFL DEV K05 00053H

## (undated) DEVICE — SYR POWER 150ML 8IN FILL TUBE --

## (undated) DEVICE — CATH GUID COR EB35 7FR 100CM -- LAUNCHER

## (undated) DEVICE — (D)SYR 10ML 1/5ML GRAD NSAF -- PKGING CHANGE USE ITEM 338027

## (undated) DEVICE — PART NUMBER 108260, VTI 8 MHZ DISPOSABLE DOPPLER PROBE, STRAIGHT, BOX: Brand: VTI 8 MHZ DISPOSABLE DOPPLER PROBE, STRAIGHT, BOX

## (undated) DEVICE — SUT SLK 2-0SH 30IN BLK --

## (undated) DEVICE — FOGARTY ARTERIAL EMBOLECTOMY CATHETER 4F 80CM: Brand: FOGARTY

## (undated) DEVICE — Z CONVERTED USE 2271365 TRAY SKIN W3XL3IN S STL STPL REMV GZ PD DISP MEDI PK

## (undated) DEVICE — SYR LR LCK 1ML GRAD NSAF 30ML --

## (undated) DEVICE — KIT,1200CC CANISTER,3/16"X6' TUBING: Brand: MEDLINE INDUSTRIES, INC.